# Patient Record
Sex: MALE | Race: WHITE | NOT HISPANIC OR LATINO | URBAN - METROPOLITAN AREA
[De-identification: names, ages, dates, MRNs, and addresses within clinical notes are randomized per-mention and may not be internally consistent; named-entity substitution may affect disease eponyms.]

---

## 2017-01-09 ENCOUNTER — INPATIENT (INPATIENT)
Facility: HOSPITAL | Age: 77
LOS: 2 days | Discharge: NURSING, REHAB OR LONG-TERM CARE | End: 2017-01-12
Attending: HOSPITALIST | Admitting: HOSPITALIST
Payer: MEDICARE

## 2017-01-09 VITALS
TEMPERATURE: 100 F | RESPIRATION RATE: 20 BRPM | DIASTOLIC BLOOD PRESSURE: 70 MMHG | HEART RATE: 76 BPM | SYSTOLIC BLOOD PRESSURE: 143 MMHG | OXYGEN SATURATION: 94 %

## 2017-01-09 DIAGNOSIS — Z87.19 PERSONAL HISTORY OF OTHER DISEASES OF THE DIGESTIVE SYSTEM: Chronic | ICD-10-CM

## 2017-01-09 DIAGNOSIS — J84.10 PULMONARY FIBROSIS, UNSPECIFIED: ICD-10-CM

## 2017-01-09 DIAGNOSIS — A41.9 SEPSIS, UNSPECIFIED ORGANISM: ICD-10-CM

## 2017-01-09 DIAGNOSIS — Z29.9 ENCOUNTER FOR PROPHYLACTIC MEASURES, UNSPECIFIED: ICD-10-CM

## 2017-01-09 DIAGNOSIS — Z90.49 ACQUIRED ABSENCE OF OTHER SPECIFIED PARTS OF DIGESTIVE TRACT: Chronic | ICD-10-CM

## 2017-01-09 DIAGNOSIS — G47.33 OBSTRUCTIVE SLEEP APNEA (ADULT) (PEDIATRIC): ICD-10-CM

## 2017-01-09 DIAGNOSIS — N30.01 ACUTE CYSTITIS WITH HEMATURIA: ICD-10-CM

## 2017-01-09 DIAGNOSIS — N30.00 ACUTE CYSTITIS WITHOUT HEMATURIA: ICD-10-CM

## 2017-01-09 LAB
ALBUMIN SERPL ELPH-MCNC: 3.5 G/DL — SIGNIFICANT CHANGE UP (ref 3.3–5)
ALP SERPL-CCNC: 80 U/L — SIGNIFICANT CHANGE UP (ref 40–120)
ALT FLD-CCNC: 18 U/L — SIGNIFICANT CHANGE UP (ref 4–41)
APPEARANCE UR: SIGNIFICANT CHANGE UP
AST SERPL-CCNC: 28 U/L — SIGNIFICANT CHANGE UP (ref 4–40)
BASE EXCESS BLDV CALC-SCNC: 6 MMOL/L — SIGNIFICANT CHANGE UP
BASOPHILS # BLD AUTO: 0.04 K/UL — SIGNIFICANT CHANGE UP (ref 0–0.2)
BASOPHILS NFR BLD AUTO: 0.2 % — SIGNIFICANT CHANGE UP (ref 0–2)
BILIRUB SERPL-MCNC: 1.1 MG/DL — SIGNIFICANT CHANGE UP (ref 0.2–1.2)
BILIRUB UR-MCNC: NEGATIVE — SIGNIFICANT CHANGE UP
BLOOD GAS VENOUS - CREATININE: 0.63 MG/DL — SIGNIFICANT CHANGE UP (ref 0.5–1.3)
BLOOD UR QL VISUAL: HIGH
BUN SERPL-MCNC: 9 MG/DL — SIGNIFICANT CHANGE UP (ref 7–23)
CALCIUM SERPL-MCNC: 8.8 MG/DL — SIGNIFICANT CHANGE UP (ref 8.4–10.5)
CHLORIDE BLDV-SCNC: 99 MMOL/L — SIGNIFICANT CHANGE UP (ref 96–108)
CHLORIDE SERPL-SCNC: 97 MMOL/L — LOW (ref 98–107)
CK MB BLD-MCNC: 1 NG/ML — SIGNIFICANT CHANGE UP (ref 1–6.6)
CK MB BLD-MCNC: SIGNIFICANT CHANGE UP (ref 0–2.5)
CK SERPL-CCNC: 38 U/L — SIGNIFICANT CHANGE UP (ref 30–200)
CO2 SERPL-SCNC: 24 MMOL/L — SIGNIFICANT CHANGE UP (ref 22–31)
COLOR SPEC: YELLOW — SIGNIFICANT CHANGE UP
CREAT SERPL-MCNC: 0.71 MG/DL — SIGNIFICANT CHANGE UP (ref 0.5–1.3)
EOSINOPHIL # BLD AUTO: 0.02 K/UL — SIGNIFICANT CHANGE UP (ref 0–0.5)
EOSINOPHIL NFR BLD AUTO: 0.1 % — SIGNIFICANT CHANGE UP (ref 0–6)
GAS PNL BLDV: 135 MMOL/L — LOW (ref 136–146)
GLUCOSE BLDV-MCNC: 109 — HIGH (ref 70–99)
GLUCOSE SERPL-MCNC: 109 MG/DL — HIGH (ref 70–99)
GLUCOSE UR-MCNC: NEGATIVE — SIGNIFICANT CHANGE UP
HCO3 BLDV-SCNC: 28 MMOL/L — HIGH (ref 20–27)
HCT VFR BLD CALC: 44.4 % — SIGNIFICANT CHANGE UP (ref 39–50)
HCT VFR BLDV CALC: 47 % — SIGNIFICANT CHANGE UP (ref 39–51)
HGB BLD-MCNC: 14.8 G/DL — SIGNIFICANT CHANGE UP (ref 13–17)
HGB BLDV-MCNC: 15.3 G/DL — SIGNIFICANT CHANGE UP (ref 13–17)
IMM GRANULOCYTES NFR BLD AUTO: 0.4 % — SIGNIFICANT CHANGE UP (ref 0–1.5)
KETONES UR-MCNC: NEGATIVE — SIGNIFICANT CHANGE UP
LACTATE BLDV-MCNC: 2 MMOL/L — SIGNIFICANT CHANGE UP (ref 0.5–2)
LEUKOCYTE ESTERASE UR-ACNC: HIGH
LYMPHOCYTES # BLD AUTO: 1.94 K/UL — SIGNIFICANT CHANGE UP (ref 1–3.3)
LYMPHOCYTES # BLD AUTO: 10.6 % — LOW (ref 13–44)
MCHC RBC-ENTMCNC: 31 PG — SIGNIFICANT CHANGE UP (ref 27–34)
MCHC RBC-ENTMCNC: 33.3 % — SIGNIFICANT CHANGE UP (ref 32–36)
MCV RBC AUTO: 93.1 FL — SIGNIFICANT CHANGE UP (ref 80–100)
MONOCYTES # BLD AUTO: 1.28 K/UL — HIGH (ref 0–0.9)
MONOCYTES NFR BLD AUTO: 7 % — SIGNIFICANT CHANGE UP (ref 2–14)
MUCOUS THREADS # UR AUTO: SIGNIFICANT CHANGE UP
NEUTROPHILS # BLD AUTO: 14.97 K/UL — HIGH (ref 1.8–7.4)
NEUTROPHILS NFR BLD AUTO: 81.7 % — HIGH (ref 43–77)
NITRITE UR-MCNC: POSITIVE — HIGH
PCO2 BLDV: 47 MMHG — SIGNIFICANT CHANGE UP (ref 41–51)
PH BLDV: 7.43 PH — SIGNIFICANT CHANGE UP (ref 7.32–7.43)
PH UR: 6.5 — SIGNIFICANT CHANGE UP (ref 4.6–8)
PLATELET # BLD AUTO: 145 K/UL — LOW (ref 150–400)
PMV BLD: 13.5 FL — HIGH (ref 7–13)
PO2 BLDV: 32 MMHG — LOW (ref 35–40)
POTASSIUM BLDV-SCNC: 3.8 MMOL/L — SIGNIFICANT CHANGE UP (ref 3.4–4.5)
POTASSIUM SERPL-MCNC: 4.3 MMOL/L — SIGNIFICANT CHANGE UP (ref 3.5–5.3)
POTASSIUM SERPL-SCNC: 4.3 MMOL/L — SIGNIFICANT CHANGE UP (ref 3.5–5.3)
PROT SERPL-MCNC: 7.5 G/DL — SIGNIFICANT CHANGE UP (ref 6–8.3)
PROT UR-MCNC: 100 — SIGNIFICANT CHANGE UP
RBC # BLD: 4.77 M/UL — SIGNIFICANT CHANGE UP (ref 4.2–5.8)
RBC # FLD: 13.3 % — SIGNIFICANT CHANGE UP (ref 10.3–14.5)
RBC CASTS # UR COMP ASSIST: >50 — HIGH (ref 0–?)
SAO2 % BLDV: 58 % — LOW (ref 60–85)
SODIUM SERPL-SCNC: 137 MMOL/L — SIGNIFICANT CHANGE UP (ref 135–145)
SP GR SPEC: 1.02 — SIGNIFICANT CHANGE UP (ref 1–1.03)
SQUAMOUS # UR AUTO: SIGNIFICANT CHANGE UP
TROPONIN T SERPL-MCNC: < 0.06 NG/ML — SIGNIFICANT CHANGE UP (ref 0–0.06)
UROBILINOGEN FLD QL: NORMAL E.U. — SIGNIFICANT CHANGE UP (ref 0.1–0.2)
WBC # BLD: 18.32 K/UL — HIGH (ref 3.8–10.5)
WBC # FLD AUTO: 18.32 K/UL — HIGH (ref 3.8–10.5)
WBC CLUMPS #/AREA URNS HPF: PRESENT — HIGH (ref 0–?)
WBC UR QL: >50 — HIGH (ref 0–?)

## 2017-01-09 PROCEDURE — 71010: CPT | Mod: 26

## 2017-01-09 PROCEDURE — 99223 1ST HOSP IP/OBS HIGH 75: CPT | Mod: AI,GC

## 2017-01-09 RX ORDER — ACETYLCYSTEINE 200 MG/ML
1200 VIAL (ML) MISCELLANEOUS DAILY
Qty: 0 | Refills: 0 | Status: DISCONTINUED | OUTPATIENT
Start: 2017-01-09 | End: 2017-01-12

## 2017-01-09 RX ORDER — CEFTRIAXONE 500 MG/1
1 INJECTION, POWDER, FOR SOLUTION INTRAMUSCULAR; INTRAVENOUS EVERY 24 HOURS
Qty: 0 | Refills: 0 | Status: DISCONTINUED | OUTPATIENT
Start: 2017-01-10 | End: 2017-01-12

## 2017-01-09 RX ORDER — ENOXAPARIN SODIUM 100 MG/ML
40 INJECTION SUBCUTANEOUS DAILY
Qty: 0 | Refills: 0 | Status: DISCONTINUED | OUTPATIENT
Start: 2017-01-09 | End: 2017-01-12

## 2017-01-09 RX ORDER — PANTOPRAZOLE SODIUM 20 MG/1
40 TABLET, DELAYED RELEASE ORAL
Qty: 0 | Refills: 0 | Status: DISCONTINUED | OUTPATIENT
Start: 2017-01-09 | End: 2017-01-12

## 2017-01-09 RX ORDER — CEFTRIAXONE 500 MG/1
1 INJECTION, POWDER, FOR SOLUTION INTRAMUSCULAR; INTRAVENOUS EVERY 24 HOURS
Qty: 0 | Refills: 0 | Status: DISCONTINUED | OUTPATIENT
Start: 2017-01-10 | End: 2017-01-09

## 2017-01-09 RX ORDER — SODIUM CHLORIDE 9 MG/ML
1000 INJECTION INTRAMUSCULAR; INTRAVENOUS; SUBCUTANEOUS ONCE
Qty: 0 | Refills: 0 | Status: COMPLETED | OUTPATIENT
Start: 2017-01-09 | End: 2017-01-09

## 2017-01-09 RX ORDER — CEFTRIAXONE 500 MG/1
1 INJECTION, POWDER, FOR SOLUTION INTRAMUSCULAR; INTRAVENOUS ONCE
Qty: 0 | Refills: 0 | Status: COMPLETED | OUTPATIENT
Start: 2017-01-09 | End: 2017-01-09

## 2017-01-09 RX ORDER — DOCUSATE SODIUM 100 MG
100 CAPSULE ORAL
Qty: 0 | Refills: 0 | Status: DISCONTINUED | OUTPATIENT
Start: 2017-01-09 | End: 2017-01-12

## 2017-01-09 RX ORDER — ATORVASTATIN CALCIUM 80 MG/1
20 TABLET, FILM COATED ORAL AT BEDTIME
Qty: 0 | Refills: 0 | Status: DISCONTINUED | OUTPATIENT
Start: 2017-01-09 | End: 2017-01-12

## 2017-01-09 RX ORDER — ASPIRIN/CALCIUM CARB/MAGNESIUM 324 MG
81 TABLET ORAL DAILY
Qty: 0 | Refills: 0 | Status: DISCONTINUED | OUTPATIENT
Start: 2017-01-09 | End: 2017-01-12

## 2017-01-09 RX ORDER — ACETAMINOPHEN 500 MG
650 TABLET ORAL ONCE
Qty: 0 | Refills: 0 | Status: COMPLETED | OUTPATIENT
Start: 2017-01-09 | End: 2017-01-09

## 2017-01-09 RX ORDER — TAMSULOSIN HYDROCHLORIDE 0.4 MG/1
0.4 CAPSULE ORAL AT BEDTIME
Qty: 0 | Refills: 0 | Status: DISCONTINUED | OUTPATIENT
Start: 2017-01-09 | End: 2017-01-12

## 2017-01-09 RX ORDER — ACETAMINOPHEN 500 MG
650 TABLET ORAL EVERY 6 HOURS
Qty: 0 | Refills: 0 | Status: DISCONTINUED | OUTPATIENT
Start: 2017-01-09 | End: 2017-01-12

## 2017-01-09 RX ADMIN — CEFTRIAXONE 100 GRAM(S): 500 INJECTION, POWDER, FOR SOLUTION INTRAMUSCULAR; INTRAVENOUS at 16:32

## 2017-01-09 RX ADMIN — Medication 650 MILLIGRAM(S): at 22:27

## 2017-01-09 RX ADMIN — Medication 650 MILLIGRAM(S): at 15:02

## 2017-01-09 RX ADMIN — TAMSULOSIN HYDROCHLORIDE 0.4 MILLIGRAM(S): 0.4 CAPSULE ORAL at 21:00

## 2017-01-09 RX ADMIN — SODIUM CHLORIDE 1000 MILLILITER(S): 9 INJECTION INTRAMUSCULAR; INTRAVENOUS; SUBCUTANEOUS at 13:43

## 2017-01-09 RX ADMIN — Medication 100 MILLIGRAM(S): at 21:00

## 2017-01-09 RX ADMIN — Medication 650 MILLIGRAM(S): at 16:32

## 2017-01-09 RX ADMIN — ENOXAPARIN SODIUM 40 MILLIGRAM(S): 100 INJECTION SUBCUTANEOUS at 18:33

## 2017-01-09 RX ADMIN — ATORVASTATIN CALCIUM 20 MILLIGRAM(S): 80 TABLET, FILM COATED ORAL at 21:00

## 2017-01-09 RX ADMIN — Medication 1200 MILLIGRAM(S): at 22:27

## 2017-01-09 NOTE — H&P ADULT. - FAMILY HISTORY
Sibling  Still living? Unknown  Family history of myocardial infarction, Age at diagnosis: Age Unknown  Family history of colon cancer, Age at diagnosis: Age Unknown

## 2017-01-09 NOTE — ED ADULT NURSE NOTE - OBJECTIVE STATEMENT
75 y/o male pt brought in to rm 3, accompanied by family members, aox3. Pt reports generalized weakness that started this morning, unable to position self on a chair, pt skin warm and dry in triage, rectal temp taken rectally 102. Pt denies any other complaints, SL placed, labs sent, fluids infusing, NAD, will monitor

## 2017-01-09 NOTE — ED PROVIDER NOTE - OBJECTIVE STATEMENT
Pt is a 71 y/o male with PMH of HLD, ?Cardiac hx, BRIEN (on nasal CPAP) p/w generalized weakness today. patient lives w/ wife and had difficulty bearing beign, patient at baseline. Not eating / drinking well as usual. NO other GI or  complaints. No recent sickness. Pt is a 71 y/o male with PMH of HLD, ?Cardiac hx, BRIEN (on nasal CPAP) p/w generalized weakness today. Noted to have difficulty bearing weight and decreased PO intake. Denies chest pain or shortness of breath. Wife reporting that he is at baseline.  NO other GI or  complaints. No recent sickness.    patient lives w/ wife who provided most of the history. Pt is a 73 y/o male with PMH of HLD, ?Cardiac hx, BRIEN (on nasal CPAP) p/w generalized weakness today. Noted to have difficulty bearing weight and decreased PO intake. Denies chest pain or shortness of breath. Wife reporting that he is at baseline.  NO other GI or  complaints. No recent sickness.  patient lives w/ wife who provided most of the history.

## 2017-01-09 NOTE — ED ADULT TRIAGE NOTE - CHIEF COMPLAINT QUOTE
Pt with c/o feeling unsteady this morning. Pt with fever o2 sat 94% in triage placed on 02. Pt c/o feeling tired.

## 2017-01-09 NOTE — H&P ADULT. - ASSESSMENT
Pt. is a 75y/o M w/ PMHx of mild dementia at baseline, HLD, BRIEN (on nasal CPAP), Pulmonary     Fibrosis, RBBB, and multiple UTIs in the past (most recently in late December 2016) who presents for     generalized weakness and fever since this morning found to have a UTI.

## 2017-01-09 NOTE — ED PROVIDER NOTE - ATTENDING CONTRIBUTION TO CARE
Case of a  71 y/o male with PMH of HLD, BRIEN (on nasal CPAP) presenting with generalized weakness and fever, physical exam within normal although patient coughing. R/O Pneumonia vs UTI, will do labs, X-ray and monitor patient closely. Agree with resident's physical exam, assessment and documentation

## 2017-01-09 NOTE — H&P ADULT. - ATTENDING COMMENTS
Pt was seen and examined by me on date of admission, 1/9/17.  Case d/w housestaff and I agree with findings and plan as detailed above.

## 2017-01-09 NOTE — H&P ADULT. - HISTORY OF PRESENT ILLNESS
HPI: Pt. is a 75y/o M w/ PMHx of mild dementia at baseline, HLD, BRIEN (on nasal CPAP), Pulmonary Fibrosis, RBBB, and multiple UTIs in the past (most recently in late December 2016) who presents for generalized weakness and fever since this morning. As per wife, Pt. had difficulty ambulating to the dining table this morning and was unable to stand up from the chair. Pt. has had decreased appetite for past few days and nausea since last night. Denies any vomiting.   Pt. also reports having burning sensation on urination and foul smelling urine this morning. Pt. was recently treated for a UTI in late December, completing 10 day Ciprofloxacin course on Dec. 29th. Cultures collected by Urologist were negative on January 3rd, 2017.   Pt. Denies any CP, HA, cough, Pt. is a 77y/o M w/ PMHx of mild dementia at baseline, HLD, BRIEN (on nasal CPAP), Pulmonary     Fibrosis, RBBB, and multiple UTIs in the past (most recently in late December 2016) who presents for     generalized weakness and fever since this morning. As per wife, Pt. had difficulty ambulating to the     dining table this morning and was unable to stand up from the chair. Pt. has had decreased appetite     for past few days and nausea since last night. Denies any vomiting.     Pt. also reports having burning sensation on urination and foul smelling urine this morning. Pt. was     recently treated for a UTI in late December, completing 10 day Ciprofloxacin course on Dec. 29th.     Cultures collected by Urologist were negative on January 3rd, 2017. Pt. was last hospitalized in 2012     for UTI and has had multiple UTI and outpatient Tx since than.     Pt. had endorses some SOB but Denies any CP, HA, cough, chills, diarrhea, constipation, rashes, back     pain, abdominal pain, no LOC, no falls, no facial drooping, no shaking.     As per wife Pt. is independent in all ADL, ambulates well on cane.     ED Course:  -Vitals: T=99.7-102, HR=76, QV=774/70, RR=20 on room air, 16 on O2, SpO2=94%  -UA showed moderate blood, Nitrite+, High Leukocyte Esterase.   -CXR showed signs of pulm fibrosis/atelectasis in the bases but no focal consolidation.   -cardiac enzymes WNL    -Tx: 1g Ceftriaxone IV, 650mg Tylenol PO

## 2017-01-09 NOTE — H&P ADULT. - PROBLEM SELECTOR PLAN 1
Ua positive, frequent UTI, symptomatic UTI with sepsis.   - C/W CTX, f/u urine and blood cultures.   - Complicated UTI will treat for 7 days.

## 2017-01-09 NOTE — ED PROVIDER NOTE - MEDICAL DECISION MAKING DETAILS
77 y/o male w/ generalized weakness. Given hx and physical, likely UTI, will evaluate for other sources of infection. xray chest, basic labs, abx, plan for admission

## 2017-01-10 LAB
HCT VFR BLD CALC: 41.5 % — SIGNIFICANT CHANGE UP (ref 39–50)
HGB BLD-MCNC: 13.6 G/DL — SIGNIFICANT CHANGE UP (ref 13–17)
MCHC RBC-ENTMCNC: 30.8 PG — SIGNIFICANT CHANGE UP (ref 27–34)
MCHC RBC-ENTMCNC: 32.8 % — SIGNIFICANT CHANGE UP (ref 32–36)
MCV RBC AUTO: 94.1 FL — SIGNIFICANT CHANGE UP (ref 80–100)
PLATELET # BLD AUTO: 126 K/UL — LOW (ref 150–400)
PMV BLD: 14.4 FL — HIGH (ref 7–13)
RBC # BLD: 4.41 M/UL — SIGNIFICANT CHANGE UP (ref 4.2–5.8)
RBC # FLD: 13.7 % — SIGNIFICANT CHANGE UP (ref 10.3–14.5)
SPECIMEN SOURCE: SIGNIFICANT CHANGE UP
WBC # BLD: 21.22 K/UL — HIGH (ref 3.8–10.5)
WBC # FLD AUTO: 21.22 K/UL — HIGH (ref 3.8–10.5)

## 2017-01-10 PROCEDURE — 99233 SBSQ HOSP IP/OBS HIGH 50: CPT | Mod: GC

## 2017-01-10 RX ORDER — SODIUM CHLORIDE 9 MG/ML
1000 INJECTION INTRAMUSCULAR; INTRAVENOUS; SUBCUTANEOUS
Qty: 0 | Refills: 0 | Status: DISCONTINUED | OUTPATIENT
Start: 2017-01-10 | End: 2017-01-11

## 2017-01-10 RX ADMIN — Medication 100 MILLIGRAM(S): at 17:03

## 2017-01-10 RX ADMIN — TAMSULOSIN HYDROCHLORIDE 0.4 MILLIGRAM(S): 0.4 CAPSULE ORAL at 21:21

## 2017-01-10 RX ADMIN — ENOXAPARIN SODIUM 40 MILLIGRAM(S): 100 INJECTION SUBCUTANEOUS at 11:58

## 2017-01-10 RX ADMIN — Medication 100 MILLIGRAM(S): at 05:11

## 2017-01-10 RX ADMIN — CEFTRIAXONE 100 GRAM(S): 500 INJECTION, POWDER, FOR SOLUTION INTRAMUSCULAR; INTRAVENOUS at 17:04

## 2017-01-10 RX ADMIN — ATORVASTATIN CALCIUM 20 MILLIGRAM(S): 80 TABLET, FILM COATED ORAL at 21:21

## 2017-01-10 RX ADMIN — PANTOPRAZOLE SODIUM 40 MILLIGRAM(S): 20 TABLET, DELAYED RELEASE ORAL at 05:11

## 2017-01-10 RX ADMIN — Medication 1200 MILLIGRAM(S): at 11:58

## 2017-01-10 RX ADMIN — SODIUM CHLORIDE 75 MILLILITER(S): 9 INJECTION INTRAMUSCULAR; INTRAVENOUS; SUBCUTANEOUS at 10:12

## 2017-01-10 RX ADMIN — Medication 650 MILLIGRAM(S): at 17:50

## 2017-01-10 RX ADMIN — Medication 81 MILLIGRAM(S): at 11:58

## 2017-01-11 LAB
-  AMIKACIN: SIGNIFICANT CHANGE UP
-  AMPICILLIN/SULBACTAM: SIGNIFICANT CHANGE UP
-  AMPICILLIN: SIGNIFICANT CHANGE UP
-  AZTREONAM: SIGNIFICANT CHANGE UP
-  CEFAZOLIN: SIGNIFICANT CHANGE UP
-  CEFEPIME: SIGNIFICANT CHANGE UP
-  CEFOXITIN: SIGNIFICANT CHANGE UP
-  CEFTAZIDIME: SIGNIFICANT CHANGE UP
-  CEFTRIAXONE: SIGNIFICANT CHANGE UP
-  CIPROFLOXACIN: SIGNIFICANT CHANGE UP
-  ERTAPENEM: SIGNIFICANT CHANGE UP
-  GENTAMICIN: SIGNIFICANT CHANGE UP
-  IMIPENEM: SIGNIFICANT CHANGE UP
-  LEVOFLOXACIN: SIGNIFICANT CHANGE UP
-  MEROPENEM: SIGNIFICANT CHANGE UP
-  NITROFURANTOIN: SIGNIFICANT CHANGE UP
-  PIPERACILLIN/TAZOBACTAM: SIGNIFICANT CHANGE UP
-  TIGECYCLINE: SIGNIFICANT CHANGE UP
-  TOBRAMYCIN: SIGNIFICANT CHANGE UP
-  TRIMETHOPRIM/SULFAMETHOXAZOLE: SIGNIFICANT CHANGE UP
APTT BLD: 32.6 SEC — SIGNIFICANT CHANGE UP (ref 27.5–37.4)
BACTERIA UR CULT: SIGNIFICANT CHANGE UP
BASE EXCESS BLDA CALC-SCNC: 0.6 MMOL/L — SIGNIFICANT CHANGE UP
BASOPHILS # BLD AUTO: 0.02 K/UL — SIGNIFICANT CHANGE UP (ref 0–0.2)
BASOPHILS NFR BLD AUTO: 0.2 % — SIGNIFICANT CHANGE UP (ref 0–2)
EOSINOPHIL # BLD AUTO: 0.01 K/UL — SIGNIFICANT CHANGE UP (ref 0–0.5)
EOSINOPHIL NFR BLD AUTO: 0.1 % — SIGNIFICANT CHANGE UP (ref 0–6)
HCO3 BLDA-SCNC: 26 MMOL/L — SIGNIFICANT CHANGE UP (ref 22–26)
HCT VFR BLD CALC: 39.2 % — SIGNIFICANT CHANGE UP (ref 39–50)
HGB BLD-MCNC: 13.1 G/DL — SIGNIFICANT CHANGE UP (ref 13–17)
IMM GRANULOCYTES NFR BLD AUTO: 0.5 % — SIGNIFICANT CHANGE UP (ref 0–1.5)
INR BLD: 1.27 — HIGH (ref 0.87–1.18)
LYMPHOCYTES # BLD AUTO: 1.51 K/UL — SIGNIFICANT CHANGE UP (ref 1–3.3)
LYMPHOCYTES # BLD AUTO: 12.5 % — LOW (ref 13–44)
MCHC RBC-ENTMCNC: 31.2 PG — SIGNIFICANT CHANGE UP (ref 27–34)
MCHC RBC-ENTMCNC: 33.4 % — SIGNIFICANT CHANGE UP (ref 32–36)
MCV RBC AUTO: 93.3 FL — SIGNIFICANT CHANGE UP (ref 80–100)
METHOD TYPE: SIGNIFICANT CHANGE UP
MONOCYTES # BLD AUTO: 0.62 K/UL — SIGNIFICANT CHANGE UP (ref 0–0.9)
MONOCYTES NFR BLD AUTO: 5.1 % — SIGNIFICANT CHANGE UP (ref 2–14)
NEUTROPHILS # BLD AUTO: 9.86 K/UL — HIGH (ref 1.8–7.4)
NEUTROPHILS NFR BLD AUTO: 81.6 % — HIGH (ref 43–77)
ORGANISM # SPEC MICROSCOPIC CNT: SIGNIFICANT CHANGE UP
ORGANISM # SPEC MICROSCOPIC CNT: SIGNIFICANT CHANGE UP
PCO2 BLDA: 32 MMHG — LOW (ref 35–48)
PH BLDA: 7.48 PH — HIGH (ref 7.35–7.45)
PLATELET # BLD AUTO: 102 K/UL — LOW (ref 150–400)
PMV BLD: 14 FL — HIGH (ref 7–13)
PO2 BLDA: 113 MMHG — HIGH (ref 83–108)
PROTHROM AB SERPL-ACNC: 14.5 SEC — HIGH (ref 10–13.1)
RBC # BLD: 4.2 M/UL — SIGNIFICANT CHANGE UP (ref 4.2–5.8)
RBC # FLD: 13.7 % — SIGNIFICANT CHANGE UP (ref 10.3–14.5)
SAO2 % BLDA: 98.9 % — SIGNIFICANT CHANGE UP (ref 95–99)
WBC # BLD: 12.08 K/UL — HIGH (ref 3.8–10.5)
WBC # FLD AUTO: 12.08 K/UL — HIGH (ref 3.8–10.5)

## 2017-01-11 PROCEDURE — 76770 US EXAM ABDO BACK WALL COMP: CPT | Mod: 26

## 2017-01-11 PROCEDURE — 99233 SBSQ HOSP IP/OBS HIGH 50: CPT

## 2017-01-11 RX ORDER — SODIUM CHLORIDE 9 MG/ML
1000 INJECTION INTRAMUSCULAR; INTRAVENOUS; SUBCUTANEOUS
Qty: 0 | Refills: 0 | Status: COMPLETED | OUTPATIENT
Start: 2017-01-11 | End: 2017-01-11

## 2017-01-11 RX ADMIN — TAMSULOSIN HYDROCHLORIDE 0.4 MILLIGRAM(S): 0.4 CAPSULE ORAL at 21:47

## 2017-01-11 RX ADMIN — Medication 81 MILLIGRAM(S): at 12:05

## 2017-01-11 RX ADMIN — Medication 1200 MILLIGRAM(S): at 12:05

## 2017-01-11 RX ADMIN — Medication 100 MILLIGRAM(S): at 17:48

## 2017-01-11 RX ADMIN — ATORVASTATIN CALCIUM 20 MILLIGRAM(S): 80 TABLET, FILM COATED ORAL at 21:47

## 2017-01-11 RX ADMIN — CEFTRIAXONE 100 GRAM(S): 500 INJECTION, POWDER, FOR SOLUTION INTRAMUSCULAR; INTRAVENOUS at 17:47

## 2017-01-11 RX ADMIN — PANTOPRAZOLE SODIUM 40 MILLIGRAM(S): 20 TABLET, DELAYED RELEASE ORAL at 05:31

## 2017-01-11 RX ADMIN — Medication 650 MILLIGRAM(S): at 02:30

## 2017-01-11 RX ADMIN — SODIUM CHLORIDE 75 MILLILITER(S): 9 INJECTION INTRAMUSCULAR; INTRAVENOUS; SUBCUTANEOUS at 21:47

## 2017-01-11 RX ADMIN — ENOXAPARIN SODIUM 40 MILLIGRAM(S): 100 INJECTION SUBCUTANEOUS at 12:05

## 2017-01-11 RX ADMIN — Medication 100 MILLIGRAM(S): at 05:31

## 2017-01-12 ENCOUNTER — TRANSCRIPTION ENCOUNTER (OUTPATIENT)
Age: 77
End: 2017-01-12

## 2017-01-12 VITALS
RESPIRATION RATE: 17 BRPM | SYSTOLIC BLOOD PRESSURE: 97 MMHG | TEMPERATURE: 99 F | DIASTOLIC BLOOD PRESSURE: 62 MMHG | HEART RATE: 88 BPM | OXYGEN SATURATION: 97 %

## 2017-01-12 LAB
HCT VFR BLD CALC: 39.3 % — SIGNIFICANT CHANGE UP (ref 39–50)
HGB BLD-MCNC: 12.8 G/DL — LOW (ref 13–17)
MCHC RBC-ENTMCNC: 30.8 PG — SIGNIFICANT CHANGE UP (ref 27–34)
MCHC RBC-ENTMCNC: 32.6 % — SIGNIFICANT CHANGE UP (ref 32–36)
MCV RBC AUTO: 94.5 FL — SIGNIFICANT CHANGE UP (ref 80–100)
PLATELET # BLD AUTO: 111 K/UL — LOW (ref 150–400)
PMV BLD: 13.6 FL — HIGH (ref 7–13)
RBC # BLD: 4.16 M/UL — LOW (ref 4.2–5.8)
RBC # FLD: 13.8 % — SIGNIFICANT CHANGE UP (ref 10.3–14.5)
WBC # BLD: 8.04 K/UL — SIGNIFICANT CHANGE UP (ref 3.8–10.5)
WBC # FLD AUTO: 8.04 K/UL — SIGNIFICANT CHANGE UP (ref 3.8–10.5)

## 2017-01-12 PROCEDURE — 99239 HOSP IP/OBS DSCHRG MGMT >30: CPT

## 2017-01-12 RX ADMIN — ENOXAPARIN SODIUM 40 MILLIGRAM(S): 100 INJECTION SUBCUTANEOUS at 12:56

## 2017-01-12 RX ADMIN — Medication 81 MILLIGRAM(S): at 12:56

## 2017-01-12 RX ADMIN — PANTOPRAZOLE SODIUM 40 MILLIGRAM(S): 20 TABLET, DELAYED RELEASE ORAL at 05:21

## 2017-01-12 RX ADMIN — Medication 100 MILLIGRAM(S): at 05:21

## 2017-01-12 RX ADMIN — Medication 1200 MILLIGRAM(S): at 15:14

## 2017-01-12 NOTE — DISCHARGE NOTE ADULT - MEDICATION SUMMARY - MEDICATIONS TO TAKE
I will START or STAY ON the medications listed below when I get home from the hospital:    Aspir 81 oral delayed release tablet  -- 1 tab(s) by mouth once a day  -- Indication: For Cardiac PPX    tamsulosin 0.4 mg oral capsule  -- 2 cap(s) by mouth once a day  -- Indication: For BPH     mg oral capsule  -- 2 cap(s) by mouth once a day  -- Indication: For Pulmonary fibrosis    Livalo 2 mg oral tablet  -- 1 tab(s) by mouth once a day  -- Indication: For Hyperlipidemia    Foradil Aerolizer 12 mcg inhalation capsule  -- 1 cap(s) inhaled every 12 hours  -- Indication: For Obstructive sleep apnea on CPAP    cefpodoxime 100 mg oral tablet  -- 1 tab(s) by mouth every 12 hours until 1/15/17  -- Indication: For UTI    armodafinil 50 mg oral tablet  -- 1 tab(s) by mouth once a day  -- Indication: For Obstructive sleep apnea on CPAP    raNITIdine 300 mg oral capsule  -- 1 cap(s) by mouth once a day (at bedtime)  -- Indication: For GERD    cranberry oral capsule  -- take 4200mg twice daily   -- Indication: For UTI    docusate sodium 100 mg oral capsule  -- 1 cap(s) by mouth 2 times a day  -- Indication: For Constipation    omeprazole 40 mg oral delayed release capsule  -- 1 cap(s) by mouth once a day  -- Indication: For GERD

## 2017-01-12 NOTE — DISCHARGE NOTE ADULT - HOSPITAL COURSE
HPI  Pt. is a 75y/o M w/ PMHx of mild dementia at baseline, HLD, BRIEN (on nasal CPAP), Pulmonary     Fibrosis, RBBB, and multiple UTIs in the past (most recently in late December 2016) who presents for     generalized weakness and fever since this morning. As per wife, Pt. had difficulty ambulating to the     dining table this morning and was unable to stand up from the chair. Pt. has had decreased appetite     for past few days and nausea since last night. Denies any vomiting.     Pt. also reports having burning sensation on urination and foul smelling urine this morning. Pt. was     recently treated for a UTI in late December, completing 10 day Ciprofloxacin course on Dec. 29th.     Cultures collected by Urologist were negative on January 3rd, 2017. Pt. was last hospitalized in 2012     for UTI and has had multiple UTI and outpatient Tx since than.     Hospital course.   The patient was admitted to medicine for further care of his UTI. The patient was started on CTX for UTI. Urine cultures grew Ecoli only resistant to cipro and levaquin. Blood cultures were negative. US of the kidney and bladder was normal. The patient had an episode of delirum in the hospital and was monitored. Likely 2/2 to infection, hospital setting, mild dementia and medication (nuvagil) withdrawal. By discharge the patient's mental status improved. The patient was discharged in a stable condition to rehab as rec by PT. He needs to continue the use of his bipap QHS for BRIEN, follow up with his pmd post d/c from [place] and finish his course of antibiotics.

## 2017-01-12 NOTE — DISCHARGE NOTE ADULT - PATIENT PORTAL LINK FT
“You can access the Follow[facility] Patient Portal, offered by [facility], by registering with the following website: [email or web address]”

## 2017-01-12 NOTE — DISCHARGE NOTE ADULT - PLAN OF CARE
Resolution and completion of your antibiotics. Please continue to take your antbiotics til 1/15/17. Please follow up with your urologist for your recurrent UTI's. If recurrent fevers, chills, dysuria or flank pain please call your doctor. Please continue to use your BIPAP at night time. Your CPAP settings should be @ 6 at FIO2 of 40. Please follow up with your pulmonologist.

## 2017-01-12 NOTE — DISCHARGE NOTE ADULT - CARE PROVIDER_API CALL
Te Prieto), Internal Medicine; Pulmonary Disease  [address] [place] [id number]  Phone: [phone]  Fax: [phone]

## 2017-01-12 NOTE — DISCHARGE NOTE ADULT - CARE PLAN
Principal Discharge DX:	Acute cystitis with hematuria  Goal:	Resolution and completion of your antibiotics.  Instructions for follow-up, activity and diet:	Please continue to take your antbiotics til 1/15/17. Please follow up with your urologist for your recurrent UTI's. If recurrent fevers, chills, dysuria or flank pain please call your doctor.  Secondary Diagnosis:	Obstructive sleep apnea on CPAP  Instructions for follow-up, activity and diet:	Please continue to use your BIPAP at night time. Your CPAP settings should be @ 6 at FIO2 of 40.  Secondary Diagnosis:	Pulmonary fibrosis  Instructions for follow-up, activity and diet:	Please follow up with your pulmonologist.

## 2017-01-14 LAB
BACTERIA BLD CULT: SIGNIFICANT CHANGE UP
BACTERIA BLD CULT: SIGNIFICANT CHANGE UP

## 2017-03-08 ENCOUNTER — APPOINTMENT (OUTPATIENT)
Dept: PULMONOLOGY | Facility: CLINIC | Age: 77
End: 2017-03-08

## 2017-03-08 VITALS
WEIGHT: 204 LBS | HEART RATE: 83 BPM | BODY MASS INDEX: 27.63 KG/M2 | RESPIRATION RATE: 17 BRPM | HEIGHT: 72 IN | OXYGEN SATURATION: 96 % | DIASTOLIC BLOOD PRESSURE: 66 MMHG | SYSTOLIC BLOOD PRESSURE: 108 MMHG

## 2017-03-22 ENCOUNTER — APPOINTMENT (OUTPATIENT)
Dept: PULMONOLOGY | Facility: CLINIC | Age: 77
End: 2017-03-22

## 2017-04-14 ENCOUNTER — RX RENEWAL (OUTPATIENT)
Age: 77
End: 2017-04-14

## 2017-06-08 ENCOUNTER — APPOINTMENT (OUTPATIENT)
Dept: PULMONOLOGY | Facility: CLINIC | Age: 77
End: 2017-06-08

## 2017-06-08 VITALS
RESPIRATION RATE: 17 BRPM | BODY MASS INDEX: 28.31 KG/M2 | WEIGHT: 209 LBS | HEART RATE: 78 BPM | DIASTOLIC BLOOD PRESSURE: 70 MMHG | OXYGEN SATURATION: 93 % | SYSTOLIC BLOOD PRESSURE: 110 MMHG | HEIGHT: 72 IN

## 2017-06-08 RX ORDER — MONTELUKAST SODIUM 10 MG/1
10 TABLET, FILM COATED ORAL
Qty: 1 | Refills: 1 | Status: ACTIVE | COMMUNITY
Start: 2017-06-08 | End: 1900-01-01

## 2017-07-26 ENCOUNTER — FORM ENCOUNTER (OUTPATIENT)
Age: 77
End: 2017-07-26

## 2017-07-27 ENCOUNTER — APPOINTMENT (OUTPATIENT)
Dept: CT IMAGING | Facility: IMAGING CENTER | Age: 77
End: 2017-07-27
Payer: MEDICARE

## 2017-07-27 ENCOUNTER — OUTPATIENT (OUTPATIENT)
Dept: OUTPATIENT SERVICES | Facility: HOSPITAL | Age: 77
LOS: 1 days | End: 2017-07-27
Payer: MEDICARE

## 2017-07-27 DIAGNOSIS — J84.9 INTERSTITIAL PULMONARY DISEASE, UNSPECIFIED: ICD-10-CM

## 2017-07-27 DIAGNOSIS — R06.02 SHORTNESS OF BREATH: ICD-10-CM

## 2017-07-27 DIAGNOSIS — Z87.19 PERSONAL HISTORY OF OTHER DISEASES OF THE DIGESTIVE SYSTEM: Chronic | ICD-10-CM

## 2017-07-27 DIAGNOSIS — Z90.49 ACQUIRED ABSENCE OF OTHER SPECIFIED PARTS OF DIGESTIVE TRACT: Chronic | ICD-10-CM

## 2017-07-27 PROCEDURE — 71250 CT THORAX DX C-: CPT | Mod: 26

## 2017-07-27 PROCEDURE — 71250 CT THORAX DX C-: CPT

## 2017-08-06 ENCOUNTER — INPATIENT (INPATIENT)
Facility: HOSPITAL | Age: 77
LOS: 2 days | Discharge: HOME | End: 2017-08-09
Attending: HOSPITALIST | Admitting: HOSPITALIST
Payer: MEDICARE

## 2017-08-06 ENCOUNTER — TRANSCRIPTION ENCOUNTER (OUTPATIENT)
Age: 77
End: 2017-08-06

## 2017-08-06 VITALS
RESPIRATION RATE: 20 BRPM | SYSTOLIC BLOOD PRESSURE: 115 MMHG | DIASTOLIC BLOOD PRESSURE: 65 MMHG | OXYGEN SATURATION: 98 % | HEART RATE: 83 BPM | TEMPERATURE: 100 F

## 2017-08-06 DIAGNOSIS — I48.91 UNSPECIFIED ATRIAL FIBRILLATION: ICD-10-CM

## 2017-08-06 DIAGNOSIS — J44.9 CHRONIC OBSTRUCTIVE PULMONARY DISEASE, UNSPECIFIED: ICD-10-CM

## 2017-08-06 DIAGNOSIS — G45.9 TRANSIENT CEREBRAL ISCHEMIC ATTACK, UNSPECIFIED: ICD-10-CM

## 2017-08-06 DIAGNOSIS — A41.9 SEPSIS, UNSPECIFIED ORGANISM: ICD-10-CM

## 2017-08-06 DIAGNOSIS — Z87.19 PERSONAL HISTORY OF OTHER DISEASES OF THE DIGESTIVE SYSTEM: Chronic | ICD-10-CM

## 2017-08-06 DIAGNOSIS — F03.90 UNSPECIFIED DEMENTIA, UNSPECIFIED SEVERITY, WITHOUT BEHAVIORAL DISTURBANCE, PSYCHOTIC DISTURBANCE, MOOD DISTURBANCE, AND ANXIETY: ICD-10-CM

## 2017-08-06 DIAGNOSIS — E78.5 HYPERLIPIDEMIA, UNSPECIFIED: ICD-10-CM

## 2017-08-06 DIAGNOSIS — G47.33 OBSTRUCTIVE SLEEP APNEA (ADULT) (PEDIATRIC): ICD-10-CM

## 2017-08-06 DIAGNOSIS — N39.0 URINARY TRACT INFECTION, SITE NOT SPECIFIED: ICD-10-CM

## 2017-08-06 DIAGNOSIS — Z29.9 ENCOUNTER FOR PROPHYLACTIC MEASURES, UNSPECIFIED: ICD-10-CM

## 2017-08-06 DIAGNOSIS — Z90.49 ACQUIRED ABSENCE OF OTHER SPECIFIED PARTS OF DIGESTIVE TRACT: Chronic | ICD-10-CM

## 2017-08-06 LAB
ALBUMIN SERPL ELPH-MCNC: 3.4 G/DL — SIGNIFICANT CHANGE UP (ref 3.3–5)
ALP SERPL-CCNC: 93 U/L — SIGNIFICANT CHANGE UP (ref 40–120)
ALT FLD-CCNC: 17 U/L — SIGNIFICANT CHANGE UP (ref 4–41)
APPEARANCE UR: SIGNIFICANT CHANGE UP
APTT BLD: 29.5 SEC — SIGNIFICANT CHANGE UP (ref 27.5–37.4)
AST SERPL-CCNC: 29 U/L — SIGNIFICANT CHANGE UP (ref 4–40)
BACTERIA # UR AUTO: SIGNIFICANT CHANGE UP
BASE EXCESS BLDV CALC-SCNC: 1.8 MMOL/L — SIGNIFICANT CHANGE UP
BASOPHILS # BLD AUTO: 0.07 K/UL — SIGNIFICANT CHANGE UP (ref 0–0.2)
BASOPHILS # BLD AUTO: 0.09 K/UL — SIGNIFICANT CHANGE UP (ref 0–0.2)
BASOPHILS NFR BLD AUTO: 0.4 % — SIGNIFICANT CHANGE UP (ref 0–2)
BASOPHILS NFR BLD AUTO: 0.5 % — SIGNIFICANT CHANGE UP (ref 0–2)
BILIRUB SERPL-MCNC: 0.7 MG/DL — SIGNIFICANT CHANGE UP (ref 0.2–1.2)
BILIRUB UR-MCNC: NEGATIVE — SIGNIFICANT CHANGE UP
BLD GP AB SCN SERPL QL: NEGATIVE — SIGNIFICANT CHANGE UP
BLOOD GAS VENOUS - CREATININE: 0.71 MG/DL — SIGNIFICANT CHANGE UP (ref 0.5–1.3)
BLOOD UR QL VISUAL: HIGH
BUN SERPL-MCNC: 10 MG/DL — SIGNIFICANT CHANGE UP (ref 7–23)
BUN SERPL-MCNC: 11 MG/DL — SIGNIFICANT CHANGE UP (ref 7–23)
BUN SERPL-MCNC: 14 MG/DL — SIGNIFICANT CHANGE UP (ref 7–23)
CA-I BLD-SCNC: 1.03 MMOL/L — SIGNIFICANT CHANGE UP (ref 1.03–1.23)
CALCIUM SERPL-MCNC: 6.4 MG/DL — CRITICAL LOW (ref 8.4–10.5)
CALCIUM SERPL-MCNC: 7.9 MG/DL — LOW (ref 8.4–10.5)
CALCIUM SERPL-MCNC: 8.4 MG/DL — SIGNIFICANT CHANGE UP (ref 8.4–10.5)
CHLORIDE BLDV-SCNC: 101 MMOL/L — SIGNIFICANT CHANGE UP (ref 96–108)
CHLORIDE SERPL-SCNC: 102 MMOL/L — SIGNIFICANT CHANGE UP (ref 98–107)
CHLORIDE SERPL-SCNC: 111 MMOL/L — HIGH (ref 98–107)
CHLORIDE SERPL-SCNC: 99 MMOL/L — SIGNIFICANT CHANGE UP (ref 98–107)
CHOLEST SERPL-MCNC: 88 MG/DL — LOW (ref 120–199)
CK MB BLD-MCNC: 1 NG/ML — SIGNIFICANT CHANGE UP (ref 1–6.6)
CK MB BLD-MCNC: 1 NG/ML — SIGNIFICANT CHANGE UP (ref 1–6.6)
CK SERPL-CCNC: 48 U/L — SIGNIFICANT CHANGE UP (ref 30–200)
CK SERPL-CCNC: 49 U/L — SIGNIFICANT CHANGE UP (ref 30–200)
CO2 SERPL-SCNC: 21 MMOL/L — LOW (ref 22–31)
CO2 SERPL-SCNC: 21 MMOL/L — LOW (ref 22–31)
CO2 SERPL-SCNC: 22 MMOL/L — SIGNIFICANT CHANGE UP (ref 22–31)
COLOR SPEC: YELLOW — SIGNIFICANT CHANGE UP
CREAT SERPL-MCNC: 0.49 MG/DL — LOW (ref 0.5–1.3)
CREAT SERPL-MCNC: 0.72 MG/DL — SIGNIFICANT CHANGE UP (ref 0.5–1.3)
CREAT SERPL-MCNC: 0.76 MG/DL — SIGNIFICANT CHANGE UP (ref 0.5–1.3)
EOSINOPHIL # BLD AUTO: 0.01 K/UL — SIGNIFICANT CHANGE UP (ref 0–0.5)
EOSINOPHIL # BLD AUTO: 0.02 K/UL — SIGNIFICANT CHANGE UP (ref 0–0.5)
EOSINOPHIL NFR BLD AUTO: 0.1 % — SIGNIFICANT CHANGE UP (ref 0–6)
EOSINOPHIL NFR BLD AUTO: 0.1 % — SIGNIFICANT CHANGE UP (ref 0–6)
GAS PNL BLDV: 135 MMOL/L — LOW (ref 136–146)
GLUCOSE BLDV-MCNC: 125 — HIGH (ref 70–99)
GLUCOSE SERPL-MCNC: 113 MG/DL — HIGH (ref 70–99)
GLUCOSE SERPL-MCNC: 142 MG/DL — HIGH (ref 70–99)
GLUCOSE SERPL-MCNC: 88 MG/DL — SIGNIFICANT CHANGE UP (ref 70–99)
GLUCOSE UR-MCNC: NEGATIVE — SIGNIFICANT CHANGE UP
HCO3 BLDV-SCNC: 26 MMOL/L — SIGNIFICANT CHANGE UP (ref 20–27)
HCT VFR BLD CALC: 37.3 % — LOW (ref 39–50)
HCT VFR BLD CALC: 41.7 % — SIGNIFICANT CHANGE UP (ref 39–50)
HCT VFR BLDV CALC: 44.1 % — SIGNIFICANT CHANGE UP (ref 39–51)
HDLC SERPL-MCNC: 31 MG/DL — LOW (ref 35–55)
HGB BLD-MCNC: 12.5 G/DL — LOW (ref 13–17)
HGB BLD-MCNC: 14.2 G/DL — SIGNIFICANT CHANGE UP (ref 13–17)
HGB BLDV-MCNC: 14.4 G/DL — SIGNIFICANT CHANGE UP (ref 13–17)
IMM GRANULOCYTES # BLD AUTO: 0.08 # — SIGNIFICANT CHANGE UP
IMM GRANULOCYTES # BLD AUTO: 0.09 # — SIGNIFICANT CHANGE UP
IMM GRANULOCYTES NFR BLD AUTO: 0.5 % — SIGNIFICANT CHANGE UP (ref 0–1.5)
IMM GRANULOCYTES NFR BLD AUTO: 0.5 % — SIGNIFICANT CHANGE UP (ref 0–1.5)
KETONES UR-MCNC: NEGATIVE — SIGNIFICANT CHANGE UP
LACTATE BLDV-MCNC: 1.8 MMOL/L — SIGNIFICANT CHANGE UP (ref 0.5–2)
LEUKOCYTE ESTERASE UR-ACNC: HIGH
LIPID PNL WITH DIRECT LDL SERPL: 52 MG/DL — SIGNIFICANT CHANGE UP
LYMPHOCYTES # BLD AUTO: 20.1 % — SIGNIFICANT CHANGE UP (ref 13–44)
LYMPHOCYTES # BLD AUTO: 24.8 % — SIGNIFICANT CHANGE UP (ref 13–44)
LYMPHOCYTES # BLD AUTO: 3.47 K/UL — HIGH (ref 1–3.3)
LYMPHOCYTES # BLD AUTO: 4.11 K/UL — HIGH (ref 1–3.3)
MAGNESIUM SERPL-MCNC: 1.3 MG/DL — LOW (ref 1.6–2.6)
MAGNESIUM SERPL-MCNC: 1.9 MG/DL — SIGNIFICANT CHANGE UP (ref 1.6–2.6)
MCHC RBC-ENTMCNC: 30.9 PG — SIGNIFICANT CHANGE UP (ref 27–34)
MCHC RBC-ENTMCNC: 31.1 PG — SIGNIFICANT CHANGE UP (ref 27–34)
MCHC RBC-ENTMCNC: 33.5 % — SIGNIFICANT CHANGE UP (ref 32–36)
MCHC RBC-ENTMCNC: 34.1 % — SIGNIFICANT CHANGE UP (ref 32–36)
MCV RBC AUTO: 91.4 FL — SIGNIFICANT CHANGE UP (ref 80–100)
MCV RBC AUTO: 92.1 FL — SIGNIFICANT CHANGE UP (ref 80–100)
MONOCYTES # BLD AUTO: 1.24 K/UL — HIGH (ref 0–0.9)
MONOCYTES # BLD AUTO: 1.33 K/UL — HIGH (ref 0–0.9)
MONOCYTES NFR BLD AUTO: 7.5 % — SIGNIFICANT CHANGE UP (ref 2–14)
MONOCYTES NFR BLD AUTO: 7.7 % — SIGNIFICANT CHANGE UP (ref 2–14)
MUCOUS THREADS # UR AUTO: SIGNIFICANT CHANGE UP
NEUTROPHILS # BLD AUTO: 11.07 K/UL — HIGH (ref 1.8–7.4)
NEUTROPHILS # BLD AUTO: 12.32 K/UL — HIGH (ref 1.8–7.4)
NEUTROPHILS NFR BLD AUTO: 66.6 % — SIGNIFICANT CHANGE UP (ref 43–77)
NEUTROPHILS NFR BLD AUTO: 71.2 % — SIGNIFICANT CHANGE UP (ref 43–77)
NITRITE UR-MCNC: NEGATIVE — SIGNIFICANT CHANGE UP
NRBC # FLD: 0 — SIGNIFICANT CHANGE UP
NRBC # FLD: 0 — SIGNIFICANT CHANGE UP
PCO2 BLDV: 38 MMHG — LOW (ref 41–51)
PH BLDV: 7.44 PH — HIGH (ref 7.32–7.43)
PH UR: 5.5 — SIGNIFICANT CHANGE UP (ref 4.6–8)
PLATELET # BLD AUTO: 128 K/UL — LOW (ref 150–400)
PLATELET # BLD AUTO: 139 K/UL — LOW (ref 150–400)
PMV BLD: 13.3 FL — HIGH (ref 7–13)
PMV BLD: 13.8 FL — HIGH (ref 7–13)
PO2 BLDV: 67 MMHG — HIGH (ref 35–40)
POTASSIUM BLDV-SCNC: 3.7 MMOL/L — SIGNIFICANT CHANGE UP (ref 3.4–4.5)
POTASSIUM SERPL-MCNC: 3.1 MMOL/L — LOW (ref 3.5–5.3)
POTASSIUM SERPL-MCNC: 4.4 MMOL/L — SIGNIFICANT CHANGE UP (ref 3.5–5.3)
POTASSIUM SERPL-MCNC: 4.6 MMOL/L — SIGNIFICANT CHANGE UP (ref 3.5–5.3)
POTASSIUM SERPL-SCNC: 3.1 MMOL/L — LOW (ref 3.5–5.3)
POTASSIUM SERPL-SCNC: 4.4 MMOL/L — SIGNIFICANT CHANGE UP (ref 3.5–5.3)
POTASSIUM SERPL-SCNC: 4.6 MMOL/L — SIGNIFICANT CHANGE UP (ref 3.5–5.3)
PROT SERPL-MCNC: 7.5 G/DL — SIGNIFICANT CHANGE UP (ref 6–8.3)
PROT UR-MCNC: 10 — SIGNIFICANT CHANGE UP
RBC # BLD: 4.05 M/UL — LOW (ref 4.2–5.8)
RBC # BLD: 4.56 M/UL — SIGNIFICANT CHANGE UP (ref 4.2–5.8)
RBC # FLD: 13.7 % — SIGNIFICANT CHANGE UP (ref 10.3–14.5)
RBC # FLD: 13.8 % — SIGNIFICANT CHANGE UP (ref 10.3–14.5)
RBC CASTS # UR COMP ASSIST: SIGNIFICANT CHANGE UP (ref 0–?)
RH IG SCN BLD-IMP: POSITIVE — SIGNIFICANT CHANGE UP
SAO2 % BLDV: 93.9 % — HIGH (ref 60–85)
SODIUM SERPL-SCNC: 136 MMOL/L — SIGNIFICANT CHANGE UP (ref 135–145)
SODIUM SERPL-SCNC: 137 MMOL/L — SIGNIFICANT CHANGE UP (ref 135–145)
SODIUM SERPL-SCNC: 142 MMOL/L — SIGNIFICANT CHANGE UP (ref 135–145)
SP GR SPEC: 1.02 — SIGNIFICANT CHANGE UP (ref 1–1.03)
SQUAMOUS # UR AUTO: SIGNIFICANT CHANGE UP
TRIGL SERPL-MCNC: 76 MG/DL — SIGNIFICANT CHANGE UP (ref 10–149)
TROPONIN T SERPL-MCNC: < 0.06 NG/ML — SIGNIFICANT CHANGE UP (ref 0–0.06)
TROPONIN T SERPL-MCNC: < 0.06 NG/ML — SIGNIFICANT CHANGE UP (ref 0–0.06)
UROBILINOGEN FLD QL: NORMAL E.U. — SIGNIFICANT CHANGE UP (ref 0.1–0.2)
WBC # BLD: 16.6 K/UL — HIGH (ref 3.8–10.5)
WBC # BLD: 17.3 K/UL — HIGH (ref 3.8–10.5)
WBC # FLD AUTO: 16.6 K/UL — HIGH (ref 3.8–10.5)
WBC # FLD AUTO: 17.3 K/UL — HIGH (ref 3.8–10.5)
WBC CLUMPS #/AREA URNS HPF: PRESENT — HIGH (ref 0–?)
WBC UR QL: SIGNIFICANT CHANGE UP (ref 0–?)

## 2017-08-06 PROCEDURE — 70450 CT HEAD/BRAIN W/O DYE: CPT | Mod: 26

## 2017-08-06 PROCEDURE — 99223 1ST HOSP IP/OBS HIGH 75: CPT

## 2017-08-06 PROCEDURE — 71010: CPT | Mod: 26

## 2017-08-06 RX ORDER — SODIUM CHLORIDE 9 MG/ML
1000 INJECTION INTRAMUSCULAR; INTRAVENOUS; SUBCUTANEOUS
Qty: 0 | Refills: 0 | Status: DISCONTINUED | OUTPATIENT
Start: 2017-08-06 | End: 2017-08-08

## 2017-08-06 RX ORDER — ASPIRIN/CALCIUM CARB/MAGNESIUM 324 MG
81 TABLET ORAL DAILY
Qty: 0 | Refills: 0 | Status: DISCONTINUED | OUTPATIENT
Start: 2017-08-06 | End: 2017-08-08

## 2017-08-06 RX ORDER — PANTOPRAZOLE SODIUM 20 MG/1
40 TABLET, DELAYED RELEASE ORAL
Qty: 0 | Refills: 0 | Status: DISCONTINUED | OUTPATIENT
Start: 2017-08-06 | End: 2017-08-08

## 2017-08-06 RX ORDER — DABIGATRAN ETEXILATE MESYLATE 150 MG/1
150 CAPSULE ORAL EVERY 12 HOURS
Qty: 0 | Refills: 0 | Status: DISCONTINUED | OUTPATIENT
Start: 2017-08-06 | End: 2017-08-08

## 2017-08-06 RX ORDER — CEFPODOXIME PROXETIL 100 MG
1 TABLET ORAL
Qty: 0 | Refills: 0 | COMMUNITY

## 2017-08-06 RX ORDER — CEFTRIAXONE 500 MG/1
1 INJECTION, POWDER, FOR SOLUTION INTRAMUSCULAR; INTRAVENOUS EVERY 24 HOURS
Qty: 0 | Refills: 0 | Status: DISCONTINUED | OUTPATIENT
Start: 2017-08-07 | End: 2017-08-08

## 2017-08-06 RX ORDER — CEFTRIAXONE 500 MG/1
1 INJECTION, POWDER, FOR SOLUTION INTRAMUSCULAR; INTRAVENOUS ONCE
Qty: 0 | Refills: 0 | Status: COMPLETED | OUTPATIENT
Start: 2017-08-06 | End: 2017-08-06

## 2017-08-06 RX ORDER — CHOLECALCIFEROL (VITAMIN D3) 125 MCG
500 CAPSULE ORAL DAILY
Qty: 0 | Refills: 0 | Status: DISCONTINUED | OUTPATIENT
Start: 2017-08-06 | End: 2017-08-07

## 2017-08-06 RX ORDER — ACETAMINOPHEN 500 MG
1000 TABLET ORAL ONCE
Qty: 0 | Refills: 0 | Status: COMPLETED | OUTPATIENT
Start: 2017-08-06 | End: 2017-08-06

## 2017-08-06 RX ORDER — TAMSULOSIN HYDROCHLORIDE 0.4 MG/1
0.4 CAPSULE ORAL AT BEDTIME
Qty: 0 | Refills: 0 | Status: DISCONTINUED | OUTPATIENT
Start: 2017-08-06 | End: 2017-08-08

## 2017-08-06 RX ORDER — DOCUSATE SODIUM 100 MG
100 CAPSULE ORAL
Qty: 0 | Refills: 0 | Status: DISCONTINUED | OUTPATIENT
Start: 2017-08-06 | End: 2017-08-08

## 2017-08-06 RX ORDER — POTASSIUM CHLORIDE 20 MEQ
40 PACKET (EA) ORAL EVERY 4 HOURS
Qty: 0 | Refills: 0 | Status: COMPLETED | OUTPATIENT
Start: 2017-08-06 | End: 2017-08-06

## 2017-08-06 RX ORDER — ACETYLCYSTEINE 200 MG/ML
1200 VIAL (ML) MISCELLANEOUS DAILY
Qty: 0 | Refills: 0 | Status: DISCONTINUED | OUTPATIENT
Start: 2017-08-06 | End: 2017-08-06

## 2017-08-06 RX ORDER — CEFTRIAXONE 500 MG/1
INJECTION, POWDER, FOR SOLUTION INTRAMUSCULAR; INTRAVENOUS
Qty: 0 | Refills: 0 | Status: DISCONTINUED | OUTPATIENT
Start: 2017-08-06 | End: 2017-08-08

## 2017-08-06 RX ORDER — LEVALBUTEROL 1.25 MG/.5ML
0.63 SOLUTION, CONCENTRATE RESPIRATORY (INHALATION) EVERY 4 HOURS
Qty: 0 | Refills: 0 | Status: DISCONTINUED | OUTPATIENT
Start: 2017-08-06 | End: 2017-08-08

## 2017-08-06 RX ORDER — FORMOTEROL FUMARATE 12 MCG
1 CAPSULE, WITH INHALATION DEVICE INHALATION
Qty: 0 | Refills: 0 | COMMUNITY

## 2017-08-06 RX ORDER — ACETYLCYSTEINE 200 MG/ML
2 VIAL (ML) MISCELLANEOUS
Qty: 0 | Refills: 0 | COMMUNITY

## 2017-08-06 RX ORDER — MAGNESIUM SULFATE 500 MG/ML
1 VIAL (ML) INJECTION ONCE
Qty: 0 | Refills: 0 | Status: COMPLETED | OUTPATIENT
Start: 2017-08-06 | End: 2017-08-06

## 2017-08-06 RX ORDER — FAMOTIDINE 10 MG/ML
20 INJECTION INTRAVENOUS DAILY
Qty: 0 | Refills: 0 | Status: DISCONTINUED | OUTPATIENT
Start: 2017-08-06 | End: 2017-08-08

## 2017-08-06 RX ADMIN — Medication 400 MILLIGRAM(S): at 02:32

## 2017-08-06 RX ADMIN — Medication 100 GRAM(S): at 11:33

## 2017-08-06 RX ADMIN — LEVALBUTEROL 0.63 MILLIGRAM(S): 1.25 SOLUTION, CONCENTRATE RESPIRATORY (INHALATION) at 15:32

## 2017-08-06 RX ADMIN — SODIUM CHLORIDE 100 MILLILITER(S): 9 INJECTION INTRAMUSCULAR; INTRAVENOUS; SUBCUTANEOUS at 06:00

## 2017-08-06 RX ADMIN — Medication 40 MILLIEQUIVALENT(S): at 15:32

## 2017-08-06 RX ADMIN — PANTOPRAZOLE SODIUM 40 MILLIGRAM(S): 20 TABLET, DELAYED RELEASE ORAL at 06:18

## 2017-08-06 RX ADMIN — Medication 81 MILLIGRAM(S): at 11:33

## 2017-08-06 RX ADMIN — FAMOTIDINE 20 MILLIGRAM(S): 10 INJECTION INTRAVENOUS at 11:33

## 2017-08-06 RX ADMIN — CEFTRIAXONE 100 GRAM(S): 500 INJECTION, POWDER, FOR SOLUTION INTRAMUSCULAR; INTRAVENOUS at 06:00

## 2017-08-06 RX ADMIN — TAMSULOSIN HYDROCHLORIDE 0.4 MILLIGRAM(S): 0.4 CAPSULE ORAL at 21:26

## 2017-08-06 RX ADMIN — Medication 40 MILLIEQUIVALENT(S): at 20:02

## 2017-08-06 RX ADMIN — Medication 100 MILLIGRAM(S): at 06:18

## 2017-08-06 RX ADMIN — DABIGATRAN ETEXILATE MESYLATE 150 MILLIGRAM(S): 150 CAPSULE ORAL at 20:02

## 2017-08-06 RX ADMIN — Medication 40 MILLIEQUIVALENT(S): at 10:00

## 2017-08-06 RX ADMIN — Medication 1000 MILLIGRAM(S): at 03:35

## 2017-08-06 RX ADMIN — DABIGATRAN ETEXILATE MESYLATE 150 MILLIGRAM(S): 150 CAPSULE ORAL at 03:41

## 2017-08-06 RX ADMIN — Medication 500 UNIT(S): at 11:33

## 2017-08-06 NOTE — H&P ADULT - PROBLEM SELECTOR PLAN 6
Patient unable to take atorvastatin due to muscle pain. Patient's wife to bring in medication tomorrow .   Check lipid  Low salt, low cholesterol diet Continue with CPAP at night

## 2017-08-06 NOTE — H&P ADULT - ASSESSMENT
78 y/o M with h/o A. fib on pradaxa, COPD, GERD, HLD, BRIEN on CPAP, dementia, recurrent UTI presents to the ED for slurred speech and left sided weakness. Admit to telemetry. 78 y/o Male, ambulatory with a cane and a walker, with h/o A. fib on Pradaxa, COPD (only on Xopenex prn per the wife as unable to take), GERD, HLD, BRIEN on CPAP at night, chronic b/l LE lymphedema, mild dementia, urinary incontinence, recurrent UTIs a/w slurred speech and left sided weakness (in resolution), TIA vs CVA; Found to have UTI, satisfied criteria for sepsis;

## 2017-08-06 NOTE — ED PROVIDER NOTE - CHIEF COMPLAINT
The patient is a 77y Male complaining of The patient is a 77y Male complaining of altered mental status

## 2017-08-06 NOTE — OCCUPATIONAL THERAPY INITIAL EVALUATION ADULT - MD ORDER
Occupational Therapy to evaluate and treat. Occupational Therapy to evaluate and treat. Out of Bed with Assistance. As per RNKANE, patient is okay to participate in OT evaluation and perform out of bed activity as tolerated.

## 2017-08-06 NOTE — H&P ADULT - PROBLEM SELECTOR PLAN 1
Admit to telemetry.   Neurology consult appreciated-Permissive HTN x 24hrs goal 220/120, MRI brain ,MRA brain MRA neck, HgA1c, Lipid profile, Atorvastatin 80 mg, Continue Pradaxa home dose , TTE, Telemetry monitoring,dysphagia screen, NPO , PT/OT.  check cbc,tsh,bmp with mag and phos.   f/u MD note Admit to telemetry. Sepsis most likely due to urinary tract infection.   Start Ceftriaxone 1 gram STAT and every 24 hours.   Blood cultures and urine culture pending.   Continue to monitor. Leukocytosis, fever, Zvna=099.4;  Sepsis most likely due to urinary tract infection;  Start Ceftriaxone 1 gram STAT and every 24 hours.   Blood cultures and urine culture (was sent to the lab - not reflected by EMR yet) pending.   IV fluids

## 2017-08-06 NOTE — DISCHARGE NOTE ADULT - CARE PLAN
Principal Discharge DX:	Encephalopathy due to infection  Goal:	continue Antibiotics  Instructions for follow-up, activity and diet:	follow up with Neurology in one week - call for appointment  Secondary Diagnosis:	Atrial fibrillation  Secondary Diagnosis:	COPD (chronic obstructive pulmonary disease)  Secondary Diagnosis:	Dementia Principal Discharge DX:	Encephalopathy due to infection  Goal:	continue Antibiotics  Instructions for follow-up, activity and diet:	follow up with Neurology in one week - call for appointment  Secondary Diagnosis:	Atrial fibrillation  Secondary Diagnosis:	COPD (chronic obstructive pulmonary disease)  Instructions for follow-up, activity and diet:	f/u with Dr. Prieto upon discharge  Secondary Diagnosis:	Dementia

## 2017-08-06 NOTE — OCCUPATIONAL THERAPY INITIAL EVALUATION ADULT - PLANNED THERAPY INTERVENTIONS, OT EVAL
balance training/bed mobility training/ADL retraining/neuromuscular re-education/transfer training/strengthening/ROM

## 2017-08-06 NOTE — DISCHARGE NOTE ADULT - MEDICATION SUMMARY - MEDICATIONS TO STOP TAKING
I will STOP taking the medications listed below when I get home from the hospital:    cefpodoxime 100 mg oral tablet  -- 1 tab(s) by mouth every 12 hours until 1/15/17

## 2017-08-06 NOTE — PHYSICAL THERAPY INITIAL EVALUATION ADULT - GENERAL OBSERVATIONS, REHAB EVAL
Received supine and left sitting up at the edge of the stretcher with IV in place and wife and son present.

## 2017-08-06 NOTE — DISCHARGE NOTE ADULT - ADDITIONAL INSTRUCTIONS
follow up with your PMD in one week - call for appointment  follow up with Neurology in one week - call for appointment

## 2017-08-06 NOTE — H&P ADULT - MUSCULOSKELETAL
detailed exam no joint warmth/no calf tenderness/ROM intact/normal strength/no joint erythema/no joint swelling

## 2017-08-06 NOTE — ED ADULT NURSE NOTE - OBJECTIVE STATEMENT
Pt arrives to trauma C,  A+OX2 as code stroke. Pt c/o generalized weakness with increased weakness to left side.  Unable to walk, at baseline ambulates with cane.  PMH dementia, at baseline mental status.  Wife states at 2300h she found pt slumped over to left side and had slurred speech and was unable to walk.  Currently without any slurred speech, arm drift, facial droop.  Equal strength aaron, all symptoms have since resolved, +4 pitting edema to BLE, patient denies any SOB, chest pain, states "I just feel tired." nad noted. nsr per monitor. respirations are even and unlabored. spo2 97% on room air, bilateral 20g iv access obtained all labs drawn and sent. patient ntoed to be febrile rectally, md rahman aware, blood cultures drawn and sent. will ctm closely. neuro at bedside.

## 2017-08-06 NOTE — OCCUPATIONAL THERAPY INITIAL EVALUATION ADULT - PERTINENT HX OF CURRENT PROBLEM, REHAB EVAL
Pt is a 77 year old Male with PMHx of AFib, COPD, GERD, HLD, BRIEN on CPAP at night, chronic b/l LE lymphedema, mild dementia, urinary incontinence, & recurrent UTIs, who presented to [facility] on 8/6/17 for slurred speech and left sided weakness. CT Brain Scan on 8/6/17 displays no acute intracranial hemorrhage, mass effect, or CT evidence of an acute vascular territorial infarct. Moderate chronic ischemic changes in the frontoparietal white matter.

## 2017-08-06 NOTE — H&P ADULT - PMH
Atrial fibrillation    Dementia    GERD (gastroesophageal reflux disease)    Hyperlipidemia    Obstructive sleep apnea on CPAP    Recurrent UTI

## 2017-08-06 NOTE — DISCHARGE NOTE ADULT - CARE PROVIDERS DIRECT ADDRESSES
[email or web address]

## 2017-08-06 NOTE — OCCUPATIONAL THERAPY INITIAL EVALUATION ADULT - ADDITIONAL COMMENTS
Patient's wife reports pt. was using the RW outside in the community and uses the cane inside the apartment.

## 2017-08-06 NOTE — H&P ADULT - NSHPOUTPATIENTPROVIDERS_GEN_ALL_CORE
Dr. Markus Warner (PCP) Dr. Markus Warner (PCP) Dr. Rey (cardiologist) Dr. Lux (Neurologist) Dr. Pfeiffer (Pulmonologist)

## 2017-08-06 NOTE — H&P ADULT - NSHPSOCIALHISTORY_GEN_ALL_CORE
Pt denies smoking, alcohol or drug use. Retired sanitation dept worker (administrative position)  , lives with wife   Denies smoking, alcohol or drug use

## 2017-08-06 NOTE — CONSULT NOTE ADULT - PROBLEM SELECTOR RECOMMENDATION 9
Permissive HTN x 24hrs goal 220/120  MRI brain   MRA brain   MRA neck  HgA1c  Lipid profile  Atorvastatin 80 mg  Continue Pradaxa home dose   TTE  Telemetry monitoring  dysphagia screen  NPO  PT/OT

## 2017-08-06 NOTE — CONSULT NOTE ADULT - SUBJECTIVE AND OBJECTIVE BOX
HPI:    77 year old male with PMH of Asad (on Pradaxa BID), COPD, GERD, ILD, BRIEN presented to ED for evaluation of slurred speech and weakness on left side of his body.     As per wife, he was in his usual state of health until 10:30 pm last night. She again saw him around 11:00 pm and found that he had facial droop, slurred speech and leaned towards his left side.     Patient currently takes Pradaxa BID for MONIE.changb. Last dose was in the morning. His NIHSS is 2 ( facial droop and drift in left lower limb) and MRS is 1.     He denied any sensory loss or incontinence. As per wife, he is returning to his baseline as his slurred speech and weakness improved. initially receptive aphasia was on impression but wife reported that he forgot his hearing aid and he responded well to loud sound.       MEDICATIONS  (STANDING):    MEDICATIONS  (PRN):    PAST MEDICAL & SURGICAL HISTORY:  Hyperlipidemia  Recurrent UTI (urinary tract infection)  Obstructive sleep apnea on CPAP  H/O small bowel obstruction  History of appendectomy    FAMILY HISTORY:  Family history of colon cancer (Sibling)  Family history of myocardial infarction (Sibling)    Allergies    No Known Drug Allergies  zosyn (Drowsiness)    Intolerances        SHx - No smoking, No ETOH, No drug abuse      Review of Systems:  CONSTITUTIONAL:  No weight loss, fever, chills, weakness or fatigue.  HEENT:  Eyes:  No visual loss, blurred vision, double vision or yellow sclerae. Ears, Nose, Throat:  No hearing loss, sneezing, congestion, runny nose or sore throat.  SKIN:  No rash or itching.  CARDIOVASCULAR:  No chest pain, chest pressure or chest discomfort. No palpitations or edema.  RESPIRATORY:  No shortness of breath, cough or sputum.  GASTROINTESTINAL:  No anorexia, nausea, vomiting or diarrhea. No abdominal pain or blood.  GENITOURINARY:  NO Burning on urination.   NEUROLOGICAL: See HPI  MUSCULOSKELETAL:  No muscle, back pain, joint pain or stiffness.  HEMATOLOGIC:  No anemia, bleeding or bruising.  LYMPHATICS:  No enlarged nodes. No history of splenectomy.  PSYCHIATRIC:  No history of depression or anxiety.  ENDOCRINOLOGIC:  No reports of sweating, cold or heat intolerance. No polyuria or polydipsia.  ALLERGIES:  No history of asthma, hives, eczema or rhinitis.        Vital Signs Last 24 Hrs  T(C): 38.6 (06 Aug 2017 01:02), Max: 38.6 (06 Aug 2017 01:02)  T(F): 101.4 (06 Aug 2017 01:02), Max: 101.4 (06 Aug 2017 01:02)  HR: 87 (06 Aug 2017 01:02) (83 - 87)  BP: 108/61 (06 Aug 2017 01:02) (108/61 - 115/65)  BP(mean): --  RR: 19 (06 Aug 2017 01:02) (19 - 20)  SpO2: 100% (06 Aug 2017 01:02) (98% - 100%)    General Exam:   General appearance: No acute distress                   Neurological Exam:    Mental Status: Orientated to self, date and place.  Attention intact.  No dysarthria, aphasia or neglect.  Knowledge intact.      Cranial Nerves:   PERRL, EOMI, VFF, no nystagmus or diplopia.   CN V1-3 intact to light touch and pinprick.  No facial asymmetry.  Hearing intact to finger rub bilaterally.  Tongue, uvula and palate midline.  Sternocleidomastoid and Trapezius intact bilaterally.    Motor:   Tone: normal.                  Strength:   bilateral upper extremity 5/5 , strength in bilateral lower extremity are symmetrical as he was not able to hold against gravity, Knee extensors and knee flexros 4/5 on left side    Pronator drift: none                 Dysmetria: None to finger-nose-finger or heel-shin-heel    Tremor: No resting, postural or action tremor.  No myoclonus.    Sensation: intact to light touch, pinprick, vibration and proprioception    Deep Tendon Reflexes: 1+ bilateral biceps, triceps, brachioradialis, knee and ankle  Toes flexor bilaterally    Gait: deferred     Other:    08-06    136  |  99  |  x   ----------------------------<  x   4.4   |  x   |  x         08-06    136  |  99  |  x   ----------------------------<  x   4.4   |  x   |  x                               14.2   17.30 )-----------( 139      ( 06 Aug 2017 00:55 )             41.7     PTT - ( 06 Aug 2017 00:55 )  PTT:29.5 SEC    Radiology    CT head :    < from: CT Brain Stroke Protocol (08.06.17 @ 00:37) >  No acute intracranial hemorrhage, mass effect, or CT evidence of anacute   vascular territorial infarct.    Moderate chronic ischemic changes in the frontoparietal white matter.    < end of copied text >

## 2017-08-06 NOTE — ED ADULT NURSE NOTE - CHIEF COMPLAINT QUOTE
Pt A+OX2.  Pt c/o generalized weakness with increased weakness to left side.  Unable to walk.  PMH dementia.  Wife states at 2300h she found pt slumped over to left side and had slurred speech and was unable to walk.  Currently without any slurred speech, arm drift, facial droop.  Equal strength aaron.  Pt observed to be very weak.  4+ pedal edema noted.  Dr. Blevins notified and code stroke called.  Pt to CT

## 2017-08-06 NOTE — H&P ADULT - NSHPREVIEWOFSYSTEMS_GEN_ALL_CORE
Constitutional: No fever, fatigue or weight loss.  Skin: No rash.  Eyes: No recent vision problems or eye pain.  ENT: No congestion, ear pain, or sore throat.  Endocrine: No thyroid problems.  Cardiovascular: No chest pain or palpation.  Respiratory: No cough, shortness of breath, congestion, or wheezing.  Gastrointestinal: No abdominal pain, nausea, vomiting, or diarrhea.  Genitourinary: No dysuria.  Musculoskeletal: No joint swelling.  Neurologic: No headache.

## 2017-08-06 NOTE — DISCHARGE NOTE ADULT - NS AS DC STROKE ED MATERIALS
Need for Followup After Discharge/Call 911 for Stroke/Prescribed Medications/Stroke Warning Signs and Symptoms/Stroke Education Booklet/Risk Factors for Stroke

## 2017-08-06 NOTE — H&P ADULT - PROBLEM SELECTOR PLAN 3
Continue with xopenex. [id number] score of 4. Continue with pradaxa. Patient is currently rate controlled. [id number] score of 4. Continue with Pradaxa. Patient is currently rate controlled.  Not on rate controlling agents at home;

## 2017-08-06 NOTE — CONSULT NOTE ADULT - ASSESSMENT
77 year old male with PMH of Asad (on Pradaxa BID), COPD, GERD, ILD, BRIEN presented to ED for evaluation of slurred speech and weakness on left side of his body. PE showed mild facial droop on left side and drifting in left lower extremity. Patient had rapidly improving neurological symptoms and his NIHSS is 2. Patient was not given tPA due to rapidly improving neurological symptoms and low NIHSS.

## 2017-08-06 NOTE — DISCHARGE NOTE ADULT - CARE PROVIDER_API CALL
Te Prieto), Internal Medicine; Pulmonary Disease  [address]  [address]  [place] [address]  Phone: [phone]  Fax: [phone]    Manuel Kate (DILLON), Neurology; Vascular Neurology  [address]  [address]  [place] [address]  Phone: [phone]  Fax: [phone]

## 2017-08-06 NOTE — OCCUPATIONAL THERAPY INITIAL EVALUATION ADULT - LIVES WITH, PROFILE
Patient’s wife reports pt. lives with her in an apartment within a house with 5 steps to enter. Patient’s wife explains once inside pt. has 13 steps to negotiate to reach his apartment on the 2nd floor. As per patient’s daughter, he has a bathtub with a shower chair and a grab bar in his bathroom available. Patient’s wife reports pt. lives with her in an apartment within a house with 5 steps to enter. Patient’s wife explains once inside there are 13 steps to negotiate to reach their apartment on the 2nd floor. As per patient’s wife, there is a bathtub with a shower chair and a grab bar in bathroom available.

## 2017-08-06 NOTE — DISCHARGE NOTE ADULT - PLAN OF CARE
continue Antibiotics follow up with Neurology in one week - call for appointment f/u with Dr. Prieto upon discharge

## 2017-08-06 NOTE — ED ADULT TRIAGE NOTE - CHIEF COMPLAINT QUOTE
Pt A+OX2.  Pt c/o generalized weakness with increased weakness to left side.  Unable to walk.  PMH dementia.  Wife states at 2300h she found pt slumped over to left side and had slurred speech and was unable to walk.  Currently without any slurred speech, arm drift, facial droop.  Equal strength aaron.  Pt observed to be very weak.  4+ pedal edema noted.  Dr. Blevins notified and code stroke called Pt A+OX2.  Pt c/o generalized weakness with increased weakness to left side.  Unable to walk.  PMH dementia.  Wife states at 2300h she found pt slumped over to left side and had slurred speech and was unable to walk.  Currently without any slurred speech, arm drift, facial droop.  Equal strength aaron.  Pt observed to be very weak.  4+ pedal edema noted.  Dr. Blevins notified and code stroke called.  Pt to CT

## 2017-08-06 NOTE — H&P ADULT - PROBLEM SELECTOR PLAN 2
[id number] score of 4. Continue with pradaxa. Patient is currently rate controlled. Admit to telemetry.   Neurology consult appreciated-Permissive HTN x 24hrs goal 220/120, MRI brain ,MRA brain MRA neck, HgA1c, Lipid profile, Atorvastatin 80 mg, Continue Pradaxa home dose , TTE, Telemetry monitoring,dysphagia screen, NPO , PT/OT.  check cbc,tsh,bmp with mag and phos.   f/u MD note Admit to telemetry.   Neurology consult appreciated-Permissive HTN x 24hrs goal 220/120, MRI brain ,MRA brain MRA neck, HgA1c, Lipid profile, Atorvastatin 80 mg, Continue Pradaxa, ASA , TTE, Telemetry monitoring, dysphagia screen, NPO , PT/OT.  Cineesophagogram ordered    check cbc,tsh,bmp with mag and phos. Admit to telemetry.   Neurology consult appreciated-Permissive HTN x 24hrs goal 220/120, MRI brain ,MRA brain MRA neck, HgA1c, Lipid profile, Continue Pradaxa, ASA , TTE, Telemetry monitoring, dysphagia screen, NPO , PT/OT.  Cineesophagogram ordered    Atorvastatin 80 mg ---> Pt already on Livalo 2mg at home and 76yo, will c/w Livalo at home (not available via [facility] pharmacy)

## 2017-08-06 NOTE — DISCHARGE NOTE ADULT - MEDICATION SUMMARY - MEDICATIONS TO TAKE
I will START or STAY ON the medications listed below when I get home from the hospital:    aspirin 81 mg oral delayed release tablet  -- 1 tab(s) by mouth once a day  -- Indication: For CVA prevention    tamsulosin 0.4 mg oral capsule  -- 1 cap(s) by mouth once a day (at bedtime)  -- Indication: For BPH    dabigatran 150 mg oral capsule  -- 1 cap(s) by mouth every 12 hours  -- Indication: For Atrial fibrillation    Livalo 2 mg oral tablet  -- 1 tab(s) by mouth once a day  -- Indication: For Hyperlipidemia    QUEtiapine 25 mg oral tablet  -- 1 tab(s) by mouth once a day (at bedtime)  -- Indication: For Dementia    levalbuterol 0.63 mg/3 mL inhalation solution  -- 3 milliliter(s) inhaled 4 times a day, As Needed  -- Indication: For COPD (chronic obstructive pulmonary disease)    cefuroxime 250 mg oral tablet  -- 1 tab(s) by mouth every 12 hours  -- Indication: For UTI    armodafinil 50 mg oral tablet  -- 1 tab(s) by mouth once a day  -- Indication: For Dementia    raNITIdine 300 mg oral capsule  -- 1 cap(s) by mouth once a day (at bedtime)  -- Indication: For GERD (gastroesophageal reflux disease)    cranberry oral capsule  -- take 4200mg twice daily   -- Indication: For Supplement    docusate sodium 100 mg oral capsule  -- 1 cap(s) by mouth 2 times a day  -- Indication: For Constipation    omeprazole 40 mg oral delayed release capsule  -- 1 cap(s) by mouth once a day  -- Indication: For GERD (gastroesophageal reflux disease)    Vitamin D3 1000 intl units oral capsule  -- 0.5 cap(s) by mouth once a day  -- Indication: For Supplement    Vitamin B12 Methylcobalamin 5000 mcg sublingual tablet  -- 0.5 tab(s) under tongue once a day  -- Indication: For Supplement

## 2017-08-06 NOTE — H&P ADULT - HISTORY OF PRESENT ILLNESS
76 y/o M with h/o A. fib on pradaxa, COPD, GERD, HLD, BRIEN on CPAP, dementia, recurrent UTI presents to the ED for slurred speech and left sided weakness. As per wife, patient was found to have left sided facial droop, slurred speech and leaning towards his left sided while sitting in the chair. As per wife, patient is returning back to baseline. Pt denies LOC, syncope,fever, chills ,recent infection/travel, shortness of breath,palpitations, numbness, tingling, dysuria, urinary/bowel incontinence or any other complaints at this time. 76 y/o M with h/o A. fib on pradaxa, COPD, GERD, HLD, BRIEN on CPAP, dementia, recurrent UTI presents to the ED for slurred speech and left sided weakness. As per wife, patient was found to have left sided facial droop, slurred speech and leaning towards his left sided while sitting in the chair. As per wife, patient is returning back to baseline. Pt denies LOC, syncope,fever, chills ,recent infection/travel, shortness of breath,palpitations, numbness, tingling, dysuria, urinary/bowel incontinence or any other complaints at this time.     In the ED, patient was seen and evaluated by neurology. Vital Signs Last 24 Hrs T(C): 37.2 (06 Aug 2017 04:05), Max: 38.6 (06 Aug 2017 01:02)T(F): 98.9 (06 Aug 2017 04:05), Max: 101.4 (06 Aug 2017 01:02) HR: 67 (06 Aug 2017 04:05) (67 - 87) BP: 114/55 (06 Aug 2017 04:05) (108/61 - 124/72) RR: 22 (06 Aug 2017 04:05) (19 - 25) SpO2: 100% (06 Aug 2017 04:05) (98% - 100%). Pt is admitted to telemetry. 78 y/o M with h/o A. fib on pradaxa, COPD, GERD, HLD, BRIEN on CPAP, dementia, recurrent UTI presents to the ED for slurred speech and left sided weakness. As per wife, patient was found to have left sided facial droop, slurred speech and leaning towards his left sided while sitting in the chair around 11 pm. As per wife, patient is returning back to baseline. Pt denies LOC, syncope,fever, chills ,recent infection/travel, shortness of breath, palpitations numbness, tingling, dysuria, urinary/bowel incontinence or any other complaints at this time.     In the ED, patient was seen and evaluated by neurology. Vital Signs Last 24 Hrs T(C): 37.2 (06 Aug 2017 04:05), Max: 38.6 (06 Aug 2017 01:02)T(F): 98.9 (06 Aug 2017 04:05), Max: 101.4 (06 Aug 2017 01:02) HR: 67 (06 Aug 2017 04:05) (67 - 87) BP: 114/55 (06 Aug 2017 04:05) (108/61 - 124/72) RR: 22 (06 Aug 2017 04:05) (19 - 25) SpO2: 100% (06 Aug 2017 04:05) (98% - 100%). Pt is admitted to telemetry. 78 y/o Male, ambulatory with a cane and a walker, with h/o A. fib on Pradaxa COPD, GERD, HLD, BRIEN on CPAP, chronic b/l LE lymphedema, mild dementia, urinary incontinence, recurrent UTI presents to the ED for slurred speech and left sided weakness. As per wife, patient was found to have left sided facial droop, slurred speech and leaning towards his left sided while sitting in the chair around 11 pm. As per wife, patient is returning back to baseline. Pt denies LOC, fever, chills , recent infection/travel, shortness of breath, palpitations numbness, tingling, dysuria, urinary/bowel incontinence or any other complaints at this time.     In the ED, patient was seen and evaluated by neurology. Vital Signs Last 24 Hrs T(C): 37.2 (06 Aug 2017 04:05), Max: 38.6 (06 Aug 2017 01:02)T(F): 98.9 (06 Aug 2017 04:05), Tmax: 101.4 (06 Aug 2017 01:02) HR: 67 (06 Aug 2017 04:05) (67 - 87) BP: 114/55 (06 Aug 2017 04:05) (108/61 - 124/72) RR: 22 (06 Aug 2017 04:05) (19 - 25) SpO2: 100% (06 Aug 2017 04:05) (98% - 100%). Pt is admitted to telemetry. 78 y/o Male, ambulatory with a cane and a walker, with h/o A. fib on Pradaxa, COPD (only on Xopenex prn per the wife as unable to take), GERD, HLD, BRIEN on CPAP at night, chronic b/l LE lymphedema, mild dementia, urinary incontinence, recurrent UTIs presents to the ED for slurred speech and left sided weakness. As per wife, patient was found to have left sided facial droop, slurred speech and leaning towards his left sided while sitting in the chair around 11 pm. As per wife, patient is returning back to baseline. Pt denies LOC, fever, chills , recent infection/travel, shortness of breath, palpitations numbness, tingling, dysuria, urinary/bowel incontinence or any other complaints at this time.     In the ED, patient was seen and evaluated by neurology. Vital Signs Last 24 Hrs T(C): 37.2 (06 Aug 2017 04:05), Max: 38.6 (06 Aug 2017 01:02)T(F): 98.9 (06 Aug 2017 04:05), Tmax: 101.4 (06 Aug 2017 01:02) HR: 67 (06 Aug 2017 04:05) (67 - 87) BP: 114/55 (06 Aug 2017 04:05) (108/61 - 124/72) RR: 22 (06 Aug 2017 04:05) (19 - 25) SpO2: 100% (06 Aug 2017 04:05) (98% - 100%). Pt is admitted to telemetry.

## 2017-08-06 NOTE — H&P ADULT - PROBLEM SELECTOR PLAN 5
Continue with CPAP at night Continue with Nuvigil. Continue with CPAP at night  Continue with home Nuvigil - the pt has his own (to be sent to the pharmacy for identification once the pt is on the floor)

## 2017-08-06 NOTE — ED PROVIDER NOTE - OBJECTIVE STATEMENT
Case of a 78 y/o male patient with past medical history of A-fib (on Pradaxa), HLD and BRIEN brought into the ED after an episode of altered mental status. Patient's wife states that the patient was sitting on a chair and suddenly he lean on to his left side, after that he was unable to talk and had some weakness of his left sided extremities. Patient started to got better after a couple of minutes but remains with difficulty talking and mild weakness to the side. Patient talking, reports wekness denies headache, chest pain, dizziness, nausea or double vision.

## 2017-08-06 NOTE — DISCHARGE NOTE ADULT - HOSPITAL COURSE
76 y/o M with h/o A. fib on pradaxa, COPD, GERD, HLD, BRIEN on CPAP, dementia, recurrent UTI presents to the ED for slurred speech and left sided weakness. Admit to telemetry. NIHSS 2 dysphagia pending   CT head: No acute intracranial hemorrhage, mass effect, or CT evidence of an acute vascular territorial infarct. Moderate chronic ischemic changes in the frontoparietal white matter.  Cardiac enzyme neg x 2  EKG shows a. fib @ 85 bpm,    Neurology:Permissive HTN x 24hrs goal 220/120, MRI brain ,MRA brain   MRA neck, HgA1c, Lipid profile, Atorvastatin 80 mg, Continue Pradaxa home dose , TTE, Telemetry monitoring,dysphagia screen, NPO , PT/OT.  K+ 3.1-->supplementing  8/6 CXR: Redemonstrated bibasilar predominant coarse reticular interstitial opacities corresponding to pulmonary fibrosis as demonstrated on chest CT from 7/27/2017. Clear upper lungs. No pleural effusions or pneumothorax.  8/7 cards: stable CV, pending TTE. f/u neuro  8/7 speech: pureed and honey  8/7 pulm: stable pulmonarys stautus, would suggest checking O2 sat on RA after ambulation, continue noctural CPAP, bronchodilator prn and incentive spirometry. suggest spacer upon discahrge outpt f/u with Dr Prieto   8/7 med: pending MRI/MRA and TTE. continue pradaxa and asa. f/u consult service  8/7 neuro: gradual normotensive, awaiting MRI/MRA and TTE study. continue abx for UTI  8/8 cards: awaiting TTE, f/u neuro  8/8 med: awaiting TTE, MRI. seroquel 12.5 prior to MRI. seroquel 25mg HS for sleep,avoid benzo so as not to worsen delirium in dementia patient. haldol prn for severe agitation. ceftriaxone convert to ceftin for 5 days total course  8/8 per neuro(Dr Kate): if cannot toelrate MRI then do repeat CT head and carotid doppler. pt was only able to concetta MRA done. CT and carotid doppler ordered  8/8 carotid doppler:Mild-moderate calcific plaque noted within the proximal right and left internal carotid arteries, consistent with 16-49% stenoses in both proximal vessels.  No  hemodynamically significant stenoses noted  8/8 TTE bubble  1. Calcified trileaflet aortic valve with normal opening.  2. Endocardium not well visualized; particularly the basal  segments. Unable to evaluate left ventricular function.  Consider repeat limited study with echocontrast (Definity)  for further LV function assessment.  3. The right ventricle is not well visualized.  4. Agitated saline injection is inconclusive regarding the  pressence of an intracardiac shunt.  8/8 MRA: Intracranial MR angiography is within normal limits.  8/8 CTH: icrovascular ischemic changes involving the periventricular   and subcortical white matter. No change compared with the prior  8/9 Neuro - Likely Encephalopathy 2/2 UTI  C/W UTI Treatment  - Continue blood pressure control  - MRA brain WNL  - MRA neck not done, Neck Duplex showed 16-49% Stenosis of the b/l ICAs  - No need for further imaging  - C/W Atorvastatin 80 mg- (pt on Piavistatin at home due to muscle aches with atorvastatin)  - C/W ASA  - C/W Pradaxa home dose   - TTE w/ bubble study did not show any thrombus, PFO (however study was inadequate) No definity study ordered  - F/U with Neuro (Dr. Kate) as outpatient  - Will sign off, recall as needed  77 year old male with PMH of A.fibb (on Pradaxa BID), COPD, GERD, ILD, BRIEN presented to ED for evaluation of slurred speech and weakness on left side of his body. PE showed mild facial droop on left side and drifting in left lower extremity. Patient had rapidly improving neurological symptoms and his NIHSS is 2. Patient was not given tPA due to rapidly improving neurological symptoms and low NIHSS.      Problem:  - R/O Stroke    Recommendations:  - C/W UTI Treatment  - Continue blood pressure control  - MRI brain  - MRA brain WNL  - MRA neck not done, Neck Duplex showed 16-49% Stenosis of the b/l ICAs  - C/W Atorvastatin 80 mg  - C/W ASA  - C/W Pradaxa home dose   - TTE w/ bubble study did not show any thrombus, PFO (however study was inadequate)  - C/W Telemetry monitoring    cleared by Neuro  Discussed with MD - pt stable for discharge home, pt with no complaints.  discharge meds discussed with MD 76 y/o M with h/o A. fib on pradaxa, COPD, GERD, HLD, BRIEN on CPAP, dementia, recurrent UTI presents to the ED for slurred speech and left sided weakness. Initially admitted to r/o CVA but found to have sepsis 2/2 UTI initially treated with Ceftriaxone then switched to Cefuroxime to complete a total 5 day course. Patient was not given tPA due to rapidly improving neurological symptoms and low NIHSS.  Repeat CT head negative, was unable to do MRI brain due to patient inability to lie still. MRA brain WNL, Neck Duplex showed 16-49% Stenosis of the b/l ICAs. Cardiac enzymes neg x 2. TTE w/ bubble study did not show any thrombus, PFO (however study was inadequate) Had some episodes of sundowning and improved on Seroquel qhs.   Of note 8/6 CXR:bibasilar predominant coarse reticular interstitial opacities corresponding to pulmonary fibrosis as demonstrated on chest CT, had stable pulmonary status during admission. Advised outpatient followup with Dr. Prieto on DC    TTE results:  1. Calcified trileaflet aortic valve with normal opening.  2. Endocardium not well visualized; particularly the basal  segments. Unable to evaluate left ventricular function.  Consider repeat limited study with echocontrast (Definity)  for further LV function assessment.  3. The right ventricle is not well visualized.  4. Agitated saline injection is inconclusive regarding the  pressence of an intracardiac shunt.  8/8 MRA: Intracranial MR angiography is within normal limits.  8/8 CTH: icrovascular ischemic changes involving the periventricular   and subcortical white matter. No change compared with the prior 78 y/o M with h/o A. fib on pradaxa, COPD, GERD, HLD, BRIEN on CPAP, dementia, recurrent UTI presents to the ED for slurred speech and left sided weakness. Initially admitted to r/o CVA but found to have sepsis 2/2 UTI initially treated with Ceftriaxone then switched to Cefuroxime to complete a total 5 day course. Patient was not given tPA due to rapidly improving neurological symptoms and low NIHSS.  Repeat CT head negative, was unable to do MRI brain due to patient inability to lie still. MRA brain WNL, Neck Duplex showed 16-49% Stenosis of the b/l ICAs. Cardiac enzymes neg x 2. TTE w/ bubble study did not show any thrombus, PFO (however study was inadequate) Had some episodes of sundowning and improved on Seroquel qhs. PT recs: home with home PT    Of note 8/6 CXR:bibasilar predominant coarse reticular interstitial opacities corresponding to pulmonary fibrosis as demonstrated on chest CT, had stable pulmonary status during admission. Advised outpatient followup with Dr. Prieto on DC     TTE results:  1. Calcified trileaflet aortic valve with normal opening.  2. Endocardium not well visualized; particularly the basal  segments. Unable to evaluate left ventricular function.  Consider repeat limited study with echocontrast (Definity)  for further LV function assessment.  3. The right ventricle is not well visualized.  4. Agitated saline injection is inconclusive regarding the  pressence of an intracardiac shunt.  8/8 MRA: Intracranial MR angiography is within normal limits.  8/8 CTH: icrovascular ischemic changes involving the periventricular   and subcortical white matter. No change compared with the prior

## 2017-08-06 NOTE — H&P ADULT - NSHPLABSRESULTS_GEN_ALL_CORE
LABS:                        14.2   17.30 )-----------( 139      ( 06 Aug 2017 00:55 )             41.7     08-06    136  |  99  |  14  ----------------------------<  113<H>  4.4   |  22  |  0.76    Ca    8.4      06 Aug 2017 00:55    TPro  7.5  /  Alb  3.4  /  TBili  0.7  /  DBili  x   /  AST  29  /  ALT  17  /  AlkPhos  93  08-06    PTT - ( 06 Aug 2017 00:55 )  PTT:29.5 SEC    CAPILLARY BLOOD GLUCOSE  110 (06 Aug 2017 01:10)  110 (06 Aug 2017 00:09)          CT head: No acute intracranial hemorrhage, mass effect, or CT evidence of an acute vascular territorial infarct. Moderate chronic ischemic changes in the frontoparietal white matter.  EKG shows a. trial fib @ 85 bpm  LABS:                        14.2   17.30 )-----------( 139      ( 06 Aug 2017 00:55 )             41.7     08-06    136  |  99  |  14  ----------------------------<  113<H>  4.4   |  22  |  0.76    Ca    8.4      06 Aug 2017 00:55    TPro  7.5  /  Alb  3.4  /  TBili  0.7  /  DBili  x   /  AST  29  /  ALT  17  /  AlkPhos  93  08-06    PTT - ( 06 Aug 2017 00:55 )  PTT:29.5 SEC    CAPILLARY BLOOD GLUCOSE  110 (06 Aug 2017 01:10)  110 (06 Aug 2017 00:09)          CT head: No acute intracranial hemorrhage, mass effect, or CT evidence of an acute vascular territorial infarct. Moderate chronic ischemic changes in the frontoparietal white matter.  EKG shows atrial fib @ 85 bpm  CT head: No acute intracranial hemorrhage, mass effect, or CT evidence of an acute vascular territorial infarct. Moderate chronic ischemic changes in the frontoparietal white matter.  EKG shows atrial fib @ 85 bpm   CXR prelim show Reticular opacities and honeycombing with a basilar predominance likely representing pulmonary fibrosis. Bibasilar subsegmental atelectasis- f/u offical report CT head: No acute intracranial hemorrhage, mass effect, or CT evidence of an acute vascular territorial infarct. Moderate chronic ischemic changes in the frontoparietal white matter.    EKG shows atrial fib @ 85 bpm , RBBB, no acute Tw or ST changes - unchanged compared to EKG from 1/9/2017    CXR prelim: reticular opacities and honeycombing with a basilar predominance likely representing pulmonary fibrosis, bibasilar subsegmental atelectasis

## 2017-08-06 NOTE — H&P ADULT - PROBLEM SELECTOR PLAN 7
Check urinalysis and urine culture. Patient unable to take atorvastatin due to muscle pain. Patient's wife to bring in medication tomorrow .   Check lipid  Low salt, low cholesterol diet On Pradaxa

## 2017-08-06 NOTE — ED ADULT NURSE REASSESSMENT NOTE - NS ED NURSE REASSESS COMMENT FT1
report given to rads avril vega. ankit noted. vs as stated. resps even and unlabored. afib per monitor. medicated per orders. ivf infusing. patient to be taken to rads.

## 2017-08-06 NOTE — OCCUPATIONAL THERAPY INITIAL EVALUATION ADULT - ANTICIPATED DISCHARGE DISPOSITION, OT EVAL
[facility]. However, please continue to follow progress notes closely.

## 2017-08-06 NOTE — ED PROVIDER NOTE - PROGRESS NOTE DETAILS
Patient symptoms have been improving, Neuro recommends no TPA, probable TIA. will admit for further workup

## 2017-08-07 DIAGNOSIS — A41.9 SEPSIS, UNSPECIFIED ORGANISM: ICD-10-CM

## 2017-08-07 LAB
BUN SERPL-MCNC: 11 MG/DL — SIGNIFICANT CHANGE UP (ref 7–23)
CALCIUM SERPL-MCNC: 8.8 MG/DL — SIGNIFICANT CHANGE UP (ref 8.4–10.5)
CHLORIDE SERPL-SCNC: 101 MMOL/L — SIGNIFICANT CHANGE UP (ref 98–107)
CO2 SERPL-SCNC: 23 MMOL/L — SIGNIFICANT CHANGE UP (ref 22–31)
CREAT SERPL-MCNC: 0.71 MG/DL — SIGNIFICANT CHANGE UP (ref 0.5–1.3)
GLUCOSE SERPL-MCNC: 107 MG/DL — HIGH (ref 70–99)
HCT VFR BLD CALC: 41.8 % — SIGNIFICANT CHANGE UP (ref 39–50)
HGB BLD-MCNC: 13.8 G/DL — SIGNIFICANT CHANGE UP (ref 13–17)
MCHC RBC-ENTMCNC: 30.7 PG — SIGNIFICANT CHANGE UP (ref 27–34)
MCHC RBC-ENTMCNC: 33 % — SIGNIFICANT CHANGE UP (ref 32–36)
MCV RBC AUTO: 92.9 FL — SIGNIFICANT CHANGE UP (ref 80–100)
NRBC # FLD: 0 — SIGNIFICANT CHANGE UP
PLATELET # BLD AUTO: 131 K/UL — LOW (ref 150–400)
PMV BLD: 13.6 FL — HIGH (ref 7–13)
POTASSIUM SERPL-MCNC: 4.2 MMOL/L — SIGNIFICANT CHANGE UP (ref 3.5–5.3)
POTASSIUM SERPL-SCNC: 4.2 MMOL/L — SIGNIFICANT CHANGE UP (ref 3.5–5.3)
RBC # BLD: 4.5 M/UL — SIGNIFICANT CHANGE UP (ref 4.2–5.8)
RBC # FLD: 13.9 % — SIGNIFICANT CHANGE UP (ref 10.3–14.5)
SODIUM SERPL-SCNC: 139 MMOL/L — SIGNIFICANT CHANGE UP (ref 135–145)
SPECIMEN SOURCE: SIGNIFICANT CHANGE UP
WBC # BLD: 15.65 K/UL — HIGH (ref 3.8–10.5)
WBC # FLD AUTO: 15.65 K/UL — HIGH (ref 3.8–10.5)

## 2017-08-07 PROCEDURE — 99223 1ST HOSP IP/OBS HIGH 75: CPT | Mod: GC

## 2017-08-07 PROCEDURE — 74230 X-RAY XM SWLNG FUNCJ C+: CPT | Mod: 26

## 2017-08-07 PROCEDURE — 99233 SBSQ HOSP IP/OBS HIGH 50: CPT

## 2017-08-07 RX ORDER — LANOLIN ALCOHOL/MO/W.PET/CERES
3 CREAM (GRAM) TOPICAL AT BEDTIME
Qty: 0 | Refills: 0 | Status: DISCONTINUED | OUTPATIENT
Start: 2017-08-07 | End: 2017-08-08

## 2017-08-07 RX ORDER — CHOLECALCIFEROL (VITAMIN D3) 125 MCG
5000 CAPSULE ORAL DAILY
Qty: 0 | Refills: 0 | Status: DISCONTINUED | OUTPATIENT
Start: 2017-08-07 | End: 2017-08-08

## 2017-08-07 RX ORDER — HALOPERIDOL DECANOATE 100 MG/ML
1 INJECTION INTRAMUSCULAR ONCE
Qty: 0 | Refills: 0 | Status: COMPLETED | OUTPATIENT
Start: 2017-08-07 | End: 2017-08-07

## 2017-08-07 RX ADMIN — CEFTRIAXONE 100 GRAM(S): 500 INJECTION, POWDER, FOR SOLUTION INTRAMUSCULAR; INTRAVENOUS at 05:39

## 2017-08-07 RX ADMIN — Medication 100 MILLIGRAM(S): at 05:47

## 2017-08-07 RX ADMIN — PANTOPRAZOLE SODIUM 40 MILLIGRAM(S): 20 TABLET, DELAYED RELEASE ORAL at 05:47

## 2017-08-07 RX ADMIN — FAMOTIDINE 20 MILLIGRAM(S): 10 INJECTION INTRAVENOUS at 12:44

## 2017-08-07 RX ADMIN — TAMSULOSIN HYDROCHLORIDE 0.4 MILLIGRAM(S): 0.4 CAPSULE ORAL at 21:03

## 2017-08-07 RX ADMIN — DABIGATRAN ETEXILATE MESYLATE 150 MILLIGRAM(S): 150 CAPSULE ORAL at 05:42

## 2017-08-07 RX ADMIN — Medication 81 MILLIGRAM(S): at 12:44

## 2017-08-07 RX ADMIN — DABIGATRAN ETEXILATE MESYLATE 150 MILLIGRAM(S): 150 CAPSULE ORAL at 17:24

## 2017-08-07 RX ADMIN — HALOPERIDOL DECANOATE 1 MILLIGRAM(S): 100 INJECTION INTRAMUSCULAR at 23:22

## 2017-08-07 RX ADMIN — Medication 5000 UNIT(S): at 12:55

## 2017-08-07 NOTE — CONSULT NOTE ADULT - SUBJECTIVE AND OBJECTIVE BOX
CHIEF COMPLAINT: Chest Pain    HPI:  78 y/o Male well known to our group, best to Dr Ro, history AF on pradaxa, GERD, HL, sleep apnea on CPAP, copd, presented with with slurred speech and weakness. No chest pain or dyspnea. Symptoms reportedly resolving. Poor historian.    In the ED, patient was seen and evaluated by neurology. Vital Signs Last 24 Hrs T(C): 37.2 (06 Aug 2017 04:05), Max: 38.6 (06 Aug 2017 01:02)T(F): 98.9 (06 Aug 2017 04:05), Tmax: 101.4 (06 Aug 2017 01:02) HR: 67 (06 Aug 2017 04:05) (67 - 87) BP: 114/55 (06 Aug 2017 04:05) (108/61 - 124/72) RR: 22 (06 Aug 2017 04:05) (19 - 25) SpO2: 100% (06 Aug 2017 04:05) (98% - 100%). Pt is admitted to telemetry. (06 Aug 2017 03:51)      PAST MEDICAL & SURGICAL HISTORY:  Dementia  Atrial fibrillation  GERD (gastroesophageal reflux disease)  Recurrent UTI  Hyperlipidemia  Obstructive sleep apnea on CPAP  H/O small bowel obstruction  History of appendectomy      Allergies    No Known Drug Allergies  zosyn (Drowsiness)      SOCIAL HISTORY    Smoking Hx: denies    FAMILY HISTORY:  Family history of colon cancer (Sibling)  Family history of myocardial infarction (Sibling)      MEDICATIONS:  dabigatran 150 milliGRAM(s) Oral every 12 hours  aspirin enteric coated 81 milliGRAM(s) Oral daily  tamsulosin 0.4 milliGRAM(s) Oral at bedtime  famotidine    Tablet 20 milliGRAM(s) Oral daily  docusate sodium 100 milliGRAM(s) Oral two times a day  pantoprazole    Tablet 40 milliGRAM(s) Oral before breakfast  cholecalciferol 500 Unit(s) Oral daily  levalbuterol Inhalation 0.63 milliGRAM(s) Inhalation every 4 hours PRN  cefTRIAXone   IVPB   IV Intermittent   sodium chloride 0.9%. 1000 milliLiter(s) IV Continuous <Continuous>  cefTRIAXone   IVPB 1 Gram(s) IV Intermittent every 24 hours      REVIEW OF SYSTEMS:    CONSTITUTIONAL: No weakness, fevers or chills  EYES/ENT: No visual changes;  No vertigo or throat pain   NECK: No pain or stiffness  RESPIRATORY: No cough, wheezing, hemoptysis; No shortness of breath  CARDIOVASCULAR: No chest pain or palpitations  GASTROINTESTINAL: No abdominal or epigastric pain. No nausea, vomiting, or hematemesis; No diarrhea or constipation. No melena or hematochezia.  GENITOURINARY: No dysuria, frequency or hematuria  NEUROLOGICAL: No numbness or weakness  SKIN: No itching, burning, rashes, or lesions       Vital Signs Last 24 Hrs  T(C): 37.1 (07 Aug 2017 05:42), Max: 37.2 (06 Aug 2017 23:08)  T(F): 98.7 (07 Aug 2017 05:42), Max: 98.9 (06 Aug 2017 23:08)  HR: 88 (07 Aug 2017 07:29) (73 - 88)  BP: 125/68 (07 Aug 2017 05:42) (111/46 - 134/79)  BP(mean): --  RR: 18 (07 Aug 2017 05:42) (18 - 19)  SpO2: 95% (07 Aug 2017 07:29) (95% - 99%)    I&O's Summary    06 Aug 2017 07:01  -  07 Aug 2017 07:00  --------------------------------------------------------  IN: 0 mL / OUT: 500 mL / NET: -500 mL        PHYSICAL EXAM:  Telemtry: AF 70    Constitutional: NAD, awake and alert, well-developed  HEENT: PERR, EOMI  Neck: soft and supple, No LAD, No JVD  Respiratory: Breath sounds are clear bilaterally, No wheezing, rales or rhonchi  Cardiovascular: irregular normal s1s2  Gastrointestinal: Bowel Sounds present, soft, nontender.   Extremities: moderate bilateral edema    .  EKG: cannot locate, not in computer, but reportedly AF rbbb; qtc 499 ms, reportedly unchanged versus 1/17    LABS: All Labs Reviewed:                        13.8   15.65 )-----------( 131      ( 07 Aug 2017 05:49 )             41.8                         12.5   16.60 )-----------( 128      ( 06 Aug 2017 06:00 )             37.3                         14.2   17.30 )-----------( 139      ( 06 Aug 2017 00:55 )             41.7     07 Aug 2017 05:49    139    |  101    |  11     ----------------------------<  107    4.2     |  23     |  0.71   06 Aug 2017 20:07    137    |  102    |  10     ----------------------------<  142    4.6     |  21     |  0.72   06 Aug 2017 06:00    142    |  111    |  11     ----------------------------<  88     3.1     |  21     |  0.49     Ca    8.8        07 Aug 2017 05:49  Ca    7.9        06 Aug 2017 20:07  Ca    6.4        06 Aug 2017 06:00  Mg     1.9       06 Aug 2017 20:07  Mg     1.3       06 Aug 2017 06:00    TPro  7.5    /  Alb  3.4    /  TBili  0.7    /  DBili  x      /  AST  29     /  ALT  17     /  AlkPhos  93     06 Aug 2017 00:55    PTT - ( 06 Aug 2017 00:55 )  PTT:29.5 SEC  Blood Culture:     CARDIAC MARKERS:

## 2017-08-07 NOTE — CONSULT NOTE ADULT - ASSESSMENT
#AF on pradaxa  #R/o cva versus tia   #sleep apnea  #COPD   #HL  Stable at present cv perspective. Echo please. Neurology workup in progress.

## 2017-08-07 NOTE — SWALLOW VFSS/MBS ASSESSMENT ADULT - ROSENBEK'S PENETRATION ASPIRATION SCALE
(3) contrast remains above the vocal cords, visible residue remains (penetration) (1) no aspiration, contrast does not enter airway

## 2017-08-07 NOTE — SWALLOW VFSS/MBS ASSESSMENT ADULT - DIAGNOSTIC IMPRESSIONS
Patient presents with Mild Oral Stage and Moderate Pharyngeal Stage Dysphagia. The Oral Stage is characterized by adequate oral containment, slow/prolonged chewing for solid, slow bolus manipulation, slow tongue motion with slow anterior to posterior transfer of the bolus for puree/solids trials; intermittent premature loss/spillage to the hypopharynx for thin liquids. There is adequate oral clearance post swallow.   The Pharyngeal Stage is characterized by delayed initiation of the pharyngeal swallow (bolus head is at pyriforms for Thin Liquids/Nectar Thick Liquids; laryngeal surface of epiglottis for Honey Thick liquids), reduced laryngeal elevation, reduced tongue base retraction resulting in mild to moderate vallecular residue post primary swallow, reduced pharyngeal constriction resulting in mild pyriform/pharyngeal wall coating residue post primary swallow. There was Laryngeal Penetration during and after the swallow for Thin Liquids/Nectar Thick Liquids without retrieval leaving residue in the laryngeal vestibule. Patient is with reduced sensation given no reflexive cough. Patient presents with Mild Oral Stage and Moderate Pharyngeal Stage Dysphagia. The Oral Stage is characterized by adequate oral containment, slow/prolonged chewing for solid, slow bolus manipulation, slow tongue motion with slow anterior to posterior transfer of the bolus for puree/solids trials; intermittent premature loss/spillage to the hypopharynx for thin liquids. There is adequate oral clearance post swallow.   The Pharyngeal Stage is characterized by delayed initiation of the pharyngeal swallow (bolus head is at pyriforms for Thin Liquids/Nectar Thick Liquids; laryngeal surface of epiglottis for Honey Thick liquids), reduced laryngeal elevation, reduced tongue base retraction resulting in mild to moderate vallecular residue post primary swallow, reduced pharyngeal constriction resulting in mild pyriform/pharyngeal wall coating residue post primary swallow. There was Laryngeal Penetration during and after the swallow for Thin Liquids/Nectar Thick Liquids without retrieval leaving residue in the laryngeal vestibule. Patient is with reduced sensation given no reflexive cough.    Of Note: Patient's shoulder girdle limited viewing of the lower laryngeal vestibule/trachea. Patient presents with Mild Oral Stage and Moderate Pharyngeal Stage Dysphagia. The Oral Stage is characterized by adequate oral containment, slow/prolonged chewing for solid, slow bolus manipulation, slow tongue motion with slow anterior to posterior transfer of the bolus for puree/solids trials; intermittent premature loss/spillage to the hypopharynx for thin liquids. There is adequate oral clearance post swallow.   The Pharyngeal Stage is characterized by delayed initiation of the pharyngeal swallow (bolus head is at pyriforms for Thin Liquids/Nectar Thick Liquids; laryngeal surface of epiglottis for Honey Thick liquids), reduced laryngeal elevation, reduced tongue base retraction resulting in mild to moderate vallecular residue post primary swallow, reduced pharyngeal constriction resulting in mild pyriform/pharyngeal wall coating residue post primary swallow. There is moderate pharyngeal clearance deficits post primary swallow located in the vallecula/pyriforms/pharyngeal wall.  Spontaneous reswallow intermittently benefits to assist with partial pharyngeal clearance. There was Laryngeal Penetration during and after the swallow for Thin Liquids/Nectar Thick Liquids without retrieval leaving residue in the laryngeal vestibule. Patient is with reduced sensation given no reflexive cough. There was No Aspiration observed before, during or after the swallow for puree/honey thick liquids.     Of Note: Patient's shoulder girdle limited viewing of the lower laryngeal vestibule/trachea. Patient presents with Mild Oral Stage and Moderate Pharyngeal Stage Dysphagia. The Oral Stage is characterized by adequate oral containment, slow/prolonged chewing for solid, slow bolus manipulation, slow tongue motion with slow anterior to posterior transfer of the bolus for puree/solids trials; intermittent premature loss/spillage to the hypopharynx for thin liquids. There is adequate oral clearance post swallow.   The Pharyngeal Stage is characterized by delayed initiation of the pharyngeal swallow (bolus head is at pyriforms for Thin Liquids/Nectar Thick Liquids; laryngeal surface of epiglottis for Honey Thick liquids), reduced laryngeal elevation, reduced tongue base retraction resulting in mild to moderate vallecular residue post primary swallow, reduced pharyngeal constriction resulting in mild pyriform/pharyngeal wall coating residue post primary swallow. There is moderate pharyngeal clearance deficits post primary swallow located in the vallecula/pyriforms/pharyngeal wall.  Spontaneous reswallow intermittently benefits to assist with partial pharyngeal clearance. There was Laryngeal Penetration during and after the swallow for Thin Liquids/Nectar Thick Liquids without retrieval leaving residue in the laryngeal vestibule. Patient is with reduced sensation given no reflexive cough. There was No Aspiration observed before, during or after the swallow for puree/honey thick liquids.     Of Note: Patient's shoulder girdle limited viewing of the trachea.

## 2017-08-07 NOTE — CONSULT NOTE ADULT - ASSESSMENT
*** 77M hx COPD, GERD, ILD, Afib on Pradaxa, and BRIEN on CPAP at home, who initially presented with slurred speech and left sided weakness. Pt of Dr. Prieto's    #Hx of ILD, COPD, BRIEN  At this time respiratory status is stable. No SOB/SEO. No hypoxia noted on VS, though has mild cyanosis of lips.  - pt may use his home CPAP (not tolerating full face mask)  - cont home meds  - ambulatory sats, pt may require O2  - please provider pt with Rx for spacer upon d/c  - please have f/u with Dr. Prieto upon discharge    case d/w Dr. Kumar

## 2017-08-07 NOTE — SWALLOW VFSS/MBS ASSESSMENT ADULT - ADDITIONAL RECOMMENDATIONS
Patient may benefit swallowing therapy pending discharge plans (rehabilitation center vs home care vs OutPatient at [facility] [phone])

## 2017-08-07 NOTE — PROGRESS NOTE ADULT - ASSESSMENT
77 year old male with PMH of Asad (on Pradaxa BID), COPD, GERD, ILD, BRIEN presented to ED for evaluation of slurred speech and weakness on left side of his body. PE showed mild facial droop on left side and drifting in left lower extremity. Patient had rapidly improving neurological symptoms and his NIHSS is 2. Patient was not given tPA due to rapidly improving neurological symptoms and low NIHSS.      Problem:  - R/O Stroke    Recommendations:  - C/W UTI Treatment  - Gradual Normotension  - MRI brain   - MRA brain   - MRA neck  - Start Atorvastatin 80 mg  - C/W ASA  - C/W Pradaxa home dose   - TTE w/ bubble study  - Telemetry monitoring

## 2017-08-07 NOTE — SWALLOW VFSS/MBS ASSESSMENT ADULT - RECOMMENDED CONSISTENCY
1.) Puree with Honey Thick Liquids  2.) Aspiration Precautions  3.) Feeding/Swallowing Guidelines: Sit upright, teaspoon amount at a time, allow patient to swallow prior to next presentation, honey thick liquids via teaspoon or small single cup sips; alternate with a liquid wash of honey thick liquid after every 2 teaspoon of puree.   4.) Maintain Good Oral Hygiene Care

## 2017-08-07 NOTE — CONSULT NOTE ADULT - SUBJECTIVE AND OBJECTIVE BOX
CHIEF COMPLAINT:    HPI:    PAST MEDICAL & SURGICAL HISTORY:  Dementia  Atrial fibrillation  GERD (gastroesophageal reflux disease)  Recurrent UTI  Hyperlipidemia  Obstructive sleep apnea on CPAP  H/O small bowel obstruction  History of appendectomy      FAMILY HISTORY:  Family history of colon cancer (Sibling)  Family history of myocardial infarction (Sibling)      SOCIAL HISTORY:  Smoking: [ ] Never Smoked [ ] Former Smoker (__ packs x ___ years) [ ] Current Smoker  (__ packs x ___ years)  Substance Use: [ ] Never Used [ ] Used ____  EtOH Use:  Marital Status: [ ] Single [ ]  [ ]  [ ]   Sexual History:   Occupation:  Recent Travel:  Country of Birth:  Advance Directives:    Allergies    No Known Drug Allergies  zosyn (Drowsiness)    Intolerances        HOME MEDICATIONS:    REVIEW OF SYSTEMS:  Constitutional: [ ] negative [ ] fevers [ ] chills [ ] weight loss [ ] weight gain  HEENT: [ ] negative [ ] dry eyes [ ] eye irritation [ ] postnasal drip [ ] nasal congestion  CV: [ ] negative  [ ] chest pain [ ] orthopnea [ ] palpitations [ ] murmur  Resp: [ ] negative [ ] cough [ ] shortness of breath [ ] dyspnea [ ] wheezing [ ] sputum [ ] hemoptysis  GI: [ ] negative [ ] nausea [ ] vomiting [ ] diarrhea [ ] constipation [ ] abd pain [ ] dysphagia   : [ ] negative [ ] dysuria [ ] nocturia [ ] hematuria [ ] increased urinary frequency  Musculoskeletal: [ ] negative [ ] back pain [ ] myalgias [ ] arthralgias [ ] fracture  Skin: [ ] negative [ ] rash [ ] itch  Neurological: [ ] negative [ ] headache [ ] dizziness [ ] syncope [ ] weakness [ ] numbness  Psychiatric: [ ] negative [ ] anxiety [ ] depression  Endocrine: [ ] negative [ ] diabetes [ ] thyroid problem  Hematologic/Lymphatic: [ ] negative [ ] anemia [ ] bleeding problem  Allergic/Immunologic: [ ] negative [ ] itchy eyes [ ] nasal discharge [ ] hives [ ] angioedema  [ ] All other systems negative  [ ] Unable to assess ROS because ________    OBJECTIVE:  ICU Vital Signs Last 24 Hrs  T(C): 37.1 (07 Aug 2017 05:42), Max: 37.2 (06 Aug 2017 23:08)  T(F): 98.7 (07 Aug 2017 05:42), Max: 98.9 (06 Aug 2017 23:08)  HR: 85 (07 Aug 2017 12:25) (73 - 88)  BP: 135/74 (07 Aug 2017 12:25) (111/46 - 135/74)  BP(mean): --  ABP: --  ABP(mean): --  RR: 18 (07 Aug 2017 12:25) (18 - 18)  SpO2: 96% (07 Aug 2017 12:25) (95% - 98%)        08 @ 07:01  -   @ 07:00  --------------------------------------------------------  IN: 0 mL / OUT: 500 mL / NET: -500 mL      CAPILLARY BLOOD GLUCOSE  110 (06 Aug 2017 01:10)          PHYSICAL EXAM:  General:   HEENT:   Lymph Nodes:  Neck:   Respiratory:   Cardiovascular:   Abdomen:   Extremities:   Skin:   Neurological:  Psychiatry:    HOSPITAL MEDICATIONS:  dabigatran 150 milliGRAM(s) Oral every 12 hours  aspirin enteric coated 81 milliGRAM(s) Oral daily    cefTRIAXone   IVPB   IV Intermittent   cefTRIAXone   IVPB 1 Gram(s) IV Intermittent every 24 hours    tamsulosin 0.4 milliGRAM(s) Oral at bedtime      levalbuterol Inhalation 0.63 milliGRAM(s) Inhalation every 4 hours PRN      famotidine    Tablet 20 milliGRAM(s) Oral daily  docusate sodium 100 milliGRAM(s) Oral two times a day  pantoprazole    Tablet 40 milliGRAM(s) Oral before breakfast        sodium chloride 0.9%. 1000 milliLiter(s) IV Continuous <Continuous>  cholecalciferol 5000 Unit(s) Oral daily        livalo 2 milliGRAM(s) 2 milliGRAM(s) Oral daily  armodafinil 50 milliGRAM(s) 50 milliGRAM(s) Oral daily          LABS:                        13.8   15.65 )-----------( 131      ( 07 Aug 2017 05:49 )             41.8     Hgb Trend: 13.8<--, 12.5<--, 14.2<--      139  |  101  |  11  ----------------------------<  107<H>  4.2   |  23  |  0.71    Ca    8.8      07 Aug 2017 05:49  Mg     1.9         TPro  7.5  /  Alb  3.4  /  TBili  0.7  /  DBili  x   /  AST  29  /  ALT  17  /  AlkPhos  93      Creatinine Trend: 0.71<--, 0.72<--, 0.49<--, 0.76<--  PTT - ( 06 Aug 2017 00:55 )  PTT:29.5 SEC  Urinalysis Basic - ( 06 Aug 2017 02:40 )    Color: YELLOW / Appearance: HAZY / S.023 / pH: 5.5  Gluc: NEGATIVE / Ketone: NEGATIVE  / Bili: NEGATIVE / Urobili: NORMAL E.U.   Blood: TRACE / Protein: 10 / Nitrite: NEGATIVE   Leuk Esterase: LARGE / RBC: 2-5 / WBC 25-50   Sq Epi: OCC / Non Sq Epi: x / Bacteria: FEW        Venous Blood Gas:   @ 00:55  7.44/38/67/26/93.9  VBG Lactate: 1.8      MICROBIOLOGY:     RADIOLOGY:  [ ] Reviewed and interpreted by me    PULMONARY FUNCTION TESTS:    EKG: CHIEF COMPLAINT: facial droop    HPI:  77M hx COPD, GERD, ILD, Afib on Pradaxa, and BRIEN on CPAP at home, who initially presented with slurred speech and left sided weakness. Pt arrived to the ED, stroke code called. CTH negative. MRI/A pending. TTE pending. Neurology following the patient on the floors. Currently being treated for sepsis due to UTI - on ceftriaxone.     Asked to see the patient by Dr. Prieto. Currently patient has no SOB and feels at his respiratory baseline. Outpatient spirometry show restrictive disease. As per his wife, pt does not take any of his inhalers at home.     PAST MEDICAL & SURGICAL HISTORY:  Dementia  Atrial fibrillation  GERD (gastroesophageal reflux disease)  Recurrent UTI  Hyperlipidemia  Obstructive sleep apnea on CPAP  H/O small bowel obstruction  History of appendectomy      FAMILY HISTORY:  Family history of colon cancer (Sibling)  Family history of myocardial infarction (Sibling)      SOCIAL HISTORY:  Smoking: [x] Never Smoked   Substance Use: [x] Never Used [ ] Used ____  EtOH Use: none  Marital Status: [ ] Single [x]  [ ]  [ ]     Allergies    No Known Drug Allergies  zosyn (Drowsiness)      HOME MEDICATIONS:  Aspirin 81 MG TABS  Esbriet CAPS  Levalbuterol HCl - 0.63 MG/3ML Inhalation Nebulization Solution; PRN 4 x day  Livalo 1 MG Oral Tablet   MG Oral Capsule  Nuvigil 250 MG Oral Tablet  Omeprazole 40 MG Oral Capsule Delayed Release; TAKE ONE CAPSULE BY MOUTH  EVERY MORNING  Omeprazole 40 MG Oral Capsule Delayed Release  RaNITidine HCl - 300 MG Oral Tablet; TAKE 1 TABLET AT BEDTIME  Stool Softener 100 MG Oral Capsule  Terazosin HCl TABS  Levalbuterol    REVIEW OF SYSTEMS:  [x] Unable to assess ROS because dementia    OBJECTIVE:  ICU Vital Signs Last 24 Hrs  T(C): 37.1 (07 Aug 2017 05:42), Max: 37.2 (06 Aug 2017 23:08)  T(F): 98.7 (07 Aug 2017 05:42), Max: 98.9 (06 Aug 2017 23:08)  HR: 85 (07 Aug 2017 12:25) (73 - 88)  BP: 135/74 (07 Aug 2017 12:25) (111/46 - 135/74)  BP(mean): --  ABP: --  ABP(mean): --  RR: 18 (07 Aug 2017 12:25) (18 - 18)  SpO2: 96% (07 Aug 2017 12:25) (95% - 98%)         @ 07:01  -   @ 07:00  --------------------------------------------------------  IN: 0 mL / OUT: 500 mL / NET: -500 mL      CAPILLARY BLOOD GLUCOSE  110 (06 Aug 2017 01:10)    PHYSICAL EXAM:  General: NAD, pleasant  HEENT: NCAT, moist mucosal membranes  Respiratory: CTAB, no wheezing  Cardiovascular: S1/S2 normal, irregular rhythm  Abdomen: soft, non-tender, non-distended with normal bowel sounds  Extremities: no clubbing, no b/l LE edema  Skin: mildly cyanotic lips  Neurological: no facial droop, speaking in full sentences    HOSPITAL MEDICATIONS:  dabigatran 150 milliGRAM(s) Oral every 12 hours  aspirin enteric coated 81 milliGRAM(s) Oral daily    cefTRIAXone   IVPB   IV Intermittent   cefTRIAXone   IVPB 1 Gram(s) IV Intermittent every 24 hours    tamsulosin 0.4 milliGRAM(s) Oral at bedtime      levalbuterol Inhalation 0.63 milliGRAM(s) Inhalation every 4 hours PRN      famotidine    Tablet 20 milliGRAM(s) Oral daily  docusate sodium 100 milliGRAM(s) Oral two times a day  pantoprazole    Tablet 40 milliGRAM(s) Oral before breakfast        sodium chloride 0.9%. 1000 milliLiter(s) IV Continuous <Continuous>  cholecalciferol 5000 Unit(s) Oral daily        livalo 2 milliGRAM(s) 2 milliGRAM(s) Oral daily  armodafinil 50 milliGRAM(s) 50 milliGRAM(s) Oral daily          LABS:                        13.8   15.65 )-----------( 131      ( 07 Aug 2017 05:49 )             41.8     Hgb Trend: 13.8<--, 12.5<--, 14.2<--      139  |  101  |  11  ----------------------------<  107<H>  4.2   |  23  |  0.71    Ca    8.8      07 Aug 2017 05:49  Mg     1.9     -    TPro  7.5  /  Alb  3.4  /  TBili  0.7  /  DBili  x   /  AST  29  /  ALT  17  /  AlkPhos  93  -    Creatinine Trend: 0.71<--, 0.72<--, 0.49<--, 0.76<--  PTT - ( 06 Aug 2017 00:55 )  PTT:29.5 SEC  Urinalysis Basic - ( 06 Aug 2017 02:40 )    Color: YELLOW / Appearance: HAZY / S.023 / pH: 5.5  Gluc: NEGATIVE / Ketone: NEGATIVE  / Bili: NEGATIVE / Urobili: NORMAL E.U.   Blood: TRACE / Protein: 10 / Nitrite: NEGATIVE   Leuk Esterase: LARGE / RBC: 2-5 / WBC 25-50   Sq Epi: OCC / Non Sq Epi: x / Bacteria: FEW        Venous Blood Gas:   @ 00:55  7.44/38/67/26/93.9  VBG Lactate: 1.8      MICROBIOLOGY:   UrCx E. coli  BCx NGTD    RADIOLOGY:  CXR :  IMPRESSION:  Redemonstrated bibasilar predominant coarse reticular interstitial   opacities corresponding to pulmonary fibrosis as demonstrated on chest CT   from 2017. Clear upper lungs. No pleural effusions or pneumothorax.    Indistinct heart borders due to epicardial fat limits accurate assessment   of heart size.     Trachea midline.    Generalized osteopenia again noted.    PULMONARY FUNCTION TESTS:    FVC 58%  FEV1 62%  FEV1/FVC 84%  DLCO 65%

## 2017-08-07 NOTE — SWALLOW VFSS/MBS ASSESSMENT ADULT - PHARYNGEAL PHASE COMMENTS
delayed initiation of the pharyngeal swallow, reduced laryngeal elevation, reduced tongue base retraction, reduced pharyngeal constriction

## 2017-08-07 NOTE — CONSULT NOTE ADULT - ATTENDING COMMENTS
77 M with obesity, COPD/asthma, BRIEN on nocturnal CPAP, ILD  A fib on pradaxa now admitted with a possible TIA. Neurological symptoms completely resolved, undergoing work up. Also found to have a UTI now on Ceftriaxone. Stable pulmonary status, oxygenating well on RA at rest. would suggest checking O2 sat on RA after ambulation. Cont nocturnal CPAP, bronchodilators prn and incentive spirometry. He is supposed to be on Symbicort BID but unable to use his inhalers correctly at home. Would suggest a spacer upon discharge. Outpt pulm FUP with Dr. Prieto.
Patient was seen and examined.  At this time he is asymptomatic given presenting symptomology.  Agree with plan.

## 2017-08-07 NOTE — PROGRESS NOTE ADULT - ASSESSMENT
78 y/o Male, ambulatory with a cane and a walker, with h/o A. fib on Pradaxa, COPD (only on Xopenex prn per the wife as unable to take), GERD, HLD, BRIEN on CPAP at night, chronic b/l LE lymphedema, mild dementia, urinary incontinence, recurrent UTIs a/w slurred speech and left sided weakness (in resolution), TIA vs CVA; Found to have UTI, satisfied criteria for sepsis;

## 2017-08-07 NOTE — SWALLOW VFSS/MBS ASSESSMENT ADULT - ORAL PHASE
Reduced anterior - posterior transport/Delayed oral transit time Delayed oral transit time/Reduced anterior - posterior transport within functional limits Within functional limits

## 2017-08-07 NOTE — SWALLOW VFSS/MBS ASSESSMENT ADULT - COMMENTS
Patient is a 78 y/o M with h/o A. fib on pradaxa, COPD, GERD, HLD, BRIEN on CPAP, dementia, recurrent UTI presents to the ED for slurred speech and left sided weakness; r/o CVA

## 2017-08-08 DIAGNOSIS — F03.91 UNSPECIFIED DEMENTIA WITH BEHAVIORAL DISTURBANCE: ICD-10-CM

## 2017-08-08 LAB
-  AMIKACIN: SIGNIFICANT CHANGE UP
-  AMPICILLIN/SULBACTAM: SIGNIFICANT CHANGE UP
-  AMPICILLIN: SIGNIFICANT CHANGE UP
-  AZTREONAM: SIGNIFICANT CHANGE UP
-  CEFAZOLIN: SIGNIFICANT CHANGE UP
-  CEFEPIME: SIGNIFICANT CHANGE UP
-  CEFOXITIN: SIGNIFICANT CHANGE UP
-  CEFTAZIDIME: SIGNIFICANT CHANGE UP
-  CEFTRIAXONE: SIGNIFICANT CHANGE UP
-  CIPROFLOXACIN: SIGNIFICANT CHANGE UP
-  ERTAPENEM: SIGNIFICANT CHANGE UP
-  GENTAMICIN: SIGNIFICANT CHANGE UP
-  IMIPENEM: SIGNIFICANT CHANGE UP
-  LEVOFLOXACIN: SIGNIFICANT CHANGE UP
-  MEROPENEM: SIGNIFICANT CHANGE UP
-  NITROFURANTOIN: SIGNIFICANT CHANGE UP
-  PIPERACILLIN/TAZOBACTAM: SIGNIFICANT CHANGE UP
-  TIGECYCLINE: SIGNIFICANT CHANGE UP
-  TOBRAMYCIN: SIGNIFICANT CHANGE UP
-  TRIMETHOPRIM/SULFAMETHOXAZOLE: SIGNIFICANT CHANGE UP
BACTERIA UR CULT: SIGNIFICANT CHANGE UP
BASE EXCESS BLDV CALC-SCNC: 2.8 MMOL/L — SIGNIFICANT CHANGE UP
BUN SERPL-MCNC: 10 MG/DL — SIGNIFICANT CHANGE UP (ref 7–23)
CALCIUM SERPL-MCNC: 8.7 MG/DL — SIGNIFICANT CHANGE UP (ref 8.4–10.5)
CHLORIDE SERPL-SCNC: 100 MMOL/L — SIGNIFICANT CHANGE UP (ref 98–107)
CO2 SERPL-SCNC: 24 MMOL/L — SIGNIFICANT CHANGE UP (ref 22–31)
CREAT SERPL-MCNC: 0.58 MG/DL — SIGNIFICANT CHANGE UP (ref 0.5–1.3)
GAS PNL BLDV: 135 MMOL/L — LOW (ref 136–146)
GLUCOSE BLDV-MCNC: 137 — HIGH (ref 70–99)
GLUCOSE SERPL-MCNC: 130 MG/DL — HIGH (ref 70–99)
HCO3 BLDV-SCNC: 27 MMOL/L — SIGNIFICANT CHANGE UP (ref 20–27)
HCT VFR BLD CALC: 41.1 % — SIGNIFICANT CHANGE UP (ref 39–50)
HCT VFR BLDV CALC: 42.8 % — SIGNIFICANT CHANGE UP (ref 39–51)
HGB BLD-MCNC: 13.7 G/DL — SIGNIFICANT CHANGE UP (ref 13–17)
HGB BLDV-MCNC: 13.9 G/DL — SIGNIFICANT CHANGE UP (ref 13–17)
MAGNESIUM SERPL-MCNC: 1.8 MG/DL — SIGNIFICANT CHANGE UP (ref 1.6–2.6)
MCHC RBC-ENTMCNC: 30.6 PG — SIGNIFICANT CHANGE UP (ref 27–34)
MCHC RBC-ENTMCNC: 33.3 % — SIGNIFICANT CHANGE UP (ref 32–36)
MCV RBC AUTO: 91.7 FL — SIGNIFICANT CHANGE UP (ref 80–100)
METHOD TYPE: SIGNIFICANT CHANGE UP
NRBC # FLD: 0 — SIGNIFICANT CHANGE UP
ORGANISM # SPEC MICROSCOPIC CNT: SIGNIFICANT CHANGE UP
ORGANISM # SPEC MICROSCOPIC CNT: SIGNIFICANT CHANGE UP
PCO2 BLDV: 37 MMHG — LOW (ref 41–51)
PH BLDV: 7.47 PH — HIGH (ref 7.32–7.43)
PLATELET # BLD AUTO: 140 K/UL — LOW (ref 150–400)
PMV BLD: 13.2 FL — HIGH (ref 7–13)
PO2 BLDV: 71 MMHG — HIGH (ref 35–40)
POTASSIUM BLDV-SCNC: 3.5 MMOL/L — SIGNIFICANT CHANGE UP (ref 3.4–4.5)
POTASSIUM SERPL-MCNC: 3.8 MMOL/L — SIGNIFICANT CHANGE UP (ref 3.5–5.3)
POTASSIUM SERPL-SCNC: 3.8 MMOL/L — SIGNIFICANT CHANGE UP (ref 3.5–5.3)
RBC # BLD: 4.48 M/UL — SIGNIFICANT CHANGE UP (ref 4.2–5.8)
RBC # FLD: 13.6 % — SIGNIFICANT CHANGE UP (ref 10.3–14.5)
SAO2 % BLDV: 95.1 % — HIGH (ref 60–85)
SODIUM SERPL-SCNC: 138 MMOL/L — SIGNIFICANT CHANGE UP (ref 135–145)
WBC # BLD: 9.35 K/UL — SIGNIFICANT CHANGE UP (ref 3.8–10.5)
WBC # FLD AUTO: 9.35 K/UL — SIGNIFICANT CHANGE UP (ref 3.8–10.5)

## 2017-08-08 PROCEDURE — 93306 TTE W/DOPPLER COMPLETE: CPT | Mod: 26

## 2017-08-08 PROCEDURE — 93880 EXTRACRANIAL BILAT STUDY: CPT | Mod: 26

## 2017-08-08 PROCEDURE — 99233 SBSQ HOSP IP/OBS HIGH 50: CPT

## 2017-08-08 PROCEDURE — 70450 CT HEAD/BRAIN W/O DYE: CPT | Mod: 26

## 2017-08-08 PROCEDURE — 93010 ELECTROCARDIOGRAM REPORT: CPT

## 2017-08-08 PROCEDURE — 70544 MR ANGIOGRAPHY HEAD W/O DYE: CPT | Mod: 26

## 2017-08-08 RX ORDER — QUETIAPINE FUMARATE 200 MG/1
12.5 TABLET, FILM COATED ORAL ONCE
Qty: 0 | Refills: 0 | Status: COMPLETED | OUTPATIENT
Start: 2017-08-08 | End: 2018-07-07

## 2017-08-08 RX ORDER — QUETIAPINE FUMARATE 200 MG/1
25 TABLET, FILM COATED ORAL AT BEDTIME
Qty: 0 | Refills: 0 | Status: DISCONTINUED | OUTPATIENT
Start: 2017-08-08 | End: 2017-08-08

## 2017-08-08 RX ORDER — QUETIAPINE FUMARATE 200 MG/1
12.5 TABLET, FILM COATED ORAL ONCE
Qty: 0 | Refills: 0 | Status: COMPLETED | OUTPATIENT
Start: 2017-08-08 | End: 2017-08-08

## 2017-08-08 RX ORDER — CEFUROXIME AXETIL 250 MG
250 TABLET ORAL EVERY 12 HOURS
Qty: 0 | Refills: 0 | Status: DISCONTINUED | OUTPATIENT
Start: 2017-08-09 | End: 2017-08-08

## 2017-08-08 RX ADMIN — Medication 100 MILLIGRAM(S): at 06:48

## 2017-08-08 RX ADMIN — CEFTRIAXONE 100 GRAM(S): 500 INJECTION, POWDER, FOR SOLUTION INTRAMUSCULAR; INTRAVENOUS at 06:48

## 2017-08-08 RX ADMIN — DABIGATRAN ETEXILATE MESYLATE 150 MILLIGRAM(S): 150 CAPSULE ORAL at 06:48

## 2017-08-08 RX ADMIN — Medication 5000 UNIT(S): at 11:29

## 2017-08-08 RX ADMIN — PANTOPRAZOLE SODIUM 40 MILLIGRAM(S): 20 TABLET, DELAYED RELEASE ORAL at 06:48

## 2017-08-08 RX ADMIN — FAMOTIDINE 20 MILLIGRAM(S): 10 INJECTION INTRAVENOUS at 11:30

## 2017-08-08 RX ADMIN — DABIGATRAN ETEXILATE MESYLATE 150 MILLIGRAM(S): 150 CAPSULE ORAL at 21:34

## 2017-08-08 RX ADMIN — QUETIAPINE FUMARATE 25 MILLIGRAM(S): 200 TABLET, FILM COATED ORAL at 21:34

## 2017-08-08 RX ADMIN — QUETIAPINE FUMARATE 12.5 MILLIGRAM(S): 200 TABLET, FILM COATED ORAL at 11:30

## 2017-08-08 RX ADMIN — Medication 81 MILLIGRAM(S): at 11:29

## 2017-08-08 RX ADMIN — TAMSULOSIN HYDROCHLORIDE 0.4 MILLIGRAM(S): 0.4 CAPSULE ORAL at 21:34

## 2017-08-08 NOTE — PROGRESS NOTE ADULT - ASSESSMENT
76 y/o Male, ambulatory with a cane and a walker, with h/o A. fib on Pradaxa, COPD (only on Xopenex prn per the wife as unable to take), GERD, HLD, BRIEN on CPAP at night, chronic b/l LE lymphedema, mild dementia, urinary incontinence, recurrent UTIs a/w slurred speech and left sided weakness (in resolution), TIA vs CVA; Found to have UTI, satisfied criteria for sepsis;

## 2017-08-08 NOTE — PROGRESS NOTE ADULT - ASSESSMENT
#AF on pradaxa  #R/o cva versus tia   #sleep apnea  #COPD   #HL  Await echo  The patient is stable from a cardiovascular perspective.  Neuro workup in progress

## 2017-08-08 NOTE — PROGRESS NOTE ADULT - PROBLEM SELECTOR PLAN 2
Likely with sundowning overnight  Can give seroquel 12.5 prior to MRI testing  Start seroquel 25mg qhs  Haldol PRN for severe agitation, QTC within acceptable range Likely with sundowning overnight  Can give seroquel 12.5 prior to MRI testing  Start seroquel 25mg qhs  Avoid benzos so as not to worsen delirium in dementia patients  Haldol PRN for severe agitation, QTC within acceptable range

## 2017-08-08 NOTE — CHART NOTE - NSCHARTNOTEFT_GEN_A_CORE
notified by ELMIRA Samson, patient confused, often getting up out of bed. On exam patient is angry and confused as to why his wife left the hospital tonight. Patient able to be redirected that he is in the hospital. She was called by patient various times, wife spoken to RN and requested something to help him sleep. Neuro exam stable no focal weakness, sensation in tact. Haldol 1mg given with improvement. Will continue on Ceftriaxone for UTI, and MRI/MRA pending.
Pt's MS improved this morning A&OX 1.5-2. Plan for MRI/MRA with premedication to calm him. Pt unable to complete the study secondary to confusion and agitation, per neuro if MRI cannot be done then order head CT without contrast and carotid doppler. Test ordered. Family seen at bedside to explain progress, family requested repeat speech eval. Pt seen by speech therapist given recent cinesophagram, will recommend to repeat cinesophagram on Thursday. Will continue to monitor

## 2017-08-09 VITALS
HEART RATE: 84 BPM | RESPIRATION RATE: 18 BRPM | OXYGEN SATURATION: 100 % | DIASTOLIC BLOOD PRESSURE: 74 MMHG | SYSTOLIC BLOOD PRESSURE: 111 MMHG | TEMPERATURE: 99 F

## 2017-08-09 DIAGNOSIS — G93.49 OTHER ENCEPHALOPATHY: ICD-10-CM

## 2017-08-09 LAB
BUN SERPL-MCNC: 11 MG/DL — SIGNIFICANT CHANGE UP (ref 7–23)
CALCIUM SERPL-MCNC: 8.9 MG/DL — SIGNIFICANT CHANGE UP (ref 8.4–10.5)
CHLORIDE SERPL-SCNC: 101 MMOL/L — SIGNIFICANT CHANGE UP (ref 98–107)
CO2 SERPL-SCNC: 25 MMOL/L — SIGNIFICANT CHANGE UP (ref 22–31)
CREAT SERPL-MCNC: 0.64 MG/DL — SIGNIFICANT CHANGE UP (ref 0.5–1.3)
GLUCOSE SERPL-MCNC: 96 MG/DL — SIGNIFICANT CHANGE UP (ref 70–99)
HBA1C BLD-MCNC: 5.7 % — HIGH (ref 4–5.6)
HCT VFR BLD CALC: 43.9 % — SIGNIFICANT CHANGE UP (ref 39–50)
HGB BLD-MCNC: 14.5 G/DL — SIGNIFICANT CHANGE UP (ref 13–17)
MAGNESIUM SERPL-MCNC: 1.9 MG/DL — SIGNIFICANT CHANGE UP (ref 1.6–2.6)
MCHC RBC-ENTMCNC: 30.4 PG — SIGNIFICANT CHANGE UP (ref 27–34)
MCHC RBC-ENTMCNC: 33 % — SIGNIFICANT CHANGE UP (ref 32–36)
MCV RBC AUTO: 92 FL — SIGNIFICANT CHANGE UP (ref 80–100)
NRBC # FLD: 0 — SIGNIFICANT CHANGE UP
PLATELET # BLD AUTO: 163 K/UL — SIGNIFICANT CHANGE UP (ref 150–400)
PMV BLD: 13.2 FL — HIGH (ref 7–13)
POTASSIUM SERPL-MCNC: 4.2 MMOL/L — SIGNIFICANT CHANGE UP (ref 3.5–5.3)
POTASSIUM SERPL-SCNC: 4.2 MMOL/L — SIGNIFICANT CHANGE UP (ref 3.5–5.3)
RBC # BLD: 4.77 M/UL — SIGNIFICANT CHANGE UP (ref 4.2–5.8)
RBC # FLD: 13.7 % — SIGNIFICANT CHANGE UP (ref 10.3–14.5)
SODIUM SERPL-SCNC: 141 MMOL/L — SIGNIFICANT CHANGE UP (ref 135–145)
WBC # BLD: 8.79 K/UL — SIGNIFICANT CHANGE UP (ref 3.8–10.5)
WBC # FLD AUTO: 8.79 K/UL — SIGNIFICANT CHANGE UP (ref 3.8–10.5)

## 2017-08-09 PROCEDURE — 99233 SBSQ HOSP IP/OBS HIGH 50: CPT

## 2017-08-09 PROCEDURE — 99239 HOSP IP/OBS DSCHRG MGMT >30: CPT

## 2017-08-09 RX ORDER — CEFUROXIME AXETIL 250 MG
1 TABLET ORAL
Qty: 3 | Refills: 0 | OUTPATIENT
Start: 2017-08-09 | End: 2017-08-11

## 2017-08-09 RX ORDER — DABIGATRAN ETEXILATE MESYLATE 150 MG/1
1 CAPSULE ORAL
Qty: 0 | Refills: 0 | COMMUNITY

## 2017-08-09 RX ORDER — DOCUSATE SODIUM 100 MG
1 CAPSULE ORAL
Qty: 0 | Refills: 0 | COMMUNITY

## 2017-08-09 RX ORDER — DOCUSATE SODIUM 100 MG
1 CAPSULE ORAL
Qty: 0 | Refills: 0 | COMMUNITY
Start: 2017-08-09

## 2017-08-09 RX ORDER — DABIGATRAN ETEXILATE MESYLATE 150 MG/1
1 CAPSULE ORAL
Qty: 0 | Refills: 0 | COMMUNITY
Start: 2017-08-09

## 2017-08-09 RX ORDER — QUETIAPINE FUMARATE 200 MG/1
1 TABLET, FILM COATED ORAL
Qty: 30 | Refills: 0 | OUTPATIENT
Start: 2017-08-09 | End: 2017-09-08

## 2017-08-09 RX ORDER — ASPIRIN/CALCIUM CARB/MAGNESIUM 324 MG
1 TABLET ORAL
Qty: 0 | Refills: 0 | COMMUNITY

## 2017-08-09 RX ORDER — QUETIAPINE FUMARATE 200 MG/1
1 TABLET, FILM COATED ORAL
Qty: 0 | Refills: 0 | COMMUNITY
Start: 2017-08-09

## 2017-08-09 RX ORDER — ASPIRIN/CALCIUM CARB/MAGNESIUM 324 MG
1 TABLET ORAL
Qty: 0 | Refills: 0 | COMMUNITY
Start: 2017-08-09

## 2017-08-09 RX ORDER — TAMSULOSIN HYDROCHLORIDE 0.4 MG/1
1 CAPSULE ORAL
Qty: 0 | Refills: 0 | COMMUNITY
Start: 2017-08-09

## 2017-08-09 RX ORDER — TAMSULOSIN HYDROCHLORIDE 0.4 MG/1
2 CAPSULE ORAL
Qty: 0 | Refills: 0 | COMMUNITY

## 2017-08-09 RX ADMIN — PANTOPRAZOLE SODIUM 40 MILLIGRAM(S): 20 TABLET, DELAYED RELEASE ORAL at 06:22

## 2017-08-09 RX ADMIN — Medication 81 MILLIGRAM(S): at 13:13

## 2017-08-09 RX ADMIN — Medication 5000 UNIT(S): at 13:13

## 2017-08-09 RX ADMIN — Medication 100 MILLIGRAM(S): at 06:22

## 2017-08-09 RX ADMIN — DABIGATRAN ETEXILATE MESYLATE 150 MILLIGRAM(S): 150 CAPSULE ORAL at 17:15

## 2017-08-09 RX ADMIN — DABIGATRAN ETEXILATE MESYLATE 150 MILLIGRAM(S): 150 CAPSULE ORAL at 06:22

## 2017-08-09 RX ADMIN — Medication 250 MILLIGRAM(S): at 06:22

## 2017-08-09 RX ADMIN — FAMOTIDINE 20 MILLIGRAM(S): 10 INJECTION INTRAVENOUS at 13:13

## 2017-08-09 NOTE — PROGRESS NOTE ADULT - PROBLEM SELECTOR PLAN 7
On Pradaxa
Patient unable to take atorvastatin due to muscle pain. ordered home med as nonformulary
Patient unable to take atorvastatin due to muscle pain. ordered home med as nonformulary

## 2017-08-09 NOTE — PROGRESS NOTE ADULT - ASSESSMENT
76 y/o Male, ambulatory with a cane and a walker, with h/o A. fib on Pradaxa, COPD (only on Xopenex prn per the wife as unable to take), GERD, HLD, BRIEN on CPAP at night, chronic b/l LE lymphedema, mild dementia, urinary incontinence, recurrent UTIs a/w slurred speech and left sided weakness (in resolution), TIA vs CVA; Found to have UTI, satisfied criteria for sepsis. Admitted due to encephalopathy 2/2 UTI

## 2017-08-09 NOTE — PROGRESS NOTE ADULT - PROBLEM SELECTOR PLAN 3
[id number] score of 4. Continue with Pradaxa. Patient is currently rate controlled.  Not on rate controlling agents at home
leukocytosis resolved on ceftriaxone, on day 3, to complete 5 day course  Ucx with Ecoli, sensitivities pending, BCx: NGTD
resolved, Continue Ceftin BID till 8/10  Ucx with Ecoli, sensitivities pending, BCx: NGTD

## 2017-08-09 NOTE — PROGRESS NOTE ADULT - PROBLEM SELECTOR PLAN 6
Patient unable to take atorvastatin due to muscle pain. ordered home med as nonformulary
Continue with CPAP at night  Continue with home Nuvigil (ordered as non formulary)
Continue with CPAP at night  Continue with home Nuvigil (ordered as non formulary)

## 2017-08-09 NOTE — PROGRESS NOTE ADULT - ASSESSMENT
77 year old male with PMH of Asad (on Pradaxa BID), COPD, GERD, ILD, BRIEN presented to ED for evaluation of slurred speech and weakness on left side of his body. PE showed mild facial droop on left side and drifting in left lower extremity. Patient had rapidly improving neurological symptoms and his NIHSS is 2. Patient was not given tPA due to rapidly improving neurological symptoms and low NIHSS.      Problem:  - R/O Stroke    Recommendations:  - C/W UTI Treatment  - Continue blood pressure control  - MRI brain  - MRA brain WNL  - MRA neck not done, Neck Duplex showed 16-49% Stenosis of the b/l ICAs  - C/W Atorvastatin 80 mg  - C/W ASA  - C/W Pradaxa home dose   - TTE w/ bubble study did not show any thrombus, PFO (however study was inadequate)  - C/W Telemetry monitoring 77 year old male with PMH of A.fibb (on Pradaxa BID), COPD, GERD, ILD, BRIEN presented to ED for evaluation of slurred speech and weakness on left side of his body. PE showed mild facial droop on left side and drifting in left lower extremity. Patient had rapidly improving neurological symptoms and his NIHSS is 2. Patient was not given tPA due to rapidly improving neurological symptoms and low NIHSS.      Problem:  - Likely Encephalopathy 2/2 UTI    Recommendations:  - C/W UTI Treatment  - Continue blood pressure control  - MRA brain WNL  - MRA neck not done, Neck Duplex showed 16-49% Stenosis of the b/l ICAs  - No need for further imaging  - C/W Atorvastatin 80 mg  - C/W ASA  - C/W Pradaxa home dose   - TTE w/ bubble study did not show any thrombus, PFO (however study was inadequate)  - F/U with Neuro (Dr. Kate) as outpatient  - Will sign off, recall as needed

## 2017-08-09 NOTE — PROGRESS NOTE ADULT - PROBLEM SELECTOR PROBLEM 2
TIA (transient ischemic attack)
Dementia with behavioral disturbance, unspecified dementia type
Dementia with behavioral disturbance, unspecified dementia type

## 2017-08-09 NOTE — PROGRESS NOTE ADULT - PROBLEM SELECTOR PROBLEM 5
Obstructive sleep apnea on CPAP
COPD (chronic obstructive pulmonary disease)
COPD (chronic obstructive pulmonary disease)

## 2017-08-09 NOTE — PROGRESS NOTE ADULT - SUBJECTIVE AND OBJECTIVE BOX
Neurology Follow up note    Patient is a 77y old  Male who presents with a chief complaint of Slurred speech, facial droop (06 Aug 2017 13:27)      Subjective:Interval History - No events overnight    Objective:   Vital Signs Last 24 Hrs  T(C): 36.2 (07 Aug 2017 14:35), Max: 37.2 (06 Aug 2017 23:08)  T(F): 97.2 (07 Aug 2017 14:35), Max: 98.9 (06 Aug 2017 23:08)  HR: 56 (07 Aug 2017 14:35) (56 - 88)  BP: 150/86 (07 Aug 2017 14:35) (111/46 - 150/86)  BP(mean): --  RR: 17 (07 Aug 2017 14:35) (17 - 18)  SpO2: 100% (07 Aug 2017 14:35) (95% - 100%)    General Exam:   General appearance: No acute distress                   Neurological Exam:    Mental Status: Orientated to self, date and place.  Attention intact.  Mild dysarthria, No aphasia.     Cranial Nerves:   PERRL, EOMI, VFF, no nystagmus or diplopia. No facial asymmetry.  Motor:   Tone: normal.                  Strength:   bilateral upper extremity 5/5 , strength in bilateral lower extremity are symmetrical as he was not able to hold against gravity  Pronator drift: none                 Dysmetria: None to finger-nose-finger b/l    Tremor: No resting, postural or action tremor.        08-07    139  |  101  |  11  ----------------------------<  107<H>  4.2   |  23  |  0.71    Ca    8.8      07 Aug 2017 05:49  Mg     1.9     08-06    TPro  7.5  /  Alb  3.4  /  TBili  0.7  /  DBili  x   /  AST  29  /  ALT  17  /  AlkPhos  93  08-06 08-07    139  |  101  |  11  ----------------------------<  107<H>  4.2   |  23  |  0.71    Ca    8.8      07 Aug 2017 05:49  Mg     1.9     08-06    TPro  7.5  /  Alb  3.4  /  TBili  0.7  /  DBili  x   /  AST  29  /  ALT  17  /  AlkPhos  93  08-06    LIVER FUNCTIONS - ( 06 Aug 2017 00:55 )  Alb: 3.4 g/dL / Pro: 7.5 g/dL / ALK PHOS: 93 u/L / ALT: 17 u/L / AST: 29 u/L / GGT: x                                 13.8   15.65 )-----------( 131      ( 07 Aug 2017 05:49 )             41.8     Radiology Reviewed.      MEDICATIONS  (STANDING):  dabigatran 150 milliGRAM(s) Oral every 12 hours  aspirin enteric coated 81 milliGRAM(s) Oral daily  tamsulosin 0.4 milliGRAM(s) Oral at bedtime  famotidine    Tablet 20 milliGRAM(s) Oral daily  docusate sodium 100 milliGRAM(s) Oral two times a day  pantoprazole    Tablet 40 milliGRAM(s) Oral before breakfast  cefTRIAXone   IVPB   IV Intermittent   sodium chloride 0.9%. 1000 milliLiter(s) (100 mL/Hr) IV Continuous <Continuous>  cefTRIAXone   IVPB 1 Gram(s) IV Intermittent every 24 hours  livalo 2 milliGRAM(s) 2 milliGRAM(s) Oral daily  armodafinil 50 milliGRAM(s) 50 milliGRAM(s) Oral daily  cholecalciferol 5000 Unit(s) Oral daily  LORazepam     Tablet 1 milliGRAM(s) Oral once    MEDICATIONS  (PRN):  levalbuterol Inhalation 0.63 milliGRAM(s) Inhalation every 4 hours PRN Shortness of breath and /or Wheezing
Neurology Follow up note    Patient is a 77y old  Male who presents with a chief complaint of Slurred speech, facial droop (06 Aug 2017 13:27)      Subjective:Interval History - No events overnight    Objective:   Vital Signs Last 24 Hrs  T(C): 36.7 (09 Aug 2017 05:06), Max: 36.9 (08 Aug 2017 20:46)  T(F): 98.1 (09 Aug 2017 05:06), Max: 98.4 (08 Aug 2017 20:46)  HR: 82 (09 Aug 2017 06:40) (75 - 92)  BP: 117/68 (09 Aug 2017 05:06) (117/68 - 127/74)  BP(mean): --  RR: 18 (09 Aug 2017 05:06) (18 - 18)  SpO2: 95% (09 Aug 2017 06:40) (95% - 100%)    General Exam:   General appearance: No acute distress                   Neurological Exam:    Mental Status: Orientated to self, date and place.  Attention intact.  Mild dysarthria, No aphasia.     Cranial Nerves:   PERRL, EOMI, VFF, no nystagmus or diplopia. No facial asymmetry.  Motor:   Tone: normal.                  Strength:   bilateral upper extremity 5/5 , strength in bilateral lower extremity are symmetrical as he was not able to hold against gravity  Pronator drift: none                 Dysmetria: None to finger-nose-finger b/l    Tremor: No resting, postural or action tremor.    08-08    138  |  100  |  10  ----------------------------<  130<H>  3.8   |  24  |  0.58    Ca    8.7      08 Aug 2017 07:20  Mg     1.8     08-08 08-08    138  |  100  |  10  ----------------------------<  130<H>  3.8   |  24  |  0.58    Ca    8.7      08 Aug 2017 07:20  Mg     1.8     08-08                              14.5   8.79  )-----------( 163      ( 09 Aug 2017 06:00 )             43.9     Radiology Reviewed.      MEDICATIONS  (STANDING):  dabigatran 150 milliGRAM(s) Oral every 12 hours  aspirin enteric coated 81 milliGRAM(s) Oral daily  tamsulosin 0.4 milliGRAM(s) Oral at bedtime  famotidine    Tablet 20 milliGRAM(s) Oral daily  docusate sodium 100 milliGRAM(s) Oral two times a day  pantoprazole    Tablet 40 milliGRAM(s) Oral before breakfast  livalo 2 milliGRAM(s) 2 milliGRAM(s) Oral daily  armodafinil 50 milliGRAM(s) 50 milliGRAM(s) Oral daily  cholecalciferol 5000 Unit(s) Oral daily  QUEtiapine 25 milliGRAM(s) Oral at bedtime  cefuroxime   Tablet 250 milliGRAM(s) Oral every 12 hours    MEDICATIONS  (PRN):  levalbuterol Inhalation 0.63 milliGRAM(s) Inhalation every 4 hours PRN Shortness of breath and /or Wheezing
Patient is a 77y old  Male who presents with a chief complaint of Slurred speech, facial droop (06 Aug 2017 13:27)      SUBJECTIVE / OVERNIGHT EVENTS: No acute events overnight. Mental status back to baseline per wife at bedside. No chest pain, SOB, N/V/D. Slept well last night with seroquel    MEDICATIONS  (STANDING):  dabigatran 150 milliGRAM(s) Oral every 12 hours  aspirin enteric coated 81 milliGRAM(s) Oral daily  tamsulosin 0.4 milliGRAM(s) Oral at bedtime  famotidine    Tablet 20 milliGRAM(s) Oral daily  docusate sodium 100 milliGRAM(s) Oral two times a day  pantoprazole    Tablet 40 milliGRAM(s) Oral before breakfast  livalo 2 milliGRAM(s) 2 milliGRAM(s) Oral daily  armodafinil 50 milliGRAM(s) 50 milliGRAM(s) Oral daily  cholecalciferol 5000 Unit(s) Oral daily  QUEtiapine 25 milliGRAM(s) Oral at bedtime  cefuroxime   Tablet 250 milliGRAM(s) Oral every 12 hours    MEDICATIONS  (PRN):  levalbuterol Inhalation 0.63 milliGRAM(s) Inhalation every 4 hours PRN Shortness of breath and /or Wheezing      T(C): 37 (08-09-17 @ 14:36), Max: 37 (08-09-17 @ 14:36)  HR: 84 (08-09-17 @ 14:36) (75 - 92)  BP: 111/74 (08-09-17 @ 14:36) (111/74 - 127/74)  RR: 18 (08-09-17 @ 14:36) (18 - 18)  SpO2: 100% (08-09-17 @ 14:36) (95% - 100%)  CAPILLARY BLOOD GLUCOSE        I&O's Summary      PHYSICAL EXAM:  GENERAL: no apparent distress, on room air, CPAP at bedside  EYES: conjunctiva and sclera clear b/l  CHEST/LUNG: + gynecomastia, Clear to auscultation bilaterally; No wheezing or crackles  HEART: irregular, no murmurs appreciated  ABDOMEN: Soft, Nontender, Nondistended; Bowel sounds present  EXTREMITIES:  2+ Peripheral Pulses, No clubbing, cyanosis, or edema  NEUROLOGY: no facial droop or dysarthria, no sensory deficits, muscle strength equal b/l, able to lift all extremities against resistance  PSYCH: calm, pleasant, elderly male, awake, alert, oriented to person and place, does not know month, year or president. Recognizes wife at bedside    LABS:                        14.5   8.79  )-----------( 163      ( 09 Aug 2017 06:00 )             43.9     08-09    141  |  101  |  11  ----------------------------<  96  4.2   |  25  |  0.64    Ca    8.9      09 Aug 2017 06:00  Mg     1.9     08-09            RADIOLOGY & ADDITIONAL TESTS:
Patient is a 77y old  Male who presents with a chief complaint of Slurred speech, facial droop (06 Aug 2017 13:27)      SUBJECTIVE / OVERNIGHT EVENTS: Required Haldol overnight due to patient being agitated and trying to leave. No complaints this morning and is more calm. ROS unreliable. Wife and son at bedside stating at baseline, patient recognizes family and knows where he is. Wife concerned about the honey thick liquids and states patient not drinking the thickened liquids. States patient was drinking water completely fine.    MEDICATIONS  (STANDING):  dabigatran 150 milliGRAM(s) Oral every 12 hours  aspirin enteric coated 81 milliGRAM(s) Oral daily  tamsulosin 0.4 milliGRAM(s) Oral at bedtime  famotidine    Tablet 20 milliGRAM(s) Oral daily  docusate sodium 100 milliGRAM(s) Oral two times a day  pantoprazole    Tablet 40 milliGRAM(s) Oral before breakfast  cefTRIAXone   IVPB   IV Intermittent   sodium chloride 0.9%. 1000 milliLiter(s) (100 mL/Hr) IV Continuous <Continuous>  cefTRIAXone   IVPB 1 Gram(s) IV Intermittent every 24 hours  livalo 2 milliGRAM(s) 2 milliGRAM(s) Oral daily  armodafinil 50 milliGRAM(s) 50 milliGRAM(s) Oral daily  cholecalciferol 5000 Unit(s) Oral daily  QUEtiapine 25 milliGRAM(s) Oral at bedtime  QUEtiapine 12.5 milliGRAM(s) Oral once    MEDICATIONS  (PRN):  levalbuterol Inhalation 0.63 milliGRAM(s) Inhalation every 4 hours PRN Shortness of breath and /or Wheezing  melatonin 3 milliGRAM(s) Oral at bedtime PRN Insomnia      T(C): 36.8 (08-08-17 @ 05:37), Max: 37.1 (08-07-17 @ 20:23)  HR: 90 (08-08-17 @ 07:35) (56 - 102)  BP: 147/82 (08-08-17 @ 05:37) (126/68 - 150/86)  RR: 18 (08-08-17 @ 05:37) (17 - 18)  SpO2: 100% (08-08-17 @ 07:35) (95% - 100%)  CAPILLARY BLOOD GLUCOSE        I&O's Summary    07 Aug 2017 07:01  -  08 Aug 2017 07:00  --------------------------------------------------------  IN: 0 mL / OUT: 450 mL / NET: -450 mL        PHYSICAL EXAM:  GENERAL: no apparent distress, on room air, CPAP at bedside  EYES: conjunctiva and sclera clear b/l  CHEST/LUNG: + gynecomastia, Clear to auscultation bilaterally; No wheezing or crackles  HEART: irregular, no murmurs appreciated  ABDOMEN: Soft, Nontender, Nondistended; Bowel sounds present  EXTREMITIES:  2+ Peripheral Pulses, No clubbing, cyanosis, or edema  NEUROLOGY: no facial droop or dysarthria, left side slightly weaker than right, 4/5 muscle strength on left and 5/5 on right. No sensory deficits  PSYCH: calm, pleasant, elderly male, awake, alert, oriented to person and place, does not know month, year or president. Recognizes wife and son at bedside.    LABS:                        13.7   9.35  )-----------( 140      ( 08 Aug 2017 07:20 )             41.1     08-08    138  |  100  |  10  ----------------------------<  130<H>  3.8   |  24  |  0.58    Ca    8.7      08 Aug 2017 07:20  Mg     1.8     08-08        RADIOLOGY & ADDITIONAL TESTS: < from: Xray Cinesophagram (08.07.17 @ 11:35) >  IMPRESSION:       Laryngeal penetration during and after the swallow for thin   liquids/nectar thick liquids without retrieval leaving residue in the   laryngeal vestibule. No aspiration observed before, during or after the   swallow for puree/honey thick liquids.    < end of copied text >
SUBJECTIVE: The patient denies chest pain, shortness of breath, arm pain or jaw pain, dizziness or palpitations.    	  MEDICATIONS:  tamsulosin 0.4 milliGRAM(s) Oral at bedtime    cefTRIAXone   IVPB   IV Intermittent   cefTRIAXone   IVPB 1 Gram(s) IV Intermittent every 24 hours    levalbuterol Inhalation 0.63 milliGRAM(s) Inhalation every 4 hours PRN    LORazepam     Tablet 1 milliGRAM(s) Oral once  melatonin 3 milliGRAM(s) Oral at bedtime PRN    famotidine    Tablet 20 milliGRAM(s) Oral daily  docusate sodium 100 milliGRAM(s) Oral two times a day  pantoprazole    Tablet 40 milliGRAM(s) Oral before breakfast      dabigatran 150 milliGRAM(s) Oral every 12 hours  aspirin enteric coated 81 milliGRAM(s) Oral daily  sodium chloride 0.9%. 1000 milliLiter(s) IV Continuous <Continuous>  cholecalciferol 5000 Unit(s) Oral daily      REVIEW OF SYSTEMS:    CONSTITUTIONAL: No fever, weight loss, or fatigue  EYES: No eye pain, visual disturbances, or discharge  NECK: No pain or stiffness  RESPIRATORY: No cough, wheezing, chills or hemoptysis; No Shortness of Breath  CARDIOVASCULAR: No chest pain, palpitations, dizziness, or leg swelling  GASTROINTESTINAL: No abdominal or epigastric pain. No nausea, vomiting, or hematemesis; No diarrhea or constipation. No melena or hematochezia.  GENITOURINARY: No dysuria, frequency, hematuria, or incontinence  NEUROLOGICAL: No headaches, memory loss, loss of strength, numbness, or tremors  SKIN: No itching, burning, rashes, or lesions   LYMPH Nodes: No enlarged glands  MUSCULOSKELETAL: No joint pain or swelling; No muscle, back, or extremity pain  All other review of systems are negative.  	  [ ] Unable to obtain    PHYSICAL EXAM:  T(C): 36.8 (08-08-17 @ 05:37), Max: 37.1 (08-07-17 @ 20:23)  HR: 90 (08-08-17 @ 07:35) (56 - 102)  BP: 147/82 (08-08-17 @ 05:37) (126/68 - 150/86)  RR: 18 (08-08-17 @ 05:37) (17 - 18)  SpO2: 100% (08-08-17 @ 07:35) (95% - 100%)  Wt(kg): --  I&O's Summary    07 Aug 2017 07:01  -  08 Aug 2017 07:00  --------------------------------------------------------  IN: 0 mL / OUT: 450 mL / NET: -450 mL          PHYSICAL EXAM    Appearance: Normal	  HEENT:   Normal oral mucosa, PERRL, EOMI	  NECK: Soft and supple, No LAD, No JVD  Cardiovascular: Regular Rate and Rhythm, Normal S1 S2, No murmurs, No clicks, gallops or rubs  Respiratory: Lungs clear to auscultation	  Gastrointestinal:  Soft, Non-tender, + BS	  Skin: No rashes, No ecchymoses, No cyanosis  Neurologic: Non-focal  Extremities: No clubbing, cyanosis or edema  Vascular: Peripheral pulses palpable 2+ bilaterally    TELEMETRY: 	Atrial flutter in the 80's    ECG:  	  RADIOLOGY  DIAGNOSTIC TESTING:  [ ] Echocardiogram:  [ ] Catheterization:  [ ] Stress Test:    OTHER: 	    LABS:	 	                    13.7   9.35  )-----------( 140      ( 08 Aug 2017 07:20 )             41.1     08-08    138  |  100  |  10  ----------------------------<  130<H>  3.8   |  24  |  0.58    Ca    8.7      08 Aug 2017 07:20  Mg     1.8     08-08
Patient is a 77y old  Male who presents with a chief complaint of Slurred speech, facial droop (06 Aug 2017 13:27)      SUBJECTIVE / OVERNIGHT EVENTS: No acute events overnight. No complaints from patient. States his "mind is going". No chest pain, SOB, N/V/D.    MEDICATIONS  (STANDING):  dabigatran 150 milliGRAM(s) Oral every 12 hours  aspirin enteric coated 81 milliGRAM(s) Oral daily  tamsulosin 0.4 milliGRAM(s) Oral at bedtime  famotidine    Tablet 20 milliGRAM(s) Oral daily  docusate sodium 100 milliGRAM(s) Oral two times a day  pantoprazole    Tablet 40 milliGRAM(s) Oral before breakfast  cefTRIAXone   IVPB   IV Intermittent   sodium chloride 0.9%. 1000 milliLiter(s) (100 mL/Hr) IV Continuous <Continuous>  cefTRIAXone   IVPB 1 Gram(s) IV Intermittent every 24 hours  livalo 2 milliGRAM(s) 2 milliGRAM(s) Oral daily  armodafinil 50 milliGRAM(s) 50 milliGRAM(s) Oral daily  cholecalciferol 5000 Unit(s) Oral daily    MEDICATIONS  (PRN):  levalbuterol Inhalation 0.63 milliGRAM(s) Inhalation every 4 hours PRN Shortness of breath and /or Wheezing      T(C): 36.2 (17 @ 14:35), Max: 37.2 (17 @ 23:08)  HR: 56 (17 @ 14:35) (56 - 88)  BP: 150/86 (17 @ 14:35) (111/46 - 150/86)  RR: 17 (17 @ 14:35) (17 - 18)  SpO2: 100% (17 @ 14:35) (95% - 100%)  CAPILLARY BLOOD GLUCOSE        I&O's Summary    06 Aug 2017 07:01  -  07 Aug 2017 07:00  --------------------------------------------------------  IN: 0 mL / OUT: 500 mL / NET: -500 mL        PHYSICAL EXAM:  GENERAL: no apparent distress, on room air  HEAD:  Atraumatic, Normocephalic  EYES: EOMI, PERRL, conjunctiva and sclera clear b/l  CHEST/LUNG: + gynecomastia, Clear to auscultation bilaterally; No wheezing or crackles  HEART: irregular, no murmurs appreciated  ABDOMEN: Soft, Nontender, Nondistended; Bowel sounds present  EXTREMITIES:  2+ Peripheral Pulses, No clubbing, cyanosis, or edema  NEUROLOGY: no facial droop or dysarthria, left hand  slightly weaker than right, 5/5 muscle strength b./l upper and lower extremities, no sensory deficits  PSYCH: calm, pleasant, elderly male, awake, alert, oriented to person and place, does not know month, year or president.    LABS:                        13.8   15.65 )-----------( 131      ( 07 Aug 2017 05:49 )             41.8     08-07    139  |  101  |  11  ----------------------------<  107<H>  4.2   |  23  |  0.71    Ca    8.8      07 Aug 2017 05:49  Mg     1.9     08-06    TPro  7.5  /  Alb  3.4  /  TBili  0.7  /  DBili  x   /  AST  29  /  ALT  17  /  AlkPhos  93  08-06    PTT - ( 06 Aug 2017 00:55 )  PTT:29.5 SEC  CARDIAC MARKERS ( 06 Aug 2017 06:00 )  x     / < 0.06 ng/mL / 48 u/L / 1.00 ng/mL / x      CARDIAC MARKERS ( 06 Aug 2017 00:55 )  x     / < 0.06 ng/mL / 49 u/L / 1.00 ng/mL / x          Urinalysis Basic - ( 06 Aug 2017 02:40 )    Color: YELLOW / Appearance: HAZY / S.023 / pH: 5.5  Gluc: NEGATIVE / Ketone: NEGATIVE  / Bili: NEGATIVE / Urobili: NORMAL E.U.   Blood: TRACE / Protein: 10 / Nitrite: NEGATIVE   Leuk Esterase: LARGE / RBC: 2-5 / WBC 25-50   Sq Epi: OCC / Non Sq Epi: x / Bacteria: FEW        RADIOLOGY & ADDITIONAL TESTS:

## 2017-08-09 NOTE — PROGRESS NOTE ADULT - PROBLEM SELECTOR PLAN 5
Continue with CPAP at night  Continue with home Nuvigil (ordered as non formulary)
Continue with Xopenex  CXR with predominant coarse reticular interstitial opacities corresponding to pulmonary fibrosis as demonstrated on chest CT from 7/27/2017. Pulmonary recs pending
Continue with Xopenex  CXR with predominant coarse reticular interstitial opacities corresponding to pulmonary fibrosis as demonstrated on chest CT from 7/27/2017. Pulmonary recs pending

## 2017-08-09 NOTE — PROGRESS NOTE ADULT - PROBLEM SELECTOR PLAN 2
Improved  c/w seroquel 25mg qhs  Avoid benzos so as not to worsen delirium in dementia patients  Haldol PRN for severe agitation, QTC within acceptable range

## 2017-08-09 NOTE — PROGRESS NOTE ADULT - PROBLEM SELECTOR PROBLEM 1
Sepsis due to urinary tract infection
Encephalopathy due to infection
TIA (transient ischemic attack)

## 2017-08-09 NOTE — PROGRESS NOTE ADULT - PROBLEM SELECTOR PLAN 4
Continue with Xopenex  CXR with predominant coarse reticular interstitial opacities corresponding to pulmonary fibrosis as demonstrated on chest CT from 7/27/2017. Pulmonary recs pending
[id number] score of 4. Continue with Pradaxa. Patient is currently rate controlled.  Not on rate controlling agents at home  patient is stable from a cardiovascular perspective
[id number] score of 4. Continue with Pradaxa. Patient is currently rate controlled.  Not on rate controlling agents at home  patient is stable from a cardiovascular perspective

## 2017-08-09 NOTE — PROGRESS NOTE ADULT - NSHPATTENDINGPLANDISCUSS_GEN_ALL_CORE
family and primary team
tele PA, wife and son at bedside
tele PA, wife at bedside
tele PA, attempted to call wife: Monique and left message for callback

## 2017-08-10 ENCOUNTER — RX RENEWAL (OUTPATIENT)
Age: 77
End: 2017-08-10

## 2017-08-11 LAB
BACTERIA BLD CULT: SIGNIFICANT CHANGE UP
BACTERIA BLD CULT: SIGNIFICANT CHANGE UP

## 2017-08-18 ENCOUNTER — RX RENEWAL (OUTPATIENT)
Age: 77
End: 2017-08-18

## 2017-09-25 ENCOUNTER — APPOINTMENT (OUTPATIENT)
Dept: NEUROLOGY | Facility: CLINIC | Age: 77
End: 2017-09-25
Payer: MEDICARE

## 2017-09-25 VITALS
DIASTOLIC BLOOD PRESSURE: 71 MMHG | HEART RATE: 60 BPM | WEIGHT: 209 LBS | SYSTOLIC BLOOD PRESSURE: 129 MMHG | BODY MASS INDEX: 28.31 KG/M2 | HEIGHT: 72 IN

## 2017-09-25 DIAGNOSIS — G45.9 TRANSIENT CEREBRAL ISCHEMIC ATTACK, UNSPECIFIED: ICD-10-CM

## 2017-09-25 PROCEDURE — ZZZZZ: CPT

## 2017-09-25 PROCEDURE — 99215 OFFICE O/P EST HI 40 MIN: CPT

## 2017-10-09 ENCOUNTER — APPOINTMENT (OUTPATIENT)
Dept: PULMONOLOGY | Facility: CLINIC | Age: 77
End: 2017-10-09
Payer: MEDICARE

## 2017-10-09 VITALS
OXYGEN SATURATION: 96 % | HEIGHT: 72 IN | SYSTOLIC BLOOD PRESSURE: 110 MMHG | WEIGHT: 209 LBS | HEART RATE: 70 BPM | DIASTOLIC BLOOD PRESSURE: 70 MMHG | RESPIRATION RATE: 14 BRPM | BODY MASS INDEX: 28.31 KG/M2

## 2017-10-09 DIAGNOSIS — Z23 ENCOUNTER FOR IMMUNIZATION: ICD-10-CM

## 2017-10-09 PROCEDURE — 90662 IIV NO PRSV INCREASED AG IM: CPT

## 2017-10-09 PROCEDURE — G0008: CPT

## 2017-10-09 PROCEDURE — 94010 BREATHING CAPACITY TEST: CPT

## 2017-10-09 PROCEDURE — 99214 OFFICE O/P EST MOD 30 MIN: CPT | Mod: 25

## 2017-10-09 RX ORDER — INHALER, ASSIST DEVICES
SPACER (EA) MISCELLANEOUS
Qty: 1 | Refills: 2 | Status: ACTIVE | COMMUNITY
Start: 2017-10-09 | End: 1900-01-01

## 2017-10-09 RX ORDER — NITROFURANTOIN (MONOHYDRATE/MACROCRYSTALS) 25; 75 MG/1; MG/1
100 CAPSULE ORAL
Qty: 20 | Refills: 0 | Status: ACTIVE | COMMUNITY
Start: 2017-04-11

## 2017-10-09 RX ORDER — DOCUSATE SODIUM 100 MG/1
100 CAPSULE, LIQUID FILLED ORAL TWICE DAILY
Refills: 0 | Status: ACTIVE | COMMUNITY

## 2017-10-09 RX ORDER — MONTELUKAST 10 MG/1
10 TABLET, FILM COATED ORAL DAILY
Refills: 0 | Status: ACTIVE | COMMUNITY

## 2017-10-09 RX ORDER — QUETIAPINE FUMARATE 25 MG/1
25 TABLET ORAL
Qty: 30 | Refills: 0 | Status: ACTIVE | COMMUNITY
Start: 2017-08-09

## 2017-10-09 RX ORDER — LEVOFLOXACIN 250 MG/1
250 TABLET, FILM COATED ORAL
Qty: 7 | Refills: 0 | Status: DISCONTINUED | COMMUNITY
Start: 2017-05-05 | End: 2017-10-09

## 2017-10-09 RX ORDER — CEFUROXIME AXETIL 250 MG/1
250 TABLET ORAL
Qty: 3 | Refills: 0 | Status: DISCONTINUED | COMMUNITY
Start: 2017-08-09 | End: 2017-10-09

## 2017-10-09 RX ORDER — OXYCODONE AND ACETAMINOPHEN 5; 325 MG/1; MG/1
5-325 TABLET ORAL
Qty: 30 | Refills: 0 | Status: ACTIVE | COMMUNITY
Start: 2017-05-19

## 2017-10-09 RX ORDER — DONEPEZIL HYDROCHLORIDE 5 MG/1
5 TABLET ORAL
Qty: 30 | Refills: 0 | Status: ACTIVE | COMMUNITY
Start: 2017-09-27

## 2017-10-09 RX ORDER — DABIGATRAN ETEXILATE MESYLATE 150 MG/1
150 CAPSULE ORAL TWICE DAILY
Refills: 0 | Status: ACTIVE | COMMUNITY

## 2017-10-09 RX ORDER — ROSUVASTATIN CALCIUM 5 MG/1
5 TABLET, FILM COATED ORAL
Qty: 30 | Refills: 0 | Status: ACTIVE | COMMUNITY
Start: 2017-08-11

## 2017-10-09 RX ORDER — PITAVASTATIN CALCIUM 2.09 MG/1
2 TABLET, FILM COATED ORAL
Qty: 30 | Refills: 0 | Status: ACTIVE | COMMUNITY
Start: 2017-05-23

## 2017-10-09 RX ORDER — COLD-HOT PACK
600 EACH MISCELLANEOUS
Refills: 0 | Status: ACTIVE | COMMUNITY

## 2017-10-09 RX ORDER — CIPROFLOXACIN AND DEXAMETHASONE 3; 1 MG/ML; MG/ML
0.3-0.1 SUSPENSION/ DROPS AURICULAR (OTIC)
Qty: 8 | Refills: 0 | Status: DISCONTINUED | COMMUNITY
Start: 2017-04-03 | End: 2017-10-09

## 2017-10-09 RX ORDER — CEPHALEXIN 500 MG/1
500 CAPSULE ORAL
Qty: 15 | Refills: 0 | Status: DISCONTINUED | COMMUNITY
Start: 2017-05-19 | End: 2017-10-09

## 2017-10-09 RX ORDER — TAMSULOSIN HYDROCHLORIDE 0.4 MG/1
0.4 CAPSULE ORAL
Refills: 0 | Status: ACTIVE | COMMUNITY

## 2017-10-09 RX ORDER — RANITIDINE 300 MG/1
300 TABLET ORAL
Refills: 0 | Status: ACTIVE | COMMUNITY

## 2017-10-24 ENCOUNTER — RX RENEWAL (OUTPATIENT)
Age: 77
End: 2017-10-24

## 2017-11-08 NOTE — H&P ADULT - NSHPPHYSICALEXAM_GEN_ALL_CORE
Surgical Office Location :   [facility]  [address] [place] [address]     GENERAL APPEARANCE: Well developed, well nourished, alert and cooperative, and appears to be in no acute distress.  HEAD: normocephalic.  EYES: PERRL, EOMI.   EARS: External auditory canals clear, hearing grossly intact.  NECK: Neck supple, non-tender without lymphadenopathy, masses or thyromegaly.  CARDIAC: Normal S1 and S2. No S3, S4 or murmurs. Rhythm is irregular.   LUNGS: Clear to auscultation and percussion without rales, rhonchi, wheezing or diminished breath sounds.  ABDOMEN: Positive bowel sounds. Soft, nondistended, nontender. No guarding or rebound. No masses.  EXTREMITIES: No significant deformity or joint abnormality. 3+ lower extremity edema bilaterally.  Peripheral pulses intact.   NEUROLOGICAL: Orientated to self, date and place. No dysarthria, aphasia or neglect. Cranial Nerves:   PERRL, EOMI, VFF, no nystagmus or diplopia.   CN V1-3 intact to light touch and pinprick.  No facial asymmetry.  Hearing intact to finger rub bilaterally.  Tongue, uvula and palate midline.  Sternocleidomastoid and Trapezius intact bilaterally.             Strength:   RUE 5/5 LUE 5/5, LLE 4/5 RLE 5/5   Pronator drift: none                SKIN: Skin normal color, texture. GENERAL APPEARANCE: Well developed, well nourished, alert and cooperative, and appears to be in no acute distress.  HEAD: normocephalic.  EYES: PERRL, EOMI.   EARS: External auditory canals clear, hearing grossly intact.  NECK: Neck supple, non-tender without lymphadenopathy, masses or thyromegaly.  CARDIAC: Normal S1 and S2. No S3, S4 or murmurs. Rhythm is irregular.   LUNGS: Clear to auscultation and percussion without rales, rhonchi, wheezing  ABDOMEN: Positive bowel sounds. Soft, nondistended, nontender. No guarding or rebound. No masses.  EXTREMITIES: No significant deformity or joint abnormality. 3+ lower extremity edema bilaterally.  Peripheral pulses intact.   NEUROLOGICAL: Orientated to self, date and place. No dysarthria, aphasia or neglect. Cranial Nerves:   PERRL, EOMI, VFF, no nystagmus or diplopia.   CN V1-3 intact to light touch and pinprick.  No facial asymmetry.  Hearing intact to finger rub bilaterally.  Tongue, uvula and palate midline.  Sternocleidomastoid and Trapezius intact bilaterally.             Strength:   RUE 5/5 LUE 5/5, LLE 4/5 RLE 5/5   Pronator drift: none                SKIN: Skin normal color, texture.

## 2018-01-01 ENCOUNTER — TRANSCRIPTION ENCOUNTER (OUTPATIENT)
Age: 78
End: 2018-01-01

## 2018-01-01 ENCOUNTER — INPATIENT (INPATIENT)
Facility: HOSPITAL | Age: 78
LOS: 6 days | Discharge: NURSING, REHAB OR LONG-TERM CARE | DRG: 192 | End: 2018-01-08
Attending: GENERAL PRACTICE | Admitting: GENERAL PRACTICE
Payer: MEDICARE

## 2018-01-01 VITALS
TEMPERATURE: 98 F | HEIGHT: 73 IN | OXYGEN SATURATION: 97 % | WEIGHT: 216.05 LBS | RESPIRATION RATE: 14 BRPM | DIASTOLIC BLOOD PRESSURE: 68 MMHG | HEART RATE: 71 BPM | SYSTOLIC BLOOD PRESSURE: 126 MMHG

## 2018-01-01 DIAGNOSIS — Z87.19 PERSONAL HISTORY OF OTHER DISEASES OF THE DIGESTIVE SYSTEM: Chronic | ICD-10-CM

## 2018-01-01 DIAGNOSIS — Z29.9 ENCOUNTER FOR PROPHYLACTIC MEASURES, UNSPECIFIED: ICD-10-CM

## 2018-01-01 DIAGNOSIS — J90 PLEURAL EFFUSION, NOT ELSEWHERE CLASSIFIED: ICD-10-CM
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
## 2018-01-01 DIAGNOSIS — G47.33 OBSTRUCTIVE SLEEP APNEA (ADULT) (PEDIATRIC): ICD-10-CM

## 2018-01-01 DIAGNOSIS — K21.9 GASTRO-ESOPHAGEAL REFLUX DISEASE WITHOUT ESOPHAGITIS: ICD-10-CM

## 2018-01-01 DIAGNOSIS — Z90.49 ACQUIRED ABSENCE OF OTHER SPECIFIED PARTS OF DIGESTIVE TRACT: Chronic | ICD-10-CM

## 2018-01-01 DIAGNOSIS — J44.1 CHRONIC OBSTRUCTIVE PULMONARY DISEASE WITH (ACUTE) EXACERBATION: ICD-10-CM

## 2018-01-01 DIAGNOSIS — I50.9 HEART FAILURE, UNSPECIFIED: ICD-10-CM

## 2018-01-01 DIAGNOSIS — I48.91 UNSPECIFIED ATRIAL FIBRILLATION: ICD-10-CM

## 2018-01-01 DIAGNOSIS — J84.10 PULMONARY FIBROSIS, UNSPECIFIED: ICD-10-CM

## 2018-01-01 LAB
ALBUMIN SERPL ELPH-MCNC: 3 G/DL — LOW (ref 3.5–5)
ALP SERPL-CCNC: 97 U/L — SIGNIFICANT CHANGE UP (ref 40–120)
ALT FLD-CCNC: 26 U/L DA — SIGNIFICANT CHANGE UP (ref 10–60)
ANION GAP SERPL CALC-SCNC: 9 MMOL/L — SIGNIFICANT CHANGE UP (ref 5–17)
APPEARANCE UR: CLEAR — SIGNIFICANT CHANGE UP
APTT BLD: 36.8 SEC — SIGNIFICANT CHANGE UP (ref 27.5–37.4)
AST SERPL-CCNC: 21 U/L — SIGNIFICANT CHANGE UP (ref 10–40)
BASE EXCESS BLDV CALC-SCNC: 2.7 MMOL/L — HIGH (ref -2–2)
BASOPHILS # BLD AUTO: 0.1 K/UL — SIGNIFICANT CHANGE UP (ref 0–0.2)
BASOPHILS NFR BLD AUTO: 0.8 % — SIGNIFICANT CHANGE UP (ref 0–2)
BILIRUB SERPL-MCNC: 0.7 MG/DL — SIGNIFICANT CHANGE UP (ref 0.2–1.2)
BILIRUB UR-MCNC: NEGATIVE — SIGNIFICANT CHANGE UP
BLOOD GAS COMMENTS, VENOUS: SIGNIFICANT CHANGE UP
BUN SERPL-MCNC: 14 MG/DL — SIGNIFICANT CHANGE UP (ref 7–18)
CALCIUM SERPL-MCNC: 8 MG/DL — LOW (ref 8.4–10.5)
CHLORIDE SERPL-SCNC: 100 MMOL/L — SIGNIFICANT CHANGE UP (ref 96–108)
CO2 SERPL-SCNC: 26 MMOL/L — SIGNIFICANT CHANGE UP (ref 22–31)
COLOR SPEC: YELLOW — SIGNIFICANT CHANGE UP
CREAT SERPL-MCNC: 0.86 MG/DL — SIGNIFICANT CHANGE UP (ref 0.5–1.3)
DIFF PNL FLD: NEGATIVE — SIGNIFICANT CHANGE UP
EOSINOPHIL # BLD AUTO: 0 K/UL — SIGNIFICANT CHANGE UP (ref 0–0.5)
EOSINOPHIL NFR BLD AUTO: 0.2 % — SIGNIFICANT CHANGE UP (ref 0–6)
GLUCOSE SERPL-MCNC: 119 MG/DL — HIGH (ref 70–99)
GLUCOSE UR QL: NEGATIVE — SIGNIFICANT CHANGE UP
HCO3 BLDV-SCNC: 27 MMOL/L — SIGNIFICANT CHANGE UP (ref 21–29)
HCOV 229E RNA SPEC QL NAA+PROBE: DETECTED
HCT VFR BLD CALC: 45.3 % — SIGNIFICANT CHANGE UP (ref 39–50)
HGB BLD-MCNC: 14.3 G/DL — SIGNIFICANT CHANGE UP (ref 13–17)
HOROWITZ INDEX BLDV+IHG-RTO: 21 — SIGNIFICANT CHANGE UP
INR BLD: 1.2 RATIO — HIGH (ref 0.88–1.16)
KETONES UR-MCNC: NEGATIVE — SIGNIFICANT CHANGE UP
LEUKOCYTE ESTERASE UR-ACNC: NEGATIVE — SIGNIFICANT CHANGE UP
LYMPHOCYTES # BLD AUTO: 17.4 % — SIGNIFICANT CHANGE UP (ref 13–44)
LYMPHOCYTES # BLD AUTO: 2.5 K/UL — SIGNIFICANT CHANGE UP (ref 1–3.3)
MCHC RBC-ENTMCNC: 30.2 PG — SIGNIFICANT CHANGE UP (ref 27–34)
MCHC RBC-ENTMCNC: 31.6 GM/DL — LOW (ref 32–36)
MCV RBC AUTO: 95.6 FL — SIGNIFICANT CHANGE UP (ref 80–100)
MONOCYTES # BLD AUTO: 1.2 K/UL — HIGH (ref 0–0.9)
MONOCYTES NFR BLD AUTO: 8.1 % — SIGNIFICANT CHANGE UP (ref 2–14)
NEUTROPHILS # BLD AUTO: 10.7 K/UL — HIGH (ref 1.8–7.4)
NEUTROPHILS NFR BLD AUTO: 73.5 % — SIGNIFICANT CHANGE UP (ref 43–77)
NITRITE UR-MCNC: NEGATIVE — SIGNIFICANT CHANGE UP
NT-PROBNP SERPL-SCNC: 1249 PG/ML — HIGH (ref 0–450)
PCO2 BLDV: 43 MMHG — SIGNIFICANT CHANGE UP (ref 35–50)
PH BLDV: 7.42 — SIGNIFICANT CHANGE UP (ref 7.35–7.45)
PH UR: 6.5 — SIGNIFICANT CHANGE UP (ref 5–8)
PLATELET # BLD AUTO: 152 K/UL — SIGNIFICANT CHANGE UP (ref 150–400)
PO2 BLDV: SIGNIFICANT CHANGE UP MMHG (ref 25–45)
POTASSIUM SERPL-MCNC: 3.8 MMOL/L — SIGNIFICANT CHANGE UP (ref 3.5–5.3)
POTASSIUM SERPL-SCNC: 3.8 MMOL/L — SIGNIFICANT CHANGE UP (ref 3.5–5.3)
PROT SERPL-MCNC: 7.7 G/DL — SIGNIFICANT CHANGE UP (ref 6–8.3)
PROT UR-MCNC: NEGATIVE — SIGNIFICANT CHANGE UP
PROTHROM AB SERPL-ACNC: 13.1 SEC — HIGH (ref 9.8–12.7)
RAPID RVP RESULT: DETECTED
RBC # BLD: 4.74 M/UL — SIGNIFICANT CHANGE UP (ref 4.2–5.8)
RBC # FLD: 13.1 % — SIGNIFICANT CHANGE UP (ref 10.3–14.5)
SAO2 % BLDV: 59 % — LOW (ref 67–88)
SODIUM SERPL-SCNC: 135 MMOL/L — SIGNIFICANT CHANGE UP (ref 135–145)
SP GR SPEC: 1.01 — SIGNIFICANT CHANGE UP (ref 1.01–1.02)
TROPONIN I SERPL-MCNC: 0.02 NG/ML — SIGNIFICANT CHANGE UP (ref 0–0.04)
UROBILINOGEN FLD QL: NEGATIVE — SIGNIFICANT CHANGE UP
WBC # BLD: 14.6 K/UL — HIGH (ref 3.8–10.5)
WBC # FLD AUTO: 14.6 K/UL — HIGH (ref 3.8–10.5)

## 2018-01-01 PROCEDURE — 99285 EMERGENCY DEPT VISIT HI MDM: CPT | Mod: 25

## 2018-01-01 PROCEDURE — 72074 X-RAY EXAM THORAC SPINE4/>VW: CPT | Mod: 26

## 2018-01-01 PROCEDURE — 71250 CT THORAX DX C-: CPT | Mod: 26

## 2018-01-01 PROCEDURE — 71045 X-RAY EXAM CHEST 1 VIEW: CPT | Mod: 26

## 2018-01-01 RX ORDER — CHOLECALCIFEROL (VITAMIN D3) 125 MCG
1000 CAPSULE ORAL DAILY
Qty: 0 | Refills: 0 | Status: DISCONTINUED | OUTPATIENT
Start: 2018-01-01 | End: 2018-01-08

## 2018-01-01 RX ORDER — FAMOTIDINE 10 MG/ML
20 INJECTION INTRAVENOUS DAILY
Qty: 0 | Refills: 0 | Status: DISCONTINUED | OUTPATIENT
Start: 2018-01-01 | End: 2018-01-02

## 2018-01-01 RX ORDER — ACETAMINOPHEN 500 MG
650 TABLET ORAL EVERY 6 HOURS
Qty: 0 | Refills: 0 | Status: DISCONTINUED | OUTPATIENT
Start: 2018-01-01 | End: 2018-01-08

## 2018-01-01 RX ORDER — DABIGATRAN ETEXILATE MESYLATE 150 MG/1
150 CAPSULE ORAL EVERY 12 HOURS
Qty: 0 | Refills: 0 | Status: DISCONTINUED | OUTPATIENT
Start: 2018-01-01 | End: 2018-01-08

## 2018-01-01 RX ORDER — PANTOPRAZOLE SODIUM 20 MG/1
40 TABLET, DELAYED RELEASE ORAL
Qty: 0 | Refills: 0 | Status: DISCONTINUED | OUTPATIENT
Start: 2018-01-01 | End: 2018-01-08

## 2018-01-01 RX ORDER — FUROSEMIDE 40 MG
40 TABLET ORAL ONCE
Qty: 0 | Refills: 0 | Status: COMPLETED | OUTPATIENT
Start: 2018-01-01 | End: 2018-01-01

## 2018-01-01 RX ORDER — DOCUSATE SODIUM 100 MG
100 CAPSULE ORAL
Qty: 0 | Refills: 0 | Status: DISCONTINUED | OUTPATIENT
Start: 2018-01-01 | End: 2018-01-08

## 2018-01-01 RX ORDER — ASPIRIN/CALCIUM CARB/MAGNESIUM 324 MG
81 TABLET ORAL DAILY
Qty: 0 | Refills: 0 | Status: DISCONTINUED | OUTPATIENT
Start: 2018-01-01 | End: 2018-01-08

## 2018-01-01 RX ORDER — SODIUM CHLORIDE 9 MG/ML
500 INJECTION INTRAMUSCULAR; INTRAVENOUS; SUBCUTANEOUS ONCE
Qty: 0 | Refills: 0 | Status: COMPLETED | OUTPATIENT
Start: 2018-01-01 | End: 2018-01-01

## 2018-01-01 RX ORDER — ATORVASTATIN CALCIUM 80 MG/1
10 TABLET, FILM COATED ORAL AT BEDTIME
Qty: 0 | Refills: 0 | Status: DISCONTINUED | OUTPATIENT
Start: 2018-01-01 | End: 2018-01-03

## 2018-01-01 RX ORDER — IPRATROPIUM/ALBUTEROL SULFATE 18-103MCG
3 AEROSOL WITH ADAPTER (GRAM) INHALATION ONCE
Qty: 0 | Refills: 0 | Status: COMPLETED | OUTPATIENT
Start: 2018-01-01 | End: 2018-01-01

## 2018-01-01 RX ORDER — ACETYLCYSTEINE 200 MG/ML
4 VIAL (ML) MISCELLANEOUS
Qty: 0 | Refills: 0 | Status: DISCONTINUED | OUTPATIENT
Start: 2018-01-01 | End: 2018-01-03

## 2018-01-01 RX ORDER — TAMSULOSIN HYDROCHLORIDE 0.4 MG/1
0.8 CAPSULE ORAL AT BEDTIME
Qty: 0 | Refills: 0 | Status: DISCONTINUED | OUTPATIENT
Start: 2018-01-01 | End: 2018-01-08

## 2018-01-01 RX ORDER — PREGABALIN 225 MG/1
100 CAPSULE ORAL DAILY
Qty: 0 | Refills: 0 | Status: DISCONTINUED | OUTPATIENT
Start: 2018-01-01 | End: 2018-01-08

## 2018-01-01 RX ORDER — IPRATROPIUM/ALBUTEROL SULFATE 18-103MCG
3 AEROSOL WITH ADAPTER (GRAM) INHALATION EVERY 6 HOURS
Qty: 0 | Refills: 0 | Status: DISCONTINUED | OUTPATIENT
Start: 2018-01-01 | End: 2018-01-08

## 2018-01-01 RX ADMIN — Medication 1000 UNIT(S): at 14:56

## 2018-01-01 RX ADMIN — Medication 3 MILLILITER(S): at 04:41

## 2018-01-01 RX ADMIN — Medication 4 MILLILITER(S): at 18:15

## 2018-01-01 RX ADMIN — Medication 4 MILLILITER(S): at 20:25

## 2018-01-01 RX ADMIN — Medication 3 MILLILITER(S): at 20:24

## 2018-01-01 RX ADMIN — TAMSULOSIN HYDROCHLORIDE 0.8 MILLIGRAM(S): 0.4 CAPSULE ORAL at 22:38

## 2018-01-01 RX ADMIN — PREGABALIN 100 MICROGRAM(S): 225 CAPSULE ORAL at 14:56

## 2018-01-01 RX ADMIN — Medication 81 MILLIGRAM(S): at 14:56

## 2018-01-01 RX ADMIN — DABIGATRAN ETEXILATE MESYLATE 150 MILLIGRAM(S): 150 CAPSULE ORAL at 18:09

## 2018-01-01 RX ADMIN — Medication 3 MILLILITER(S): at 17:11

## 2018-01-01 RX ADMIN — FAMOTIDINE 20 MILLIGRAM(S): 10 INJECTION INTRAVENOUS at 14:56

## 2018-01-01 RX ADMIN — SODIUM CHLORIDE 1000 MILLILITER(S): 9 INJECTION INTRAMUSCULAR; INTRAVENOUS; SUBCUTANEOUS at 04:09

## 2018-01-01 RX ADMIN — Medication 100 MILLIGRAM(S): at 18:09

## 2018-01-01 RX ADMIN — ATORVASTATIN CALCIUM 10 MILLIGRAM(S): 80 TABLET, FILM COATED ORAL at 22:39

## 2018-01-01 RX ADMIN — Medication 40 MILLIGRAM(S): at 05:47

## 2018-01-01 NOTE — DISCHARGE NOTE ADULT - CARE PROVIDER_API CALL
Willis Montes), Internal Medicine; Pulmonary Disease  [address] [place] [address]  Phone: [phone]  Fax: [phone]

## 2018-01-01 NOTE — DISCHARGE NOTE ADULT - CARE PLAN
Principal Discharge DX:	COPD (chronic obstructive pulmonary disease)  Goal:	Continue with your current bronchodilator medications as prescribed  Instructions for follow-up, activity and diet:	You were admitted for an exacerbation of your COPD, likely secondary to viral bronchitis given your positive respiratory viral panel for coronavirus. We started you on a regimen of bronchodilating medications and oxygen supplementation, which improved you clinical status.  Continue with your bronchodilator medications as prescribed and follow up with outpatient primary care provider within 2 weeks of discharge for additional recommendations and continued monitoring.  Secondary Diagnosis:	Atrial fibrillation  Instructions for follow-up, activity and diet:	Continue with your home dose of dabigatran as prescribed  Secondary Diagnosis:	Fall  Instructions for follow-up, activity and diet:	You were admitted for a mechanical fall. Your initial xray of your lumbar spine revealed no evidence of fracture, and we had you evaluated by our physical therapist. PT recommends for you to be discharged to subacute rehab for further therapy in order to optimize your ambulatory status.  Secondary Diagnosis:	GERD (gastroesophageal reflux disease)  Instructions for follow-up, activity and diet:	Continue with your home dose of ranitidine and omeprazole as prescribed  Secondary Diagnosis:	Pulmonary fibrosis  Instructions for follow-up, activity and diet:	Continue with your bronchodilator medications as prescribed Principal Discharge DX:	COPD (chronic obstructive pulmonary disease)  Goal:	Continue with your current bronchodilator medications as prescribed  Instructions for follow-up, activity and diet:	You were admitted for an exacerbation of your COPD, likely secondary to viral bronchitis given your positive respiratory viral panel for coronavirus. We started you on a regimen of bronchodilating medications and oxygen supplementation, which improved you clinical status.  Continue with your bronchodilator medications as prescribed and follow up with outpatient primary care provider within 2 weeks of discharge for additional recommendations and continued monitoring.  Secondary Diagnosis:	Atrial fibrillation  Instructions for follow-up, activity and diet:	Continue with your home dose of dabigatran as prescribed  Secondary Diagnosis:	Fall  Instructions for follow-up, activity and diet:	You were admitted for a mechanical fall. Your initial xray of your lumbar spine revealed no evidence of fracture, and we had you evaluated by our physical therapist. PT recommends for you to be discharged to subacute rehab for further therapy in order to optimize your ambulatory status.  In addition, your orthostatic VS were positive, and ABIEL stockings were ordered.  Please continue with them.  Secondary Diagnosis:	GERD (gastroesophageal reflux disease)  Instructions for follow-up, activity and diet:	Continue with your home dose of ranitidine and omeprazole as prescribed  Secondary Diagnosis:	Pulmonary fibrosis  Instructions for follow-up, activity and diet:	Continue with your bronchodilator medications as prescribed as well as steroids

## 2018-01-01 NOTE — PATIENT PROFILE ADULT. - ABILITY TO HEAR (WITH HEARING AID OR HEARING APPLIANCE IF NORMALLY USED):
Mildly to Moderately Impaired: difficulty hearing in some environments or speaker may need to increase volume or speak distinctly hearing aids at home/Mildly to Moderately Impaired: difficulty hearing in some environments or speaker may need to increase volume or speak distinctly

## 2018-01-01 NOTE — ED ADULT TRIAGE NOTE - CHIEF COMPLAINT QUOTE
BIBA with c/o fall with a back pain. As per EMS , no head trauma, no loc. Patient on paradoxa blood thinner .

## 2018-01-01 NOTE — H&P ADULT - ASSESSMENT
76 y/o Male, ambulatory with a cane and a walker, with h/o A. fib on Pradaxa, COPD, GERD, HLD, abdominal hernia, pulmonary fibrosis, RBBB, BRIEN on CPAP at night, chronic b/l LE lymphedema, mild dementia, urinary incontinence, and recurrent UTIs came in after a fall. Patient is confused, most of the history is by wife. Per wife, she has been noticing that the patient was feeling weak in the past couple of days. Last night around 1am, when he was getting out of the bathroom, he feel. His back hit the tub. Denies any head trauma, or LOC. Denies near syncope or dizziness. After the fall patient started complaining of lower back pain so he was bought to the ER. Wife also complaints that she heard him wheezing overnight. Denies any cough. No other complaints at this time.

## 2018-01-01 NOTE — DISCHARGE NOTE ADULT - HOSPITAL COURSE
77M admitted for COPD exacerbation and fall     PMHx A Fib (Pradaxa), COPD, GERD, HLD, Pulmonary Fibrosis, BRIEN (CPAP QHS), Chronic B/L Lymphedema, Dementia, Urinary incontinence with current UTIs    Background - came to ED s/p fall. Patient's wife reported that patient had been weak prior 2 days and 1AM prior to admission he fell and hit his back in the tub. Patient's wife denied any head truma, syncope, and patient denied any preceding dizziness. His only complain was lower back back and wheezing, so he was brought to ED for further evaluation.     ED - vitals stable  Labs significant for WBC 14.6,   Plain film of lumbar spine negative for acute pathology  CXR revealed new basilar effusions; BNP 1249; RVP positive for coronavirus   CT chest revealed mild pulmonary fibrosis and stable L pleural effusion   EKG without ischemic changes; Troponin negative  S/p 40mg IV lasix, BiPAP with transition to Nasal cannula    On the floors, patient was continued on regimen of bronchodilating medications and evaluated by pulmonology.     Given patient's improved clinical status and current hemodynamic stability, decision was made to discharge.  Please refer to patient's complete medical chart with documents for a full hospital course, for this is only a brief summary. 77M admitted for COPD exacerbation and fall     PMHx A Fib (Pradaxa), COPD, GERD, HLD, Pulmonary Fibrosis, BRIEN (CPAP QHS), Chronic B/L Lymphedema, Dementia, Urinary incontinence with current UTIs    Background - came to ED s/p fall. Patient's wife reported that patient had been weak prior 2 days and 1AM prior to admission he fell and hit his back in the tub. Patient's wife denied any head truma, syncope, and patient denied any preceding dizziness. His only complain was lower back back and wheezing, so he was brought to ED for further evaluation.     ED - vitals stable  Labs significant for WBC 14.6,   Plain film of lumbar spine negative for acute pathology  CXR revealed new basilar effusions; BNP 1249; RVP positive for coronavirus   CT chest revealed mild pulmonary fibrosis and stable L pleural effusion   EKG without ischemic changes; Troponin negative  S/p 40mg IV lasix, BiPAP with transition to Nasal cannula    On the floors, patient was continued on regimen of bronchodilating medications and evaluated by pulmonology. Because patient's CT scan findings were chronic and unchanged from prior testing, patient did not require any surgical intervention for his pleural effusion.  Patient did, however, undergo repeat echocardiographic testing to evaluate for potential worsening in cardiac function - TTE revealed (ECHO).    Given patient's improved clinical status and current hemodynamic stability, decision was made to discharge.  Please refer to patient's complete medical chart with documents for a full hospital course, for this is only a brief summary. 78 y/o Male, ambulatory with a cane and a walker, with h/o A. fib on Pradaxa, COPD, GERD, HLD, abdominal hernia, pulmonary fibrosis, RBBB, BRIEN on CPAP at night, chronic b/l LE lymphedema, mild dementia, urinary incontinence, and recurrent UTIs came in after a fall. Patient is confused, most of the history is by wife. Per wife, she has been noticing that the patient was feeling weak in the past couple of days. Last night around 1am, when he was getting out of the bathroom, he fell. His back hit the tub. Denied any head trauma, or LOC. Denied near syncope or dizziness. After the fall patient started complaining of lower back pain so he was bought to the ER. Wife also complained that she heard him wheezing overnight.  Pt admitted for workup to the medical floor.      Fall,  likely mechanical fall  - XR lumbar spine- negative for acute changes or fracture  - PT eval noted: for home pt  - Tylenol PRN pain, added topical lidocaine patch.     COPD (chronic obstructive pulmonary disease), likely 2/2 to viral bronchitis  RVP- positive for corona virus  - c/w duonebs/steroids  - CT chest showed no acute infilitrate.  No need for antibiotics  - Dr. Montes, pulmonologist, consulted given pulm fibrosis and effusion noted  - BRIEN, c/w bipap hs.     R/O CHF, left ventricular.    -Echocardiogram showed an EF of >55%, and essentially no  signs of HF.      Pulmonary fibrosis.   -Dr. Montes, pulmonologist, consulted  -c/w Bronchodilators prn and oxygen supp.     Atrial fibrillation.    rate controlled  c/w pradaxa.     GERD   Protonix    Need for prophylactic measure.   Pradaxa and Protonix    PT changed to [facility].  CM provided choices     Attending cleared patient for discharge to [facility]. 78 y/o Male, ambulatory with a cane and a walker, with h/o A. fib on Pradaxa, COPD, GERD, HLD, abdominal hernia, pulmonary fibrosis, RBBB, BRIEN on CPAP at night, chronic b/l LE lymphedema, mild dementia, urinary incontinence, and recurrent UTIs came in after a fall. Patient is confused, most of the history is by wife. Per wife, she has been noticing that the patient was feeling weak in the past couple of days. Last night around 1am, when he was getting out of the bathroom, he fell. His back hit the tub. Denied any head trauma, or LOC. Denied near syncope or dizziness. After the fall patient started complaining of lower back pain so he was bought to the ER. Wife also complained that she heard him wheezing overnight.  Pt admitted for workup to the medical floor.      Fall,  likely mechanical fall  - Orthostatic VS positive.  ABIEL stockings prescribed.  - XR lumbar spine- negative for acute changes or fracture  - PT eval recommended [facility].    - Tylenol PRN pain, added topical lidocaine patch.     COPD (chronic obstructive pulmonary disease), likely 2/2 to viral bronchitis  RVP- positive for corona virus  - c/w duonebs/steroids  - CT chest showed no acute infilitrate.  No need for antibiotics  - Dr. Montes, pulmonologist, consulted given pulm fibrosis and effusion noted  - BRIEN, c/w bipap hs.     R/O CHF, left ventricular.    -Echocardiogram showed an EF of >55%, and essentially no  signs of HF.      Pulmonary fibrosis.   -Dr. Montes, pulmonologist, consulted  -c/w Bronchodilators prn and oxygen supp.     Atrial fibrillation.    rate controlled  c/w pradaxa.     GERD   Protonix given     Need for prophylactic measure.   Pradaxa and Protonix    PT changed to [facility].  CM provided choices     Attending cleared patient for discharge to [facility]. 78 y/o Male, ambulatory with a cane and a walker, with h/o A. fib on Pradaxa, COPD, GERD, HLD, abdominal hernia, pulmonary fibrosis, RBBB, BRIEN on CPAP at night, chronic b/l LE lymphedema, mild dementia, urinary incontinence, and recurrent UTIs came in after a fall. Patient is confused, most of the history is by wife. Per wife, she has been noticing that the patient was feeling weak in the past couple of days. Last night around 1am, when he was getting out of the bathroom, he fell. His back hit the tub. Denied any head trauma, or LOC. Denied near syncope or dizziness. After the fall patient started complaining of lower back pain so he was bought to the ER. Wife also complained that she heard him wheezing overnight.  Pt admitted for workup to the medical floor.      Fall,  likely mechanical fall  - Orthostatic VS positive with O2 at 99%.  ABIEL stockings prescribed.  - XR lumbar spine- negative for acute changes or fracture  - PT eval recommended [facility].    - Tylenol PRN pain, added topical lidocaine patch.     COPD (chronic obstructive pulmonary disease), likely 2/2 to viral bronchitis  RVP- positive for corona virus  - c/w duonebs/steroids  - CT chest showed no acute infilitrate.  No need for antibiotics  - Dr. Montes, pulmonologist, consulted given pulm fibrosis and effusion noted  - BRIEN, c/w bipap hs.     R/O CHF, left ventricular.    -Echocardiogram showed an EF of >55%, and essentially no  signs of HF.      Pulmonary fibrosis.   -Dr. Montes, pulmonologist, consulted  -c/w Bronchodilators prn and oxygen supp.     Atrial fibrillation.    rate controlled  c/w pradaxa.     GERD   Protonix given     Need for prophylactic measure.   Pradaxa and Protonix    PT changed to [facility].  CM provided choices     Attending cleared patient for discharge to [facility].

## 2018-01-01 NOTE — DISCHARGE NOTE ADULT - PLAN OF CARE
Continue with your current bronchodilator medications as prescribed You were admitted for an exacerbation of your COPD, likely secondary to viral bronchitis given your positive respiratory viral panel for coronavirus. We started you on a regimen of bronchodilating medications and oxygen supplementation, which improved you clinical status.  Continue with your bronchodilator medications as prescribed and follow up with outpatient primary care provider within 2 weeks of discharge for additional recommendations and continued monitoring. Continue with your home dose of dabigatran as prescribed You were admitted for a mechanical fall. Your initial xray of your lumbar spine revealed no evidence of fracture, and we had you evaluated by our physical therapist. PT recommends for you to be discharged to subacute rehab for further therapy in order to optimize your ambulatory status. Continue with your home dose of ranitidine and omeprazole as prescribed Continue with your bronchodilator medications as prescribed You were admitted for a mechanical fall. Your initial xray of your lumbar spine revealed no evidence of fracture, and we had you evaluated by our physical therapist. PT recommends for you to be discharged to subacute rehab for further therapy in order to optimize your ambulatory status.  In addition, your orthostatic VS were positive, and ABIEL stockings were ordered.  Please continue with them. Continue with your bronchodilator medications as prescribed as well as steroids

## 2018-01-01 NOTE — H&P ADULT - PROBLEM SELECTOR PLAN 1
Per wife, likely mechanical fall, no focal deficits on PE  - XR lumbar spine- negative  - will check orthostatics  - PT eval  - Tylenol PRN pain

## 2018-01-01 NOTE — ED ADULT NURSE REASSESSMENT NOTE - NS ED NURSE REASSESS COMMENT FT1
Pt. placed on bipap as per Dr. Greco order. Family at bedside. Pt. appears comfortable at this time. No complaints voiced. Vs is stable at this time. Will continue to monitor closely.

## 2018-01-01 NOTE — ED ADULT NURSE REASSESSMENT NOTE - NS ED NURSE REASSESS COMMENT FT1
Patient received alert, responsive, resting in stretcher, on 2L oxygen via nasal cannula, pulse oximetry 100%. Patient on wall cardiac monitor, sinus rhythm. Wife at bedside. Awaiting inpatient bed.

## 2018-01-01 NOTE — ED PROVIDER NOTE - PMH
Atrial fibrillation    Bundle branch block, right    COPD (chronic obstructive pulmonary disease)    Dementia    GERD (gastroesophageal reflux disease)    Hyperlipidemia    Obstructive sleep apnea on CPAP    Pulmonary fibrosis    Recurrent UTI

## 2018-01-01 NOTE — ED PROVIDER NOTE - CARE PLAN
Principal Discharge DX:	CHF (congestive heart failure)  Secondary Diagnosis:	Pleural effusion  Secondary Diagnosis:	Fall at home, initial encounter

## 2018-01-01 NOTE — DISCHARGE NOTE ADULT - MEDICATION SUMMARY - MEDICATIONS TO STOP TAKING
I will STOP taking the medications listed below when I get home from the hospital:    omeprazole 40 mg oral delayed release capsule  -- 1 cap(s) by mouth once a day    Livalo 2 mg oral tablet  -- 1 tab(s) by mouth once a day    armodafinil 50 mg oral tablet  -- 1 tab(s) by mouth once a day    cranberry oral capsule  -- take 4200mg twice daily    raNITIdine 300 mg oral capsule  -- 1 cap(s) by mouth once a day (at bedtime)    levalbuterol 0.63 mg/3 mL inhalation solution  -- 3 milliliter(s) inhaled 4 times a day, As Needed    dabigatran 150 mg oral capsule  -- 1 cap(s) by mouth every 12 hours    docusate sodium 100 mg oral capsule  -- 1 cap(s) by mouth 2 times a day    QUEtiapine 25 mg oral tablet  -- 1 tab(s) by mouth once a day (at bedtime)    cefuroxime 250 mg oral tablet  -- 1 tab(s) by mouth every 12 hours

## 2018-01-01 NOTE — DISCHARGE NOTE ADULT - PATIENT PORTAL LINK FT
“You can access the [facility] Patient Portal, offered by , by registering with the following website: [email or web address]”

## 2018-01-01 NOTE — H&P ADULT - NSHPLABSRESULTS_GEN_ALL_CORE
14.3   14.6  )-----------( 152      ( 01 Jan 2018 03:42 )             45.3     01-01    135  |  100  |  14  ----------------------------<  119<H>  3.8   |  26  |  0.86    Ca    8.0<L>      01 Jan 2018 03:42    TPro  7.7  /  Alb  3.0<L>  /  TBili  0.7  /  DBili  x   /  AST  21  /  ALT  26  /  AlkPhos  97  01-01    < from: Xray Chest 1 View AP-PORTABLE IMMEDIATE (01.01.18 @ 05:58) >      EXAM:  XR CHEST PORTABLE IMMED 1V                            PROCEDURE DATE:  01/01/2018          INTERPRETATION:  AP chest on January 1, 2018 at 3:35 AM. Patient is short   of breath.    Heart is magnified by technique.    On August 6, 2017 there were increased interstitial markings at the lung   bases left greater than right suggesting chronic etiology. CAT scan of   July 27, 2017 showed reticular findings, honeycombing, and bronchiectasis   in the region.    Presently there appear to be mildpleural effusion superimposed on the   findings suggesting CHF on top of chronic lung disease.    IMPRESSION: New basilar effusions on top of chronic lung densities at the   lung bases.    < end of copied text >    < from: CT Chest No Cont (01.01.18 @ 11:31) >    EXAM:  CT CHEST                            PROCEDURE DATE:  01/01/2018          INTERPRETATION:  CLINICAL INFORMATION: Pneumonia    COMPARISON: CT chest 7/27/2017    PROCEDURE:   CT of the Chest was performed without intravenous contrast.  [place] coronal reformats were performed.      FINDINGS:    CHEST:     LUNGS AND LARGE AIRWAYS: Patent central airways. Subpleural reticulation   and honeycombing compatible with fibrosis, unchanged.  PLEURA: Stable small left pleural effusion.  VESSELS:Within normal limits.  HEART: Heart size is normal.No pericardial effusion. Small amount of   fluid in the superior pericardial recess, stable.  MEDIASTINUM AND JAM: No lymphadenopathy.  CHEST WALL AND LOWER NECK: Within normal limits.  VISUALIZED UPPER ABDOMEN: Cholelithiasis. Right upper pole renal cyst.  BONES: Degenerative changes of the spine.    IMPRESSION: No acute infiltrate. Stable mild pulmonary fibrosis and   stable small left pleural effusion.    < end of copied text >

## 2018-01-01 NOTE — ED PROVIDER NOTE - MEDICAL DECISION MAKING DETAILS
77yoM with pulm fibrosis/COPD, afib, RBBB p/w SOB with exertion and fall. B/l LE edema, BNP elev, and CXR consistent with possibly new onset CHF. May also be underlying PNA, as well as pleural effusion noted. No other s/s's of PE. No ECG or trop changes or CP to suggest ACS. Initially given 500mL due to concern for intravascular dehydration. Later after CXR became mildly SOB, likely 2/2 the exertion of going for CXR. Given 40mg IV furosemide and levoflox for possible PNA. VBG at this time unremarkable. Started on CPAP as his wife wanted him to rest and he is on nightly CPAP and sats dropped to 93% on RA. Admitted for further monitoring, w/u, and care.    Other diagnoses: afib, thoracic back pain, shortness of breath at rest, concern for pneumonia

## 2018-01-01 NOTE — H&P ADULT - PROBLEM SELECTOR PLAN 3
B/l pleural effusions seen in July 2017  CT chest- 1/1/2018- No acute infiltrate. Stable mild pulmonary fibrosis and   stable small left pleural effusion.  - Patient received 40 IV lasix in ED for suspicion of CHF  - Per wife patient has no prior history of CHF  - ECHO done in Aug 2017 was inconclusive of LV function  - BNP wnl for age  - Patient has chronic venous insufficiency hence b/l LE edema  - No rales heard on Lung exam  - doubt CHF, however will repeat ECHO to confirm B/l pleural effusions seen in July 2017  CT chest- 1/1/2018- No acute infiltrate. Stable mild pulmonary fibrosis and   stable small left pleural effusion.  - Patient received 40 IV lasix in ED for suspicion of CHF  - Per wife patient has no prior history of CHF  - ECHO done in Aug 2017 was inconclusive of LV function  - BNP wnl for age  - Patient has chronic venous insufficiency hence b/l LE edema  - No rales heard on Lung exam  - doubt CHF, however will repeat ECHO to confirm  -pulm eval

## 2018-01-01 NOTE — H&P ADULT - PROBLEM SELECTOR PLAN 2
Likely 2/2 to viral bronchitis  RVP- positive for corona virus  - start duonebs  -  Will hold off to abx, CT chest shows no acute infilitrate Likely 2/2 to viral bronchitis  RVP- positive for corona virus  - start duonebs  -  Will hold off to abx, CT chest shows no acute infilitrate  -pulm eval

## 2018-01-01 NOTE — ED ADULT NURSE NOTE - OBJECTIVE STATEMENT
Pt. stated he fell about 2 hours ago when he was leaving the bathroom. Pt. stated he fell backwards hitting his buttocks and back. Pt. denies any LOC just has back pain.

## 2018-01-01 NOTE — ED PROVIDER NOTE - OBJECTIVE STATEMENT
76 y/o M pt with a significant PMHx of a-fib (on Pradaxa), dementia, GERD, HLD, obstructive sleep apnea (on CPAP), pulmonary fibrosis, R bundle branch block, COPD and recurrent UTI and a significant PSHx of SBO and appendectomy presents to the ED c/o back pain associated with certain movements s/p losing balance and falling PTA to the ED. Pt also sustained a skin tear to his L hand from his fall. Pt states his back pain is in the lower thoracic area. Wife states that pt was initially unable to walk after his fall 2/2 weakness. Wife states that over the past week pt has been getting weak and has some SOB while walking. Pt states he has pain in his shoulders but notes that it is not new pain. Pt denies head injury, chest pain, dizziness, LOC, numbness, EtOH use or any other complaints. Pt also takes Omeprazole, Armodafinil, Aspirin, Livalo, n-acetyl cysteine, Tamsulosin, Docusate Sodium, Ranitidine, Donepezil, Albuterol inhalation. Allergies: Zosyn (fever). Non-smoker. (PMD: Dr. Warner). Cardiologst: Dr. Ro. 78 y/o M pt with a significant PMHx of a-fib (on Pradaxa), dementia, GERD, HLD, BRIEN (on nightly CPAP), pulmonary fibrosis, R bundle branch block, COPD and a significant PSHx of SBO and appendectomy presents to the ED c/o back pain associated with certain movements s/p losing balance and falling PTA to the ED. Pt also sustained a skin tear to his L hand from his fall. Pt states his back pain is in the lower thoracic area. Wife states that pt was initially unable to walk after his fall 2/2 weakness, though patient denies focal weakness or numbness in his legs. Wife states that over the past week pt has been getting weak and having SOB with walking. Pt denies head injury, chest pain, dizziness, LOC, numbness, EtOH use or any other complaints. Pt also takes Omeprazole, Armodafinil, Aspirin, Livalo, n-acetyl cysteine, Tamsulosin, Docusate Sodium, Ranitidine, Donepezil, Albuterol inhalation. Allergies: Zosyn (fever). Non-smoker. (PMD: Dr. Warner). Cardiologst: Dr. Ro.

## 2018-01-01 NOTE — H&P ADULT - NSHPREVIEWOFSYSTEMS_GEN_ALL_CORE
REVIEW OF SYSTEMS:  CONSTITUTIONAL: No fever, weight loss, or fatigue  EYES: No eye pain, visual disturbances, or discharge  ENMT:  No difficulty hearing, tinnitus, vertigo; No sinus or throat pain  NECK: No pain or stiffness  RESPIRATORY: No cough, wheezing, chills or hemoptysis; No shortness of breath  CARDIOVASCULAR: b/l LE swelling. No chest pain, palpitations, or dizziness  GASTROINTESTINAL: Constipation. No abdominal or epigastric pain. No nausea, vomiting, or hematemesis;  No melena or hematochezia.  GENITOURINARY: No dysuria, frequency, ot hematuria  NEUROLOGICAL: No headaches, memory loss, loss of strength, numbness, or tremors  SKIN: No itching, burning, rashes, or lesions   MUSCULOSKELETAL: lower back pain

## 2018-01-01 NOTE — H&P ADULT - NSHPPHYSICALEXAM_GEN_ALL_CORE
Vital Signs Last 24 Hrs  T(C): 37 (01 Jan 2018 07:32), Max: 37 (01 Jan 2018 07:32)  T(F): 98.6 (01 Jan 2018 07:32), Max: 98.6 (01 Jan 2018 07:32)  HR: 97 (01 Jan 2018 07:32) (71 - 97)  BP: 152/80 (01 Jan 2018 07:32) (126/68 - 152/80)  BP(mean): --  RR: 14 (01 Jan 2018 07:32) (14 - 14)  SpO2: 98% (01 Jan 2018 07:32) (95% - 98%)    GENERAL: NAD  HEAD:  Atraumatic, Normocephalic  EYES: EOMI, PERRLA, conjunctiva and sclera clear  ENMT: Moist mucous membranes  NECK: Supple  NERVOUS SYSTEM:  Alert & Oriented X3  CHEST/LUNG: Clear to auscultation bilaterally; No rales, rhonchi, wheezing, or rubs  HEART: Regular rate and rhythm; No murmurs, rubs, or gallops  ABDOMEN: Soft, Nontender, Nondistended; Bowel sounds present  EXTREMITIES:  2+ Peripheral Pulses, b/l LE edema

## 2018-01-02 DIAGNOSIS — J44.9 CHRONIC OBSTRUCTIVE PULMONARY DISEASE, UNSPECIFIED: ICD-10-CM

## 2018-01-02 LAB
ANION GAP SERPL CALC-SCNC: 9 MMOL/L — SIGNIFICANT CHANGE UP (ref 5–17)
BASOPHILS # BLD AUTO: 0.1 K/UL — SIGNIFICANT CHANGE UP (ref 0–0.2)
BASOPHILS NFR BLD AUTO: 0.9 % — SIGNIFICANT CHANGE UP (ref 0–2)
BUN SERPL-MCNC: 20 MG/DL — HIGH (ref 7–18)
CALCIUM SERPL-MCNC: 8.7 MG/DL — SIGNIFICANT CHANGE UP (ref 8.4–10.5)
CHLORIDE SERPL-SCNC: 100 MMOL/L — SIGNIFICANT CHANGE UP (ref 96–108)
CHOLEST SERPL-MCNC: 145 MG/DL — SIGNIFICANT CHANGE UP (ref 10–199)
CO2 SERPL-SCNC: 27 MMOL/L — SIGNIFICANT CHANGE UP (ref 22–31)
CREAT SERPL-MCNC: 0.89 MG/DL — SIGNIFICANT CHANGE UP (ref 0.5–1.3)
CULTURE RESULTS: SIGNIFICANT CHANGE UP
EOSINOPHIL # BLD AUTO: 0.1 K/UL — SIGNIFICANT CHANGE UP (ref 0–0.5)
EOSINOPHIL NFR BLD AUTO: 0.9 % — SIGNIFICANT CHANGE UP (ref 0–6)
GLUCOSE SERPL-MCNC: 127 MG/DL — HIGH (ref 70–99)
HBA1C BLD-MCNC: 5.7 % — HIGH (ref 4–5.6)
HCT VFR BLD CALC: 47.3 % — SIGNIFICANT CHANGE UP (ref 39–50)
HDLC SERPL-MCNC: 58 MG/DL — SIGNIFICANT CHANGE UP (ref 40–125)
HGB BLD-MCNC: 15.5 G/DL — SIGNIFICANT CHANGE UP (ref 13–17)
LIPID PNL WITH DIRECT LDL SERPL: 66 MG/DL — SIGNIFICANT CHANGE UP
LYMPHOCYTES # BLD AUTO: 27 % — SIGNIFICANT CHANGE UP (ref 13–44)
LYMPHOCYTES # BLD AUTO: 3 K/UL — SIGNIFICANT CHANGE UP (ref 1–3.3)
MCHC RBC-ENTMCNC: 31 PG — SIGNIFICANT CHANGE UP (ref 27–34)
MCHC RBC-ENTMCNC: 32.7 GM/DL — SIGNIFICANT CHANGE UP (ref 32–36)
MCV RBC AUTO: 94.8 FL — SIGNIFICANT CHANGE UP (ref 80–100)
MONOCYTES # BLD AUTO: 1.1 K/UL — HIGH (ref 0–0.9)
MONOCYTES NFR BLD AUTO: 9.9 % — SIGNIFICANT CHANGE UP (ref 2–14)
NEUTROPHILS # BLD AUTO: 6.7 K/UL — SIGNIFICANT CHANGE UP (ref 1.8–7.4)
NEUTROPHILS NFR BLD AUTO: 61.3 % — SIGNIFICANT CHANGE UP (ref 43–77)
PLATELET # BLD AUTO: 147 K/UL — LOW (ref 150–400)
POTASSIUM SERPL-MCNC: 3.6 MMOL/L — SIGNIFICANT CHANGE UP (ref 3.5–5.3)
POTASSIUM SERPL-SCNC: 3.6 MMOL/L — SIGNIFICANT CHANGE UP (ref 3.5–5.3)
RBC # BLD: 4.99 M/UL — SIGNIFICANT CHANGE UP (ref 4.2–5.8)
RBC # FLD: 13 % — SIGNIFICANT CHANGE UP (ref 10.3–14.5)
SODIUM SERPL-SCNC: 136 MMOL/L — SIGNIFICANT CHANGE UP (ref 135–145)
SPECIMEN SOURCE: SIGNIFICANT CHANGE UP
TOTAL CHOLESTEROL/HDL RATIO MEASUREMENT: 2.5 RATIO — LOW (ref 3.4–9.6)
TRIGL SERPL-MCNC: 107 MG/DL — SIGNIFICANT CHANGE UP (ref 10–149)
TSH SERPL-MCNC: 2.77 UU/ML — SIGNIFICANT CHANGE UP (ref 0.34–4.82)
WBC # BLD: 11 K/UL — HIGH (ref 3.8–10.5)
WBC # FLD AUTO: 11 K/UL — HIGH (ref 3.8–10.5)

## 2018-01-02 RX ORDER — DONEPEZIL HYDROCHLORIDE 10 MG/1
10 TABLET, FILM COATED ORAL AT BEDTIME
Qty: 0 | Refills: 0 | Status: DISCONTINUED | OUTPATIENT
Start: 2018-01-02 | End: 2018-01-08

## 2018-01-02 RX ORDER — FAMOTIDINE 10 MG/ML
20 INJECTION INTRAVENOUS
Qty: 0 | Refills: 0 | Status: DISCONTINUED | OUTPATIENT
Start: 2018-01-02 | End: 2018-01-08

## 2018-01-02 RX ADMIN — Medication 4 MILLILITER(S): at 02:44

## 2018-01-02 RX ADMIN — TAMSULOSIN HYDROCHLORIDE 0.8 MILLIGRAM(S): 0.4 CAPSULE ORAL at 21:46

## 2018-01-02 RX ADMIN — DONEPEZIL HYDROCHLORIDE 10 MILLIGRAM(S): 10 TABLET, FILM COATED ORAL at 21:46

## 2018-01-02 RX ADMIN — Medication 650 MILLIGRAM(S): at 03:28

## 2018-01-02 RX ADMIN — Medication 1000 UNIT(S): at 13:08

## 2018-01-02 RX ADMIN — ATORVASTATIN CALCIUM 10 MILLIGRAM(S): 80 TABLET, FILM COATED ORAL at 21:46

## 2018-01-02 RX ADMIN — Medication 4 MILLILITER(S): at 20:49

## 2018-01-02 RX ADMIN — Medication 100 MILLIGRAM(S): at 17:36

## 2018-01-02 RX ADMIN — DABIGATRAN ETEXILATE MESYLATE 150 MILLIGRAM(S): 150 CAPSULE ORAL at 17:36

## 2018-01-02 RX ADMIN — PANTOPRAZOLE SODIUM 40 MILLIGRAM(S): 20 TABLET, DELAYED RELEASE ORAL at 06:11

## 2018-01-02 RX ADMIN — Medication 100 MILLIGRAM(S): at 06:11

## 2018-01-02 RX ADMIN — Medication 4 MILLILITER(S): at 14:32

## 2018-01-02 RX ADMIN — Medication 3 MILLILITER(S): at 20:47

## 2018-01-02 RX ADMIN — DABIGATRAN ETEXILATE MESYLATE 150 MILLIGRAM(S): 150 CAPSULE ORAL at 06:11

## 2018-01-02 RX ADMIN — PREGABALIN 100 MICROGRAM(S): 225 CAPSULE ORAL at 13:08

## 2018-01-02 RX ADMIN — Medication 3 MILLILITER(S): at 02:42

## 2018-01-02 RX ADMIN — Medication 3 MILLILITER(S): at 08:28

## 2018-01-02 RX ADMIN — Medication 3 MILLILITER(S): at 14:30

## 2018-01-02 RX ADMIN — Medication 4 MILLILITER(S): at 08:27

## 2018-01-02 RX ADMIN — FAMOTIDINE 20 MILLIGRAM(S): 10 INJECTION INTRAVENOUS at 17:36

## 2018-01-02 RX ADMIN — Medication 81 MILLIGRAM(S): at 12:32

## 2018-01-02 NOTE — PHYSICAL THERAPY INITIAL EVALUATION ADULT - CRITERIA FOR SKILLED THERAPEUTIC INTERVENTIONS
rehab potential/impairments found/predicted duration of therapy intervention/anticipated discharge recommendation/therapy frequency

## 2018-01-02 NOTE — PROGRESS NOTE ADULT - PROBLEM SELECTOR PLAN 1
S/p mechanical fall with trauma to lumbar spine  Plain film of L-spine negative for acute pathology  Continue with tylenol PRN for pain  PT evaluation S/p mechanical fall with trauma to lumbar spine  Plain film of L-spine negative for acute pathology  Continue with tylenol PRN for pain  PT recommends home PT

## 2018-01-02 NOTE — PROGRESS NOTE ADULT - ASSESSMENT
77M PMHx A Fib (Pradaxa), COPD, GERD, HLD, Pulmonary Fibrosis, BRIEN (CPAP QHS), Chronic B/L Lymphedema, Dementia, Urinary incontinence with current UTIs admitted for COPD exacerbation and fall

## 2018-01-02 NOTE — PROGRESS NOTE ADULT - PROBLEM SELECTOR PLAN 8
IMPROVE VTE score = 2 for age and immobilization  Pradaxa for DVT chemoprophylaxis  Protonix and famotidine for GI prophylaxis

## 2018-01-02 NOTE — PROGRESS NOTE ADULT - SUBJECTIVE AND OBJECTIVE BOX
_________________________________________________________________________________________  ========>>  M E D I C A L   A T T E N D I N G    F O L L O W  U P  N O T E  <<=========  -----------------------------------------------------------------------------------------------------    - Patient seen and examined by me approximately thirty minutes ago.  - In summary, patient is a 77y year old man who originally presented with fall, cough, SOB  - Patient today overall doing ok, comfortable, eating OK.     ==================>> REVIEW OF SYSTEM <<=================    GEN: no fever, no chills, no pain  RESP: some occasional SOB and cough with occasional sputum  CVS: no chest pain, no palpitations, + chronic edema  GI: no abdominal pain, no nausea, no constipation, no diarrhea  : no dysuria, no frequency, no hematuria  Neuro: no headache, no dizziness  Derm : no itching, no rash    ==================>> PHYSICAL EXAM <<=================    GEN: A&O X 2 , NAD , comfortable, a bit forgetful at times  HEENT: NCAT, PERRL, MMM, hearing intact  Neck: supple , no JVD  CVS: S1S2 , regular , No M/R/G appreciated  PULM: CTA B/L,  no W/R/R appreciated  ABD.: soft. non tender, non distended,  bowel sounds present  Extrem: intact pulses , + b/l mild nonpitting edema   PSYCH : normal mood,  not anxious      ==================>> MEDICATIONS <<====================    MEDICATIONS  (STANDING):  acetylcysteine 10% Inhalation 4 milliLiter(s) Inhalation four times a day  ALBUTerol/ipratropium for Nebulization 3 milliLiter(s) Nebulizer every 6 hours  aspirin enteric coated 81 milliGRAM(s) Oral daily  atorvastatin 10 milliGRAM(s) Oral at bedtime  cholecalciferol 1000 Unit(s) Oral daily  cyanocobalamin 100 MICROGram(s) Oral daily  dabigatran 150 milliGRAM(s) Oral every 12 hours  docusate sodium 100 milliGRAM(s) Oral two times a day  donepezil 10 milliGRAM(s) Oral at bedtime  famotidine    Tablet 20 milliGRAM(s) Oral two times a day  pantoprazole    Tablet 40 milliGRAM(s) Oral before breakfast  tamsulosin 0.8 milliGRAM(s) Oral at bedtime    MEDICATIONS  (PRN):  acetaminophen   Tablet 650 milliGRAM(s) Oral every 6 hours PRN pain    ==================>> VITAL SIGNS <<==================    T(C): 36.7 (18 @ 15:46), Max: 36.8 (18 @ 23:30)  HR: 90 (18 @ 15:46) (62 - 90)  BP: 101/53 (18 @ 15:46) (95/73 - 133/68)  RR: 17 (18 @ 15:46) (17 - 18)  SpO2: 95% (18 @ 15:46) (93% - 98%)     ==================>> LAB AND IMAGING <<==================                        15.5   11.0  )-----------( 147      ( 2018 08:13 )             47.3            136  |  100  |  20<H>  ----------------------------<  127<H>  3.6   |  27  |  0.89    Ca    8.7      2018 08:13    TPro  7.7  /  Alb  3.0<L>  /  TBili  0.7  /  DBili  x   /  AST  21  /  ALT  26  /  AlkPhos  97      PT/INR - ( 2018 03:42 )   PT: 13.1 sec;   INR: 1.20 ratio    PTT - ( 2018 03:42 )  PTT:36.8 sec              CARDIAC MARKERS ( 2018 03:42 )  0.019 ng/mL / x     / x     / x     / x        Urinalysis Basic - ( 2018 07:07 )  Color: Yellow / Appearance: Clear / S.010 / pH: x  Gluc: x / Ketone: Negative  / Bili: Negative / Urobili: Negative   Blood: x / Protein: Negative / Nitrite: Negative   Leuk Esterase: Negative / RBC: x / WBC x   Sq Epi: x / Non Sq Epi: x / Bacteria: x    TSH:      2.77   (18)           Lipid profile:  (18)     Total: 145     LDL  : 66     HDL  :58     TG   :107     HgA1C: 5.7  (18)            < from: CT Chest No Cont (18 @ 11:31) >  IMPRESSION: No acute infiltrate. Stable mild pulmonary fibrosis and   stable small left pleural effusion.  < end of copied text >    ___________________________________________________________________________________  ===============>>  A S S E S S M E N T   A N D   P L A N <<===============  ------------------------------------------------------------------------------------------    · Assessment		  76 y/o Male, ambulatory with a cane and a walker, with h/o A. fib on Pradaxa, COPD, GERD, HLD, abdominal hernia, pulmonary fibrosis, RBBB, BRIEN on CPAP at night, chronic b/l LE lymphedema, mild dementia, urinary incontinence, and recurrent UTIs came in after a fall. Patient is confused, most of the history is by wife. Per wife, she has been noticing that the patient was feeling weak in the past couple of days. Last night around 1am, when he was getting out of the bathroom, he feel. His back hit the tub. Denies any head trauma, or LOC. Denies near syncope or dizziness. After the fall patient started complaining of lower back pain so he was bought to the ER. Wife also complaints that she heard him wheezing overnight.    Problem/Plan - 1:  ·  Problem: Fall.  Plan: Per wife, likely mechanical fall  - XR lumbar spine- negative for acute changes or fracture  - will check orthostatics  - PT eval  - Tylenol PRN pain.     Problem/Plan - 2:  ·  Problem: COPD exacerbation.  : Likely 2/2 to viral bronchitis  RVP- positive for corona virus  - start duonebs  -  Will hold off to abx, CT chest shows no acute infilitrate  -pulm eval for follow up given pulm fibrosis and effusion   - ECHO done in Aug 2017 was inconclusive of LV function  - BNP wnl for age  - Patient has chronic venous insufficiency hence b/l LE edema  - No rales heard on Lung exam  - doubt CHF, however will repeat ECHO to confirm    Problem/Plan - 3:  ·  Problem: Atrial fibrillation.  Plan: Rate controlled  C/w Pradaxa 150mg BID.     Problem/Plan - 4:  Problem: GERD    c/w omeprazole and ranitidine.    Problem/Plan - 5:  ·  Problem: BRIEN   c/w nocturnal BiPAP.   pulm f/u    -GI/DVT Prophylaxis.    --------------------------------------------  Case discussed with pt and family, HS  Education given on plan of care, supportive care  ___________________________  H. JAELYN Hunter.  Pager: [phone]

## 2018-01-02 NOTE — PROGRESS NOTE ADULT - PROBLEM SELECTOR PLAN 6
Continue with protonix for therapuetic interchange of home dose of omeprazole, and famotidine for ranitidine Continue with protonix for therapeutic interchange of home dose of omeprazole, and famotidine for ranitidine

## 2018-01-02 NOTE — PROGRESS NOTE ADULT - SUBJECTIVE AND OBJECTIVE BOX
PGY 1 Note discussed with supervising resident and primary attending    Patient is a 77y old  Male who presents with a chief complaint of Fall (2018 18:43)      INTERVAL HPI/OVERNIGHT EVENTS: patient examined at bedside. He/She has no complaints and current symptoms are resolving    Overnight events on telemetry monitoring []    MEDICATIONS  (STANDING):  acetylcysteine 10% Inhalation 4 milliLiter(s) Inhalation four times a day  ALBUTerol/ipratropium for Nebulization 3 milliLiter(s) Nebulizer every 6 hours  aspirin enteric coated 81 milliGRAM(s) Oral daily  atorvastatin 10 milliGRAM(s) Oral at bedtime  cholecalciferol 1000 Unit(s) Oral daily  cyanocobalamin 100 MICROGram(s) Oral daily  dabigatran 150 milliGRAM(s) Oral every 12 hours  docusate sodium 100 milliGRAM(s) Oral two times a day  famotidine    Tablet 20 milliGRAM(s) Oral daily  pantoprazole    Tablet 40 milliGRAM(s) Oral before breakfast  tamsulosin 0.8 milliGRAM(s) Oral at bedtime    MEDICATIONS  (PRN):  acetaminophen   Tablet 650 milliGRAM(s) Oral every 6 hours PRN pain    _______________________________________________  REVIEW OF SYSTEMS:  CONSTITUTIONAL: No fever  NECK: No pain or stiffness  RESPIRATORY: No cough; No shortness of breath  CARDIOVASCULAR: No chest pain, no palpitations  GASTROINTESTINAL: No pain. No constipation, nausea or vomiting; No diarrhea   NEUROLOGICAL: No headache or numbness, no tremors  MUSCULOSKELETAL: No joint pain, no muscle pain  GENITOURINARY: no dysuria, no frequency, no hesitancy  PSYCHIATRY: no depression , no anxiety    Vital Signs Last 24 Hrs  T(C): 36.4 (2018 05:17), Max: 37 (2018 07:32)  T(F): 97.6 (2018 05:17), Max: 98.6 (2018 07:32)  HR: 85 (2018 05:17) (77 - 97)  BP: 95/73 (2018 05:17) (95/73 - 152/80)  BP(mean): --  RR: 18 (2018 05:17) (14 - 18)  SpO2: 97% (2018 05:17) (94% - 98%)  ________________________________________________  PHYSICAL EXAM:  GENERAL: NAD, lying in bed  HEENT: Normocephalic;  conjunctivae and sclerae clear; moist mucous membranes  NECK : supple, trachea midline, no JVD  CHEST/LUNG: Clear to auscultation bilaterally with good air entry   HEART: S1 S2,  regular rate and rhythm,; no murmurs  ABDOMEN: Soft, Nontender, Nondistended; Bowel sounds present  EXTREMITIES: no cyanosis; no edema; no calf tenderness  SKIN: no rash  NERVOUS SYSTEM:  AAOx3; no new focal deficits  _________________________________________________  LABS:                        14.3   14.6  )-----------( 152      ( 2018 03:42 )             45.3     01-01    135  |  100  |  14  ----------------------------<  119<H>  3.8   |  26  |  0.86    Ca    8.0<L>      2018 03:42    TPro  7.7  /  Alb  3.0<L>  /  TBili  0.7  /  DBili  x   /  AST  21  /  ALT  26  /  AlkPhos  97  -    PT/INR - ( 2018 03:42 )   PT: 13.1 sec;   INR: 1.20 ratio         PTT - ( 2018 03:42 )  PTT:36.8 sec  Urinalysis Basic - ( 2018 07:07 )    Color: Yellow / Appearance: Clear / S.010 / pH: x  Gluc: x / Ketone: Negative  / Bili: Negative / Urobili: Negative   Blood: x / Protein: Negative / Nitrite: Negative   Leuk Esterase: Negative / RBC: x / WBC x   Sq Epi: x / Non Sq Epi: x / Bacteria: x      CAPILLARY BLOOD GLUCOSE        RADIOLOGY & ADDITIONAL TESTS:    Consultant(s) Notes Reviewed:   YES    Care Discussed with Consultants:   Infectious Disease [] Endocrinology [] Neurology [] ENT [] Cardiology [x] Electrophysiology [] Pulmonology [x] Gastroenterology [] Nephrology [] Urology [] Orthopaedics [] Vascular Surgery [] Thoracic Surgery [] Plastic Surgery [] General Surgery [] Podiatry [] Psychiatry [] Hematology/Oncology [] Pain [] Palliative Care []    Plan of care was discussed with patient and/or primary care giver; all questions and concerns were addressed and care was aligned with patient's wishes. PGY 1 Note discussed with supervising resident and primary attending    Patient is a 77y old  Male who presents with a chief complaint of Fall (2018 18:43)      INTERVAL HPI/OVERNIGHT EVENTS: patient examined at bedside. Patient reports mild cough, however, no symptoms of dyspnea. He has no other complaints.     Overnight events on telemetry monitoring [x] A Fib, PVCs    MEDICATIONS  (STANDING):  acetylcysteine 10% Inhalation 4 milliLiter(s) Inhalation four times a day  ALBUTerol/ipratropium for Nebulization 3 milliLiter(s) Nebulizer every 6 hours  aspirin enteric coated 81 milliGRAM(s) Oral daily  atorvastatin 10 milliGRAM(s) Oral at bedtime  cholecalciferol 1000 Unit(s) Oral daily  cyanocobalamin 100 MICROGram(s) Oral daily  dabigatran 150 milliGRAM(s) Oral every 12 hours  docusate sodium 100 milliGRAM(s) Oral two times a day  famotidine    Tablet 20 milliGRAM(s) Oral daily  pantoprazole    Tablet 40 milliGRAM(s) Oral before breakfast  tamsulosin 0.8 milliGRAM(s) Oral at bedtime    MEDICATIONS  (PRN):  acetaminophen   Tablet 650 milliGRAM(s) Oral every 6 hours PRN pain    _______________________________________________  REVIEW OF SYSTEMS:  CONSTITUTIONAL: No fever  RESPIRATORY: Reports cough; No shortness of breath  CARDIOVASCULAR: No chest pain, no palpitations  GASTROINTESTINAL: No pain. No constipation, nausea or vomiting; No diarrhea   NEUROLOGICAL: No headache or numbness, no tremors  MUSCULOSKELETAL: Reports back pain    Vital Signs Last 24 Hrs  T(C): 36.4 (2018 05:17), Max: 37 (2018 07:32)  T(F): 97.6 (2018 05:17), Max: 98.6 (2018 07:32)  HR: 85 (2018 05:17) (77 - 97)  BP: 95/73 (2018 05:17) (95/73 - 152/80)  BP(mean): --  RR: 18 (2018 05:17) (14 - 18)  SpO2: 97% (2018 05:17) (94% - 98%)  ________________________________________________  PHYSICAL EXAM:  GENERAL: NAD, lying in bed  CHEST/LUNG: Clear to auscultation bilaterally with good air entry   HEART: S1 S2,  regular rate and rhythm,; no murmurs  ABDOMEN: Soft, Nontender, Nondistended; Bowel sounds decreased  EXTREMITIES: no cyanosis,; 1+ bilateral non-pitting edema; no calf tenderness.   NERVOUS SYSTEM:  AAOx3; no new focal deficits  _________________________________________________  LABS:                        14.3   14.6  )-----------( 152      ( 2018 03:42 )             45.3         135  |  100  |  14  ----------------------------<  119<H>  3.8   |  26  |  0.86    Ca    8.0<L>      2018 03:42    TPro  7.7  /  Alb  3.0<L>  /  TBili  0.7  /  DBili  x   /  AST  21  /  ALT  26  /  AlkPhos  97      PT/INR - ( 2018 03:42 )   PT: 13.1 sec;   INR: 1.20 ratio         PTT - ( 2018 03:42 )  PTT:36.8 sec  Urinalysis Basic - ( 2018 07:07 )    Color: Yellow / Appearance: Clear / S.010 / pH: x  Gluc: x / Ketone: Negative  / Bili: Negative / Urobili: Negative   Blood: x / Protein: Negative / Nitrite: Negative   Leuk Esterase: Negative / RBC: x / WBC x   Sq Epi: x / Non Sq Epi: x / Bacteria: x      CAPILLARY BLOOD GLUCOSE        RADIOLOGY & ADDITIONAL TESTS:    Consultant(s) Notes Reviewed:   YES    Care Discussed with Consultants:   Infectious Disease [] Endocrinology [] Neurology [] ENT [] Cardiology [x] Electrophysiology [] Pulmonology [x] Gastroenterology [] Nephrology [] Urology [] Orthopaedics [] Vascular Surgery [] Thoracic Surgery [] Plastic Surgery [] General Surgery [] Podiatry [] Psychiatry [] Hematology/Oncology [] Pain [] Palliative Care []    Plan of care was discussed with patient and/or primary care giver; all questions and concerns were addressed and care was aligned with patient's wishes.

## 2018-01-03 RX ADMIN — Medication 1000 UNIT(S): at 12:11

## 2018-01-03 RX ADMIN — TAMSULOSIN HYDROCHLORIDE 0.8 MILLIGRAM(S): 0.4 CAPSULE ORAL at 23:05

## 2018-01-03 RX ADMIN — DABIGATRAN ETEXILATE MESYLATE 150 MILLIGRAM(S): 150 CAPSULE ORAL at 17:20

## 2018-01-03 RX ADMIN — Medication 100 MILLIGRAM(S): at 06:38

## 2018-01-03 RX ADMIN — Medication 3 MILLILITER(S): at 22:18

## 2018-01-03 RX ADMIN — DONEPEZIL HYDROCHLORIDE 10 MILLIGRAM(S): 10 TABLET, FILM COATED ORAL at 23:06

## 2018-01-03 RX ADMIN — DABIGATRAN ETEXILATE MESYLATE 150 MILLIGRAM(S): 150 CAPSULE ORAL at 06:38

## 2018-01-03 RX ADMIN — Medication 100 MILLIGRAM(S): at 17:21

## 2018-01-03 RX ADMIN — FAMOTIDINE 20 MILLIGRAM(S): 10 INJECTION INTRAVENOUS at 06:38

## 2018-01-03 RX ADMIN — Medication 3 MILLILITER(S): at 02:32

## 2018-01-03 RX ADMIN — Medication 650 MILLIGRAM(S): at 20:36

## 2018-01-03 RX ADMIN — Medication 4 MILLILITER(S): at 02:32

## 2018-01-03 RX ADMIN — Medication 81 MILLIGRAM(S): at 12:11

## 2018-01-03 RX ADMIN — Medication 4 MILLILITER(S): at 08:59

## 2018-01-03 RX ADMIN — Medication 100 MILLIGRAM(S): at 23:05

## 2018-01-03 RX ADMIN — PREGABALIN 100 MICROGRAM(S): 225 CAPSULE ORAL at 12:58

## 2018-01-03 RX ADMIN — PANTOPRAZOLE SODIUM 40 MILLIGRAM(S): 20 TABLET, DELAYED RELEASE ORAL at 06:38

## 2018-01-03 RX ADMIN — FAMOTIDINE 20 MILLIGRAM(S): 10 INJECTION INTRAVENOUS at 17:20

## 2018-01-03 RX ADMIN — Medication 3 MILLILITER(S): at 08:58

## 2018-01-03 NOTE — PROGRESS NOTE ADULT - SUBJECTIVE AND OBJECTIVE BOX
_________________________________________________________________________________________  ========>>  M E D I C A L   A T T E N D I N G    F O L L O W  U P  N O T E  <<=========  -----------------------------------------------------------------------------------------------------    - Patient seen and examined by me approximately thirty minutes ago.  - In summary, patient is a 77y year old man who originally presented with fall, cough, SOB  - Patient today overall doing fairly, comfortable, eating poorly today, per wife, not sleeping well and more disoriented today..    ==================>> REVIEW OF SYSTEM <<=================    GEN: no fever, no chills, + occasional pain in back  RESP: some occasional SOB and cough with occasional sputum  CVS: no chest pain, no palpitations, + chronic edema  GI: no abdominal pain, no nausea, no constipation, no diarrhea  : no dysuria, no frequency, no hematuria  Neuro: no headache, no dizziness  Derm : no itching, no rash    ==================>> PHYSICAL EXAM <<=================    GEN: A&O X 2 , NAD , comfortable, a bit forgetful at times  HEENT: NCAT, PERRL, MMM, hearing intact  Neck: supple , no JVD  CVS: S1S2 , regular , No M/R/G appreciated  PULM: CTA B/L,  no W/R/R appreciated  ABD.: soft. non tender, non distended,  bowel sounds present, obese  Extrem: intact pulses , + b/l mild nonpitting edema   PSYCH : normal mood,  not anxious       ==================>> MEDICATIONS <<====================    ALBUTerol/ipratropium for Nebulization 3 milliLiter(s) Nebulizer every 6 hours  aspirin enteric coated 81 milliGRAM(s) Oral daily  benzonatate 100 milliGRAM(s) Oral every 8 hours  cholecalciferol 1000 Unit(s) Oral daily  cyanocobalamin 100 MICROGram(s) Oral daily  dabigatran 150 milliGRAM(s) Oral every 12 hours  docusate sodium 100 milliGRAM(s) Oral two times a day  donepezil 10 milliGRAM(s) Oral at bedtime  famotidine    Tablet 20 milliGRAM(s) Oral two times a day  pantoprazole    Tablet 40 milliGRAM(s) Oral before breakfast  tamsulosin 0.8 milliGRAM(s) Oral at bedtime    MEDICATIONS  (PRN):  acetaminophen   Tablet 650 milliGRAM(s) Oral every 6 hours PRN pain    ==================>> VITAL SIGNS <<==================    Vital Signs Last 24 Hrs  T(C): 36.8 (01-03-18 @ 11:30)  T(F): 98.3 (01-03-18 @ 11:30), Max: 98.5 (01-03-18 @ 07:29)  HR: 93 (01-03-18 @ 11:30) (79 - 93)  BP: 124/75 (01-03-18 @ 11:30)  BP(mean): --  RR: 18 (01-03-18 @ 11:30) (17 - 19)  SpO2: 96% (01-03-18 @ 11:30) (94% - 100%)       ==================>> LAB AND IMAGING <<==================                        15.5   11.0  )-----------( 147      ( 02 Jan 2018 08:13 )             47.3        01-02    136  |  100  |  20<H>  ----------------------------<  127<H>  3.6   |  27  |  0.89    Ca    8.7      02 Jan 2018 08:13      < from: CT Chest No Cont (01.01.18 @ 11:31) >  IMPRESSION: No acute infiltrate. Stable mild pulmonary fibrosis and   stable small left pleural effusion.  < end of copied text >    ___________________________________________________________________________________  ===============>>  A S S E S S M E N T   A N D   P L A N <<===============  ------------------------------------------------------------------------------------------    · Assessment		  78 y/o Male, ambulatory with a cane and a walker, with h/o A. fib on Pradaxa, COPD, GERD, HLD, abdominal hernia, pulmonary fibrosis, RBBB, BRIEN on CPAP at night, chronic b/l LE lymphedema, mild dementia, urinary incontinence, and recurrent UTIs came in after a fall. Patient is confused, most of the history is by wife. Per wife, she has been noticing that the patient was feeling weak in the past couple of days. Last night around 1am, when he was getting out of the bathroom, he feel. His back hit the tub. Denies any head trauma, or LOC. Denies near syncope or dizziness. After the fall patient started complaining of lower back pain so he was bought to the ER. Wife also complaints that she heard him wheezing overnight.    Problem/Plan - 1:  ·  Problem: Fall.  Plan: Per wife, likely mechanical fall  - XR lumbar spine- negative for acute changes or fracture  - will check orthostatics, pending  - PT eval noted: for home pt  - Tylenol PRN pain.     Problem/Plan - 2:  ·  Problem: COPD exacerbation.  : Likely 2/2 to viral bronchitis  RVP- positive for corona virus  - start duonebs  -  Will hold off to abx, CT chest shows no acute infilitrate  -pulm eval for follow up given pulm fibrosis and effusion noted  - ECHO done in Aug 2017 was inconclusive of LV function  - BNP wnl for age  - Patient has chronic venous insufficiency hence b/l LE edema  - No rales heard on Lung exam  - doubt CHF, however will repeat ECHO to confirm    Problem/Plan - 3:  ·  Problem: Atrial fibrillation.  Plan: Rate controlled  C/w Pradaxa 150mg BID.     Problem/Plan - 4:  Problem: GERD    c/w omeprazole and ranitidine.    Problem/Plan - 5:  ·  Problem: BRIEN   c/w nocturnal BiPAP.   pulm f/u    -GI/DVT Prophylaxis.  - hopefully DC planing soon to home?  --------------------------------------------  Case discussed with pt and family, HS  Education given on plan of care, supportive care  ___________________________  H. JAELYN Hunter.  Pager: [phone]

## 2018-01-03 NOTE — CONSULT NOTE ADULT - SUBJECTIVE AND OBJECTIVE BOX
PULMONARY CONSULT NOTE      SATINDER HARPER  MRN-[id number]    Patient is a 77y old  Male who presents with a chief complaint of Fall (01 Jan 2018 18:43)  History of Present Illness:  Chief Complaint/Reason for Admission: Fall	  History of Present Illness: 	  76 y/o Male, ambulatory with a cane and a walker, with h/o A. fib on Pradaxa, COPD, GERD, HLD, abdominal hernia, pulmonary fibrosis, RBBB, BRIEN on CPAP at night, chronic b/l LE lymphedema, mild dementia, urinary incontinence, and recurrent UTIs came in after a fall.   Patient is confused, most of the history is by wife. Per wife, she has been noticing that the patient was feeling weak in the past couple of days. Last night around 1am, when he was getting out of the bathroom, he feel. His back hit the tub. Denies any head trauma, or LOC. Denies near syncope or dizziness. After the fall patient started complaining of lower back pain so he was bought to the ER.   Wife also complaints that she heard him wheezing overnight. Denies any cough. No other complaints at this time.  in Ed patient was placed on Bipap, removed this morning onto nasal canula      HISTORY OF PRESENT ILLNESS:As above. Denies smoking but was exposed to [employer] [place] fumes after 9/11.    MEDICATIONS  (STANDING):  acetylcysteine 10% Inhalation 4 milliLiter(s) Inhalation four times a day  ALBUTerol/ipratropium for Nebulization 3 milliLiter(s) Nebulizer every 6 hours  aspirin enteric coated 81 milliGRAM(s) Oral daily  cholecalciferol 1000 Unit(s) Oral daily  cyanocobalamin 100 MICROGram(s) Oral daily  dabigatran 150 milliGRAM(s) Oral every 12 hours  docusate sodium 100 milliGRAM(s) Oral two times a day  donepezil 10 milliGRAM(s) Oral at bedtime  famotidine    Tablet 20 milliGRAM(s) Oral two times a day  pantoprazole    Tablet 40 milliGRAM(s) Oral before breakfast  tamsulosin 0.8 milliGRAM(s) Oral at bedtime      MEDICATIONS  (PRN):  acetaminophen   Tablet 650 milliGRAM(s) Oral every 6 hours PRN pain      Allergies    No Known Drug Allergies  zosyn (Drowsiness)    Intolerances        PAST MEDICAL & SURGICAL HISTORY:  COPD (chronic obstructive pulmonary disease)  Bundle branch block, right  Pulmonary fibrosis  Dementia  Atrial fibrillation  GERD (gastroesophageal reflux disease)  Recurrent UTI  Hyperlipidemia  Obstructive sleep apnea on CPAP  H/O small bowel obstruction  History of appendectomy      FAMILY HISTORY:  Family history of colon cancer (Sibling)  Family history of myocardial infarction (Sibling)      SOCIAL HISTORY  Smoking History:     REVIEW OF SYSTEMS:    CONSTITUTIONAL:  No fevers, chills, sweats    HEENT:  Eyes:  No diplopia or blurred vision. ENT:  No earache, sore throat or runny nose.    CARDIOVASCULAR:  No pressure, squeezing, tightness, or heaviness about the chest; no palpitations.    RESPIRATORY:  Per HPI    GASTROINTESTINAL:  No abdominal pain, nausea, vomiting or diarrhea.    GENITOURINARY:  No dysuria, frequency or urgency.    NEUROLOGIC:  No paresthesias, fasciculations, seizures or weakness.    PSYCHIATRIC:  No disorder of thought or mood.    Vital Signs Last 24 Hrs  T(C): 36.8 (03 Jan 2018 11:30), Max: 36.9 (03 Jan 2018 07:29)  T(F): 98.3 (03 Jan 2018 11:30), Max: 98.5 (03 Jan 2018 07:29)  HR: 93 (03 Jan 2018 11:30) (79 - 93)  BP: 124/75 (03 Jan 2018 11:30) (101/53 - 152/73)  BP(mean): --  RR: 18 (03 Jan 2018 11:30) (17 - 19)  SpO2: 96% (03 Jan 2018 11:30) (94% - 100%)  I&O's Detail    02 Jan 2018 07:01  -  03 Jan 2018 07:00  --------------------------------------------------------  IN:    Oral Fluid: 100 mL  Total IN: 100 mL    OUT:  Total OUT: 0 mL    Total NET: 100 mL      03 Jan 2018 07:01  -  03 Jan 2018 13:55  --------------------------------------------------------  IN:    Oral Fluid: 236 mL  Total IN: 236 mL    OUT:  Total OUT: 0 mL    Total NET: 236 mL          PHYSICAL EXAMINATION:    GENERAL: The patient is a well-developed, well-nourished _____in no apparent distress.     HEENT: Head is normocephalic and atraumatic. Extraocular muscles are intact. Mucous membranes are moist.     NECK: Supple.     LUNGS: Crakles on bases post    HEART: Regular rate and rhythm without murmur.    ABDOMEN: Soft, nontender, and nondistended.  No hepatosplenomegaly is noted.    EXTREMITIES: Bilateral leg edema    NEUROLOGIC: Grossly intact.      LABS:                        15.5   11.0  )-----------( 147      ( 02 Jan 2018 08:13 )             47.3     01-02    136  |  100  |  20<H>  ----------------------------<  127<H>  3.6   |  27  |  0.89    Ca    8.7      02 Jan 2018 08:13                  Serum Pro-Brain Natriuretic Peptide: 1249 pg/mL (01-01-18 @ 03:42)          MICROBIOLOGY:    RADIOLOGY & ADDITIONAL STUDIES:    CXR:  < from: Xray Chest 1 View AP-PORTABLE IMMEDIATE (01.01.18 @ 05:58) >  IMPRESSION: New basilar effusions on top of chronic lung densities at the   lung bases.    < end of copied text >    Ct scan chest:  < from: CT Chest No Cont (01.01.18 @ 11:31) >    IMPRESSION: No acute infiltrate. Stable mild pulmonary fibrosis and   stable small left pleural effusion.      < end of copied text >    ekg;    echo:< from: TTE with Doppler (w/Cont) (08.08.17 @ 15:11) >  CONCLUSIONS:  1. Calcified trileaflet aortic valve with normal opening.  2. Endocardium not well visualized; particularly the basal  segments. Unable to evaluate left ventricular function.  Consider repeat limited study with echocontrast (Definity)  for further LV function assessment.  3. The right ventricle is not well visualized.  4. Agitated saline injection is inconclusive regarding the  pressence of an intracardiac shunt.    < end of copied text >

## 2018-01-03 NOTE — PROGRESS NOTE ADULT - PROBLEM SELECTOR PLAN 6
Continue with protonix for therapeutic interchange of home dose of omeprazole, and famotidine for ranitidine

## 2018-01-03 NOTE — PROGRESS NOTE ADULT - PROBLEM SELECTOR PLAN 1
S/p mechanical fall with trauma to lumbar spine  Plain film of L-spine negative for acute pathology  Continue with tylenol PRN for pain  PT recommends home PT

## 2018-01-03 NOTE — PROGRESS NOTE ADULT - SUBJECTIVE AND OBJECTIVE BOX
PGY 1 Note discussed with supervising resident and primary attending    Patient is a 77y old  Male who presents with a chief complaint of Fall (2018 18:43)      INTERVAL HPI/OVERNIGHT EVENTS: patient examined at bedside. He/She has no complaints and current symptoms are resolving    Overnight events on telemetry monitoring []    MEDICATIONS  (STANDING):  acetylcysteine 10% Inhalation 4 milliLiter(s) Inhalation four times a day  ALBUTerol/ipratropium for Nebulization 3 milliLiter(s) Nebulizer every 6 hours  aspirin enteric coated 81 milliGRAM(s) Oral daily  atorvastatin 10 milliGRAM(s) Oral at bedtime  cholecalciferol 1000 Unit(s) Oral daily  cyanocobalamin 100 MICROGram(s) Oral daily  dabigatran 150 milliGRAM(s) Oral every 12 hours  docusate sodium 100 milliGRAM(s) Oral two times a day  donepezil 10 milliGRAM(s) Oral at bedtime  famotidine    Tablet 20 milliGRAM(s) Oral two times a day  pantoprazole    Tablet 40 milliGRAM(s) Oral before breakfast  tamsulosin 0.8 milliGRAM(s) Oral at bedtime    MEDICATIONS  (PRN):  acetaminophen   Tablet 650 milliGRAM(s) Oral every 6 hours PRN pain    _______________________________________________  REVIEW OF SYSTEMS:  CONSTITUTIONAL: No fever  NECK: No pain or stiffness  RESPIRATORY: No cough; No shortness of breath  CARDIOVASCULAR: No chest pain, no palpitations  GASTROINTESTINAL: No pain. No constipation, nausea or vomiting; No diarrhea   NEUROLOGICAL: No headache or numbness, no tremors  MUSCULOSKELETAL: No joint pain, no muscle pain  GENITOURINARY: no dysuria, no frequency, no hesitancy  PSYCHIATRY: no depression , no anxiety    Vital Signs Last 24 Hrs  T(C): 36.8 (2018 05:15), Max: 36.8 (2018 19:35)  T(F): 98.3 (2018 05:15), Max: 98.3 (2018 23:30)  HR: 79 (2018 05:15) (62 - 90)  BP: 106/65 (2018 05:15) (101/53 - 152/73)  BP(mean): --  RR: 18 (2018 05:15) (17 - 19)  SpO2: 98% (2018 05:15) (93% - 100%)  ________________________________________________  PHYSICAL EXAM:  GENERAL: NAD, lying in bed  HEENT: Normocephalic;  conjunctivae and sclerae clear; moist mucous membranes  NECK : supple, trachea midline, no JVD  CHEST/LUNG: Clear to auscultation bilaterally with good air entry   HEART: S1 S2,  regular rate and rhythm,; no murmurs  ABDOMEN: Soft, Nontender, Nondistended; Bowel sounds present  EXTREMITIES: no cyanosis; no edema; no calf tenderness  SKIN: no rash  NERVOUS SYSTEM:  AAOx3; no new focal deficits  _________________________________________________  LABS:                        15.5   11.0  )-----------( 147      ( 2018 08:13 )             47.3     01-02    136  |  100  |  20<H>  ----------------------------<  127<H>  3.6   |  27  |  0.89    Ca    8.7      2018 08:13        Urinalysis Basic - ( 2018 07:07 )    Color: Yellow / Appearance: Clear / S.010 / pH: x  Gluc: x / Ketone: Negative  / Bili: Negative / Urobili: Negative   Blood: x / Protein: Negative / Nitrite: Negative   Leuk Esterase: Negative / RBC: x / WBC x   Sq Epi: x / Non Sq Epi: x / Bacteria: x      CAPILLARY BLOOD GLUCOSE        RADIOLOGY & ADDITIONAL TESTS:    Consultant(s) Notes Reviewed:   YES    Care Discussed with Consultants:   Infectious Disease [] Endocrinology [] Neurology [] ENT [] Cardiology [] Electrophysiology [] Pulmonology [x] Gastroenterology [] Nephrology [] Urology [] Orthopaedics [] Vascular Surgery [] Thoracic Surgery [] Plastic Surgery [] General Surgery [] Podiatry [] Psychiatry [] Hematology/Oncology [] Pain [] Palliative Care []    Plan of care was discussed with patient and/or primary care giver; all questions and concerns were addressed and care was aligned with patient's wishes. PGY 1 Note discussed with supervising resident and primary attending    Patient is a 77y old  Male who presents with a chief complaint of Fall (2018 18:43)      INTERVAL HPI/OVERNIGHT EVENTS: patient examined at bedside. Patient reports having difficulty sleeping overnight due to symptoms of lower back pain, cough, dyspnea, and chest pain. He also experienced mild nausea overnight with nausea.     Overnight events on telemetry monitoring [x] PVCs    MEDICATIONS  (STANDING):  acetylcysteine 10% Inhalation 4 milliLiter(s) Inhalation four times a day  ALBUTerol/ipratropium for Nebulization 3 milliLiter(s) Nebulizer every 6 hours  aspirin enteric coated 81 milliGRAM(s) Oral daily  atorvastatin 10 milliGRAM(s) Oral at bedtime  cholecalciferol 1000 Unit(s) Oral daily  cyanocobalamin 100 MICROGram(s) Oral daily  dabigatran 150 milliGRAM(s) Oral every 12 hours  docusate sodium 100 milliGRAM(s) Oral two times a day  donepezil 10 milliGRAM(s) Oral at bedtime  famotidine    Tablet 20 milliGRAM(s) Oral two times a day  pantoprazole    Tablet 40 milliGRAM(s) Oral before breakfast  tamsulosin 0.8 milliGRAM(s) Oral at bedtime    MEDICATIONS  (PRN):  acetaminophen   Tablet 650 milliGRAM(s) Oral every 6 hours PRN pain    _______________________________________________  REVIEW OF SYSTEMS:  CONSTITUTIONAL: No fever  RESPIRATORY: Reports cough; No shortness of breath  CARDIOVASCULAR: Reports chest pain, no palpitations  GASTROINTESTINAL: No pain. No constipation, but reports nausea without vomiting; No diarrhea   NEUROLOGICAL: No headache or numbness, no tremors  MUSCULOSKELETAL: complaining of lower back pain    Vital Signs Last 24 Hrs  T(C): 36.8 (2018 05:15), Max: 36.8 (2018 19:35)  T(F): 98.3 (2018 05:15), Max: 98.3 (2018 23:30)  HR: 79 (2018 05:15) (62 - 90)  BP: 106/65 (2018 05:15) (101/53 - 152/73)  BP(mean): --  RR: 18 (2018 05:15) (17 - 19)  SpO2: 98% (2018 05:15) (93% - 100%)  ________________________________________________  PHYSICAL EXAM:  GENERAL: NAD, lying in bed  CHEST/LUNG: Clear to auscultation bilaterally with good air entry   HEART: S1 S2,  regular rate and rhythm,; no murmurs  ABDOMEN: Soft, Nontender, Nondistended; Bowel sounds present  EXTREMITIES: no cyanosis; non-pitting edema in LEs; no calf tenderness  NERVOUS SYSTEM:  AAOx3; no new focal deficits  _________________________________________________  LABS:                        15.5   11.0  )-----------( 147      ( 2018 08:13 )             47.3     01-02    136  |  100  |  20<H>  ----------------------------<  127<H>  3.6   |  27  |  0.89    Ca    8.7      2018 08:13        Urinalysis Basic - ( 2018 07:07 )    Color: Yellow / Appearance: Clear / S.010 / pH: x  Gluc: x / Ketone: Negative  / Bili: Negative / Urobili: Negative   Blood: x / Protein: Negative / Nitrite: Negative   Leuk Esterase: Negative / RBC: x / WBC x   Sq Epi: x / Non Sq Epi: x / Bacteria: x      CAPILLARY BLOOD GLUCOSE        RADIOLOGY & ADDITIONAL TESTS:    Consultant(s) Notes Reviewed:   YES    Care Discussed with Consultants:   Infectious Disease [] Endocrinology [] Neurology [] ENT [] Cardiology [] Electrophysiology [] Pulmonology [x] Gastroenterology [] Nephrology [] Urology [] Orthopaedics [] Vascular Surgery [] Thoracic Surgery [] Plastic Surgery [] General Surgery [] Podiatry [] Psychiatry [] Hematology/Oncology [] Pain [] Palliative Care []    Plan of care was discussed with patient and/or primary care giver; all questions and concerns were addressed and care was aligned with patient's wishes.

## 2018-01-04 LAB
ANION GAP SERPL CALC-SCNC: 8 MMOL/L — SIGNIFICANT CHANGE UP (ref 5–17)
BUN SERPL-MCNC: 14 MG/DL — SIGNIFICANT CHANGE UP (ref 7–18)
CALCIUM SERPL-MCNC: 8.5 MG/DL — SIGNIFICANT CHANGE UP (ref 8.4–10.5)
CHLORIDE SERPL-SCNC: 102 MMOL/L — SIGNIFICANT CHANGE UP (ref 96–108)
CO2 SERPL-SCNC: 27 MMOL/L — SIGNIFICANT CHANGE UP (ref 22–31)
CREAT SERPL-MCNC: 0.7 MG/DL — SIGNIFICANT CHANGE UP (ref 0.5–1.3)
GLUCOSE BLDC GLUCOMTR-MCNC: 108 MG/DL — HIGH (ref 70–99)
GLUCOSE BLDC GLUCOMTR-MCNC: 116 MG/DL — HIGH (ref 70–99)
GLUCOSE SERPL-MCNC: 115 MG/DL — HIGH (ref 70–99)
HCT VFR BLD CALC: 44.4 % — SIGNIFICANT CHANGE UP (ref 39–50)
HGB BLD-MCNC: 14.1 G/DL — SIGNIFICANT CHANGE UP (ref 13–17)
MCHC RBC-ENTMCNC: 30.9 PG — SIGNIFICANT CHANGE UP (ref 27–34)
MCHC RBC-ENTMCNC: 31.6 GM/DL — LOW (ref 32–36)
MCV RBC AUTO: 97.6 FL — SIGNIFICANT CHANGE UP (ref 80–100)
PLATELET # BLD AUTO: 144 K/UL — LOW (ref 150–400)
POTASSIUM SERPL-MCNC: 3.9 MMOL/L — SIGNIFICANT CHANGE UP (ref 3.5–5.3)
POTASSIUM SERPL-SCNC: 3.9 MMOL/L — SIGNIFICANT CHANGE UP (ref 3.5–5.3)
RBC # BLD: 4.55 M/UL — SIGNIFICANT CHANGE UP (ref 4.2–5.8)
RBC # FLD: 13.3 % — SIGNIFICANT CHANGE UP (ref 10.3–14.5)
SODIUM SERPL-SCNC: 137 MMOL/L — SIGNIFICANT CHANGE UP (ref 135–145)
WBC # BLD: 6.7 K/UL — SIGNIFICANT CHANGE UP (ref 3.8–10.5)
WBC # FLD AUTO: 6.7 K/UL — SIGNIFICANT CHANGE UP (ref 3.8–10.5)

## 2018-01-04 RX ORDER — LIDOCAINE 4 G/100G
1 CREAM TOPICAL DAILY
Qty: 0 | Refills: 0 | Status: DISCONTINUED | OUTPATIENT
Start: 2018-01-04 | End: 2018-01-08

## 2018-01-04 RX ADMIN — Medication 1000 UNIT(S): at 11:29

## 2018-01-04 RX ADMIN — Medication 3 MILLILITER(S): at 08:10

## 2018-01-04 RX ADMIN — DABIGATRAN ETEXILATE MESYLATE 150 MILLIGRAM(S): 150 CAPSULE ORAL at 05:15

## 2018-01-04 RX ADMIN — DABIGATRAN ETEXILATE MESYLATE 150 MILLIGRAM(S): 150 CAPSULE ORAL at 17:09

## 2018-01-04 RX ADMIN — FAMOTIDINE 20 MILLIGRAM(S): 10 INJECTION INTRAVENOUS at 17:10

## 2018-01-04 RX ADMIN — Medication 3 MILLILITER(S): at 03:53

## 2018-01-04 RX ADMIN — Medication 650 MILLIGRAM(S): at 14:07

## 2018-01-04 RX ADMIN — Medication 100 MILLIGRAM(S): at 05:16

## 2018-01-04 RX ADMIN — Medication 3 MILLILITER(S): at 14:26

## 2018-01-04 RX ADMIN — Medication 650 MILLIGRAM(S): at 06:29

## 2018-01-04 RX ADMIN — PREGABALIN 100 MICROGRAM(S): 225 CAPSULE ORAL at 11:29

## 2018-01-04 RX ADMIN — LIDOCAINE 1 PATCH: 4 CREAM TOPICAL at 13:13

## 2018-01-04 RX ADMIN — FAMOTIDINE 20 MILLIGRAM(S): 10 INJECTION INTRAVENOUS at 06:24

## 2018-01-04 RX ADMIN — PANTOPRAZOLE SODIUM 40 MILLIGRAM(S): 20 TABLET, DELAYED RELEASE ORAL at 06:24

## 2018-01-04 RX ADMIN — Medication 100 MILLIGRAM(S): at 17:10

## 2018-01-04 RX ADMIN — Medication 81 MILLIGRAM(S): at 11:29

## 2018-01-04 RX ADMIN — Medication 100 MILLIGRAM(S): at 13:12

## 2018-01-04 RX ADMIN — Medication 3 MILLILITER(S): at 20:46

## 2018-01-04 RX ADMIN — DONEPEZIL HYDROCHLORIDE 10 MILLIGRAM(S): 10 TABLET, FILM COATED ORAL at 21:31

## 2018-01-04 RX ADMIN — TAMSULOSIN HYDROCHLORIDE 0.8 MILLIGRAM(S): 0.4 CAPSULE ORAL at 21:31

## 2018-01-04 NOTE — PROGRESS NOTE ADULT - ASSESSMENT
78 y/o Male, ambulatory with a cane and a walker, with h/o A. fib on Pradaxa, COPD, GERD, HLD, abdominal hernia, pulmonary fibrosis, RBBB, BRIEN on CPAP at night, chronic b/l LE lymphedema, mild dementia, urinary incontinence, and recurrent UTIs came in after a fall. Patient is confused, most of the history is by wife. Per wife, she has been noticing that the patient was feeling weak in the past couple of days. Last night around 1am, when he was getting out of the bathroom, he feel. His back hit the tub. Denies any head trauma, or LOC. Denies near syncope or dizziness. After the fall patient started complaining of lower back pain so he was bought to the ER. Wife also complaints that she heard him wheezing overnight.

## 2018-01-04 NOTE — PROGRESS NOTE ADULT - SUBJECTIVE AND OBJECTIVE BOX
_________________________________________________________________________________________  ========>>  M E D I C A L   A T T E N D I N G    F O L L O W  U P  N O T E  <<=========  -----------------------------------------------------------------------------------------------------    - Patient seen and examined by me earlier today.  - In summary, patient is a 77y year old man who originally presented with fall, cough, SOB  - Patient today overall doing fairly, comfortable, eating better, c/o some back pain (not asking for pain meds from RN)    ==================>> REVIEW OF SYSTEM <<=================    GEN: no fever, no chills, + occasional pain in back as above  RESP: some occasional SOB and cough with occasional sputum  CVS: no chest pain, no palpitations, + chronic edema  GI: no abdominal pain, no nausea, no constipation, no diarrhea  : no dysuria, no frequency, no hematuria  Neuro: no headache, no dizziness  Derm : no itching, no rash    ==================>> PHYSICAL EXAM <<=================    GEN: A&O X 2 , NAD , comfortable, a bit forgetful   HEENT: NCAT, PERRL, MMM, hearing intact  Neck: supple , no JVD  CVS: S1S2 , regular , No M/R/G appreciated  PULM: CTA B/L,  no W/R/R appreciated  ABD.: soft. non tender, non distended,  bowel sounds present, obese  Extrem: intact pulses , + b/l mild nonpitting edema   PSYCH : normal mood,  not anxious       ==================>> MEDICATIONS <<====================    ALBUTerol/ipratropium for Nebulization 3 milliLiter(s) Nebulizer every 6 hours  aspirin enteric coated 81 milliGRAM(s) Oral daily  cholecalciferol 1000 Unit(s) Oral daily  cyanocobalamin 100 MICROGram(s) Oral daily  dabigatran 150 milliGRAM(s) Oral every 12 hours  docusate sodium 100 milliGRAM(s) Oral two times a day  donepezil 10 milliGRAM(s) Oral at bedtime  famotidine    Tablet 20 milliGRAM(s) Oral two times a day  lidocaine   Patch 1 Patch Transdermal daily  pantoprazole    Tablet 40 milliGRAM(s) Oral before breakfast  tamsulosin 0.8 milliGRAM(s) Oral at bedtime    MEDICATIONS  (PRN):  acetaminophen   Tablet 650 milliGRAM(s) Oral every 6 hours PRN pain    ==================>> VITAL SIGNS <<==================    Vital Signs Last 24 Hrs  T(C): 36.7 (01-04-18 @ 05:26)  T(F): 98.1 (01-04-18 @ 05:26), Max: 98.1 (01-03-18 @ 19:42)  HR: 75 (01-04-18 @ 10:00) (62 - 82)  BP: 116/78 (01-04-18 @ 05:26)  RR: 14 (01-04-18 @ 05:26) (14 - 19)  SpO2: 98% (01-04-18 @ 10:00) (98% - 99%)      POCT Blood Glucose.: 108 mg/dL (04 Jan 2018 12:09)  POCT Blood Glucose.: 116 mg/dL (04 Jan 2018 08:29)     ==================>> LAB AND IMAGING <<==================                        14.1   6.7   )-----------( 144      ( 04 Jan 2018 07:12 )             44.4        01-04    137  |  102  |  14  ----------------------------<  115<H>  3.9   |  27  |  0.70    Ca    8.5      04 Jan 2018 07:12    < from: CT Chest No Cont (01.01.18 @ 11:31) >  IMPRESSION: No acute infiltrate. Stable mild pulmonary fibrosis and   stable small left pleural effusion.  < end of copied text >      < from: Transthoracic Echocardiogram (01.03.18 @ 13:03) >  CONCLUSIONS:  1. Mild mitral regurgitation.  2.  Mild aortic regurgitation.  3. Mild left atrial enlargement.  4. Normal left ventricular internal dimensions and wall  thicknesses.  5. Endocardium not well visualized; grossly normal left  ventricular systolic function. Segmental wall motion could  not be assessed.  6. Normal right ventricular size and function.    < end of copied text >    ___________________________________________________________________________________  ===============>>  A S S E S S M E N T   A N D   P L A N <<===============  ------------------------------------------------------------------------------------------    · Assessment		  76 y/o Male, ambulatory with a cane and a walker, with h/o A. fib on Pradaxa, COPD, GERD, HLD, abdominal hernia, pulmonary fibrosis, RBBB, BRIEN on CPAP at night, chronic b/l LE lymphedema, mild dementia, urinary incontinence, and recurrent UTIs came in after a fall.       Problem/Plan - 1:  ·  Problem: Fall.  Plan: Per wife, likely mechanical fall  - XR lumbar spine- negative for acute changes or fracture  - will check orthostatics, pending!!  - PT kimmie noted: for home pt  - OOB, fall precautions   - Tylenol PRN pain.     Problem/Plan - 2:  ·  Problem: COPD exacerbation.  : Likely 2/2 to viral bronchitis  RVP- positive for corona virus  - start duonebs  -  Will hold off to abx, CT chest shows no acute infilitrate  -pulm follow up  appreciated  - ECHO done in Aug 2017 was inconclusive of LV function  - Patient has chronic venous insufficiency hence b/l LE edema    Problem/Plan - 3:  ·  Problem: Atrial fibrillation.  Plan: Rate controlled  C/w Pradaxa 150mg BID.     Problem/Plan - 4:  Problem: GERD    c/w omeprazole and ranitidine.    Problem/Plan - 5:  ·  Problem: BRIEN   c/w nocturnal BiPAP.   pulm f/u    -GI/DVT Prophylaxis.  - hopefully DC planing soon to home  --------------------------------------------  Case discussed with pt RN, Np  Education given on plan of care, supportive care  ___________________________  MIGUEL ANGEL. JAELYN Hunter.  Pager: [phone]

## 2018-01-04 NOTE — PROGRESS NOTE ADULT - SUBJECTIVE AND OBJECTIVE BOX
NP Note discussed with  Primary Attending    Patient is a 77y old  Male who presents with a chief complaint of Fall (01 Jan 2018 18:43)      INTERVAL HPI/OVERNIGHT EVENTS: no new complaints    MEDICATIONS  (STANDING):  ALBUTerol/ipratropium for Nebulization 3 milliLiter(s) Nebulizer every 6 hours  aspirin enteric coated 81 milliGRAM(s) Oral daily  benzonatate 100 milliGRAM(s) Oral every 8 hours  cholecalciferol 1000 Unit(s) Oral daily  cyanocobalamin 100 MICROGram(s) Oral daily  dabigatran 150 milliGRAM(s) Oral every 12 hours  docusate sodium 100 milliGRAM(s) Oral two times a day  donepezil 10 milliGRAM(s) Oral at bedtime  famotidine    Tablet 20 milliGRAM(s) Oral two times a day  lidocaine   Patch 1 Patch Transdermal daily  pantoprazole    Tablet 40 milliGRAM(s) Oral before breakfast  tamsulosin 0.8 milliGRAM(s) Oral at bedtime    MEDICATIONS  (PRN):  acetaminophen   Tablet 650 milliGRAM(s) Oral every 6 hours PRN pain      __________________________________________________  REVIEW OF SYSTEMS:    CONSTITUTIONAL: No fever,   EYES: no acute visual disturbances  NECK: No pain or stiffness  RESPIRATORY: No cough; No shortness of breath  CARDIOVASCULAR: No chest pain, no palpitations  GASTROINTESTINAL: No pain. No nausea or vomiting; No diarrhea   NEUROLOGICAL: No headache or numbness, no tremors  MUSCULOSKELETAL: No joint pain, no muscle pain  GENITOURINARY: no dysuria, no frequency, no hesitancy  PSYCHIATRY: no depression , no anxiety  ALL OTHER  ROS negative        Vital Signs Last 24 Hrs  T(C): 36.7 (04 Jan 2018 05:26), Max: 36.7 (03 Jan 2018 15:48)  T(F): 98.1 (04 Jan 2018 05:26), Max: 98.1 (03 Jan 2018 19:42)  HR: 70 (04 Jan 2018 05:26) (62 - 82)  BP: 116/78 (04 Jan 2018 05:26) (110/56 - 120/65)  BP(mean): --  RR: 14 (04 Jan 2018 05:26) (14 - 19)  SpO2: 98% (04 Jan 2018 05:26) (98% - 99%)    ________________________________________________  PHYSICAL EXAM:  GENERAL: NAD  HEENT: Normocephalic;  conjunctivae and sclerae clear; moist mucous membranes;   NECK : supple  CHEST/LUNG: Clear to auscultation bilaterally with good air entry   HEART: S1 S2  regular; no murmurs, gallops or rubs  ABDOMEN: Soft, Nontender, Nondistended; Bowel sounds present  EXTREMITIES: no cyanosis; no edema; no calf tenderness  SKIN: warm and dry; no rash  NERVOUS SYSTEM:  Awake and alert; Oriented  to place, person and time ; no new deficits    _________________________________________________  LABS:                        14.1   6.7   )-----------( 144      ( 04 Jan 2018 07:12 )             44.4     01-04    137  |  102  |  14  ----------------------------<  115<H>  3.9   |  27  |  0.70    Ca    8.5      04 Jan 2018 07:12          CAPILLARY BLOOD GLUCOSE      POCT Blood Glucose.: 116 mg/dL (04 Jan 2018 08:29)        RADIOLOGY & ADDITIONAL TESTS:    Imaging  Reviewed:  YES    Consultant(s) Notes Reviewed:   YES      Plan of care was discussed with patient ; all questions and concerns were addressed

## 2018-01-04 NOTE — PROGRESS NOTE ADULT - PROBLEM SELECTOR PLAN 1
er wife, likely mechanical fall  - XR lumbar spine- negative for acute changes or fracture  - will check orthostatics, pending  - PT eval noted: for home pt  - Tylenol PRN pain, added topical lidocaine patch

## 2018-01-04 NOTE — PROGRESS NOTE ADULT - SUBJECTIVE AND OBJECTIVE BOX
Patient is a 77y old  Male who presents with a chief complaint of Fall (01 Jan 2018 18:43)  Awake, alert, comfortable in NAD. Complaining of lower back pain    INTERVAL HPI/OVERNIGHT EVENTS:      VITAL SIGNS:  T(F): 98.1 (01-04-18 @ 05:26)  HR: 75 (01-04-18 @ 10:00)  BP: 116/78 (01-04-18 @ 05:26)  RR: 14 (01-04-18 @ 05:26)  SpO2: 98% (01-04-18 @ 10:00)  Wt(kg): --  I&O's Detail    03 Jan 2018 07:01  -  04 Jan 2018 07:00  --------------------------------------------------------  IN:    Oral Fluid: 236 mL  Total IN: 236 mL    OUT:  Total OUT: 0 mL    Total NET: 236 mL              REVIEW OF SYSTEMS:    CONSTITUTIONAL:  No fevers, chills, sweats    HEENT:  Eyes:  No diplopia or blurred vision. ENT:  No earache, sore throat or runny nose.    CARDIOVASCULAR:  No pressure, squeezing, tightness, or heaviness about the chest; no palpitations.    RESPIRATORY:  Per HPI    GASTROINTESTINAL:  No abdominal pain, nausea, vomiting or diarrhea.    GENITOURINARY:  No dysuria, frequency or urgency.    NEUROLOGIC:  No paresthesias, fasciculations, seizures or weakness.    PSYCHIATRIC:  No disorder of thought or mood.      PHYSICAL EXAM:    Constitutional: Well developed and nourished  Eyes:Perrla  ENMT: normal  Neck:supple  Respiratory: Rales posteriorly  Cardiovascular: S1 S2 regular  Gastrointestinal: Soft, Non tender  Extremities: + edema  Vascular:normal  Neurological:Awake, alert,Ox3  Musculoskeletal:Normal      MEDICATIONS  (STANDING):  ALBUTerol/ipratropium for Nebulization 3 milliLiter(s) Nebulizer every 6 hours  aspirin enteric coated 81 milliGRAM(s) Oral daily  cholecalciferol 1000 Unit(s) Oral daily  cyanocobalamin 100 MICROGram(s) Oral daily  dabigatran 150 milliGRAM(s) Oral every 12 hours  docusate sodium 100 milliGRAM(s) Oral two times a day  donepezil 10 milliGRAM(s) Oral at bedtime  famotidine    Tablet 20 milliGRAM(s) Oral two times a day  lidocaine   Patch 1 Patch Transdermal daily  pantoprazole    Tablet 40 milliGRAM(s) Oral before breakfast  tamsulosin 0.8 milliGRAM(s) Oral at bedtime    MEDICATIONS  (PRN):  acetaminophen   Tablet 650 milliGRAM(s) Oral every 6 hours PRN pain      Allergies    No Known Drug Allergies  zosyn (Drowsiness)    Intolerances        LABS:                        14.1   6.7   )-----------( 144      ( 04 Jan 2018 07:12 )             44.4     01-04    137  |  102  |  14  ----------------------------<  115<H>  3.9   |  27  |  0.70    Ca    8.5      04 Jan 2018 07:12                CAPILLARY BLOOD GLUCOSE      POCT Blood Glucose.: 108 mg/dL (04 Jan 2018 12:09)  POCT Blood Glucose.: 116 mg/dL (04 Jan 2018 08:29)    pro-bnp 1249 01-01 @ 03:42     d-dimer --  01-01 @ 03:42      RADIOLOGY & ADDITIONAL TESTS:    CXR:    Ct scan chest:    ekg;    echo:

## 2018-01-05 RX ORDER — TIOTROPIUM BROMIDE 18 UG/1
1 CAPSULE ORAL; RESPIRATORY (INHALATION) DAILY
Qty: 0 | Refills: 0 | Status: DISCONTINUED | OUTPATIENT
Start: 2018-01-05 | End: 2018-01-08

## 2018-01-05 RX ADMIN — Medication 3 MILLILITER(S): at 20:37

## 2018-01-05 RX ADMIN — Medication 100 MILLIGRAM(S): at 17:34

## 2018-01-05 RX ADMIN — Medication 1000 UNIT(S): at 11:54

## 2018-01-05 RX ADMIN — LIDOCAINE 1 PATCH: 4 CREAM TOPICAL at 23:00

## 2018-01-05 RX ADMIN — Medication 650 MILLIGRAM(S): at 06:24

## 2018-01-05 RX ADMIN — FAMOTIDINE 20 MILLIGRAM(S): 10 INJECTION INTRAVENOUS at 17:34

## 2018-01-05 RX ADMIN — Medication 81 MILLIGRAM(S): at 11:54

## 2018-01-05 RX ADMIN — Medication 3 MILLILITER(S): at 09:18

## 2018-01-05 RX ADMIN — LIDOCAINE 1 PATCH: 4 CREAM TOPICAL at 05:24

## 2018-01-05 RX ADMIN — FAMOTIDINE 20 MILLIGRAM(S): 10 INJECTION INTRAVENOUS at 05:22

## 2018-01-05 RX ADMIN — Medication 3 MILLILITER(S): at 03:46

## 2018-01-05 RX ADMIN — DABIGATRAN ETEXILATE MESYLATE 150 MILLIGRAM(S): 150 CAPSULE ORAL at 17:34

## 2018-01-05 RX ADMIN — PANTOPRAZOLE SODIUM 40 MILLIGRAM(S): 20 TABLET, DELAYED RELEASE ORAL at 06:24

## 2018-01-05 RX ADMIN — Medication 100 MILLIGRAM(S): at 05:21

## 2018-01-05 RX ADMIN — PREGABALIN 100 MICROGRAM(S): 225 CAPSULE ORAL at 11:54

## 2018-01-05 RX ADMIN — DABIGATRAN ETEXILATE MESYLATE 150 MILLIGRAM(S): 150 CAPSULE ORAL at 05:21

## 2018-01-05 RX ADMIN — TAMSULOSIN HYDROCHLORIDE 0.8 MILLIGRAM(S): 0.4 CAPSULE ORAL at 21:39

## 2018-01-05 RX ADMIN — LIDOCAINE 1 PATCH: 4 CREAM TOPICAL at 11:54

## 2018-01-05 RX ADMIN — DONEPEZIL HYDROCHLORIDE 10 MILLIGRAM(S): 10 TABLET, FILM COATED ORAL at 21:39

## 2018-01-05 RX ADMIN — Medication 650 MILLIGRAM(S): at 21:39

## 2018-01-05 NOTE — PROGRESS NOTE ADULT - SUBJECTIVE AND OBJECTIVE BOX
_________________________________________________________________________________________  ========>>  M E D I C A L   A T T E N D I N G    F O L L O W  U P  N O T E  <<=========  -----------------------------------------------------------------------------------------------------    - Patient seen and examined by me earlier today.  - In summary, patient is a 77y year old man who originally presented with fall, cough, SOB  - Patient today overall doing fairly, comfortable, eating better, back pain seems better.      as noted, a PT re-eval done again today and recommending GINNY, so discharge was cancelled ..    ==================>> REVIEW OF SYSTEM <<=================    GEN: no fever, no chills, + occasional pain in back as above  RESP: some occasional SOB and cough with occasional sputum  CVS: no chest pain, no palpitations, + chronic edema  GI: no abdominal pain, no nausea, no constipation, no diarrhea  : no dysuria, no frequency, no hematuria  Neuro: no headache, no dizziness  Derm : no itching, no rash    ==================>> PHYSICAL EXAM <<=================    GEN: A&O X 2 , NAD , comfortable, a bit forgetful   HEENT: NCAT, PERRL, MMM, hearing intact  Neck: supple , no JVD  CVS: S1S2 , regular , No M/R/G appreciated  PULM: CTA B/L,  no W/R/R appreciated  ABD.: soft. non tender, non distended,  bowel sounds present, obese  Extrem: intact pulses , + b/l mild nonpitting edema  stable  PSYCH : normal mood,  not anxious       ==================>> MEDICATIONS <<====================    ALBUTerol/ipratropium for Nebulization 3 milliLiter(s) Nebulizer every 6 hours  aspirin enteric coated 81 milliGRAM(s) Oral daily  cholecalciferol 1000 Unit(s) Oral daily  cyanocobalamin 100 MICROGram(s) Oral daily  dabigatran 150 milliGRAM(s) Oral every 12 hours  docusate sodium 100 milliGRAM(s) Oral two times a day  donepezil 10 milliGRAM(s) Oral at bedtime  famotidine    Tablet 20 milliGRAM(s) Oral two times a day  lidocaine   Patch 1 Patch Transdermal daily  pantoprazole    Tablet 40 milliGRAM(s) Oral before breakfast  tamsulosin 0.8 milliGRAM(s) Oral at bedtime  tiotropium 18 MICROgram(s) Capsule 1 Capsule(s) Inhalation daily    MEDICATIONS  (PRN):  acetaminophen   Tablet 650 milliGRAM(s) Oral every 6 hours PRN pain    ==================>> VITAL SIGNS <<==================    Vital Signs Last 24 Hrs  T(C): 36.5 (01-05-18 @ 14:22)  T(F): 97.7 (01-05-18 @ 14:22), Max: 97.9 (01-05-18 @ 05:30)  HR: 71 (01-05-18 @ 14:22) (67 - 79)  BP: 117/68 (01-05-18 @ 14:22)  BP(mean): --  RR: 15 (01-05-18 @ 14:22) (15 - 18)  SpO2: 100% (01-05-18 @ 14:22) (97% - 100%)    CAPILLARY BLOOD GLUCOSE         ==================>> LAB AND IMAGING <<==================                        14.1   6.7   )-----------( 144      ( 04 Jan 2018 07:12 )             44.4        01-04    137  |  102  |  14  ----------------------------<  115<H>  3.9   |  27  |  0.70    Ca    8.5      04 Jan 2018 07:12    ___________________________________________________________________________________  ===============>>  A S S E S S M E N T   A N D   P L A N <<===============  ------------------------------------------------------------------------------------------    · Assessment		  76 y/o Male, ambulatory with a cane and a walker, with h/o A. fib on Pradaxa, COPD, GERD, HLD, abdominal hernia, pulmonary fibrosis, RBBB, BRIEN on CPAP at night, chronic b/l LE lymphedema, mild dementia, urinary incontinence, and recurrent UTIs came in after a fall.       Problem/Plan - 1:  ·  Problem: Fall.  Plan: Per wife, likely mechanical fall  - XR lumbar spine- negative for acute changes or fracture  - orthostatics not done!!  - OOB, fall precautions   - Tylenol PRN pain.   - rehab    Problem/Plan - 2:  ·  Problem: COPD exacerbation.  : Likely 2/2 to viral bronchitis, overall improved  RVP- positive for corona virus  - duonebs  -  observe off abx,   -pulm follow up  appreciated    Problem/Plan - 3:  ·  Problem: Atrial fibrillation.  Plan: Rate controlled  C/w Pradaxa 150mg BID.     Problem/Plan - 4:  Problem: GERD    c/w omeprazole and ranitidine.    Problem/Plan - 5:  ·  Problem: BRIEN   c/w nocturnal BiPAP.   pulm f/u    -GI/DVT Prophylaxis.  - hopefully DC planing now to rehab  --------------------------------------------  Case discussed with pt wife, NINFA, RN, Np  Education given on plan of care, supportive care  ___________________________  H. JAELYN Hunter.  Pager: [phone]

## 2018-01-05 NOTE — PROGRESS NOTE ADULT - SUBJECTIVE AND OBJECTIVE BOX
Patient is a 77y old  Male who presents with a chief complaint of Fall (01 Jan 2018 18:43)  Awake, alert, comfortable OOB to chair in NAD.    INTERVAL HPI/OVERNIGHT EVENTS:      VITAL SIGNS:  T(F): 97.9 (01-05-18 @ 05:30)  HR: 78 (01-05-18 @ 05:30)  BP: 108/65 (01-05-18 @ 05:30)  RR: 17 (01-05-18 @ 05:30)  SpO2: 100% (01-05-18 @ 05:30)  Wt(kg): --  I&O's Detail          REVIEW OF SYSTEMS:    CONSTITUTIONAL:  No fevers, chills, sweats    HEENT:  Eyes:  No diplopia or blurred vision. ENT:  No earache, sore throat or runny nose.    CARDIOVASCULAR:  No pressure, squeezing, tightness, or heaviness about the chest; no palpitations.    RESPIRATORY:  Per HPI    GASTROINTESTINAL:  No abdominal pain, nausea, vomiting or diarrhea.    GENITOURINARY:  No dysuria, frequency or urgency.    NEUROLOGIC:  No paresthesias, fasciculations, seizures or weakness.    PSYCHIATRIC:  No disorder of thought or mood.      PHYSICAL EXAM:    Constitutional: Well developed and nourished  Eyes:Perrla  ENMT: normal  Neck:supple  Respiratory: rales post  Cardiovascular: S1 S2 regular  Gastrointestinal: Soft, Non tender  Extremities: + edema  Vascular:normal  Neurological:Awake, alert,Ox3  Musculoskeletal:leg edema      MEDICATIONS  (STANDING):  ALBUTerol/ipratropium for Nebulization 3 milliLiter(s) Nebulizer every 6 hours  aspirin enteric coated 81 milliGRAM(s) Oral daily  cholecalciferol 1000 Unit(s) Oral daily  cyanocobalamin 100 MICROGram(s) Oral daily  dabigatran 150 milliGRAM(s) Oral every 12 hours  docusate sodium 100 milliGRAM(s) Oral two times a day  donepezil 10 milliGRAM(s) Oral at bedtime  famotidine    Tablet 20 milliGRAM(s) Oral two times a day  lidocaine   Patch 1 Patch Transdermal daily  pantoprazole    Tablet 40 milliGRAM(s) Oral before breakfast  tamsulosin 0.8 milliGRAM(s) Oral at bedtime    MEDICATIONS  (PRN):  acetaminophen   Tablet 650 milliGRAM(s) Oral every 6 hours PRN pain      Allergies    No Known Drug Allergies  zosyn (Drowsiness)    Intolerances        LABS:                        14.1   6.7   )-----------( 144      ( 04 Jan 2018 07:12 )             44.4     01-04    137  |  102  |  14  ----------------------------<  115<H>  3.9   |  27  |  0.70    Ca    8.5      04 Jan 2018 07:12                CAPILLARY BLOOD GLUCOSE        pro-bnp 1249 01-01 @ 03:42     d-dimer --  01-01 @ 03:42      RADIOLOGY & ADDITIONAL TESTS:    CXR:    Ct scan chest:    ekg;    echo:

## 2018-01-06 LAB
CULTURE RESULTS: SIGNIFICANT CHANGE UP
CULTURE RESULTS: SIGNIFICANT CHANGE UP
SPECIMEN SOURCE: SIGNIFICANT CHANGE UP
SPECIMEN SOURCE: SIGNIFICANT CHANGE UP

## 2018-01-06 RX ADMIN — LIDOCAINE 1 PATCH: 4 CREAM TOPICAL at 11:57

## 2018-01-06 RX ADMIN — Medication 100 MILLIGRAM(S): at 17:41

## 2018-01-06 RX ADMIN — DABIGATRAN ETEXILATE MESYLATE 150 MILLIGRAM(S): 150 CAPSULE ORAL at 05:57

## 2018-01-06 RX ADMIN — TAMSULOSIN HYDROCHLORIDE 0.8 MILLIGRAM(S): 0.4 CAPSULE ORAL at 22:20

## 2018-01-06 RX ADMIN — Medication 1000 UNIT(S): at 11:56

## 2018-01-06 RX ADMIN — Medication 3 MILLILITER(S): at 20:22

## 2018-01-06 RX ADMIN — Medication 100 MILLIGRAM(S): at 05:57

## 2018-01-06 RX ADMIN — FAMOTIDINE 20 MILLIGRAM(S): 10 INJECTION INTRAVENOUS at 17:41

## 2018-01-06 RX ADMIN — Medication 81 MILLIGRAM(S): at 11:57

## 2018-01-06 RX ADMIN — LIDOCAINE 1 PATCH: 4 CREAM TOPICAL at 23:16

## 2018-01-06 RX ADMIN — FAMOTIDINE 20 MILLIGRAM(S): 10 INJECTION INTRAVENOUS at 05:57

## 2018-01-06 RX ADMIN — DABIGATRAN ETEXILATE MESYLATE 150 MILLIGRAM(S): 150 CAPSULE ORAL at 17:41

## 2018-01-06 RX ADMIN — Medication 3 MILLILITER(S): at 14:17

## 2018-01-06 RX ADMIN — PREGABALIN 100 MICROGRAM(S): 225 CAPSULE ORAL at 11:56

## 2018-01-06 RX ADMIN — PANTOPRAZOLE SODIUM 40 MILLIGRAM(S): 20 TABLET, DELAYED RELEASE ORAL at 06:09

## 2018-01-06 RX ADMIN — DONEPEZIL HYDROCHLORIDE 10 MILLIGRAM(S): 10 TABLET, FILM COATED ORAL at 22:20

## 2018-01-06 RX ADMIN — TIOTROPIUM BROMIDE 1 CAPSULE(S): 18 CAPSULE ORAL; RESPIRATORY (INHALATION) at 17:41

## 2018-01-06 RX ADMIN — Medication 3 MILLILITER(S): at 02:12

## 2018-01-06 RX ADMIN — Medication 3 MILLILITER(S): at 09:04

## 2018-01-06 NOTE — DIETITIAN INITIAL EVALUATION ADULT. - PROBLEM SELECTOR PLAN 2
Likely 2/2 to viral bronchitis  RVP- positive for corona virus  - start duonebs  -  Will hold off to abx, CT chest shows no acute infilitrate  -pulm eval

## 2018-01-06 NOTE — PROGRESS NOTE ADULT - SUBJECTIVE AND OBJECTIVE BOX
Patient is a 77y old  Male who presents with a chief complaint of Fall (01 Jan 2018 18:43)  Awake, alert, comfortable OOB to chair in NAD    INTERVAL HPI/OVERNIGHT EVENTS:      VITAL SIGNS:  T(F): 97.4 (01-06-18 @ 05:36)  HR: 83 (01-06-18 @ 05:36)  BP: 136/78 (01-06-18 @ 05:36)  RR: 16 (01-06-18 @ 05:36)  SpO2: 100% (01-06-18 @ 05:36)  Wt(kg): --  I&O's Detail          REVIEW OF SYSTEMS:    CONSTITUTIONAL:  No fevers, chills, sweats    HEENT:  Eyes:  No diplopia or blurred vision. ENT:  No earache, sore throat or runny nose.    CARDIOVASCULAR:  No pressure, squeezing, tightness, or heaviness about the chest; no palpitations.    RESPIRATORY:  Per HPI    GASTROINTESTINAL:  No abdominal pain, nausea, vomiting or diarrhea.    GENITOURINARY:  No dysuria, frequency or urgency.    NEUROLOGIC:  No paresthesias, fasciculations, seizures or weakness.    PSYCHIATRIC:  No disorder of thought or mood.      PHYSICAL EXAM:    Constitutional: Well developed and nourished  Eyes:Perrla  ENMT: normal  Neck:supple  Respiratory: rales posteriorly  Cardiovascular: S1 S2 regular  Gastrointestinal: Soft, Non tender  Extremities: No edema  Vascular:normal  Neurological:Awake, alert,Ox3  Musculoskeletal:Normal      MEDICATIONS  (STANDING):  ALBUTerol/ipratropium for Nebulization 3 milliLiter(s) Nebulizer every 6 hours  aspirin enteric coated 81 milliGRAM(s) Oral daily  cholecalciferol 1000 Unit(s) Oral daily  cyanocobalamin 100 MICROGram(s) Oral daily  dabigatran 150 milliGRAM(s) Oral every 12 hours  docusate sodium 100 milliGRAM(s) Oral two times a day  donepezil 10 milliGRAM(s) Oral at bedtime  famotidine    Tablet 20 milliGRAM(s) Oral two times a day  lidocaine   Patch 1 Patch Transdermal daily  pantoprazole    Tablet 40 milliGRAM(s) Oral before breakfast  tamsulosin 0.8 milliGRAM(s) Oral at bedtime  tiotropium 18 MICROgram(s) Capsule 1 Capsule(s) Inhalation daily    MEDICATIONS  (PRN):  acetaminophen   Tablet 650 milliGRAM(s) Oral every 6 hours PRN pain      Allergies    No Known Drug Allergies  zosyn (Drowsiness)    Intolerances        LABS:                    CAPILLARY BLOOD GLUCOSE        pro-bnp 1249 01-01 @ 03:42     d-dimer --  01-01 @ 03:42      RADIOLOGY & ADDITIONAL TESTS:    CXR:    Ct scan chest:    ekg;    echo:< from: Transthoracic Echocardiogram (01.03.18 @ 13:03) >  CONCLUSIONS:  1. Mild mitral regurgitation.  2.  Mild aortic regurgitation.  3. Mild left atrial enlargement.  4. Normal left ventricular internal dimensions and wall  thicknesses.  5. Endocardium not well visualized; grossly normal left  ventricular systolic function. Segmental wall motion could  not be assessed.  6. Normal right ventricular size and function.      < end of copied text >

## 2018-01-06 NOTE — DIETITIAN INITIAL EVALUATION ADULT. - OTHER INFO
Pt visited. OOb to chair. Pt is on o2. On bipap at night.  Reports good appetite. No chewing  or swallowing difficulty Reported.. Po tolerated. NKFA reported

## 2018-01-06 NOTE — DIETITIAN INITIAL EVALUATION ADULT. - PROBLEM SELECTOR PLAN 3
B/l pleural effusions seen in July 2017  CT chest- 1/1/2018- No acute infiltrate. Stable mild pulmonary fibrosis and   stable small left pleural effusion.  - Patient received 40 IV lasix in ED for suspicion of CHF  - Per wife patient has no prior history of CHF  - ECHO done in Aug 2017 was inconclusive of LV function  - BNP wnl for age  - Patient has chronic venous insufficiency hence b/l LE edema  - No rales heard on Lung exam  - doubt CHF, however will repeat ECHO to confirm  -pulm eval

## 2018-01-06 NOTE — PROGRESS NOTE ADULT - SUBJECTIVE AND OBJECTIVE BOX
_________________________________________________________________________________________  ========>>  M E D I C A L   A T T E N D I N G    F O L L O W  U P  N O T E  <<=========  -----------------------------------------------------------------------------------------------------    - Patient seen and examined by me earlier today.  - In summary, patient is a 77y year old man who originally presented with fall, cough, SOB  - Patient today overall doing fairly, comfortable, eating better, back pain seems better.      as noted, a PT re-eval done again and recommending GINNY, so discharge was cancelled ..    ==================>> REVIEW OF SYSTEM <<=================    GEN: no fever, no chills, + occasional pain in back   RESP: some occasional SOB and cough   CVS: no chest pain, no palpitations, + chronic edema  GI: no abdominal pain, no nausea, no constipation, no diarrhea  : no dysuria, no frequency, no hematuria  Neuro: no headache, no dizziness  Derm : no itching, no rash    ==================>> PHYSICAL EXAM <<=================    GEN: A&O X 2 , NAD , comfortable, a bit forgetful, on commode!   HEENT: NCAT, PERRL, MMM, hearing intact  Neck: supple , no JVD  CVS: S1S2 , regular , No M/R/G appreciated  PULM: CTA B/L,  no W/R/R appreciated  ABD.: soft. non tender, non distended,  bowel sounds present, obese  Extrem: intact pulses , + b/l mild nonpitting edema  stable  PSYCH : normal mood,  not anxious       ==================>> MEDICATIONS <<====================    ALBUTerol/ipratropium for Nebulization 3 milliLiter(s) Nebulizer every 6 hours  aspirin enteric coated 81 milliGRAM(s) Oral daily  cholecalciferol 1000 Unit(s) Oral daily  cyanocobalamin 100 MICROGram(s) Oral daily  dabigatran 150 milliGRAM(s) Oral every 12 hours  docusate sodium 100 milliGRAM(s) Oral two times a day  donepezil 10 milliGRAM(s) Oral at bedtime  famotidine    Tablet 20 milliGRAM(s) Oral two times a day  lidocaine   Patch 1 Patch Transdermal daily  pantoprazole    Tablet 40 milliGRAM(s) Oral before breakfast  tamsulosin 0.8 milliGRAM(s) Oral at bedtime  tiotropium 18 MICROgram(s) Capsule 1 Capsule(s) Inhalation daily    MEDICATIONS  (PRN):  acetaminophen   Tablet 650 milliGRAM(s) Oral every 6 hours PRN pain    ==================>> VITAL SIGNS <<==================    Vital Signs Last 24 Hrs  T(C): 36.3 (01-06-18 @ 05:36)  T(F): 97.4 (01-06-18 @ 05:36), Max: 98 (01-05-18 @ 21:24)  HR: 83 (01-06-18 @ 05:36) (71 - 83)  BP: 136/78 (01-06-18 @ 05:36)  BP(mean): --  RR: 16 (01-06-18 @ 05:36) (15 - 17)  SpO2: 100% (01-06-18 @ 05:36) (97% - 100%)    CAPILLARY BLOOD GLUCOSE         ==================>> LAB AND IMAGING <<==================       no labs today    ___________________________________________________________________________________  ===============>>  A S S E S S M E N T   A N D   P L A N <<===============  ------------------------------------------------------------------------------------------    · Assessment		  76 y/o Male, ambulatory with a cane and a walker, with h/o A. fib on Pradaxa, COPD, GERD, HLD, abdominal hernia, pulmonary fibrosis, RBBB, BRIEN on CPAP at night, chronic b/l LE lymphedema, mild dementia, urinary incontinence, and recurrent UTIs came in after a fall.       Problem/Plan - 1:  ·  Problem:  likely mechanical fall with back pain likely bone contusion   - XR lumbar spine- negative for acute changes or fracture  - orthostatics not done!!  - OOB, fall precautions   - Tylenol PRN pain.   - rehab    Problem/Plan - 2:  ·  Problem: COPD exacerbation.  : Likely 2/2 to viral bronchitis, overall improved  RVP- positive for corona virus  - Duoneb  -  observe off abx,   -pulm follow up  appreciated    Problem/Plan - 3:  ·  Problem: Atrial fibrillation.  Plan: Rate controlled  C/w Pradaxa 150mg BID.     Problem/Plan - 4:  Problem: GERD    c/w omeprazole and ranitidine.    Problem/Plan - 5:  ·  Problem: BRIEN   c/w nocturnal BiPAP.   pulm f/u    -GI/DVT Prophylaxis.  - DC planing now to rehab  --------------------------------------------  Case discussed with pt wife, NINFA, RN, Np  Education given on plan of care, supportive care  ___________________________  H. JAELYN Hunter.  Pager: [phone]

## 2018-01-07 RX ADMIN — TIOTROPIUM BROMIDE 1 CAPSULE(S): 18 CAPSULE ORAL; RESPIRATORY (INHALATION) at 12:46

## 2018-01-07 RX ADMIN — FAMOTIDINE 20 MILLIGRAM(S): 10 INJECTION INTRAVENOUS at 05:38

## 2018-01-07 RX ADMIN — Medication 3 MILLILITER(S): at 21:08

## 2018-01-07 RX ADMIN — DABIGATRAN ETEXILATE MESYLATE 150 MILLIGRAM(S): 150 CAPSULE ORAL at 17:24

## 2018-01-07 RX ADMIN — DABIGATRAN ETEXILATE MESYLATE 150 MILLIGRAM(S): 150 CAPSULE ORAL at 05:39

## 2018-01-07 RX ADMIN — Medication 3 MILLILITER(S): at 15:11

## 2018-01-07 RX ADMIN — LIDOCAINE 1 PATCH: 4 CREAM TOPICAL at 12:46

## 2018-01-07 RX ADMIN — Medication 1000 UNIT(S): at 12:46

## 2018-01-07 RX ADMIN — PANTOPRAZOLE SODIUM 40 MILLIGRAM(S): 20 TABLET, DELAYED RELEASE ORAL at 06:02

## 2018-01-07 RX ADMIN — DONEPEZIL HYDROCHLORIDE 10 MILLIGRAM(S): 10 TABLET, FILM COATED ORAL at 21:04

## 2018-01-07 RX ADMIN — Medication 81 MILLIGRAM(S): at 12:46

## 2018-01-07 RX ADMIN — Medication 3 MILLILITER(S): at 02:16

## 2018-01-07 RX ADMIN — Medication 100 MILLIGRAM(S): at 05:38

## 2018-01-07 RX ADMIN — TAMSULOSIN HYDROCHLORIDE 0.8 MILLIGRAM(S): 0.4 CAPSULE ORAL at 21:04

## 2018-01-07 RX ADMIN — Medication 3 MILLILITER(S): at 09:53

## 2018-01-07 RX ADMIN — Medication 100 MILLIGRAM(S): at 17:24

## 2018-01-07 RX ADMIN — FAMOTIDINE 20 MILLIGRAM(S): 10 INJECTION INTRAVENOUS at 17:24

## 2018-01-07 RX ADMIN — PREGABALIN 100 MICROGRAM(S): 225 CAPSULE ORAL at 12:46

## 2018-01-07 NOTE — PROGRESS NOTE ADULT - SUBJECTIVE AND OBJECTIVE BOX
Patient is a 77y old  Male who presents with a chief complaint of Fall (01 Jan 2018 18:43)  Awake, alert, comfortable in NAD. OOB to chair. Awaiting rehab placement.    INTERVAL HPI/OVERNIGHT EVENTS:      VITAL SIGNS:  T(F): 98.2 (01-07-18 @ 05:33)  HR: 82 (01-07-18 @ 05:33)  BP: 142/79 (01-07-18 @ 05:33)  RR: 16 (01-07-18 @ 05:33)  SpO2: 95% (01-07-18 @ 05:33)  Wt(kg): --  I&O's Detail          REVIEW OF SYSTEMS:    CONSTITUTIONAL:  No fevers, chills, sweats    HEENT:  Eyes:  No diplopia or blurred vision. ENT:  No earache, sore throat or runny nose.    CARDIOVASCULAR:  No pressure, squeezing, tightness, or heaviness about the chest; no palpitations.    RESPIRATORY:  Per HPI    GASTROINTESTINAL:  No abdominal pain, nausea, vomiting or diarrhea.    GENITOURINARY:  No dysuria, frequency or urgency.    NEUROLOGIC:  No paresthesias, fasciculations, seizures or weakness.    PSYCHIATRIC:  No disorder of thought or mood.      PHYSICAL EXAM:    Constitutional: Well developed and nourished  Eyes:Perrla  ENMT: normal  Neck:supple  Respiratory: Rales post  Cardiovascular: S1 S2 regular  Gastrointestinal: Soft, Non tender  Extremities: + edema  Vascular:normal  Neurological:Awake, alert,Ox3  Musculoskeletal:Normal      MEDICATIONS  (STANDING):  ALBUTerol/ipratropium for Nebulization 3 milliLiter(s) Nebulizer every 6 hours  aspirin enteric coated 81 milliGRAM(s) Oral daily  cholecalciferol 1000 Unit(s) Oral daily  cyanocobalamin 100 MICROGram(s) Oral daily  dabigatran 150 milliGRAM(s) Oral every 12 hours  docusate sodium 100 milliGRAM(s) Oral two times a day  donepezil 10 milliGRAM(s) Oral at bedtime  famotidine    Tablet 20 milliGRAM(s) Oral two times a day  lidocaine   Patch 1 Patch Transdermal daily  pantoprazole    Tablet 40 milliGRAM(s) Oral before breakfast  tamsulosin 0.8 milliGRAM(s) Oral at bedtime  tiotropium 18 MICROgram(s) Capsule 1 Capsule(s) Inhalation daily    MEDICATIONS  (PRN):  acetaminophen   Tablet 650 milliGRAM(s) Oral every 6 hours PRN pain      Allergies    No Known Drug Allergies  zosyn (Drowsiness)    Intolerances        LABS:                    CAPILLARY BLOOD GLUCOSE        pro-bnp 1249 01-01 @ 03:42     d-dimer --  01-01 @ 03:42      RADIOLOGY & ADDITIONAL TESTS:    CXR:    Ct scan chest:    ekg;    echo:

## 2018-01-08 VITALS — OXYGEN SATURATION: 98 % | DIASTOLIC BLOOD PRESSURE: 82 MMHG | HEART RATE: 86 BPM | SYSTOLIC BLOOD PRESSURE: 127 MMHG

## 2018-01-08 LAB
ALBUMIN SERPL ELPH-MCNC: 2.8 G/DL — LOW (ref 3.5–5)
ALP SERPL-CCNC: 86 U/L — SIGNIFICANT CHANGE UP (ref 40–120)
ALT FLD-CCNC: 25 U/L DA — SIGNIFICANT CHANGE UP (ref 10–60)
ANION GAP SERPL CALC-SCNC: 6 MMOL/L — SIGNIFICANT CHANGE UP (ref 5–17)
AST SERPL-CCNC: 25 U/L — SIGNIFICANT CHANGE UP (ref 10–40)
BILIRUB SERPL-MCNC: 1 MG/DL — SIGNIFICANT CHANGE UP (ref 0.2–1.2)
BUN SERPL-MCNC: 14 MG/DL — SIGNIFICANT CHANGE UP (ref 7–18)
CALCIUM SERPL-MCNC: 8.5 MG/DL — SIGNIFICANT CHANGE UP (ref 8.4–10.5)
CHLORIDE SERPL-SCNC: 102 MMOL/L — SIGNIFICANT CHANGE UP (ref 96–108)
CO2 SERPL-SCNC: 29 MMOL/L — SIGNIFICANT CHANGE UP (ref 22–31)
CREAT SERPL-MCNC: 0.64 MG/DL — SIGNIFICANT CHANGE UP (ref 0.5–1.3)
GLUCOSE SERPL-MCNC: 106 MG/DL — HIGH (ref 70–99)
HCT VFR BLD CALC: 43.7 % — SIGNIFICANT CHANGE UP (ref 39–50)
HGB BLD-MCNC: 13.9 G/DL — SIGNIFICANT CHANGE UP (ref 13–17)
MCHC RBC-ENTMCNC: 30.2 PG — SIGNIFICANT CHANGE UP (ref 27–34)
MCHC RBC-ENTMCNC: 31.8 GM/DL — LOW (ref 32–36)
MCV RBC AUTO: 94.9 FL — SIGNIFICANT CHANGE UP (ref 80–100)
PLATELET # BLD AUTO: 192 K/UL — SIGNIFICANT CHANGE UP (ref 150–400)
POTASSIUM SERPL-MCNC: 4.1 MMOL/L — SIGNIFICANT CHANGE UP (ref 3.5–5.3)
POTASSIUM SERPL-SCNC: 4.1 MMOL/L — SIGNIFICANT CHANGE UP (ref 3.5–5.3)
PROT SERPL-MCNC: 7.4 G/DL — SIGNIFICANT CHANGE UP (ref 6–8.3)
RBC # BLD: 4.61 M/UL — SIGNIFICANT CHANGE UP (ref 4.2–5.8)
RBC # FLD: 12.4 % — SIGNIFICANT CHANGE UP (ref 10.3–14.5)
SODIUM SERPL-SCNC: 137 MMOL/L — SIGNIFICANT CHANGE UP (ref 135–145)
WBC # BLD: 9 K/UL — SIGNIFICANT CHANGE UP (ref 3.8–10.5)
WBC # FLD AUTO: 9 K/UL — SIGNIFICANT CHANGE UP (ref 3.8–10.5)

## 2018-01-08 PROCEDURE — 83880 ASSAY OF NATRIURETIC PEPTIDE: CPT

## 2018-01-08 PROCEDURE — 72074 X-RAY EXAM THORAC SPINE4/>VW: CPT

## 2018-01-08 PROCEDURE — 71250 CT THORAX DX C-: CPT

## 2018-01-08 PROCEDURE — 81003 URINALYSIS AUTO W/O SCOPE: CPT

## 2018-01-08 PROCEDURE — 80053 COMPREHEN METABOLIC PANEL: CPT

## 2018-01-08 PROCEDURE — 85027 COMPLETE CBC AUTOMATED: CPT

## 2018-01-08 PROCEDURE — 83036 HEMOGLOBIN GLYCOSYLATED A1C: CPT

## 2018-01-08 PROCEDURE — 85730 THROMBOPLASTIN TIME PARTIAL: CPT

## 2018-01-08 PROCEDURE — 82962 GLUCOSE BLOOD TEST: CPT

## 2018-01-08 PROCEDURE — 87798 DETECT AGENT NOS DNA AMP: CPT

## 2018-01-08 PROCEDURE — 80061 LIPID PANEL: CPT

## 2018-01-08 PROCEDURE — 97162 PT EVAL MOD COMPLEX 30 MIN: CPT

## 2018-01-08 PROCEDURE — 99285 EMERGENCY DEPT VISIT HI MDM: CPT | Mod: 25

## 2018-01-08 PROCEDURE — 71045 X-RAY EXAM CHEST 1 VIEW: CPT

## 2018-01-08 PROCEDURE — 84484 ASSAY OF TROPONIN QUANT: CPT

## 2018-01-08 PROCEDURE — 87486 CHLMYD PNEUM DNA AMP PROBE: CPT

## 2018-01-08 PROCEDURE — 93306 TTE W/DOPPLER COMPLETE: CPT

## 2018-01-08 PROCEDURE — 85610 PROTHROMBIN TIME: CPT

## 2018-01-08 PROCEDURE — 84443 ASSAY THYROID STIM HORMONE: CPT

## 2018-01-08 PROCEDURE — 80048 BASIC METABOLIC PNL TOTAL CA: CPT

## 2018-01-08 PROCEDURE — 97110 THERAPEUTIC EXERCISES: CPT

## 2018-01-08 PROCEDURE — 87040 BLOOD CULTURE FOR BACTERIA: CPT

## 2018-01-08 PROCEDURE — 93005 ELECTROCARDIOGRAM TRACING: CPT

## 2018-01-08 PROCEDURE — 87633 RESP VIRUS 12-25 TARGETS: CPT

## 2018-01-08 PROCEDURE — 87581 M.PNEUMON DNA AMP PROBE: CPT

## 2018-01-08 PROCEDURE — 87086 URINE CULTURE/COLONY COUNT: CPT

## 2018-01-08 PROCEDURE — 94640 AIRWAY INHALATION TREATMENT: CPT

## 2018-01-08 PROCEDURE — 94660 CPAP INITIATION&MGMT: CPT

## 2018-01-08 PROCEDURE — 97116 GAIT TRAINING THERAPY: CPT

## 2018-01-08 PROCEDURE — 82803 BLOOD GASES ANY COMBINATION: CPT

## 2018-01-08 RX ORDER — FAMOTIDINE 10 MG/ML
1 INJECTION INTRAVENOUS
Qty: 0 | Refills: 0 | COMMUNITY
Start: 2018-01-08

## 2018-01-08 RX ORDER — RANITIDINE HYDROCHLORIDE 150 MG/1
1 TABLET, FILM COATED ORAL
Qty: 0 | Refills: 0 | COMMUNITY

## 2018-01-08 RX ORDER — DONEPEZIL HYDROCHLORIDE 10 MG/1
1 TABLET, FILM COATED ORAL
Qty: 0 | Refills: 0 | COMMUNITY
Start: 2018-01-08

## 2018-01-08 RX ORDER — OMEPRAZOLE 10 MG/1
1 CAPSULE, DELAYED RELEASE ORAL
Qty: 0 | Refills: 0 | COMMUNITY

## 2018-01-08 RX ORDER — DONEPEZIL HYDROCHLORIDE 10 MG/1
1 TABLET, FILM COATED ORAL
Qty: 0 | Refills: 0 | COMMUNITY

## 2018-01-08 RX ORDER — TIOTROPIUM BROMIDE 18 UG/1
1 CAPSULE ORAL; RESPIRATORY (INHALATION)
Qty: 0 | Refills: 0 | COMMUNITY
Start: 2018-01-08

## 2018-01-08 RX ORDER — PITAVASTATIN CALCIUM 1.04 MG/1
1 TABLET, FILM COATED ORAL
Qty: 0 | Refills: 0 | COMMUNITY

## 2018-01-08 RX ORDER — ARMODAFINIL 200 MG/1
1 TABLET ORAL
Qty: 0 | Refills: 0 | COMMUNITY

## 2018-01-08 RX ORDER — LEVALBUTEROL 1.25 MG/.5ML
3 SOLUTION, CONCENTRATE RESPIRATORY (INHALATION)
Qty: 0 | Refills: 0 | COMMUNITY

## 2018-01-08 RX ORDER — LIDOCAINE 4 G/100G
0 CREAM TOPICAL
Qty: 0 | Refills: 0 | COMMUNITY
Start: 2018-01-08

## 2018-01-08 RX ADMIN — Medication 3 MILLILITER(S): at 02:10

## 2018-01-08 RX ADMIN — Medication 650 MILLIGRAM(S): at 08:48

## 2018-01-08 RX ADMIN — Medication 100 MILLIGRAM(S): at 05:57

## 2018-01-08 RX ADMIN — LIDOCAINE 1 PATCH: 4 CREAM TOPICAL at 01:30

## 2018-01-08 RX ADMIN — Medication 3 MILLILITER(S): at 14:25

## 2018-01-08 RX ADMIN — Medication 1000 UNIT(S): at 13:15

## 2018-01-08 RX ADMIN — LIDOCAINE 1 PATCH: 4 CREAM TOPICAL at 13:15

## 2018-01-08 RX ADMIN — Medication 81 MILLIGRAM(S): at 13:15

## 2018-01-08 RX ADMIN — PREGABALIN 100 MICROGRAM(S): 225 CAPSULE ORAL at 13:16

## 2018-01-08 RX ADMIN — FAMOTIDINE 20 MILLIGRAM(S): 10 INJECTION INTRAVENOUS at 05:57

## 2018-01-08 RX ADMIN — PANTOPRAZOLE SODIUM 40 MILLIGRAM(S): 20 TABLET, DELAYED RELEASE ORAL at 05:58

## 2018-01-08 RX ADMIN — TIOTROPIUM BROMIDE 1 CAPSULE(S): 18 CAPSULE ORAL; RESPIRATORY (INHALATION) at 13:15

## 2018-01-08 RX ADMIN — DABIGATRAN ETEXILATE MESYLATE 150 MILLIGRAM(S): 150 CAPSULE ORAL at 05:57

## 2018-01-08 NOTE — PROGRESS NOTE ADULT - PROBLEM SELECTOR PLAN 5
DVT PPX
PPI  GI eval
Continue with home dose of pradaxa for anticoagulation  Rate currently controlled without any medication
Continue with home dose of pradaxa for anticoagulation  Rate currently controlled without any medication
rate controlled  c/w pradaxa

## 2018-01-08 NOTE — PROGRESS NOTE ADULT - PROBLEM SELECTOR PROBLEM 6
Need for prophylactic measure
GERD (gastroesophageal reflux disease)

## 2018-01-08 NOTE — PROGRESS NOTE ADULT - SUBJECTIVE AND OBJECTIVE BOX
Patient is a 77y old Male who presents with a chief complaint of Fall.   Awake, alert, comfortable in NAD. OOB to chair. Awaiting rehab placement.        INTERVAL HPI/OVERNIGHT EVENTS:      VITAL SIGNS:  T(F): 97.8 (01-08-18 @ 08:56)  HR: 63 (01-08-18 @ 08:56)  BP: 128/72 (01-08-18 @ 06:16)  RR: 16 (01-08-18 @ 06:16)  SpO2: 100% (01-08-18 @ 06:16)  Wt(kg): --  I&O's Detail          REVIEW OF SYSTEMS:    CONSTITUTIONAL:  No fevers, chills, sweats    HEENT:  Eyes:  No diplopia or blurred vision. ENT:  No earache, sore throat or runny nose.    CARDIOVASCULAR:  No pressure, squeezing, tightness, or heaviness about the chest; no palpitations.    RESPIRATORY:  Per HPI    GASTROINTESTINAL:  No abdominal pain, nausea, vomiting or diarrhea.    GENITOURINARY:  No dysuria, frequency or urgency.    NEUROLOGIC:  No paresthesias, fasciculations, seizures or weakness.    PSYCHIATRIC:  No disorder of thought or mood.      PHYSICAL EXAM:    Constitutional: Well developed and nourished  Eyes:Perrla  ENMT: normal  Neck:supple  Respiratory: good air entry  Cardiovascular: S1 S2 regular  Gastrointestinal: Soft, Non tender  Extremities: No edema  Vascular:normal  Neurological:Awake, alert,Ox3  Musculoskeletal:Normal      MEDICATIONS  (STANDING):  ALBUTerol/ipratropium for Nebulization 3 milliLiter(s) Nebulizer every 6 hours  aspirin enteric coated 81 milliGRAM(s) Oral daily  cholecalciferol 1000 Unit(s) Oral daily  cyanocobalamin 100 MICROGram(s) Oral daily  dabigatran 150 milliGRAM(s) Oral every 12 hours  docusate sodium 100 milliGRAM(s) Oral two times a day  donepezil 10 milliGRAM(s) Oral at bedtime  famotidine    Tablet 20 milliGRAM(s) Oral two times a day  lidocaine   Patch 1 Patch Transdermal daily  pantoprazole    Tablet 40 milliGRAM(s) Oral before breakfast  tamsulosin 0.8 milliGRAM(s) Oral at bedtime  tiotropium 18 MICROgram(s) Capsule 1 Capsule(s) Inhalation daily    MEDICATIONS  (PRN):  acetaminophen   Tablet 650 milliGRAM(s) Oral every 6 hours PRN pain      Allergies    No Known Drug Allergies  zosyn (Drowsiness)    Intolerances        LABS:                        13.9   9.0   )-----------( 192      ( 08 Jan 2018 08:47 )             43.7     01-08    137  |  102  |  14  ----------------------------<  106<H>  4.1   |  29  |  0.64    Ca    8.5      08 Jan 2018 08:47    TPro  7.4  /  Alb  2.8<L>  /  TBili  1.0  /  DBili  x   /  AST  25  /  ALT  25  /  AlkPhos  86  01-08              CAPILLARY BLOOD GLUCOSE            RADIOLOGY & ADDITIONAL TESTS:    CXR:    Ct scan chest:    ekg;    echo: Patient is a 77y old Male who presents with a chief complaint of Fall.   Awake, alert, comfortable in NAD. Resting comfortable in bed in NAD. Awaiting rehab placement.        INTERVAL HPI/OVERNIGHT EVENTS:      VITAL SIGNS:  T(F): 97.8 (01-08-18 @ 08:56)  HR: 63 (01-08-18 @ 08:56)  BP: 128/72 (01-08-18 @ 06:16)  RR: 16 (01-08-18 @ 06:16)  SpO2: 100% (01-08-18 @ 06:16)  Wt(kg): --  I&O's Detail          REVIEW OF SYSTEMS:    CONSTITUTIONAL:  No fevers, chills, sweats    HEENT:  Eyes:  No diplopia or blurred vision. ENT:  No earache, sore throat or runny nose.    CARDIOVASCULAR:  No pressure, squeezing, tightness, or heaviness about the chest; no palpitations.    RESPIRATORY:  Per HPI    GASTROINTESTINAL:  No abdominal pain, nausea, vomiting or diarrhea.    GENITOURINARY:  No dysuria, frequency or urgency.    NEUROLOGIC:  No paresthesias, fasciculations, seizures or weakness.    PSYCHIATRIC:  No disorder of thought or mood.      PHYSICAL EXAM:    Constitutional: Well developed and nourished  Eyes:Perrla  ENMT: normal  Neck:supple  Respiratory: good air entry  Cardiovascular: S1 S2 regular  Gastrointestinal: Soft, Non tender  Extremities: No edema  Vascular:normal  Neurological:Awake, alert,Ox3  Musculoskeletal:Normal      MEDICATIONS  (STANDING):  ALBUTerol/ipratropium for Nebulization 3 milliLiter(s) Nebulizer every 6 hours  aspirin enteric coated 81 milliGRAM(s) Oral daily  cholecalciferol 1000 Unit(s) Oral daily  cyanocobalamin 100 MICROGram(s) Oral daily  dabigatran 150 milliGRAM(s) Oral every 12 hours  docusate sodium 100 milliGRAM(s) Oral two times a day  donepezil 10 milliGRAM(s) Oral at bedtime  famotidine    Tablet 20 milliGRAM(s) Oral two times a day  lidocaine   Patch 1 Patch Transdermal daily  pantoprazole    Tablet 40 milliGRAM(s) Oral before breakfast  tamsulosin 0.8 milliGRAM(s) Oral at bedtime  tiotropium 18 MICROgram(s) Capsule 1 Capsule(s) Inhalation daily    MEDICATIONS  (PRN):  acetaminophen   Tablet 650 milliGRAM(s) Oral every 6 hours PRN pain      Allergies    No Known Drug Allergies  zosyn (Drowsiness)    Intolerances        LABS:                        13.9   9.0   )-----------( 192      ( 08 Jan 2018 08:47 )             43.7     01-08    137  |  102  |  14  ----------------------------<  106<H>  4.1   |  29  |  0.64    Ca    8.5      08 Jan 2018 08:47    TPro  7.4  /  Alb  2.8<L>  /  TBili  1.0  /  DBili  x   /  AST  25  /  ALT  25  /  AlkPhos  86  01-08              CAPILLARY BLOOD GLUCOSE            RADIOLOGY & ADDITIONAL TESTS:    CXR:    Ct scan chest:    ekg;    echo:

## 2018-01-08 NOTE — PROGRESS NOTE ADULT - PROBLEM SELECTOR PROBLEM 5
GERD (gastroesophageal reflux disease)
Need for prophylactic measure
Atrial fibrillation

## 2018-01-08 NOTE — PROGRESS NOTE ADULT - PROBLEM SELECTOR PLAN 4
PPI  GI eval
lasix prn  ECHO  cardio eval
lasix prn  cardio eval
lasix prn  cardio follow up
lasix prn  cardio follow up
Stable as per CT   Continue with oxygen supplementation and duoneb PRN  Pulmonology Dr. Montes following
Stable as per CT   Continue with oxygen supplementation and duoneb PRN  Pulmonology Dr. Montes following
pulm kimmie noted  c/w Bronchodilators prn  oxygen supp.

## 2018-01-08 NOTE — PROGRESS NOTE ADULT - PROBLEM SELECTOR PLAN 2
Bronchodilators  steroids  oxygen supp  spiriva
Likely 2/2 to viral bronchitis  RVP- positive for corona virus  - c/w duonebs  - CT chest shows no acute infilitrate, hold off on abx  -pulm eval for follow up given pulm fibrosis and effusion noted  - BRIEN, c/w bipap hs
Likely secondary to viral bronchitis  RVP positive for coronavirus  Continue with duoneb  Pulmonology Dr. Schaeffer following
Likely secondary to viral bronchitis  RVP positive for coronavirus  Continue with duoneb  Pulmonology Dr. Schaeffer following

## 2018-01-08 NOTE — PROGRESS NOTE ADULT - PROBLEM SELECTOR PROBLEM 4
GERD (gastroesophageal reflux disease)
R/O CHF, left ventricular
Pulmonary fibrosis

## 2018-01-08 NOTE — PROGRESS NOTE ADULT - PROBLEM SELECTOR PROBLEM 3
BRIEN (obstructive sleep apnea)
R/O CHF, left ventricular
Pleural effusion
Pleural effusion
R/O CHF, left ventricular

## 2018-01-08 NOTE — PROGRESS NOTE ADULT - PROBLEM SELECTOR PLAN 3
Bipap at night  PFTs as OP
lasix prn  ECHO  cardio eval
CT chest revealed stable left-sided pleural effusion; BNP 1249  S/p 40mg IV lasix in ED  TTE 8/2017 showed no evidence of LV systolic dysfunction  F/U repeat TTE
CT chest revealed stable left-sided pleural effusion; BNP 1249  S/p 40mg IV lasix in ED  TTE 8/2017 showed no evidence of LV systolic dysfunction  F/U repeat TTE
f/u echocardiogram results

## 2018-01-08 NOTE — PROGRESS NOTE ADULT - PROBLEM SELECTOR PROBLEM 2
COPD (chronic obstructive pulmonary disease)

## 2018-01-08 NOTE — PROGRESS NOTE ADULT - PROBLEM SELECTOR PROBLEM 1
Pulmonary fibrosis
Fall

## 2018-02-09 ENCOUNTER — APPOINTMENT (OUTPATIENT)
Dept: PULMONOLOGY | Facility: CLINIC | Age: 78
End: 2018-02-09

## 2018-03-01 ENCOUNTER — APPOINTMENT (OUTPATIENT)
Dept: PULMONOLOGY | Facility: CLINIC | Age: 78
End: 2018-03-01

## 2018-03-12 ENCOUNTER — APPOINTMENT (OUTPATIENT)
Dept: PULMONOLOGY | Facility: CLINIC | Age: 78
End: 2018-03-12
Payer: MEDICARE

## 2018-03-12 VITALS
HEART RATE: 67 BPM | RESPIRATION RATE: 14 BRPM | DIASTOLIC BLOOD PRESSURE: 60 MMHG | BODY MASS INDEX: 27.63 KG/M2 | SYSTOLIC BLOOD PRESSURE: 110 MMHG | HEIGHT: 72 IN | WEIGHT: 204 LBS | OXYGEN SATURATION: 97 %

## 2018-03-12 PROCEDURE — 99214 OFFICE O/P EST MOD 30 MIN: CPT | Mod: 25

## 2018-03-12 PROCEDURE — 94010 BREATHING CAPACITY TEST: CPT

## 2018-03-12 RX ORDER — FAMOTIDINE 20 MG/1
20 TABLET, FILM COATED ORAL
Qty: 60 | Refills: 0 | Status: ACTIVE | COMMUNITY
Start: 2018-02-23

## 2018-03-12 RX ORDER — BUDESONIDE 0.5 MG/2ML
0.5 INHALANT ORAL
Qty: 60 | Refills: 5 | Status: ACTIVE | COMMUNITY
Start: 2018-03-12 | End: 1900-01-01

## 2018-03-12 RX ORDER — LIDOCAINE 5% 700 MG/1
5 PATCH TOPICAL
Qty: 30 | Refills: 0 | Status: ACTIVE | COMMUNITY
Start: 2018-02-23

## 2018-03-12 RX ORDER — DONEPEZIL HYDROCHLORIDE 10 MG/1
10 TABLET ORAL
Qty: 2 | Refills: 0 | Status: ACTIVE | COMMUNITY
Start: 2017-11-01

## 2018-03-12 RX ORDER — CHLORHEXIDINE GLUCONATE 4 %
1000 LIQUID (ML) TOPICAL
Qty: 60 | Refills: 0 | Status: ACTIVE | COMMUNITY
Start: 2018-02-23

## 2018-03-12 RX ORDER — ASPIRIN 81 MG
81 TABLET,CHEWABLE ORAL
Qty: 30 | Refills: 0 | Status: ACTIVE | COMMUNITY
Start: 2018-02-23

## 2018-03-12 RX ORDER — RESVER/WINE/BFL/GRPSD/PC/C/POM 200MG-60MG
25 MCG CAPSULE ORAL
Qty: 30 | Refills: 0 | Status: ACTIVE | COMMUNITY
Start: 2018-02-23

## 2018-03-13 ENCOUNTER — MEDICATION RENEWAL (OUTPATIENT)
Age: 78
End: 2018-03-13

## 2018-03-13 RX ORDER — ACLIDINIUM BROMIDE 400 UG/1
400 POWDER, METERED RESPIRATORY (INHALATION) TWICE DAILY
Qty: 3 | Refills: 1 | Status: ACTIVE | COMMUNITY
Start: 2018-03-12 | End: 1900-01-01

## 2018-04-12 ENCOUNTER — OUTPATIENT (OUTPATIENT)
Dept: OUTPATIENT SERVICES | Facility: HOSPITAL | Age: 78
LOS: 1 days | End: 2018-04-12
Payer: MEDICARE

## 2018-04-12 ENCOUNTER — APPOINTMENT (OUTPATIENT)
Dept: ULTRASOUND IMAGING | Facility: IMAGING CENTER | Age: 78
End: 2018-04-12
Payer: MEDICARE

## 2018-04-12 DIAGNOSIS — Z00.8 ENCOUNTER FOR OTHER GENERAL EXAMINATION: ICD-10-CM

## 2018-04-12 DIAGNOSIS — Z87.19 PERSONAL HISTORY OF OTHER DISEASES OF THE DIGESTIVE SYSTEM: Chronic | ICD-10-CM

## 2018-04-12 DIAGNOSIS — Z90.49 ACQUIRED ABSENCE OF OTHER SPECIFIED PARTS OF DIGESTIVE TRACT: Chronic | ICD-10-CM

## 2018-04-12 PROCEDURE — 76700 US EXAM ABDOM COMPLETE: CPT | Mod: 26

## 2018-04-12 PROCEDURE — 76700 US EXAM ABDOM COMPLETE: CPT

## 2018-05-14 ENCOUNTER — APPOINTMENT (OUTPATIENT)
Dept: CT IMAGING | Facility: IMAGING CENTER | Age: 78
End: 2018-05-14
Payer: MEDICARE

## 2018-05-14 ENCOUNTER — OUTPATIENT (OUTPATIENT)
Dept: OUTPATIENT SERVICES | Facility: HOSPITAL | Age: 78
LOS: 1 days | End: 2018-05-14
Payer: MEDICARE

## 2018-05-14 DIAGNOSIS — D47.9 NEOPLASM OF UNCERTAIN BEHAVIOR OF LYMPHOID, HEMATOPOIETIC AND RELATED TISSUE, UNSPECIFIED: ICD-10-CM

## 2018-05-14 DIAGNOSIS — D72.820 LYMPHOCYTOSIS (SYMPTOMATIC): ICD-10-CM

## 2018-05-14 DIAGNOSIS — Z87.19 PERSONAL HISTORY OF OTHER DISEASES OF THE DIGESTIVE SYSTEM: Chronic | ICD-10-CM

## 2018-05-14 DIAGNOSIS — Z90.49 ACQUIRED ABSENCE OF OTHER SPECIFIED PARTS OF DIGESTIVE TRACT: Chronic | ICD-10-CM

## 2018-05-14 PROCEDURE — 74177 CT ABD & PELVIS W/CONTRAST: CPT

## 2018-05-14 PROCEDURE — 74177 CT ABD & PELVIS W/CONTRAST: CPT | Mod: 26

## 2018-05-14 PROCEDURE — 82565 ASSAY OF CREATININE: CPT

## 2018-05-24 ENCOUNTER — MEDICATION RENEWAL (OUTPATIENT)
Age: 78
End: 2018-05-24

## 2018-07-19 ENCOUNTER — APPOINTMENT (OUTPATIENT)
Dept: PULMONOLOGY | Facility: CLINIC | Age: 78
End: 2018-07-19
Payer: MEDICARE

## 2018-07-19 VITALS
HEIGHT: 72 IN | OXYGEN SATURATION: 94 % | WEIGHT: 205 LBS | SYSTOLIC BLOOD PRESSURE: 105 MMHG | HEART RATE: 80 BPM | BODY MASS INDEX: 27.77 KG/M2 | DIASTOLIC BLOOD PRESSURE: 70 MMHG

## 2018-07-19 DIAGNOSIS — K21.9 GASTRO-ESOPHAGEAL REFLUX DISEASE W/OUT ESOPHAGITIS: ICD-10-CM

## 2018-07-19 DIAGNOSIS — J44.9 CHRONIC OBSTRUCTIVE PULMONARY DISEASE, UNSPECIFIED: ICD-10-CM

## 2018-07-19 DIAGNOSIS — R26.9 UNSPECIFIED ABNORMALITIES OF GAIT AND MOBILITY: ICD-10-CM

## 2018-07-19 DIAGNOSIS — E66.9 OBESITY, UNSPECIFIED: ICD-10-CM

## 2018-07-19 DIAGNOSIS — G47.33 OBSTRUCTIVE SLEEP APNEA (ADULT) (PEDIATRIC): ICD-10-CM

## 2018-07-19 DIAGNOSIS — R06.02 SHORTNESS OF BREATH: ICD-10-CM

## 2018-07-19 DIAGNOSIS — J84.9 INTERSTITIAL PULMONARY DISEASE, UNSPECIFIED: ICD-10-CM

## 2018-07-19 PROBLEM — N39.0 URINARY TRACT INFECTION, SITE NOT SPECIFIED: Chronic | Status: ACTIVE | Noted: 2017-08-06

## 2018-07-19 PROBLEM — I48.91 UNSPECIFIED ATRIAL FIBRILLATION: Chronic | Status: ACTIVE | Noted: 2017-08-06

## 2018-07-19 PROBLEM — I45.10 UNSPECIFIED RIGHT BUNDLE-BRANCH BLOCK: Chronic | Status: ACTIVE | Noted: 2018-01-01

## 2018-07-19 PROBLEM — F03.90 UNSPECIFIED DEMENTIA WITHOUT BEHAVIORAL DISTURBANCE: Chronic | Status: ACTIVE | Noted: 2017-08-06

## 2018-07-19 PROBLEM — J84.10 PULMONARY FIBROSIS, UNSPECIFIED: Chronic | Status: ACTIVE | Noted: 2018-01-01

## 2018-07-19 PROCEDURE — 99214 OFFICE O/P EST MOD 30 MIN: CPT | Mod: 25

## 2018-07-19 PROCEDURE — 94060 EVALUATION OF WHEEZING: CPT

## 2018-07-19 PROCEDURE — 94727 GAS DIL/WSHOT DETER LNG VOL: CPT

## 2018-07-19 RX ORDER — LEVALBUTEROL HYDROCHLORIDE 0.63 MG/3ML
0.63 SOLUTION RESPIRATORY (INHALATION)
Qty: 120 | Refills: 3 | Status: ACTIVE | COMMUNITY
Start: 2018-07-19 | End: 1900-01-01

## 2018-07-19 RX ORDER — LEVALBUTEROL HYDROCHLORIDE 1.25 MG/3ML
1.25 SOLUTION RESPIRATORY (INHALATION)
Qty: 90 | Refills: 2 | Status: DISCONTINUED | COMMUNITY
Start: 2017-10-09 | End: 2018-07-19

## 2018-08-10 ENCOUNTER — MEDICATION RENEWAL (OUTPATIENT)
Age: 78
End: 2018-08-10

## 2018-08-15 ENCOUNTER — RX RENEWAL (OUTPATIENT)
Age: 78
End: 2018-08-15

## 2018-08-15 RX ORDER — LEVALBUTEROL HYDROCHLORIDE 0.63 MG/3ML
0.63 SOLUTION RESPIRATORY (INHALATION)
Qty: 120 | Refills: 3 | Status: ACTIVE | COMMUNITY
Start: 2018-08-15 | End: 1900-01-01

## 2018-09-12 ENCOUNTER — INPATIENT (INPATIENT)
Facility: HOSPITAL | Age: 78
LOS: 7 days | Discharge: NURSING, REHAB OR LONG-TERM CARE | DRG: 884 | End: 2018-09-20
Attending: INTERNAL MEDICINE | Admitting: INTERNAL MEDICINE
Payer: MEDICARE

## 2018-09-12 VITALS
SYSTOLIC BLOOD PRESSURE: 138 MMHG | WEIGHT: 210.1 LBS | TEMPERATURE: 98 F | OXYGEN SATURATION: 98 % | DIASTOLIC BLOOD PRESSURE: 92 MMHG | HEART RATE: 91 BPM | RESPIRATION RATE: 17 BRPM

## 2018-09-12 DIAGNOSIS — Z90.49 ACQUIRED ABSENCE OF OTHER SPECIFIED PARTS OF DIGESTIVE TRACT: Chronic | ICD-10-CM

## 2018-09-12 DIAGNOSIS — I48.91 UNSPECIFIED ATRIAL FIBRILLATION: ICD-10-CM

## 2018-09-12 DIAGNOSIS — R41.0 DISORIENTATION, UNSPECIFIED: ICD-10-CM

## 2018-09-12 DIAGNOSIS — F39 UNSPECIFIED MOOD [AFFECTIVE] DISORDER: ICD-10-CM

## 2018-09-12 DIAGNOSIS — J44.9 CHRONIC OBSTRUCTIVE PULMONARY DISEASE, UNSPECIFIED: ICD-10-CM

## 2018-09-12 DIAGNOSIS — G47.33 OBSTRUCTIVE SLEEP APNEA (ADULT) (PEDIATRIC): ICD-10-CM

## 2018-09-12 DIAGNOSIS — Z87.19 PERSONAL HISTORY OF OTHER DISEASES OF THE DIGESTIVE SYSTEM: Chronic | ICD-10-CM

## 2018-09-12 DIAGNOSIS — Z29.9 ENCOUNTER FOR PROPHYLACTIC MEASURES, UNSPECIFIED: ICD-10-CM

## 2018-09-12 DIAGNOSIS — N17.9 ACUTE KIDNEY FAILURE, UNSPECIFIED: ICD-10-CM

## 2018-09-12 LAB
ALBUMIN SERPL ELPH-MCNC: 2.9 G/DL — LOW (ref 3.5–5)
ALP SERPL-CCNC: 94 U/L — SIGNIFICANT CHANGE UP (ref 40–120)
ALT FLD-CCNC: 15 U/L DA — SIGNIFICANT CHANGE UP (ref 10–60)
AMMONIA BLD-MCNC: 16 UMOL/L — SIGNIFICANT CHANGE UP (ref 11–32)
ANION GAP SERPL CALC-SCNC: 12 MMOL/L — SIGNIFICANT CHANGE UP (ref 5–17)
APPEARANCE UR: CLEAR — SIGNIFICANT CHANGE UP
AST SERPL-CCNC: 18 U/L — SIGNIFICANT CHANGE UP (ref 10–40)
BASE EXCESS BLDV CALC-SCNC: 2 MMOL/L — SIGNIFICANT CHANGE UP (ref -2–2)
BASOPHILS # BLD AUTO: 0.1 K/UL — SIGNIFICANT CHANGE UP (ref 0–0.2)
BASOPHILS NFR BLD AUTO: 1 % — SIGNIFICANT CHANGE UP (ref 0–2)
BILIRUB SERPL-MCNC: 0.7 MG/DL — SIGNIFICANT CHANGE UP (ref 0.2–1.2)
BILIRUB UR-MCNC: NEGATIVE — SIGNIFICANT CHANGE UP
BUN SERPL-MCNC: 18 MG/DL — SIGNIFICANT CHANGE UP (ref 7–18)
CALCIUM SERPL-MCNC: 8.6 MG/DL — SIGNIFICANT CHANGE UP (ref 8.4–10.5)
CHLORIDE SERPL-SCNC: 104 MMOL/L — SIGNIFICANT CHANGE UP (ref 96–108)
CO2 SERPL-SCNC: 25 MMOL/L — SIGNIFICANT CHANGE UP (ref 22–31)
COLOR SPEC: YELLOW — SIGNIFICANT CHANGE UP
CREAT SERPL-MCNC: 1.8 MG/DL — HIGH (ref 0.5–1.3)
DIFF PNL FLD: ABNORMAL
EOSINOPHIL # BLD AUTO: 0.1 K/UL — SIGNIFICANT CHANGE UP (ref 0–0.5)
EOSINOPHIL NFR BLD AUTO: 0.8 % — SIGNIFICANT CHANGE UP (ref 0–6)
GLUCOSE SERPL-MCNC: 106 MG/DL — HIGH (ref 70–99)
GLUCOSE UR QL: NEGATIVE — SIGNIFICANT CHANGE UP
HCO3 BLDV-SCNC: 26 MMOL/L — SIGNIFICANT CHANGE UP (ref 21–29)
HCT VFR BLD CALC: 39 % — SIGNIFICANT CHANGE UP (ref 39–50)
HGB BLD-MCNC: 12.9 G/DL — LOW (ref 13–17)
HOROWITZ INDEX BLDV+IHG-RTO: 21 — SIGNIFICANT CHANGE UP
KETONES UR-MCNC: NEGATIVE — SIGNIFICANT CHANGE UP
LEUKOCYTE ESTERASE UR-ACNC: NEGATIVE — SIGNIFICANT CHANGE UP
LIDOCAIN IGE QN: 70 U/L — LOW (ref 73–393)
LYMPHOCYTES # BLD AUTO: 23.6 % — SIGNIFICANT CHANGE UP (ref 13–44)
LYMPHOCYTES # BLD AUTO: 3 K/UL — SIGNIFICANT CHANGE UP (ref 1–3.3)
MCHC RBC-ENTMCNC: 30.9 PG — SIGNIFICANT CHANGE UP (ref 27–34)
MCHC RBC-ENTMCNC: 32.9 GM/DL — SIGNIFICANT CHANGE UP (ref 32–36)
MCV RBC AUTO: 93.8 FL — SIGNIFICANT CHANGE UP (ref 80–100)
MONOCYTES # BLD AUTO: 0.8 K/UL — SIGNIFICANT CHANGE UP (ref 0–0.9)
MONOCYTES NFR BLD AUTO: 6.4 % — SIGNIFICANT CHANGE UP (ref 2–14)
NEUTROPHILS # BLD AUTO: 8.8 K/UL — HIGH (ref 1.8–7.4)
NEUTROPHILS NFR BLD AUTO: 68.2 % — SIGNIFICANT CHANGE UP (ref 43–77)
NITRITE UR-MCNC: NEGATIVE — SIGNIFICANT CHANGE UP
PCO2 BLDV: 39 MMHG — SIGNIFICANT CHANGE UP (ref 35–50)
PH BLDV: 7.44 — SIGNIFICANT CHANGE UP (ref 7.35–7.45)
PH UR: 6 — SIGNIFICANT CHANGE UP (ref 5–8)
PLATELET # BLD AUTO: 225 K/UL — SIGNIFICANT CHANGE UP (ref 150–400)
PO2 BLDV: SIGNIFICANT CHANGE UP MMHG (ref 25–45)
POTASSIUM SERPL-MCNC: 4.1 MMOL/L — SIGNIFICANT CHANGE UP (ref 3.5–5.3)
POTASSIUM SERPL-SCNC: 4.1 MMOL/L — SIGNIFICANT CHANGE UP (ref 3.5–5.3)
PROT SERPL-MCNC: 8 G/DL — SIGNIFICANT CHANGE UP (ref 6–8.3)
PROT UR-MCNC: NEGATIVE — SIGNIFICANT CHANGE UP
RBC # BLD: 4.16 M/UL — LOW (ref 4.2–5.8)
RBC # FLD: 12.4 % — SIGNIFICANT CHANGE UP (ref 10.3–14.5)
SAO2 % BLDV: 74 % — SIGNIFICANT CHANGE UP (ref 67–88)
SODIUM SERPL-SCNC: 141 MMOL/L — SIGNIFICANT CHANGE UP (ref 135–145)
SP GR SPEC: 1.01 — SIGNIFICANT CHANGE UP (ref 1.01–1.02)
TROPONIN I SERPL-MCNC: 0.02 NG/ML — SIGNIFICANT CHANGE UP (ref 0–0.04)
UROBILINOGEN FLD QL: NEGATIVE — SIGNIFICANT CHANGE UP
WBC # BLD: 12.9 K/UL — HIGH (ref 3.8–10.5)
WBC # FLD AUTO: 12.9 K/UL — HIGH (ref 3.8–10.5)

## 2018-09-12 PROCEDURE — 71045 X-RAY EXAM CHEST 1 VIEW: CPT | Mod: 26

## 2018-09-12 PROCEDURE — 70450 CT HEAD/BRAIN W/O DYE: CPT | Mod: 26

## 2018-09-12 PROCEDURE — 99285 EMERGENCY DEPT VISIT HI MDM: CPT

## 2018-09-12 RX ORDER — ATORVASTATIN CALCIUM 80 MG/1
20 TABLET, FILM COATED ORAL AT BEDTIME
Qty: 0 | Refills: 0 | Status: DISCONTINUED | OUTPATIENT
Start: 2018-09-12 | End: 2018-09-20

## 2018-09-12 RX ORDER — CEFTRIAXONE 500 MG/1
1 INJECTION, POWDER, FOR SOLUTION INTRAMUSCULAR; INTRAVENOUS ONCE
Qty: 0 | Refills: 0 | Status: COMPLETED | OUTPATIENT
Start: 2018-09-12 | End: 2018-09-12

## 2018-09-12 RX ORDER — ESCITALOPRAM OXALATE 10 MG/1
10 TABLET, FILM COATED ORAL DAILY
Qty: 0 | Refills: 0 | Status: DISCONTINUED | OUTPATIENT
Start: 2018-09-12 | End: 2018-09-20

## 2018-09-12 RX ORDER — IPRATROPIUM/ALBUTEROL SULFATE 18-103MCG
3 AEROSOL WITH ADAPTER (GRAM) INHALATION EVERY 6 HOURS
Qty: 0 | Refills: 0 | Status: DISCONTINUED | OUTPATIENT
Start: 2018-09-12 | End: 2018-09-20

## 2018-09-12 RX ORDER — SODIUM CHLORIDE 9 MG/ML
1000 INJECTION INTRAMUSCULAR; INTRAVENOUS; SUBCUTANEOUS ONCE
Qty: 0 | Refills: 0 | Status: COMPLETED | OUTPATIENT
Start: 2018-09-12 | End: 2018-09-12

## 2018-09-12 RX ORDER — APIXABAN 2.5 MG/1
5 TABLET, FILM COATED ORAL EVERY 12 HOURS
Qty: 0 | Refills: 0 | Status: DISCONTINUED | OUTPATIENT
Start: 2018-09-12 | End: 2018-09-17

## 2018-09-12 RX ORDER — ASPIRIN/CALCIUM CARB/MAGNESIUM 324 MG
81 TABLET ORAL DAILY
Qty: 0 | Refills: 0 | Status: DISCONTINUED | OUTPATIENT
Start: 2018-09-12 | End: 2018-09-20

## 2018-09-12 RX ORDER — CEFTRIAXONE 500 MG/1
1000 INJECTION, POWDER, FOR SOLUTION INTRAMUSCULAR; INTRAVENOUS ONCE
Qty: 0 | Refills: 0 | Status: DISCONTINUED | OUTPATIENT
Start: 2018-09-12 | End: 2018-09-12

## 2018-09-12 RX ORDER — CEFTRIAXONE 500 MG/1
1 INJECTION, POWDER, FOR SOLUTION INTRAMUSCULAR; INTRAVENOUS EVERY 24 HOURS
Qty: 0 | Refills: 0 | Status: DISCONTINUED | OUTPATIENT
Start: 2018-09-12 | End: 2018-09-18

## 2018-09-12 RX ORDER — HEPARIN SODIUM 5000 [USP'U]/ML
5000 INJECTION INTRAVENOUS; SUBCUTANEOUS EVERY 8 HOURS
Qty: 0 | Refills: 0 | Status: DISCONTINUED | OUTPATIENT
Start: 2018-09-12 | End: 2018-09-12

## 2018-09-12 RX ADMIN — Medication 3 MILLILITER(S): at 20:44

## 2018-09-12 RX ADMIN — SODIUM CHLORIDE 4000 MILLILITER(S): 9 INJECTION INTRAMUSCULAR; INTRAVENOUS; SUBCUTANEOUS at 10:56

## 2018-09-12 RX ADMIN — SODIUM CHLORIDE 1000 MILLILITER(S): 9 INJECTION INTRAMUSCULAR; INTRAVENOUS; SUBCUTANEOUS at 11:30

## 2018-09-12 RX ADMIN — SODIUM CHLORIDE 1000 MILLILITER(S): 9 INJECTION INTRAMUSCULAR; INTRAVENOUS; SUBCUTANEOUS at 13:08

## 2018-09-12 RX ADMIN — ATORVASTATIN CALCIUM 20 MILLIGRAM(S): 80 TABLET, FILM COATED ORAL at 22:41

## 2018-09-12 RX ADMIN — SODIUM CHLORIDE 4000 MILLILITER(S): 9 INJECTION INTRAMUSCULAR; INTRAVENOUS; SUBCUTANEOUS at 11:32

## 2018-09-12 RX ADMIN — SODIUM CHLORIDE 1000 MILLILITER(S): 9 INJECTION INTRAMUSCULAR; INTRAVENOUS; SUBCUTANEOUS at 12:30

## 2018-09-12 RX ADMIN — APIXABAN 5 MILLIGRAM(S): 2.5 TABLET, FILM COATED ORAL at 18:32

## 2018-09-12 RX ADMIN — CEFTRIAXONE 1 GRAM(S): 500 INJECTION, POWDER, FOR SOLUTION INTRAMUSCULAR; INTRAVENOUS at 11:40

## 2018-09-12 RX ADMIN — Medication 3 MILLILITER(S): at 15:26

## 2018-09-12 RX ADMIN — SODIUM CHLORIDE 4000 MILLILITER(S): 9 INJECTION INTRAMUSCULAR; INTRAVENOUS; SUBCUTANEOUS at 11:31

## 2018-09-12 RX ADMIN — Medication 81 MILLIGRAM(S): at 22:41

## 2018-09-12 RX ADMIN — CEFTRIAXONE 100 GRAM(S): 500 INJECTION, POWDER, FOR SOLUTION INTRAMUSCULAR; INTRAVENOUS at 10:58

## 2018-09-12 NOTE — H&P ADULT - ASSESSMENT
78 Male from home, walks with walker having PMH of Afib (Eliquis), COPD, BRIEN (Cpap at night), CHFpEF, GERD, HLD, Pulmonary fibrosis, RBBB, Chronic B/L LE lymphedema, dementia, recurrent UTIs and multiple falls came in with acute change in mental status. Likely Delirium with underlying dementia.

## 2018-09-12 NOTE — H&P ADULT - PROBLEM SELECTOR PLAN 1
Acute worsening of mental status overnight with underlying dementia  CT head: Normal  Pt is recurrent history of UTIs in past however UA here is negative  Started Rocephin and wait until Urine Cx results  F/u Blood Cx  F/u Drug screen and ammonia levels Acute worsening of mental status overnight with underlying dementia  CT head: Normal  No focal deficit and Normal pupil reflex, less likely stroke   Pt is recurrent history of UTIs in past however UA here is negative  Started Rocephin and wait until Urine Cx results  F/u Blood Cx  F/u Drug screen and ammonia levels Acute worsening of mental status overnight with underlying dementia  CT head: Normal  No focal deficit and Normal pupil reflex, less likely stroke   Pt is recurrent history of UTIs in past however UA here is negative  Mild Leukocytosis  Started Rocephin and wait until Urine Cx results  F/u Blood Cx  F/u Drug screen and ammonia levels

## 2018-09-12 NOTE — H&P ADULT - PROBLEM SELECTOR PLAN 3
History of Pulmonary fibrosis  Currently stable  C/w bronchodilators Pt used Cpap at night  Recently his machine broke down  Lack of sleep can lead to delirium  Will need new Cpap machine at discharge

## 2018-09-12 NOTE — ED ADULT NURSE NOTE - ED STAT RN HANDOFF DETAILS
report given to ELMIRA Gann on [facility] room 615 in stable condition for continuation of care. pt a&ox2, to self and place. admitted for dehydration and delirium. 20G right arm. family at bedside.

## 2018-09-12 NOTE — ED ADULT NURSE NOTE - NSIMPLEMENTINTERV_GEN_ALL_ED
Implemented All Fall Risk Interventions:  Herod to call system. Call bell, personal items and telephone within reach. Instruct patient to call for assistance. Room bathroom lighting operational. Non-slip footwear when patient is off stretcher. Physically safe environment: no spills, clutter or unnecessary equipment. Stretcher in lowest position, wheels locked, appropriate side rails in place. Provide visual cue, wrist band, yellow gown, etc. Monitor gait and stability. Monitor for mental status changes and reorient to person, place, and time. Review medications for side effects contributing to fall risk. Reinforce activity limits and safety measures with patient and family.

## 2018-09-12 NOTE — CONSULT NOTE ADULT - PROBLEM SELECTOR RECOMMENDATION 4
Baseline Cr 0.6  Currently elevated to 1.8  Likely pre-renal due to poor PO intake  Pt received 3 liters Bolus in ED  Holding further hydration due to LE edema

## 2018-09-12 NOTE — PATIENT PROFILE ADULT. - FUNCTIONAL SCREEN CURRENT LEVEL: COMMUNICATION, MLM
(0) understands/communicates without difficulty periods of confusion/(2) difficulty understanding (not related to language barrier)

## 2018-09-12 NOTE — ED PROVIDER NOTE - MEDICAL DECISION MAKING DETAILS
iv, labs, culture, ct head, blood gas, reassess  likely related to dehydration, back to baseline. will screen for infection/ischemia/metabolic derangement, will need admission

## 2018-09-12 NOTE — ED ADULT NURSE NOTE - OBJECTIVE STATEMENT
pt a&ox2 to self and place, BIBEMS from home for AMS. as per wife, pt has been delirious since last night. as per wife, last time pt had these sx he had a UTI. pt denies any pain.

## 2018-09-12 NOTE — H&P ADULT - PROBLEM SELECTOR PLAN 4
Pt used Cpap at night  Recently his machine broke down  Lack of sleep can lead to delirium  Will need new Cpap machine at discharge Baseline Cr 0.6  Currently elevated to 1.8  Likely pre-renal due to poor PO intake  Pt received 3 liters Bolus in ED  Holding further hydration due to LE edema

## 2018-09-12 NOTE — H&P ADULT - HISTORY OF PRESENT ILLNESS
78 Male from home, walks with walker having PMH of Afib (Eliquis), COPD, BRIEN (Cpap at night), GERD, HLD, Pulmonary fibrosis, RBBB, Chronic B/L LE lymphedema, demantia, recurrent UTIs and multiple falls came in with acute change in mental status. Patient is confused, most of the history is by wife. Pt was in [place] 2 weeks ago where he fell and was admitted to hospital where he was started on antibiotics for UTI. After returning to [place], he again had a fall last week and was admitted in [facility] for a day where CT head was normal. Overnight, pt had acute worsening delirium when started having visual hallucinations. Pt denies fever, chest pain, shortness of breath, abdominal discomfort, weight loss, sick contacts, any new medications. Wife has also noticed generalized weakness and painful urination.       PSH: SBO and appendectomy  GOC: Spoke to wife, Full code    ED Course: 78 Male from home, walks with walker having PMH of Afib (Eliquis), COPD, BRIEN (Cpap at night), CHFpEF, GERD, HLD, Pulmonary fibrosis, RBBB, Chronic B/L LE lymphedema, dementia, recurrent UTIs and multiple falls came in with acute change in mental status. Patient is confused, most of the history is by wife. Pt was in [place] 2 weeks ago where he fell and was admitted to hospital where he was started on antibiotics for UTI. After returning to [place], he again had a fall last week and was admitted in [facility] for a day where CT head was normal. Overnight, pt had acute worsening delirium when started having visual hallucinations. Pt denies fever, chest pain, shortness of breath, abdominal discomfort, weight loss, sick contacts, any new medications. Wife has also noticed generalized weakness and painful urination.       PSH: SBO and appendectomy  GOC: Spoke to wife, Full code    ED Course: Hemodynamically stable. Labs showed mild leukocytosis and elevated Cr of 1.8 (Baseline 0.6). UA and CT head was negative. EKG showed Afib @ 80 bpm with T wave inversions in lateral leads. Pt was given 3 Liter bolus and Rocephin.

## 2018-09-12 NOTE — CONSULT NOTE ADULT - SUBJECTIVE AND OBJECTIVE BOX
PULMONARY CONSULT NOTE      SATINDER HARPER  MRN-[id number]    Patient is a 78y old  Male who presents with a chief complaint of Altered mental status (12 Sep 2018 13:45)       History of Present Illness:  Reason for Admission: Altered mental status	  History of Present Illness: 	  78 Male from home, walks with walker having PMH of Afib (Eliquis), COPD, BRIEN (Cpap at night), CHFpEF, GERD, HLD, Pulmonary fibrosis, RBBB, Chronic B/L LE lymphedema, dementia, recurrent UTIs and multiple falls came in with acute change in mental status. Patient is confused, most of the history is by wife. Pt was in [place] 2 weeks ago where he fell and was admitted to hospital where he was started on antibiotics for UTI. After returning to [place], he again had a fall last week and was admitted in [facility] for a day where CT head was normal. Overnight, pt had acute worsening delirium when started having visual hallucinations. Pt denies fever, chest pain, shortness of breath, abdominal discomfort, weight loss, sick contacts, any new medications. Wife has also noticed generalized weakness and painful urination.         HISTORY OF PRESENT ILLNESS: As above , awake , lying in bed in NAD.     MEDICATIONS  (STANDING):  ALBUTerol/ipratropium for Nebulization 3 milliLiter(s) Nebulizer every 6 hours  apixaban 5 milliGRAM(s) Oral every 12 hours  aspirin enteric coated 81 milliGRAM(s) Oral daily  atorvastatin 20 milliGRAM(s) Oral at bedtime  cefTRIAXone   IVPB 1 Gram(s) IV Intermittent every 24 hours  escitalopram 10 milliGRAM(s) Oral daily      MEDICATIONS  (PRN):      Allergies    No Known Drug Allergies  zosyn (Drowsiness)    Intolerances        PAST MEDICAL & SURGICAL HISTORY:  COPD (chronic obstructive pulmonary disease)  Bundle branch block, right  Pulmonary fibrosis  Dementia  Atrial fibrillation  GERD (gastroesophageal reflux disease)  Recurrent UTI  Hyperlipidemia  Obstructive sleep apnea on CPAP  H/O small bowel obstruction  History of appendectomy      FAMILY HISTORY:  Family history of colon cancer (Sibling)  Family history of myocardial infarction (Sibling)      SOCIAL HISTORY  Smoking History:     REVIEW OF SYSTEMS:    CONSTITUTIONAL:  No fevers, chills, sweats    HEENT:  Eyes:  No diplopia or blurred vision. ENT:  No earache, sore throat or runny nose.    CARDIOVASCULAR:  No pressure, squeezing, tightness, or heaviness about the chest; no palpitations.    RESPIRATORY:  Per HPI    GASTROINTESTINAL:  No abdominal pain, nausea, vomiting or diarrhea.    GENITOURINARY:  No dysuria, frequency or urgency.    NEUROLOGIC:  No paresthesias, fasciculations, seizures or weakness.    PSYCHIATRIC:  No disorder of thought or mood.    Vital Signs Last 24 Hrs  T(C): 36.7 (12 Sep 2018 10:39), Max: 36.8 (12 Sep 2018 09:01)  T(F): 98 (12 Sep 2018 10:39), Max: 98.3 (12 Sep 2018 09:01)  HR: 100 (12 Sep 2018 10:39) (91 - 100)  BP: 147/80 (12 Sep 2018 10:39) (138/92 - 147/80)  BP(mean): --  RR: 18 (12 Sep 2018 10:39) (17 - 18)  SpO2: 98% (12 Sep 2018 10:39) (98% - 98%)  I&O's Detail      PHYSICAL EXAMINATION:    GENERAL: The patient is a well-developed, well-nourished _____in no apparent distress.     HEENT: Head is normocephalic and atraumatic. Extraocular muscles are intact. Mucous membranes are moist.     NECK: Supple.     LUNGS: Clear to auscultation without wheezing, rales, or rhonchi. Respirations unlabored    HEART: Regular rate and rhythm without murmur.    ABDOMEN: Soft, nontender, and nondistended.  No hepatosplenomegaly is noted.    EXTREMITIES: + edema.    NEUROLOGIC:  awake  x1       LABS:                        12.9   12.9  )-----------( 225      ( 12 Sep 2018 09:44 )             39.0     12    141  |  104  |  18  ----------------------------<  106<H>  4.1   |  25  |  1.80<H>    Ca    8.6      12 Sep 2018 09:44    TPro  8.0  /  Alb  2.9<L>  /  TBili  0.7  /  DBili  x   /  AST  18  /  ALT  15  /  AlkPhos  94  09-12      Urinalysis Basic - ( 12 Sep 2018 10:20 )    Color: Yellow / Appearance: Clear / S.010 / pH: x  Gluc: x / Ketone: Negative  / Bili: Negative / Urobili: Negative   Blood: x / Protein: Negative / Nitrite: Negative   Leuk Esterase: Negative / RBC: 0-2 /HPF / WBC 3-5 /HPF   Sq Epi: x / Non Sq Epi: Few /HPF / Bacteria: Trace /HPF        CARDIAC MARKERS ( 12 Sep 2018 09:44 )  0.022 ng/mL / x     / x     / x     / x                    MICROBIOLOGY:    RADIOLOGY & ADDITIONAL STUDIES:    CXR:  < from: Xray Chest 1 View-PORTABLE IMMEDIATE (18 @ 13:28) >    COMPARISON STUDY: 2018    Frontal expiratory view of the chest shows the heart to be similarly   enlarged in size. The lungs show increased peripheral markings compatible   with pulmonary fibrosis and there is no evidence of pneumothorax nor   definite pleural effusion. Likely skinfold projects over the left chest.    IMPRESSION:  Pulmonary markings as described.    Thank you for the courtesy of this referral.    < end of copied text >    Ct scan chest:    ekg;    echo:

## 2018-09-12 NOTE — CONSULT NOTE ADULT - PROBLEM SELECTOR RECOMMENDATION 9
Mild Leukocytosis  C/W Rocephin and wait until Urine Cx results  F/u Blood Cx  F/u Drug screen and ammonia levels.

## 2018-09-12 NOTE — ED PROVIDER NOTE - OBJECTIVE STATEMENT
79 y/o M with PMHx of atrial fibrillation, COPD, dementia, GERD, HLD, pulmonary fibrosis and PSHx of appendectomy presents to the ED brought in by wife with altered mental status. Patient was diagnosed with UTI two weeks ago and placed on course of antibiotics, however wife feels as though UTI is not resolved. Wife states patient is with decreased PO intake and decreased urination. Wife states patient was delirious last night saying things that were not consistent with what was occurring. Denies fever or any other complaints. Allergies: Zosyn. 77 y/o M with PMHx of atrial fibrillation, COPD, dementia, GERD, HLD, pulmonary fibrosis and PSHx of appendectomy presents to the ED brought in by wife with altered mental status. Patient was diagnosed with UTI two weeks ago and placed on course of antibiotics, however wife feels as though UTI is not resolved. Wife states patient is with decreased PO intake and decreased urination. Wife states patient was delirious last night saying things that were not consistent with what was occurring, patient having visual hallucinations of people who were not there. Denies fever or any other complaints. Allergies: Zosyn.

## 2018-09-12 NOTE — H&P ADULT - NSHPPHYSICALEXAM_GEN_ALL_CORE
.  GENERAL: Well developed, Elderly confused male   HEENT:  Normocephalic/Atraumatic, reactive light reflex, dry mucous membranes  NECK: Supple, no JVD  RESP: Symmetric movement of the chest, clear to auscultation bilaterally, no wheeze, no rhonchi, no crackles  CVS: + irregular rate and rhythm, S1 and S2 audible, no murmur, rubs or gallops  GI: Normal active bowel sounds present, abdomen soft, non tender, non distended  EXTREMITIES:  2+ edema, no clubbing, cyanosis  MSK: 4/5 strength bilateral upper and lower extremities, intact sensations to light & crude touch  PSYCH: Confused and Delirious     NEURO: alert and oriented x 1

## 2018-09-12 NOTE — PATIENT PROFILE ADULT. - ABILITY TO HEAR (WITH HEARING AID OR HEARING APPLIANCE IF NORMALLY USED):
Mildly to Moderately Impaired: difficulty hearing in some environments or speaker may need to increase volume or speak distinctly/hearing aids at home

## 2018-09-12 NOTE — CONSULT NOTE ADULT - SUBJECTIVE AND OBJECTIVE BOX
CHIEF COMPLAINT:Patient is a 78y old  Male who presents with a chief complaint of Altered mental status.      HPI:  78 yr old  Male from home, walks with walker having PMHX of Afib (Eliquis), COPD, BRIEN (Cpap at night), CHFpEF, GERD, HLD, Pulmonary fibrosis, RBBB, Chronic B/L LE lymphedema, dementia, recurrent UTIs and multiple falls came in with acute change in mental status. Patient is confused, most of the history is by wife. Pt was in [place] 2 weeks ago where he fell and was admitted to hospital where he was started on antibiotics for UTI. After returning to [place], he again had a fall last week and was admitted in [facility] for a day where CT head was normal. Overnight, pt had acute worsening delirium when started having visual hallucinations. Pt denies fever, chest pain, shortness of breath, abdominal discomfort, weight loss, sick contacts, any new medications. Wife has also noticed generalized weakness and painful urination.     ED Course: Hemodynamically stable. Labs showed mild leukocytosis and elevated Cr of 1.8 (Baseline 0.6). UA and CT head was negative. EKG showed Afib @ 80 bpm with T wave inversions in lateral leads. Pt was given 3 Liter bolus and Rocephin. (12 Sep 2018 12:33)    PAST MEDICAL & SURGICAL HISTORY:  COPD (chronic obstructive pulmonary disease)  Bundle branch block, right  Pulmonary fibrosis  Dementia  Atrial fibrillation  GERD (gastroesophageal reflux disease)  Recurrent UTI  Hyperlipidemia  Obstructive sleep apnea on CPAP  H/O small bowel obstruction  History of appendectomy      MEDICATIONS  (STANDING):  ALBUTerol/ipratropium for Nebulization 3 milliLiter(s) Nebulizer every 6 hours  apixaban 5 milliGRAM(s) Oral every 12 hours  aspirin enteric coated 81 milliGRAM(s) Oral daily  atorvastatin 20 milliGRAM(s) Oral at bedtime  cefTRIAXone   IVPB 1 Gram(s) IV Intermittent every 24 hours  escitalopram 10 milliGRAM(s) Oral daily    MEDICATIONS  (PRN):      FAMILY HISTORY:  Family history of colon cancer (Sibling)  Family history of myocardial infarction (Sibling)      SOCIAL HISTORY:    [x ] Non-smoker    [x ] Alcohol-denies    Allergies    No Known Drug Allergies  zosyn (Drowsiness)    Intolerances    	    REVIEW OF SYSTEMS:  	  [ x] Unable to obtain    PHYSICAL EXAM:  T(C): 36.7 (09-12-18 @ 10:39), Max: 36.8 (09-12-18 @ 09:01)  HR: 100 (09-12-18 @ 10:39) (91 - 100)  BP: 147/80 (09-12-18 @ 10:39) (138/92 - 147/80)  RR: 18 (09-12-18 @ 10:39) (17 - 18)  SpO2: 98% (09-12-18 @ 10:39) (98% - 98%)      Appearance: Normal	  HEENT:   Normal oral mucosa, PERRL, EOMI	  Lymphatic: No lymphadenopathy  Cardiovascular: Normal S1 S2, No JVD, No murmurs, +1 edema  Respiratory: Lungs clear to auscultation	  Gastrointestinal:  Soft, Non-tender, + BS	  Skin: No rashes, No ecchymoses, No cyanosis	  Neurologic: Non-focal  Extremities: Normal range of motion, No clubbing, cyanosis +1 edema  Vascular: Peripheral pulses palpable 2+ bilaterally    	    ECG:  	afb with rbbb  	  LABS:	 	    CARDIAC MARKERS:  CARDIAC MARKERS ( 12 Sep 2018 09:44 )  0.022 ng/mL / x     / x     / x     / x                                  12.9   12.9  )-----------( 225      ( 12 Sep 2018 09:44 )             39.0     09-12    141  |  104  |  18  ----------------------------<  106<H>  4.1   |  25  |  1.80<H>    Ca    8.6      12 Sep 2018 09:44    TPro  8.0  /  Alb  2.9<L>  /  TBili  0.7  /  DBili  x   /  AST  18  /  ALT  15  /  AlkPhos  94  09-12        PROCEDURE DATE:  09/12/2018          INTERPRETATION:  CT HEAD WITHOUT CONTRAST    INDICATION: 78 years old. Male. confusion.     COMPARISON: 8/8/2017.    TECHNIQUE: Noncontrast axial CT head was obtained from the skull base to   vertex.    FINDINGS:  Study is limited by motion artifact. The lower half of the cerebral   hemispheres are not well evaluated.    No definite acute intracranial hemorrhage, mass effect or midline shift   in the visualized portions of the brain.  No CT evidence of acute large territory vascular infarct.  The ventricles and cortical sulci are prominent reflecting parenchymal   volume loss.  Patchy hypodensities in the periventricular white matter are nonspecific,   but likely sequela of small vessel ischemic disease.    The visualized paranasal sinuses are well aerated. Partial opacification   of the left mastoid air cells.    IMPRESSION: Limited study.  No definite acute intracranial hemorrhage or mass effect in the   visualized brain. Repeat study may be performed as clinically indicated.

## 2018-09-12 NOTE — H&P ADULT - PROBLEM SELECTOR PLAN 2
EKG showed Afib 80 bpm  Currently rate controlled off meds  Anticoagulation with Eliquis 5 BID  Previous Echo in Jan 2018 showed Normal EF  Consulted Dr Davis

## 2018-09-13 DIAGNOSIS — J84.10 PULMONARY FIBROSIS, UNSPECIFIED: ICD-10-CM

## 2018-09-13 DIAGNOSIS — R41.82 ALTERED MENTAL STATUS, UNSPECIFIED: ICD-10-CM

## 2018-09-13 LAB
ALBUMIN SERPL ELPH-MCNC: 2.6 G/DL — LOW (ref 3.5–5)
ALP SERPL-CCNC: 84 U/L — SIGNIFICANT CHANGE UP (ref 40–120)
ALT FLD-CCNC: 13 U/L DA — SIGNIFICANT CHANGE UP (ref 10–60)
ANION GAP SERPL CALC-SCNC: 9 MMOL/L — SIGNIFICANT CHANGE UP (ref 5–17)
AST SERPL-CCNC: 17 U/L — SIGNIFICANT CHANGE UP (ref 10–40)
BASOPHILS # BLD AUTO: 0.1 K/UL — SIGNIFICANT CHANGE UP (ref 0–0.2)
BASOPHILS NFR BLD AUTO: 1.1 % — SIGNIFICANT CHANGE UP (ref 0–2)
BILIRUB SERPL-MCNC: 0.6 MG/DL — SIGNIFICANT CHANGE UP (ref 0.2–1.2)
BUN SERPL-MCNC: 17 MG/DL — SIGNIFICANT CHANGE UP (ref 7–18)
CALCIUM SERPL-MCNC: 8.4 MG/DL — SIGNIFICANT CHANGE UP (ref 8.4–10.5)
CHLORIDE SERPL-SCNC: 108 MMOL/L — SIGNIFICANT CHANGE UP (ref 96–108)
CHOLEST SERPL-MCNC: 114 MG/DL — SIGNIFICANT CHANGE UP (ref 10–199)
CO2 SERPL-SCNC: 25 MMOL/L — SIGNIFICANT CHANGE UP (ref 22–31)
CREAT SERPL-MCNC: 1.6 MG/DL — HIGH (ref 0.5–1.3)
CULTURE RESULTS: NO GROWTH — SIGNIFICANT CHANGE UP
EOSINOPHIL # BLD AUTO: 0.3 K/UL — SIGNIFICANT CHANGE UP (ref 0–0.5)
EOSINOPHIL NFR BLD AUTO: 2.9 % — SIGNIFICANT CHANGE UP (ref 0–6)
FOLATE SERPL-MCNC: 14 NG/ML — SIGNIFICANT CHANGE UP
GLUCOSE SERPL-MCNC: 85 MG/DL — SIGNIFICANT CHANGE UP (ref 70–99)
HBA1C BLD-MCNC: 5.8 % — HIGH (ref 4–5.6)
HCT VFR BLD CALC: 37.2 % — LOW (ref 39–50)
HDLC SERPL-MCNC: 41 MG/DL — SIGNIFICANT CHANGE UP
HGB BLD-MCNC: 12 G/DL — LOW (ref 13–17)
LIPID PNL WITH DIRECT LDL SERPL: 47 MG/DL — SIGNIFICANT CHANGE UP
LYMPHOCYTES # BLD AUTO: 27.5 % — SIGNIFICANT CHANGE UP (ref 13–44)
LYMPHOCYTES # BLD AUTO: 3.4 K/UL — HIGH (ref 1–3.3)
MAGNESIUM SERPL-MCNC: 2 MG/DL — SIGNIFICANT CHANGE UP (ref 1.6–2.6)
MCHC RBC-ENTMCNC: 30.6 PG — SIGNIFICANT CHANGE UP (ref 27–34)
MCHC RBC-ENTMCNC: 32.4 GM/DL — SIGNIFICANT CHANGE UP (ref 32–36)
MCV RBC AUTO: 94.7 FL — SIGNIFICANT CHANGE UP (ref 80–100)
MONOCYTES # BLD AUTO: 0.8 K/UL — SIGNIFICANT CHANGE UP (ref 0–0.9)
MONOCYTES NFR BLD AUTO: 6.7 % — SIGNIFICANT CHANGE UP (ref 2–14)
NEUTROPHILS # BLD AUTO: 7.6 K/UL — HIGH (ref 1.8–7.4)
NEUTROPHILS NFR BLD AUTO: 61.9 % — SIGNIFICANT CHANGE UP (ref 43–77)
PHOSPHATE SERPL-MCNC: 3.8 MG/DL — SIGNIFICANT CHANGE UP (ref 2.5–4.5)
PLATELET # BLD AUTO: 209 K/UL — SIGNIFICANT CHANGE UP (ref 150–400)
POTASSIUM SERPL-MCNC: 3.8 MMOL/L — SIGNIFICANT CHANGE UP (ref 3.5–5.3)
POTASSIUM SERPL-SCNC: 3.8 MMOL/L — SIGNIFICANT CHANGE UP (ref 3.5–5.3)
PROT SERPL-MCNC: 7.2 G/DL — SIGNIFICANT CHANGE UP (ref 6–8.3)
RBC # BLD: 3.93 M/UL — LOW (ref 4.2–5.8)
RBC # FLD: 12.7 % — SIGNIFICANT CHANGE UP (ref 10.3–14.5)
SODIUM SERPL-SCNC: 142 MMOL/L — SIGNIFICANT CHANGE UP (ref 135–145)
SPECIMEN SOURCE: SIGNIFICANT CHANGE UP
TOTAL CHOLESTEROL/HDL RATIO MEASUREMENT: 2.8 RATIO — LOW (ref 3.4–9.6)
TRIGL SERPL-MCNC: 128 MG/DL — SIGNIFICANT CHANGE UP (ref 10–149)
TSH SERPL-MCNC: 3.61 UU/ML — SIGNIFICANT CHANGE UP (ref 0.34–4.82)
VIT B12 SERPL-MCNC: 1157 PG/ML — SIGNIFICANT CHANGE UP (ref 232–1245)
VIT D25+D1,25 OH+D1,25 PNL SERPL-MCNC: 39.3 PG/ML — SIGNIFICANT CHANGE UP (ref 19.9–79.3)
WBC # BLD: 12.2 K/UL — HIGH (ref 3.8–10.5)
WBC # FLD AUTO: 12.2 K/UL — HIGH (ref 3.8–10.5)

## 2018-09-13 PROCEDURE — 76775 US EXAM ABDO BACK WALL LIM: CPT | Mod: 26

## 2018-09-13 PROCEDURE — 70551 MRI BRAIN STEM W/O DYE: CPT | Mod: 26

## 2018-09-13 RX ORDER — TAMSULOSIN HYDROCHLORIDE 0.4 MG/1
0.4 CAPSULE ORAL AT BEDTIME
Qty: 0 | Refills: 0 | Status: DISCONTINUED | OUTPATIENT
Start: 2018-09-13 | End: 2018-09-20

## 2018-09-13 RX ORDER — FINASTERIDE 5 MG/1
5 TABLET, FILM COATED ORAL DAILY
Qty: 0 | Refills: 0 | Status: DISCONTINUED | OUTPATIENT
Start: 2018-09-13 | End: 2018-09-20

## 2018-09-13 RX ADMIN — Medication 81 MILLIGRAM(S): at 11:59

## 2018-09-13 RX ADMIN — APIXABAN 5 MILLIGRAM(S): 2.5 TABLET, FILM COATED ORAL at 17:20

## 2018-09-13 RX ADMIN — APIXABAN 5 MILLIGRAM(S): 2.5 TABLET, FILM COATED ORAL at 05:53

## 2018-09-13 RX ADMIN — ESCITALOPRAM OXALATE 10 MILLIGRAM(S): 10 TABLET, FILM COATED ORAL at 11:59

## 2018-09-13 RX ADMIN — CEFTRIAXONE 100 GRAM(S): 500 INJECTION, POWDER, FOR SOLUTION INTRAMUSCULAR; INTRAVENOUS at 11:59

## 2018-09-13 RX ADMIN — Medication 3 MILLILITER(S): at 20:41

## 2018-09-13 RX ADMIN — Medication 3 MILLILITER(S): at 09:27

## 2018-09-13 NOTE — CONSULT NOTE ADULT - SUBJECTIVE AND OBJECTIVE BOX
Chief complain HPI  78 Male from home, walks with walker having PMH of Afib (Eliquis), COPD, BRIEN (Cpap at night), CHFpEF, GERD, HLD, Pulmonary fibrosis, RBBB, Chronic B/L LE lymphedema, dementia, recurrent UTIs and multiple falls came in with acute change in mental status. Patient is confused, most of the history is by wife. Pt was in [place] 2 weeks ago where he fell and was admitted to hospital where he was started on antibiotics for UTI. After returning to [place], he again had a fall last week and was admitted in [facility] for a day where CT head was normal. Overnight, pt had acute worsening delirium when started having visual hallucinations. Pt denies fever, chest pain, shortness of breath, abdominal discomfort, weight loss, sick contacts, any new medications. Wife has also noticed generalized weakness and painful urination.       CT chest showed chronic lung dsieae with small bialteral pleural effusion and pericardial effusionsmall  CT Abdomen showed mild to moderate bilateral hydronephrosis.  Creatinine in  was 1.4  [facility] admission creatinine was;  Creatinine, Serum: 1.80 mg/dL (.18 @ 09:44)  Today Creatinine 1.6      Creatinine, Serum: 0.64 mg/dL (..18 @ 08:47)      From admission he is sleepy and confused  Poor Appetite.      INTERPRETATION:  CLINICAL INFORMATION: Lymphocytosis.    COMPARISON: Ultrasound of the kidneys dated 2017    TECHNIQUE: Sonography of the abdomen.     FINDINGS:    Liver: Within normal limits.    Bile ducts: Normal caliber. Common bile duct measures 3 mm.     Gallbladder: There are multiple small gallstones. The gallbladder wall is   normal in thickness. There is no pericholecystic fluid.     Pancreas: Visualized portions are within normal limits.    Spleen: 10.1 cm. Within normal limits.    Right kidney: 10.0 cm. No hydronephrosis. There is a cyst in the upper   pole showing 2.6 x 2.2 x 2.1 cm.    Left kidney: 11.2 cm.  No hydronephrosis.        Ascites: None.    Aorta and IVC: Visualized portions are within normal limits.    IMPRESSION:     Cholelithiasis.  Simple right renal cyst.          PAST MEDICAL & SURGICAL HISTORY:  COPD (chronic obstructive pulmonary disease)  Bundle branch block, right  Pulmonary fibrosis  Dementia  Atrial fibrillation  GERD (gastroesophageal reflux disease)  Recurrent UTI  Hyperlipidemia  Obstructive sleep apnea on CPAP  H/O small bowel obstruction  History of appendectomy      Home Medications Reviewed    Hospital Medications:   MEDICATIONS  (STANDING):  ALBUTerol/ipratropium for Nebulization 3 milliLiter(s) Nebulizer every 6 hours  apixaban 5 milliGRAM(s) Oral every 12 hours  aspirin enteric coated 81 milliGRAM(s) Oral daily  atorvastatin 20 milliGRAM(s) Oral at bedtime  cefTRIAXone   IVPB 1 Gram(s) IV Intermittent every 24 hours  escitalopram 10 milliGRAM(s) Oral daily  tamsulosin 0.4 milliGRAM(s) Oral at bedtime    MEDICATIONS  (PRN):      Allergies    No Known Drug Allergies  zosyn (Drowsiness)    Intolerances                              12.0   12.2  )-----------( 209      ( 13 Sep 2018 07:04 )             37.2     -    142  |  108  |  17  ----------------------------<  85  3.8   |  25  |  1.60<H>    Ca    8.4      13 Sep 2018 07:04  Phos  3.8       Mg     2.0         TPro  7.2  /  Alb  2.6<L>  /  TBili  0.6  /  DBili  x   /  AST  17  /  ALT  13  /  AlkPhos  84        Urinalysis Basic - ( 12 Sep 2018 10:20 )    Color: Yellow / Appearance: Clear / S.010 / pH: x  Gluc: x / Ketone: Negative  / Bili: Negative / Urobili: Negative   Blood: x / Protein: Negative / Nitrite: Negative   Leuk Esterase: Negative / RBC: 0-2 /HPF / WBC 3-5 /HPF   Sq Epi: x / Non Sq Epi: Few /HPF / Bacteria: Trace /HPF    INTERPRETATION:  Chest one view    HISTORY: Disorientation with a history of pulmonary fibrosis    COMPARISON STUDY: 2018    Frontal expiratory view of the chest shows the heart to be similarly   enlarged in size. The lungs show increased peripheral markings compatible   with pulmonary fibrosis and there is no evidence of pneumothorax nor   definite pleural effusion. Likely skinfold projects over the left chest.    IMPRESSION:  Pulmonary markings as described.    Thank you for the courtesy of this referral.          RADIOLOGY & ADDITIONAL STUDIES:        FAMILY HISTORY:  Family history of colon cancer (Sibling)  Family history of myocardial infarction (Sibling)      REVIEW OF SYSTEMS:  patient is sleepy and confused.      VITALS:  Vital Signs Last 24 Hrs  T(C): 36.7 (13 Sep 2018 05:04), Max: 36.8 (12 Sep 2018 15:33)  T(F): 98 (13 Sep 2018 05:04), Max: 98.3 (12 Sep 2018 15:33)  HR: 80 (13 Sep 2018 05:04) (77 - 83)  BP: 125/75 (13 Sep 2018 05:04) (125/75 - 146/93)  BP(mean): --  RR: 16 (13 Sep 2018 05:04) (16 - 18)  SpO2: 98% (13 Sep 2018 05:04) (97% - 98%)        PHYSICAL EXAM:  Constitutional: NAD  HEENT: anicteric sclera, oropharynx clear, MMM  Neck: No JVD  Respiratory: good air entrance B/L, no wheezes, rales or rhonchi  Cardiovascular: S1, S2, RRR, no pericardial rub, no murmur  Gastrointestinal: BS+, soft, no tenderness, no distension, no bruit  Pelvis: bladder non-distended, no CVA tenderness  Extremities: No cyanosis or clubbing. No peripheral edema  Neurological: A/O x 3, no focal deficits  Psychiatric: sleepy and not follow commends.  Skin turgor reduced.  LLE trace tibial edema  Right lower extremity no edema. Chief complain HPI  78 Male from home, walks with walker having PMH of Afib (Eliquis), COPD, BRIEN (Cpap at night), CHFpEF, GERD, HLD, Pulmonary fibrosis, RBBB, Chronic B/L LE lymphedema, dementia, recurrent UTIs and multiple falls came in with acute change in mental status. Patient is confused, most of the history is by wife. Pt was in [place] 2 weeks ago where he fell and was admitted to hospital where he was started on antibiotics for UTI. After returning to [place], he again had a fall last week and was admitted in [facility] for a day where CT head was normal. Overnight, pt had acute worsening delirium when started having visual hallucinations. Pt denies fever, chest pain, shortness of breath, abdominal discomfort, weight loss, sick contacts, any new medications. Wife has also noticed generalized weakness and painful urination.       CT chest showed chronic lung dsieae with small bialteral pleural effusion and pericardial effusionsmall  CT Abdomen showed mild to moderate bilateral hydronephrosis.  Creatinine in  was 1.4  [facility] admission creatinine was;  Creatinine, Serum: 1.80 mg/dL (18 @ 09:44)  Today Creatinine 1.6      Creatinine, Serum: 0.64 mg/dL (18 @ 08:47)      From admission he is sleepy and confused  Poor Appetite.    Nurse bladder scan showed 800 ml of urine.  Attempt to place a Davidson cathetr failed and after that patient passed urine and repeat scan was 200 ml  US showed mild hydronephrosis      COMPARISON: CT abdomen and pelvis 2018. Abdominal ultrasound   2018.    TECHNIQUE: Sonography of the kidneys and bladder.     FINDINGS:    Right kidney:  12.8 cm. Mild to moderate right hydronephrosis. Visualized   portions of the right ureter are dilated. Right upper pole cyst measuring   2.5 cm.    Left kidney:  12.9 cm. Mild to moderate left hydronephrosis.    Urinary bladder: Limited evaluation demonstrates no acute abnormality.    IMPRESSION:     Mild to moderate bilateral hydronephrosis. Limited evaluation of the   bladder. Dedicated bladder sonogram can be obtained to evaluate for   ureteral jets and pre and post void volumes as clinically indicated.              PAST MEDICAL & SURGICAL HISTORY:  COPD (chronic obstructive pulmonary disease)  Bundle branch block, right  Pulmonary fibrosis  Dementia  Atrial fibrillation  GERD (gastroesophageal reflux disease)  Recurrent UTI  Hyperlipidemia  Obstructive sleep apnea on CPAP  H/O small bowel obstruction  History of appendectomy      Home Medications Reviewed    Hospital Medications:   MEDICATIONS  (STANDING):  ALBUTerol/ipratropium for Nebulization 3 milliLiter(s) Nebulizer every 6 hours  apixaban 5 milliGRAM(s) Oral every 12 hours  aspirin enteric coated 81 milliGRAM(s) Oral daily  atorvastatin 20 milliGRAM(s) Oral at bedtime  cefTRIAXone   IVPB 1 Gram(s) IV Intermittent every 24 hours  escitalopram 10 milliGRAM(s) Oral daily  tamsulosin 0.4 milliGRAM(s) Oral at bedtime    MEDICATIONS  (PRN):      Allergies    No Known Drug Allergies  zosyn (Drowsiness)    Intolerances                              12.0   12.2  )-----------( 209      ( 13 Sep 2018 07:04 )             37.2     -    142  |  108  |  17  ----------------------------<  85  3.8   |  25  |  1.60<H>    Ca    8.4      13 Sep 2018 07:04  Phos  3.8       Mg     2.0         TPro  7.2  /  Alb  2.6<L>  /  TBili  0.6  /  DBili  x   /  AST  17  /  ALT  13  /  AlkPhos  84        Urinalysis Basic - ( 12 Sep 2018 10:20 )    Color: Yellow / Appearance: Clear / S.010 / pH: x  Gluc: x / Ketone: Negative  / Bili: Negative / Urobili: Negative   Blood: x / Protein: Negative / Nitrite: Negative   Leuk Esterase: Negative / RBC: 0-2 /HPF / WBC 3-5 /HPF   Sq Epi: x / Non Sq Epi: Few /HPF / Bacteria: Trace /HPF    INTERPRETATION:  Chest one view    HISTORY: Disorientation with a history of pulmonary fibrosis    COMPARISON STUDY: 2018    Frontal expiratory view of the chest shows the heart to be similarly   enlarged in size. The lungs show increased peripheral markings compatible   with pulmonary fibrosis and there is no evidence of pneumothorax nor   definite pleural effusion. Likely skinfold projects over the left chest.    IMPRESSION:  Pulmonary markings as described.    Thank you for the courtesy of this referral.          RADIOLOGY & ADDITIONAL STUDIES:        FAMILY HISTORY:  Family history of colon cancer (Sibling)  Family history of myocardial infarction (Sibling)      REVIEW OF SYSTEMS:  patient is sleepy and confused.      VITALS:  Vital Signs Last 24 Hrs  T(C): 36.7 (13 Sep 2018 05:04), Max: 36.8 (12 Sep 2018 15:33)  T(F): 98 (13 Sep 2018 05:04), Max: 98.3 (12 Sep 2018 15:33)  HR: 80 (13 Sep 2018 05:04) (77 - 83)  BP: 125/75 (13 Sep 2018 05:04) (125/75 - 146/93)  BP(mean): --  RR: 16 (13 Sep 2018 05:04) (16 - 18)  SpO2: 98% (13 Sep 2018 05:04) (97% - 98%)        PHYSICAL EXAM:  Constitutional: NAD  HEENT: anicteric sclera, oropharynx clear, MMM  Neck: No JVD  Respiratory: good air entrance B/L, no wheezes, rales or rhonchi  Cardiovascular: S1, S2, RRR, no pericardial rub, no murmur  Gastrointestinal: BS+, soft, no tenderness, no distension, no bruit  Pelvis: bladder non-distended, no CVA tenderness  Extremities: No cyanosis or clubbing. No peripheral edema  Neurological: A/O x 3, no focal deficits  Psychiatric: sleepy and not follow commends.  Skin turgor reduced.  LLE trace tibial edema  Right lower extremity no edema.

## 2018-09-13 NOTE — PROGRESS NOTE ADULT - SUBJECTIVE AND OBJECTIVE BOX
Patient is a 78y old  Male who presents with a chief complaint of Altered mental status (13 Sep 2018 11:01)    Pt lying in bed in NAD. Today more awake and alert.  INTERVAL HPI/OVERNIGHT EVENTS:      VITAL SIGNS:  T(F): 98 (18 @ 05:04)  HR: 80 (18 @ 05:04)  BP: 125/75 (18 @ 05:04)  RR: 16 (18 @ 05:04)  SpO2: 98% (18 @ 05:04)  Wt(kg): --  I&O's Detail          REVIEW OF SYSTEMS:    CONSTITUTIONAL:  No fevers, chills, sweats    HEENT:  Eyes:  No diplopia or blurred vision. ENT:  No earache, sore throat or runny nose.    CARDIOVASCULAR:  No pressure, squeezing, tightness, or heaviness about the chest; no palpitations.    RESPIRATORY:  Per HPI    GASTROINTESTINAL:  No abdominal pain, nausea, vomiting or diarrhea.    GENITOURINARY:  No dysuria, frequency or urgency.    NEUROLOGIC:  No paresthesias, fasciculations, seizures or weakness.    PSYCHIATRIC:  No disorder of thought or mood.      PHYSICAL EXAM:    Constitutional: Well developed and nourished  Eyes:Perrla  ENMT: normal  Neck:supple  Respiratory: good air entry  Cardiovascular: S1 S2 regular  Gastrointestinal: Soft, Non tender  Extremities: + edema  Vascular:normal  Neurological:Awake, alert,Ox3  Musculoskeletal:Normal      MEDICATIONS  (STANDING):  ALBUTerol/ipratropium for Nebulization 3 milliLiter(s) Nebulizer every 6 hours  apixaban 5 milliGRAM(s) Oral every 12 hours  aspirin enteric coated 81 milliGRAM(s) Oral daily  atorvastatin 20 milliGRAM(s) Oral at bedtime  cefTRIAXone   IVPB 1 Gram(s) IV Intermittent every 24 hours  escitalopram 10 milliGRAM(s) Oral daily  tamsulosin 0.4 milliGRAM(s) Oral at bedtime    MEDICATIONS  (PRN):      Allergies    No Known Drug Allergies  zosyn (Drowsiness)    Intolerances        LABS:                        12.0   12.2  )-----------( 209      ( 13 Sep 2018 07:04 )             37.2     09-13    142  |  108  |  17  ----------------------------<  85  3.8   |  25  |  1.60<H>    Ca    8.4      13 Sep 2018 07:04  Phos  3.8       Mg     2.0         TPro  7.2  /  Alb  2.6<L>  /  TBili  0.6  /  DBili  x   /  AST  17  /  ALT  13  /  AlkPhos  84  -      Urinalysis Basic - ( 12 Sep 2018 10:20 )    Color: Yellow / Appearance: Clear / S.010 / pH: x  Gluc: x / Ketone: Negative  / Bili: Negative / Urobili: Negative   Blood: x / Protein: Negative / Nitrite: Negative   Leuk Esterase: Negative / RBC: 0-2 /HPF / WBC 3-5 /HPF   Sq Epi: x / Non Sq Epi: Few /HPF / Bacteria: Trace /HPF        CARDIAC MARKERS ( 12 Sep 2018 09:44 )  0.022 ng/mL / x     / x     / x     / x          CAPILLARY BLOOD GLUCOSE            RADIOLOGY & ADDITIONAL TESTS:    CXR:  < from: Xray Chest 1 View-PORTABLE IMMEDIATE (18 @ 13:28) >  INTERPRETATION:  Chest one view    HISTORY: Disorientation with a history of pulmonary fibrosis    COMPARISON STUDY: 2018    Frontal expiratory view of the chest shows the heart to be similarly   enlarged in size. The lungs show increased peripheral markings compatible   with pulmonary fibrosis and there is no evidence of pneumothorax nor   definite pleural effusion. Likely skinfold projects over the left chest.    IMPRESSION:  Pulmonary markings as described.    < end of copied text >    Ct scan chest:      ekg;    echo:

## 2018-09-13 NOTE — PROGRESS NOTE ADULT - PROBLEM SELECTOR PLAN 4
Baseline Cr 0.6  Currently elevated to 1.8  Likely pre-renal due to poor PO intake  Pt received 3 liters Bolus in ED  Holding further hydration due to LE edema.

## 2018-09-13 NOTE — PROGRESS NOTE ADULT - SUBJECTIVE AND OBJECTIVE BOX
PGY 2  Note discussed with supervising primary attending    Patient is a 78y old  Male who presents with a chief complaint of Altered mental status (12 Sep 2018 14:08)      INTERVAL HPI/OVERNIGHT EVENTS: Patient is still confused, denies any symptoms    MEDICATIONS  (STANDING):  ALBUTerol/ipratropium for Nebulization 3 milliLiter(s) Nebulizer every 6 hours  apixaban 5 milliGRAM(s) Oral every 12 hours  aspirin enteric coated 81 milliGRAM(s) Oral daily  atorvastatin 20 milliGRAM(s) Oral at bedtime  cefTRIAXone   IVPB 1 Gram(s) IV Intermittent every 24 hours  escitalopram 10 milliGRAM(s) Oral daily    MEDICATIONS  (PRN):      __________________________________________________  REVIEW OF SYSTEMS:    CONSTITUTIONAL: No fever,   EYES: no acute visual disturbances  NECK: No pain or stiffness  RESPIRATORY: No cough; No shortness of breath  CARDIOVASCULAR: No chest pain, no palpitations  GASTROINTESTINAL: No pain. No nausea or vomiting; No diarrhea   NEUROLOGICAL: No headache or numbness, no tremors  MUSCULOSKELETAL: No joint pain, no muscle pain  GENITOURINARY: no dysuria, no frequency, no hesitancy  PSYCHIATRY: no depression , no anxiety  ALL OTHER  ROS negative        Vital Signs Last 24 Hrs  T(C): 36.7 (13 Sep 2018 05:04), Max: 36.8 (12 Sep 2018 15:33)  T(F): 98 (13 Sep 2018 05:04), Max: 98.3 (12 Sep 2018 15:33)  HR: 80 (13 Sep 2018 05:04) (77 - 100)  BP: 125/75 (13 Sep 2018 05:04) (125/75 - 147/80)  BP(mean): --  RR: 16 (13 Sep 2018 05:04) (16 - 18)  SpO2: 98% (13 Sep 2018 05:04) (97% - 98%)    ________________________________________________  PHYSICAL EXAM:  GENERAL: NAD  HEENT: Normocephalic;  conjunctivae and sclerae clear; moist mucous membranes;   NECK : supple  CHEST/LUNG: Clear to auscultation bilaterally with good air entry   HEART: S1 S2  regular; no murmurs, gallops or rubs  ABDOMEN: Soft, Nontender, Nondistended; Bowel sounds present  EXTREMITIES: no cyanosis; no edema; no calf tenderness  SKIN: warm and dry; no rash  NERVOUS SYSTEM:  AAOx1    _________________________________________________  LABS:                        12.0   12.2  )-----------( 209      ( 13 Sep 2018 07:04 )             37.2         142  |  108  |  17  ----------------------------<  85  3.8   |  25  |  1.60<H>    Ca    8.4      13 Sep 2018 07:04  Phos  3.8       Mg     2.0         TPro  7.2  /  Alb  2.6<L>  /  TBili  0.6  /  DBili  x   /  AST  17  /  ALT  13  /  AlkPhos  84        Urinalysis Basic - ( 12 Sep 2018 10:20 )    Color: Yellow / Appearance: Clear / S.010 / pH: x  Gluc: x / Ketone: Negative  / Bili: Negative / Urobili: Negative   Blood: x / Protein: Negative / Nitrite: Negative   Leuk Esterase: Negative / RBC: 0-2 /HPF / WBC 3-5 /HPF   Sq Epi: x / Non Sq Epi: Few /HPF / Bacteria: Trace /HPF      CAPILLARY BLOOD GLUCOSE        RADIOLOGY & ADDITIONAL TESTS:  < from: CT Head No Cont (18 @ 09:30) >  EXAM:  CT BRAIN                            PROCEDURE DATE:  2018          INTERPRETATION:  CT HEAD WITHOUT CONTRAST    INDICATION: 78 years old. Male. confusion.     COMPARISON: 2017.    TECHNIQUE: Noncontrast axial CT head was obtained from the skull base to   vertex.    FINDINGS:  Study is limited by motion artifact. The lower half of the cerebral   hemispheres are not well evaluated.    No definite acute intracranial hemorrhage, mass effect or midline shift   in the visualized portions of the brain.  No CT evidence of acute large territory vascular infarct.  The ventricles and cortical sulci are prominent reflecting parenchymal   volume loss.  Patchy hypodensities in the periventricular white matter are nonspecific,   but likely sequela of small vessel ischemic disease.    The visualized paranasal sinuses are well aerated. Partial opacification   of the left mastoid air cells.    IMPRESSION: Limited study.  No definite acute intracranial hemorrhage or mass effect in the   visualized brain. Repeat study may be performed as clinically indicated.    < end of copied text >    < from: Xray Chest 1 View-PORTABLE IMMEDIATE (18 @ 13:28) >    EXAM:  XR CHEST PORTABLE IMMED 1V                            PROCEDURE DATE:  2018          INTERPRETATION:  Chest one view    HISTORY: Disorientation with a history of pulmonary fibrosis    COMPARISON STUDY: 2018    Frontal expiratory view of the chest shows the heart to be similarly   enlarged in size. The lungs show increased peripheral markings compatible   with pulmonary fibrosis and there is no evidence of pneumothorax nor   definite pleural effusion. Likely skinfold projects over the left chest.    IMPRESSION:  Pulmonary markings as described.    Thank you for the courtesy of this referral.    < end of copied text >      Imaging Personally Reviewed:  YES    Consultant(s) Notes Reviewed:   YES    Care Discussed with Consultants :     Plan of care was discussed with patient and /or primary care giver; all questions and concerns were addressed and care was aligned with patient's wishes.

## 2018-09-13 NOTE — PROGRESS NOTE ADULT - SUBJECTIVE AND OBJECTIVE BOX
CHIEF COMPLAINT:Patient is a 78y old  Male who presents with a chief complaint of Altered mental status.Pt appears comfortable,more awake and alert.    	  REVIEW OF SYSTEMS:  [x ] Unable to obtain    PHYSICAL EXAM:  T(C): 36.7 (09-13-18 @ 05:04), Max: 36.8 (09-12-18 @ 15:33)  HR: 80 (09-13-18 @ 05:04) (77 - 83)  BP: 125/75 (09-13-18 @ 05:04) (125/75 - 146/93)  RR: 16 (09-13-18 @ 05:04) (16 - 18)  SpO2: 98% (09-13-18 @ 05:04) (97% - 98%)  Wt(kg): --  I&O's Summary      Appearance: Normal	  HEENT:   Normal oral mucosa, PERRL, EOMI	  Lymphatic: No lymphadenopathy  Cardiovascular: Normal S1 S2, No JVD, No murmurs, No edema  Respiratory: Lungs clear to auscultation	  Gastrointestinal:  Soft, Non-tender, + BS	  Skin: No rashes, No ecchymoses, No cyanosis	  Neurologic: Non-focal  Extremities: Normal range of motion, No clubbing, cyanosis or edema  Vascular: Peripheral pulses palpable 2+ bilaterally    MEDICATIONS  (STANDING):  ALBUTerol/ipratropium for Nebulization 3 milliLiter(s) Nebulizer every 6 hours  apixaban 5 milliGRAM(s) Oral every 12 hours  aspirin enteric coated 81 milliGRAM(s) Oral daily  atorvastatin 20 milliGRAM(s) Oral at bedtime  cefTRIAXone   IVPB 1 Gram(s) IV Intermittent every 24 hours  escitalopram 10 milliGRAM(s) Oral daily        	  LABS:	 	    CARDIAC MARKERS:  CARDIAC MARKERS ( 12 Sep 2018 09:44 )  0.022 ng/mL / x     / x     / x     / x                                    12.0   12.2  )-----------( 209      ( 13 Sep 2018 07:04 )             37.2     09-13    142  |  108  |  17  ----------------------------<  85  3.8   |  25  |  1.60<H>    Ca    8.4      13 Sep 2018 07:04  Phos  3.8     09-13  Mg     2.0     09-13    TPro  7.2  /  Alb  2.6<L>  /  TBili  0.6  /  DBili  x   /  AST  17  /  ALT  13  /  AlkPhos  84  09-13      Lipid Profile: Cholesterol 114  LDL 47  HDL 41        TSH: Thyroid Stimulating Hormone, Serum: 3.61 uU/mL (09-13 @ 07:04)      Culture Results:   No growth (09.12.18 @ 14:29)

## 2018-09-13 NOTE — PROGRESS NOTE ADULT - PROBLEM SELECTOR PLAN 1
Acute worsening of mental status overnight with underlying dementia  CT head: Normal  No focal deficit and Normal pupil reflex, less likely stroke   Pt is recurrent history of UTIs in past however UA here is negative  Mild Leukocytosis  Started Rocephin and wait until Urine Cx results  F/u Blood Cx  ammonia wnl

## 2018-09-13 NOTE — PROGRESS NOTE ADULT - SUBJECTIVE AND OBJECTIVE BOX
Patient is a 78y old  Male who presents with a chief complaint of Altered mental status (13 Sep 2018 11:01)  Awake, alert, comfortable in bed in NAD.    INTERVAL HPI/OVERNIGHT EVENTS:      VITAL SIGNS:  T(F): 98 (18 @ 05:04)  HR: 80 (18 @ 05:04)  BP: 125/75 (18 @ 05:04)  RR: 16 (18 @ 05:04)  SpO2: 98% (18 @ 05:04)  Wt(kg): --  I&O's Detail          REVIEW OF SYSTEMS:    CONSTITUTIONAL:  No fevers, chills, sweats    HEENT:  Eyes:  No diplopia or blurred vision. ENT:  No earache, sore throat or runny nose.    CARDIOVASCULAR:  No pressure, squeezing, tightness, or heaviness about the chest; no palpitations.    RESPIRATORY:  Per HPI    GASTROINTESTINAL:  No abdominal pain, nausea, vomiting or diarrhea.    GENITOURINARY:  No dysuria, frequency or urgency.    NEUROLOGIC:  No paresthesias, fasciculations, seizures or weakness.    PSYCHIATRIC:  No disorder of thought or mood.      PHYSICAL EXAM:    Constitutional: Well developed and nourished  Eyes:Perrla  ENMT: normal  Neck:supple  Respiratory: good air entry  Cardiovascular: S1 S2 regular  Gastrointestinal: Soft, Non tender  Extremities: + edema  Vascular:normal  Neurological:Awake, alert,Ox3  Musculoskeletal:Normal      MEDICATIONS  (STANDING):  ALBUTerol/ipratropium for Nebulization 3 milliLiter(s) Nebulizer every 6 hours  apixaban 5 milliGRAM(s) Oral every 12 hours  aspirin enteric coated 81 milliGRAM(s) Oral daily  atorvastatin 20 milliGRAM(s) Oral at bedtime  cefTRIAXone   IVPB 1 Gram(s) IV Intermittent every 24 hours  escitalopram 10 milliGRAM(s) Oral daily  tamsulosin 0.4 milliGRAM(s) Oral at bedtime    MEDICATIONS  (PRN):      Allergies    No Known Drug Allergies  zosyn (Drowsiness)    Intolerances        LABS:                        12.0   12.2  )-----------( 209      ( 13 Sep 2018 07:04 )             37.2     -    142  |  108  |  17  ----------------------------<  85  3.8   |  25  |  1.60<H>    Ca    8.4      13 Sep 2018 07:04  Phos  3.8     -  Mg     2.0     -    TPro  7.2  /  Alb  2.6<L>  /  TBili  0.6  /  DBili  x   /  AST  17  /  ALT  13  /  AlkPhos  84  -      Urinalysis Basic - ( 12 Sep 2018 10:20 )    Color: Yellow / Appearance: Clear / S.010 / pH: x  Gluc: x / Ketone: Negative  / Bili: Negative / Urobili: Negative   Blood: x / Protein: Negative / Nitrite: Negative   Leuk Esterase: Negative / RBC: 0-2 /HPF / WBC 3-5 /HPF   Sq Epi: x / Non Sq Epi: Few /HPF / Bacteria: Trace /HPF        CARDIAC MARKERS ( 12 Sep 2018 09:44 )  0.022 ng/mL / x     / x     / x     / x          CAPILLARY BLOOD GLUCOSE            RADIOLOGY & ADDITIONAL TESTS:    CXR:  < from: Xray Chest 1 View-PORTABLE IMMEDIATE (18 @ 13:28) >  Frontal expiratory view of the chest shows the heart to be similarly   enlarged in size. The lungs show increased peripheral markings compatible   with pulmonary fibrosis and there is no evidence of pneumothorax nor   definite pleural effusion. Likely skinfold projects over the left chest.    IMPRESSION:  Pulmonary markings as described.    Thank you for the courtesy of this referral.    < end of copied text >    Ct scan chest:    < from: CT Chest No Cont (18 @ 11:31) >  IMPRESSION: No acute infiltrate. Stable mild pulmonary fibrosis and   stable small left pleural effusion.      < end of copied text >  ekg;    echo:

## 2018-09-13 NOTE — PROGRESS NOTE ADULT - PROBLEM SELECTOR PLAN 5
History of Pulmonary fibrosis  Currently stable  C/w bronchodilators  f/u to eval pulmonary artery pressure

## 2018-09-13 NOTE — PROGRESS NOTE ADULT - SUBJECTIVE AND OBJECTIVE BOX
Patient is a 78y old  Male who presents with a chief complaint of Altered mental status (13 Sep 2018 09:49)    pt seen in icu [  ], reg med floor [   ], bed [  ], chair at bedside [   ], a+o x3 [  ], lethargic [  ],  nad [  ]    shah [  ], ngt [  ], peg [  ], et tube [  ], cent line [  ], picc line [  ]        Allergies    No Known Drug Allergies  zosyn (Drowsiness)        Vitals    T(F): 98 (09-13-18 @ 05:04), Max: 98.3 (09-12-18 @ 15:33)  HR: 80 (09-13-18 @ 05:04) (77 - 100)  BP: 125/75 (09-13-18 @ 05:04) (125/75 - 147/80)  RR: 16 (09-13-18 @ 05:04) (16 - 18)  SpO2: 98% (09-13-18 @ 05:04) (97% - 98%)  Wt(kg): --  CAPILLARY BLOOD GLUCOSE      POCT Blood Glucose.: 117 mg/dL (12 Sep 2018 09:05)      Labs                          12.0   12.2  )-----------( 209      ( 13 Sep 2018 07:04 )             37.2       09-13    142  |  108  |  17  ----------------------------<  85  3.8   |  25  |  1.60<H>    Ca    8.4      13 Sep 2018 07:04  Phos  3.8     09-13  Mg     2.0     09-13    TPro  7.2  /  Alb  2.6<L>  /  TBili  0.6  /  DBili  x   /  AST  17  /  ALT  13  /  AlkPhos  84  09-13      CARDIAC MARKERS ( 12 Sep 2018 09:44 )  0.022 ng/mL / x     / x     / x     / x            .Urine Clean Catch (Midstream)  09-12 @ 14:29   No growth  --  --          Radiology Results      Meds    MEDICATIONS  (STANDING):  ALBUTerol/ipratropium for Nebulization 3 milliLiter(s) Nebulizer every 6 hours  apixaban 5 milliGRAM(s) Oral every 12 hours  aspirin enteric coated 81 milliGRAM(s) Oral daily  atorvastatin 20 milliGRAM(s) Oral at bedtime  cefTRIAXone   IVPB 1 Gram(s) IV Intermittent every 24 hours  escitalopram 10 milliGRAM(s) Oral daily      MEDICATIONS  (PRN):      Physical Exam    Neuro :  no focal deficits  Respiratory: CTA B/L  CV: RRR, S1S2, no murmurs,   Abdominal: Soft, NT, ND +BS,  Extremities: No edema, + peripheral pulses    ASSESSMENT    Disorientation  COPD (chronic obstructive pulmonary disease)  Bundle branch block, right  Pulmonary fibrosis  Dementia  Atrial fibrillation  GERD (gastroesophageal reflux disease)  Recurrent UTI  Hyperlipidemia  Recurrent UTI (urinary tract infection)  Obstructive sleep apnea on CPAP  H/O small bowel obstruction  History of appendectomy      PLAN 78 Male from home, walks with walker having PMH of Afib (Eliquis), COPD, BRIEN (Cpap at night), CHFpEF, GERD, HLD, Pulmonary fibrosis, RBBB, Chronic B/L LE lymphedema, dementia, recurrent UTIs and multiple falls came in with acute change in mental status. Patient is confused, most of the history is by wife. Pt was in [place] 2 weeks ago where he fell and was admitted to hospital where he was started on antibiotics for UTI. After returning to [place], he again had a fall last week and was admitted in [facility] for a day where CT head was normal. Overnight, pt had acute worsening delirium when started having visual hallucinations. Pt denies fever, chest pain, shortness of breath, abdominal discomfort, weight loss, sick contacts, any new medications. Wife has also noticed generalized weakness and painful urination.       PSH: SBO and appendectomy  GOC: Spoke to wife, Full code    ED Course: Hemodynamically stable. Labs showed mild leukocytosis and elevated Cr of 1.8 (Baseline 0.6). UA and CT head was negative. EKG showed Afib @ 80 bpm with T wave inversions in lateral leads. Pt was given 3 Liter bolus and Rocephin.      Review of Systems:  Review of Systems: Change in mental status, Painful urination, Visual hallucinations	      pt seen in icu [  ], reg med floor [ x  ], bed [ x ], chair at bedside [   ], awake and responsive [ x ], lethargic [  ],  nad [ x ]        Allergies    No Known Drug Allergies  zosyn (Drowsiness)        Vitals    T(F): 98 (09-13-18 @ 05:04), Max: 98.3 (09-12-18 @ 15:33)  HR: 80 (09-13-18 @ 05:04) (77 - 100)  BP: 125/75 (09-13-18 @ 05:04) (125/75 - 147/80)  RR: 16 (09-13-18 @ 05:04) (16 - 18)  SpO2: 98% (09-13-18 @ 05:04) (97% - 98%)  Wt(kg): --  CAPILLARY BLOOD GLUCOSE      POCT Blood Glucose.: 117 mg/dL (12 Sep 2018 09:05)      Labs                          12.0   12.2  )-----------( 209      ( 13 Sep 2018 07:04 )             37.2       09-13    142  |  108  |  17  ----------------------------<  85  3.8   |  25  |  1.60<H>    Ca    8.4      13 Sep 2018 07:04  Phos  3.8     09-13  Mg     2.0     09-13    TPro  7.2  /  Alb  2.6<L>  /  TBili  0.6  /  DBili  x   /  AST  17  /  ALT  13  /  AlkPhos  84  09-13      CARDIAC MARKERS ( 12 Sep 2018 09:44 )  0.022 ng/mL / x     / x     / x     / x            .Urine Clean Catch (Midstream)  09-12 @ 14:29   No growth  --  --          Radiology Results    < from: CT Head No Cont (09.12.18 @ 09:30) >  IMPRESSION: Limited study.  No definite acute intracranial hemorrhage or mass effect in the   visualized brain. Repeat study may be performed as clinically indicated.    < end of copied text >      < from: Xray Chest 1 View-PORTABLE IMMEDIATE (09.12.18 @ 13:28) >    Frontal expiratory view of the chest shows the heart to be similarly   enlarged in size. The lungs show increased peripheral markings compatible   with pulmonary fibrosis and there is no evidence of pneumothorax nor   definite pleural effusion. Likely skinfold projects over the left chest.    IMPRESSION:  Pulmonary markings as described.    < end of copied text >        Meds    MEDICATIONS  (STANDING):  ALBUTerol/ipratropium for Nebulization 3 milliLiter(s) Nebulizer every 6 hours  apixaban 5 milliGRAM(s) Oral every 12 hours  aspirin enteric coated 81 milliGRAM(s) Oral daily  atorvastatin 20 milliGRAM(s) Oral at bedtime  cefTRIAXone   IVPB 1 Gram(s) IV Intermittent every 24 hours  escitalopram 10 milliGRAM(s) Oral daily      MEDICATIONS  (PRN):      Physical Exam    Neuro :  no focal deficits  Respiratory: B/L crackles  CV: RRR, S1S2, no murmurs,   Abdominal: Soft, NT, ND +BS,  Extremities: No edema, + peripheral pulses    ASSESSMENT    delerium   worsening dementia  h/o COPD (chronic obstructive pulmonary disease)  Bundle branch block, right  Pulmonary fibrosis  Dementia  Atrial fibrillation  GERD (gastroesophageal reflux disease)  Recurrent UTI  Hyperlipidemia  Recurrent UTI (urinary tract infection)  Obstructive sleep apnea on CPAP  H/O small bowel obstruction  History of appendectomy      PLAN      r/o infectious process  f/u blood and ucx  f/u mma, homocysteine, vit b12, rpr  psych cons  pulm f/u  cardio f/u  phys tx  cont current meds

## 2018-09-13 NOTE — PROGRESS NOTE ADULT - ASSESSMENT
78 yr old  Male from home, walks with walker having PMHX of Afib (Eliquis), COPD, BRIEN (Cpap at night), CHFpEF, GERD, HLD, Pulmonary fibrosis, RBBB, Chronic B/L LE lymphedema, dementia, recurrent UTIs and multiple falls came in with acute change in mental status.  1.Urine cx-.  2.Afib-eliquis.  3.Pulmonary fibrosis-duoneb.  4.Lipid D/O-statin.  5.PPI.  6.CRI-Renal sono.  7.Echocardiogram.  8.MRI-R/O CVA.

## 2018-09-13 NOTE — PROGRESS NOTE ADULT - PROBLEM SELECTOR PLAN 3
Pt used Cpap at night  Recently his machine broke down  Lack of sleep can lead to delirium  Will need new Cpap machine at discharge

## 2018-09-13 NOTE — PROGRESS NOTE ADULT - PROBLEM SELECTOR PLAN 4
Baseline Cr 0.6  Currently elevated to 1.8  Likely pre-renal due to poor PO intake  Pt received 3 liters Bolus in ED  Holding further hydration due to LE edema  F/u US renal

## 2018-09-14 DIAGNOSIS — G30.1 ALZHEIMER'S DISEASE WITH LATE ONSET: ICD-10-CM

## 2018-09-14 DIAGNOSIS — F05 DELIRIUM DUE TO KNOWN PHYSIOLOGICAL CONDITION: ICD-10-CM

## 2018-09-14 DIAGNOSIS — N39.0 URINARY TRACT INFECTION, SITE NOT SPECIFIED: ICD-10-CM

## 2018-09-14 LAB
ANION GAP SERPL CALC-SCNC: 10 MMOL/L — SIGNIFICANT CHANGE UP (ref 5–17)
BASOPHILS # BLD AUTO: 0.1 K/UL — SIGNIFICANT CHANGE UP (ref 0–0.2)
BASOPHILS NFR BLD AUTO: 1 % — SIGNIFICANT CHANGE UP (ref 0–2)
BUN SERPL-MCNC: 14 MG/DL — SIGNIFICANT CHANGE UP (ref 7–18)
CALCIUM SERPL-MCNC: 8.4 MG/DL — SIGNIFICANT CHANGE UP (ref 8.4–10.5)
CHLORIDE SERPL-SCNC: 107 MMOL/L — SIGNIFICANT CHANGE UP (ref 96–108)
CO2 SERPL-SCNC: 24 MMOL/L — SIGNIFICANT CHANGE UP (ref 22–31)
CREAT SERPL-MCNC: 1.33 MG/DL — HIGH (ref 0.5–1.3)
EOSINOPHIL # BLD AUTO: 0.3 K/UL — SIGNIFICANT CHANGE UP (ref 0–0.5)
EOSINOPHIL NFR BLD AUTO: 3.1 % — SIGNIFICANT CHANGE UP (ref 0–6)
GLUCOSE SERPL-MCNC: 76 MG/DL — SIGNIFICANT CHANGE UP (ref 70–99)
HAV IGM SER-ACNC: SIGNIFICANT CHANGE UP
HBV CORE IGM SER-ACNC: SIGNIFICANT CHANGE UP
HBV SURFACE AG SER-ACNC: SIGNIFICANT CHANGE UP
HCT VFR BLD CALC: 36.4 % — LOW (ref 39–50)
HCV AB S/CO SERPL IA: 0.27 S/CO — SIGNIFICANT CHANGE UP
HCV AB SERPL-IMP: SIGNIFICANT CHANGE UP
HGB BLD-MCNC: 11.8 G/DL — LOW (ref 13–17)
LYMPHOCYTES # BLD AUTO: 2.3 K/UL — SIGNIFICANT CHANGE UP (ref 1–3.3)
LYMPHOCYTES # BLD AUTO: 23.9 % — SIGNIFICANT CHANGE UP (ref 13–44)
MAGNESIUM SERPL-MCNC: 1.9 MG/DL — SIGNIFICANT CHANGE UP (ref 1.6–2.6)
MCHC RBC-ENTMCNC: 30.5 PG — SIGNIFICANT CHANGE UP (ref 27–34)
MCHC RBC-ENTMCNC: 32.4 GM/DL — SIGNIFICANT CHANGE UP (ref 32–36)
MCV RBC AUTO: 94.1 FL — SIGNIFICANT CHANGE UP (ref 80–100)
MONOCYTES # BLD AUTO: 0.7 K/UL — SIGNIFICANT CHANGE UP (ref 0–0.9)
MONOCYTES NFR BLD AUTO: 6.8 % — SIGNIFICANT CHANGE UP (ref 2–14)
NEUTROPHILS # BLD AUTO: 6.3 K/UL — SIGNIFICANT CHANGE UP (ref 1.8–7.4)
NEUTROPHILS NFR BLD AUTO: 65.1 % — SIGNIFICANT CHANGE UP (ref 43–77)
PHOSPHATE SERPL-MCNC: 3.5 MG/DL — SIGNIFICANT CHANGE UP (ref 2.5–4.5)
PLATELET # BLD AUTO: 169 K/UL — SIGNIFICANT CHANGE UP (ref 150–400)
POTASSIUM SERPL-MCNC: 3.6 MMOL/L — SIGNIFICANT CHANGE UP (ref 3.5–5.3)
POTASSIUM SERPL-SCNC: 3.6 MMOL/L — SIGNIFICANT CHANGE UP (ref 3.5–5.3)
RBC # BLD: 3.87 M/UL — LOW (ref 4.2–5.8)
RBC # FLD: 12.6 % — SIGNIFICANT CHANGE UP (ref 10.3–14.5)
SODIUM SERPL-SCNC: 141 MMOL/L — SIGNIFICANT CHANGE UP (ref 135–145)
T PALLIDUM AB TITR SER: NEGATIVE — SIGNIFICANT CHANGE UP
VIT B12 SERPL-MCNC: 1255 PG/ML — HIGH (ref 232–1245)
WBC # BLD: 9.7 K/UL — SIGNIFICANT CHANGE UP (ref 3.8–10.5)
WBC # FLD AUTO: 9.7 K/UL — SIGNIFICANT CHANGE UP (ref 3.8–10.5)

## 2018-09-14 PROCEDURE — 99223 1ST HOSP IP/OBS HIGH 75: CPT

## 2018-09-14 PROCEDURE — 93306 TTE W/DOPPLER COMPLETE: CPT | Mod: 26

## 2018-09-14 RX ORDER — LACTULOSE 10 G/15ML
10 SOLUTION ORAL DAILY
Qty: 0 | Refills: 0 | Status: DISCONTINUED | OUTPATIENT
Start: 2018-09-14 | End: 2018-09-20

## 2018-09-14 RX ORDER — MULTIVIT WITH MIN/MFOLATE/K2 340-15/3 G
300 POWDER (GRAM) ORAL ONCE
Qty: 0 | Refills: 0 | Status: COMPLETED | OUTPATIENT
Start: 2018-09-14 | End: 2018-09-14

## 2018-09-14 RX ADMIN — LACTULOSE 10 GRAM(S): 10 SOLUTION ORAL at 11:56

## 2018-09-14 RX ADMIN — TAMSULOSIN HYDROCHLORIDE 0.4 MILLIGRAM(S): 0.4 CAPSULE ORAL at 21:39

## 2018-09-14 RX ADMIN — ESCITALOPRAM OXALATE 10 MILLIGRAM(S): 10 TABLET, FILM COATED ORAL at 11:56

## 2018-09-14 RX ADMIN — Medication 5 MILLIGRAM(S): at 21:39

## 2018-09-14 RX ADMIN — APIXABAN 5 MILLIGRAM(S): 2.5 TABLET, FILM COATED ORAL at 17:22

## 2018-09-14 RX ADMIN — APIXABAN 5 MILLIGRAM(S): 2.5 TABLET, FILM COATED ORAL at 05:55

## 2018-09-14 RX ADMIN — Medication 3 MILLILITER(S): at 09:45

## 2018-09-14 RX ADMIN — FINASTERIDE 5 MILLIGRAM(S): 5 TABLET, FILM COATED ORAL at 11:56

## 2018-09-14 RX ADMIN — Medication 300 MILLILITER(S): at 13:22

## 2018-09-14 RX ADMIN — Medication 3 MILLILITER(S): at 20:58

## 2018-09-14 RX ADMIN — Medication 3 MILLILITER(S): at 02:41

## 2018-09-14 RX ADMIN — ATORVASTATIN CALCIUM 20 MILLIGRAM(S): 80 TABLET, FILM COATED ORAL at 21:39

## 2018-09-14 RX ADMIN — CEFTRIAXONE 100 GRAM(S): 500 INJECTION, POWDER, FOR SOLUTION INTRAMUSCULAR; INTRAVENOUS at 11:56

## 2018-09-14 RX ADMIN — Medication 81 MILLIGRAM(S): at 11:56

## 2018-09-14 NOTE — CONSULT NOTE ADULT - SUBJECTIVE AND OBJECTIVE BOX
HPI:  78 Male from home, walks with walker, PMH of Afib (Eliquis), COPD, BRIEN (Cpap at night), CHFpEF, GERD, HLD, Pulmonary fibrosis, RBBB, Chronic B/L LE lymphedema, dementia, recurrent UTIs and multiple falls came in with acute change in mental status. Patient is confused, most of the history is by wife. Pt was in [place] 2 weeks ago where he fell and was admitted to hospital where he was started on antibiotics for UTI. After returning to [place], he again had a fall last week and was admitted in [facility] for a day where CT head was normal. Overnight, pt had acute worsening delirium when started having visual hallucinations. Pt denies fever, chest pain, shortness of breath, abdominal discomfort, weight loss, sick contacts, any new medications. Wife has also noticed generalized weakness and painful urination.       Patient seen and examined at bedside with Dr. Barraza for urology consultation. Patient is confused and unable to provide accurate ROS, no family present at bedside during the time of examination.    PAST MEDICAL & SURGICAL HISTORY:  COPD (chronic obstructive pulmonary disease)  Bundle branch block, right  Pulmonary fibrosis  Dementia  Atrial fibrillation  GERD (gastroesophageal reflux disease)  Recurrent UTI  Hyperlipidemia  Obstructive sleep apnea on CPAP  H/O small bowel obstruction  History of appendectomy    Review of Systems: Unable to obtain accurate ROS.     MEDICATIONS  (STANDING):  ALBUTerol/ipratropium for Nebulization 3 milliLiter(s) Nebulizer every 6 hours  apixaban 5 milliGRAM(s) Oral every 12 hours  aspirin enteric coated 81 milliGRAM(s) Oral daily  atorvastatin 20 milliGRAM(s) Oral at bedtime  bisacodyl 5 milliGRAM(s) Oral at bedtime  cefTRIAXone   IVPB 1 Gram(s) IV Intermittent every 24 hours  escitalopram 10 milliGRAM(s) Oral daily  finasteride 5 milliGRAM(s) Oral daily  lactulose Syrup 10 Gram(s) Oral daily  magnesium citrate Solution 230 milliLiter(s) Oral once  tamsulosin 0.4 milliGRAM(s) Oral at bedtime    FAMILY HISTORY:  Family history of colon cancer (Sibling)  Family history of myocardial infarction (Sibling)    Vital Signs Last 24 Hrs  T(C): 36.5 (14 Sep 2018 05:38), Max: 36.6 (13 Sep 2018 17:08)  T(F): 97.7 (14 Sep 2018 05:38), Max: 97.8 (13 Sep 2018 17:08)  HR: 72 (14 Sep 2018 09:33) (63 - 72)  BP: 132/74 (14 Sep 2018 09:33) (132/74 - 141/67)  RR: 18 (14 Sep 2018 05:38) (17 - 18)  SpO2: 97% (14 Sep 2018 05:38) (97% - 100%)    Physical Exam:    General:  Appears stated age, well-groomed, well-nourished, no distress  Eyes: EOMI  Chest: respirations nonlabored  Abdomen:  soft, nontender   : shah catheter in place draining clear yellow urine  Rectal: done by Dr. Barraza, Prostate 2+, smooth, fecal impaction   Skin: warm and dry   Neuro:  Alert and oriented x2 to person and place. Not oriented to time.   Psych: normal affect    LABS:                        11.8   9.7   )-----------( 169      ( 14 Sep 2018 06:53 )             36.4     09-14    141  |  107  |  14  ----------------------------<  76  3.6   |  24  |  1.33<H>    Ca    8.4      14 Sep 2018 06:53  Phos  3.5     09-14  Mg     1.9     09-14    TPro  7.2  /  Alb  2.6<L>  /  TBili  0.6  /  DBili  x   /  AST  17  /  ALT  13  /  AlkPhos  84  09-13    RADIOLOGY & ADDITIONAL STUDIES:  < from: US Renal (09.13.18 @ 13:19) >  IMPRESSION:     Mild to moderate bilateral hydronephrosis. Limited evaluation of the   bladder. Dedicated bladder sonogram can be obtained to evaluate for   ureteral jets and pre and post void volumes as clinically indicated.    < end of copied text >

## 2018-09-14 NOTE — PROGRESS NOTE ADULT - SUBJECTIVE AND OBJECTIVE BOX
Patient is a 78y old  Male who presents with a chief complaint of Altered mental status (14 Sep 2018 09:30)  Awake, alert, comfortable in bed in NAD. Constipated. Renal function improved after shah. Seen by Urology.    INTERVAL HPI/OVERNIGHT EVENTS:      VITAL SIGNS:  T(F): 97.7 (09-14-18 @ 05:38)  HR: 72 (09-14-18 @ 09:33)  BP: 132/74 (09-14-18 @ 09:33)  RR: 18 (09-14-18 @ 05:38)  SpO2: 97% (09-14-18 @ 05:38)  Wt(kg): --  I&O's Detail    13 Sep 2018 07:01  -  14 Sep 2018 07:00  --------------------------------------------------------  IN:  Total IN: 0 mL    OUT:    Indwelling Catheter - Urethral: 1100 mL  Total OUT: 1100 mL    Total NET: -1100 mL              REVIEW OF SYSTEMS:    CONSTITUTIONAL:  No fevers, chills, sweats    HEENT:  Eyes:  No diplopia or blurred vision. ENT:  No earache, sore throat or runny nose.    CARDIOVASCULAR:  No pressure, squeezing, tightness, or heaviness about the chest; no palpitations.    RESPIRATORY:  Per HPI    GASTROINTESTINAL:  No abdominal pain, nausea, vomiting or diarrhea.    GENITOURINARY:  No dysuria, frequency or urgency.    NEUROLOGIC:  No paresthesias, fasciculations, seizures or weakness.    PSYCHIATRIC:  No disorder of thought or mood.      PHYSICAL EXAM:    Constitutional: Well developed and nourished  Eyes:Perrla  ENMT: normal  Neck:supple  Respiratory: rales post  Cardiovascular: S1 S2 regular  Gastrointestinal: Soft, Non tender  Extremities: No edema  Vascular:normal  Neurological:Awake, alert  Musculoskeletal:Normal      MEDICATIONS  (STANDING):  ALBUTerol/ipratropium for Nebulization 3 milliLiter(s) Nebulizer every 6 hours  apixaban 5 milliGRAM(s) Oral every 12 hours  aspirin enteric coated 81 milliGRAM(s) Oral daily  atorvastatin 20 milliGRAM(s) Oral at bedtime  bisacodyl 5 milliGRAM(s) Oral at bedtime  cefTRIAXone   IVPB 1 Gram(s) IV Intermittent every 24 hours  escitalopram 10 milliGRAM(s) Oral daily  finasteride 5 milliGRAM(s) Oral daily  lactulose Syrup 10 Gram(s) Oral daily  magnesium citrate Solution 230 milliLiter(s) Oral once  tamsulosin 0.4 milliGRAM(s) Oral at bedtime    MEDICATIONS  (PRN):      Allergies    No Known Drug Allergies  zosyn (Drowsiness)    Intolerances        LABS:                        11.8   9.7   )-----------( 169      ( 14 Sep 2018 06:53 )             36.4     09-14    141  |  107  |  14  ----------------------------<  76  3.6   |  24  |  1.33<H>    Ca    8.4      14 Sep 2018 06:53  Phos  3.5     09-14  Mg     1.9     09-14    TPro  7.2  /  Alb  2.6<L>  /  TBili  0.6  /  DBili  x   /  AST  17  /  ALT  13  /  AlkPhos  84  09-13              CAPILLARY BLOOD GLUCOSE            RADIOLOGY & ADDITIONAL TESTS:    < from: US Renal (09.13.18 @ 13:19) >  Mild to moderate bilateral hydronephrosis. Limited evaluation of the   bladder. Dedicated bladder sonogram can be obtained to evaluate for   ureteral jets and pre and post void volumes as clinically indicated.    < end of copied text >  CXR:    Ct scan chest:    ekg;    echo:

## 2018-09-14 NOTE — PROGRESS NOTE ADULT - ASSESSMENT
78 yr old  Male from home, walks with walker having PMHX of Afib (Eliquis), COPD, BRIEN (Cpap at night), CHFpEF, GERD, HLD, Pulmonary fibrosis, RBBB, Chronic B/L LE lymphedema, dementia, recurrent UTIs and multiple falls came in with acute change in mental status,COLUMBA,urinary retention and B/L hydronephrosis.  1.Urine cx-.  2.Afib-eliquis.  3.Pulmonary fibrosis-duoneb.  4.Lipid D/O-statin.  5.PPI.  6. eval noted, f/u lytes.  7.Echocardiogram.

## 2018-09-14 NOTE — BEHAVIORAL HEALTH ASSESSMENT NOTE - HPI (INCLUDE ILLNESS QUALITY, SEVERITY, DURATION, TIMING, CONTEXT, MODIFYING FACTORS, ASSOCIATED SIGNS AND SYMPTOMS)
78 Male from home, walks with walker having PMH of Afib (Eliquis), COPD, BRIEN (Cpap at night), CHFpEF, GERD, HLD, Pulmonary fibrosis, RBBB, Chronic B/L LE lymphedema, dementia, recurrent UTIs and multiple falls came in with acute change in mental status. Patient is confused, most of the history is by wife. Pt was in [place] 2 weeks ago where he fell and was admitted to hospital where he was started on antibiotics for UTI. After returning to [place], he again had a fall last week and was admitted in [facility] for a day where CT head was normal. Overnight, pt had acute worsening delirium when started having visual hallucinations. Pt denies fever, chest pain, shortness of breath, abdominal discomfort, weight loss, sick contacts, any new medications. Wife has also noticed generalized weakness and painful urination.     Spoke to wife who states that patient is demented at baseline, but has been progressively worse over the last 3 weeks with 2 falls and has been hallucinating at home, and very confused, has seen only a PMD on the outside otherwise has been doing pretty well, currently pt looks much better as per wife, grossly AOx2 speech is appropriate no prior psych history

## 2018-09-14 NOTE — PROGRESS NOTE ADULT - SUBJECTIVE AND OBJECTIVE BOX
PGY 2 Note discussed with supervising  primary attending    Patient is a 78y old  Male who presents with a chief complaint of Altered mental status (14 Sep 2018 12:50)      INTERVAL HPI/OVERNIGHT EVENTS: offers no new complaints; current symptoms resolving    MEDICATIONS  (STANDING):  ALBUTerol/ipratropium for Nebulization 3 milliLiter(s) Nebulizer every 6 hours  apixaban 5 milliGRAM(s) Oral every 12 hours  aspirin enteric coated 81 milliGRAM(s) Oral daily  atorvastatin 20 milliGRAM(s) Oral at bedtime  bisacodyl 5 milliGRAM(s) Oral at bedtime  cefTRIAXone   IVPB 1 Gram(s) IV Intermittent every 24 hours  escitalopram 10 milliGRAM(s) Oral daily  finasteride 5 milliGRAM(s) Oral daily  lactulose Syrup 10 Gram(s) Oral daily  tamsulosin 0.4 milliGRAM(s) Oral at bedtime    MEDICATIONS  (PRN):      __________________________________________________  REVIEW OF SYSTEMS:    CONSTITUTIONAL: No fever,   EYES: no acute visual disturbances  NECK: No pain or stiffness  RESPIRATORY: No cough; No shortness of breath  CARDIOVASCULAR: No chest pain, no palpitations  GASTROINTESTINAL: No pain. No nausea or vomiting; No diarrhea   NEUROLOGICAL: No headache or numbness, no tremors  MUSCULOSKELETAL: No joint pain, no muscle pain  GENITOURINARY: no dysuria, no frequency, no hesitancy  PSYCHIATRY: no depression , no anxiety  ALL OTHER  ROS negative        Vital Signs Last 24 Hrs  T(C): 36.8 (14 Sep 2018 14:29), Max: 36.8 (14 Sep 2018 14:29)  T(F): 98.3 (14 Sep 2018 14:29), Max: 98.3 (14 Sep 2018 14:29)  HR: 83 (14 Sep 2018 14:29) (64 - 83)  BP: 154/91 (14 Sep 2018 14:29) (132/74 - 154/91)  BP(mean): --  RR: 18 (14 Sep 2018 14:29) (18 - 18)  SpO2: 96% (14 Sep 2018 14:29) (96% - 100%)    ________________________________________________  PHYSICAL EXAM:  GENERAL: NAD  HEENT: Normocephalic;  conjunctivae and sclerae clear; moist mucous membranes;   NECK : supple  CHEST/LUNG: Clear to auscultation bilaterally with good air entry   HEART: S1 S2  regular; no murmurs, gallops or rubs  ABDOMEN: Soft, Nontender, Nondistended; Bowel sounds present  EXTREMITIES: no cyanosis; no edema; no calf tenderness  SKIN: warm and dry; no rash  NERVOUS SYSTEM:  AAOx1    _________________________________________________  LABS:                        11.8   9.7   )-----------( 169      ( 14 Sep 2018 06:53 )             36.4     09-14    141  |  107  |  14  ----------------------------<  76  3.6   |  24  |  1.33<H>    Ca    8.4      14 Sep 2018 06:53  Phos  3.5     09-14  Mg     1.9     09-14    TPro  7.2  /  Alb  2.6<L>  /  TBili  0.6  /  DBili  x   /  AST  17  /  ALT  13  /  AlkPhos  84  09-13        CAPILLARY BLOOD GLUCOSE            RADIOLOGY & ADDITIONAL TESTS:        Consultant(s) Notes Reviewed:   YES    Care Discussed with Consultants :     Plan of care was discussed with patient and /or primary care giver; all questions and concerns were addressed and care was aligned with patient's wishes.

## 2018-09-14 NOTE — BEHAVIORAL HEALTH ASSESSMENT NOTE - NSBHCHARTREVIEWVS_PSY_A_CORE FT
Vital Signs Last 24 Hrs  T(C): 36.8 (14 Sep 2018 14:29), Max: 36.8 (14 Sep 2018 14:29)  T(F): 98.3 (14 Sep 2018 14:29), Max: 98.3 (14 Sep 2018 14:29)  HR: 83 (14 Sep 2018 14:29) (63 - 83)  BP: 154/91 (14 Sep 2018 14:29) (132/74 - 154/91)  BP(mean): --  RR: 18 (14 Sep 2018 14:29) (17 - 18)  SpO2: 96% (14 Sep 2018 14:29) (96% - 100%)

## 2018-09-14 NOTE — PROGRESS NOTE ADULT - PROBLEM SELECTOR PLAN 5
Renal follow up  Urology follow up  check PSA  Flomax 0.4 mgs po daily  monitor BMP  Avoid nephrotoxic drugs

## 2018-09-14 NOTE — BEHAVIORAL HEALTH ASSESSMENT NOTE - NSBHCHARTREVIEWLAB_PSY_A_CORE FT
CBC Full  -  ( 14 Sep 2018 06:53 )  WBC Count : 9.7 K/uL  Hemoglobin : 11.8 g/dL  Hematocrit : 36.4 %  Platelet Count - Automated : 169 K/uL  Mean Cell Volume : 94.1 fl  Mean Cell Hemoglobin : 30.5 pg  Mean Cell Hemoglobin Concentration : 32.4 gm/dL  Auto Neutrophil # : 6.3 K/uL  Auto Lymphocyte # : 2.3 K/uL  Auto Monocyte # : 0.7 K/uL  Auto Eosinophil # : 0.3 K/uL  Auto Basophil # : 0.1 K/uL  Auto Neutrophil % : 65.1 %  Auto Lymphocyte % : 23.9 %  Auto Monocyte % : 6.8 %  Auto Eosinophil % : 3.1 %  Auto Basophil % : 1.0 %

## 2018-09-14 NOTE — PROGRESS NOTE ADULT - SUBJECTIVE AND OBJECTIVE BOX
Patient is a 78y old  Male who presents with a chief complaint of Altered mental status (13 Sep 2018 12:46)    pt seen in icu [  ], reg med floor [ x  ], bed [ x ], chair at bedside [   ], awake and responsive [ x ], lethargic [  ],  nad [ x ]      Allergies    No Known Drug Allergies  zosyn (Drowsiness)        Vitals    T(F): 97.7 (09-14-18 @ 05:38), Max: 97.8 (09-13-18 @ 17:08)  HR: 71 (09-14-18 @ 05:38) (63 - 71)  BP: 140/63 (09-14-18 @ 05:38) (134/72 - 141/67)  RR: 18 (09-14-18 @ 05:38) (17 - 18)  SpO2: 97% (09-14-18 @ 05:38) (97% - 100%)  Wt(kg): --  CAPILLARY BLOOD GLUCOSE          Labs                          11.8   9.7   )-----------( 169      ( 14 Sep 2018 06:53 )             36.4       09-14    141  |  107  |  14  ----------------------------<  76  3.6   |  24  |  1.33<H>    Ca    8.4      14 Sep 2018 06:53  Phos  3.5     09-14  Mg     1.9     09-14    TPro  7.2  /  Alb  2.6<L>  /  TBili  0.6  /  DBili  x   /  AST  17  /  ALT  13  /  AlkPhos  84  09-13      CARDIAC MARKERS ( 12 Sep 2018 09:44 )  0.022 ng/mL / x     / x     / x     / x            .Blood Blood-Peripheral  09-12 @ 18:53   No growth to date.  --  --      .Urine Clean Catch (Midstream)  09-12 @ 14:29   No growth  --  --          Radiology Results      < from: US Renal (09.13.18 @ 13:19) >  IMPRESSION:     Mild to moderate bilateral hydronephrosis. Limited evaluation of the   bladder. Dedicated bladder sonogram can be obtained to evaluate for   ureteral jets and pre and post void volumes as clinically indicated.        < end of copied text >      Meds    MEDICATIONS  (STANDING):  ALBUTerol/ipratropium for Nebulization 3 milliLiter(s) Nebulizer every 6 hours  apixaban 5 milliGRAM(s) Oral every 12 hours  aspirin enteric coated 81 milliGRAM(s) Oral daily  atorvastatin 20 milliGRAM(s) Oral at bedtime  cefTRIAXone   IVPB 1 Gram(s) IV Intermittent every 24 hours  escitalopram 10 milliGRAM(s) Oral daily  finasteride 5 milliGRAM(s) Oral daily  tamsulosin 0.4 milliGRAM(s) Oral at bedtime      MEDICATIONS  (PRN):      Physical Exam      Neuro :  no focal deficits  Respiratory: B/L crackles  CV: RRR, S1S2, no murmurs,   Abdominal: Soft, NT, ND +BS,  Extremities: No edema, + peripheral pulses    ASSESSMENT    delerium   worsening dementia  paige  h/o COPD (chronic obstructive pulmonary disease)  Bundle branch block, right  Pulmonary fibrosis  Dementia  Atrial fibrillation  GERD (gastroesophageal reflux disease)  Recurrent UTI  Hyperlipidemia  Recurrent UTI (urinary tract infection)  Obstructive sleep apnea on CPAP  H/O small bowel obstruction  History of appendectomy      PLAN      r/o infectious process  blood and ucx neg noted above  d/c abx  f/u mma, homocysteine, vit b12, rpr  psych cons  pulm f/u  cardio f/u  renal f/u  ccont  IV fluids  serum creat improving  renal sono with Mild to moderate bilateral hydronephrosis noted above   IV fluids  Continue with Flomax and Finasteride.  cont shah  urology cons  phys tx  cont current meds

## 2018-09-14 NOTE — PROGRESS NOTE ADULT - PROBLEM SELECTOR PLAN 5
History of Pulmonary fibrosis  Currently stable  C/w bronchodilators  echo EF 67%  f/u to eval pulmonary artery pressure

## 2018-09-14 NOTE — PROGRESS NOTE ADULT - PROBLEM SELECTOR PLAN 1
Acute worsening of mental status overnight with underlying dementia  CT head: Normal  No focal deficit and Normal pupil reflex, less likely stroke   Pt is recurrent history of UTIs in past however UA here is negative  Mild Leukocytosis  Started Rocephin and wait until Urine Cx results   Blood Cx neg   ammonia wnl  no sign of infectious etiology   will hold Antibiotics   Psych was consulted

## 2018-09-14 NOTE — PROGRESS NOTE ADULT - SUBJECTIVE AND OBJECTIVE BOX
CHIEF COMPLAINT:Patient is a 78y old  Male who presents with a chief complaint of Altered mental status.Pt appears comfortable.    	  REVIEW OF SYSTEMS:  CONSTITUTIONAL: No fever, weight loss, or fatigue  EYES: No eye pain, visual disturbances, or discharge  ENT:  No difficulty hearing, tinnitus, vertigo; No sinus or throat pain  NECK: No pain or stiffness  RESPIRATORY: No cough, wheezing, chills or hemoptysis; No Shortness of Breath  CARDIOVASCULAR: No chest pain, palpitations, passing out, dizziness, or leg swelling  GASTROINTESTINAL: No abdominal or epigastric pain. No nausea, vomiting, or hematemesis; No diarrhea or constipation. No melena or hematochezia.  GENITOURINARY: No dysuria, frequency, hematuria, or incontinence  NEUROLOGICAL: No headaches, memory loss, loss of strength, numbness, or tremors  SKIN: No itching, burning, rashes, or lesions   LYMPH Nodes: No enlarged glands  ENDOCRINE: No heat or cold intolerance; No hair loss  MUSCULOSKELETAL: No joint pain or swelling; No muscle, back, or extremity pain  PSYCHIATRIC: No depression, anxiety, mood swings, or difficulty sleeping  HEME/LYMPH: No easy bruising, or bleeding gums  ALLERGY AND IMMUNOLOGIC: No hives or eczema	      PHYSICAL EXAM:  T(C): 36.5 (09-14-18 @ 05:38), Max: 36.6 (09-13-18 @ 17:08)  HR: 72 (09-14-18 @ 09:33) (63 - 72)  BP: 132/74 (09-14-18 @ 09:33) (132/74 - 141/67)  RR: 18 (09-14-18 @ 05:38) (17 - 18)  SpO2: 97% (09-14-18 @ 05:38) (97% - 100%)  Wt(kg): --  I&O's Summary    13 Sep 2018 07:01  -  14 Sep 2018 07:00  --------------------------------------------------------  IN: 0 mL / OUT: 1100 mL / NET: -1100 mL        Appearance: Normal	  HEENT:   Normal oral mucosa, PERRL, EOMI	  Lymphatic: No lymphadenopathy  Cardiovascular: Normal S1 S2, No JVD, No murmurs, No edema  Respiratory: Lungs clear to auscultation	  Psychiatry: A & O x 3, Mood & affect appropriate  Gastrointestinal:  Soft, Non-tender, + BS	  Skin: No rashes, No ecchymoses, No cyanosis	  Neurologic: Non-focal  Extremities: Normal range of motion, No clubbing, cyanosis or edema  Vascular: Peripheral pulses palpable 2+ bilaterally    MEDICATIONS  (STANDING):  ALBUTerol/ipratropium for Nebulization 3 milliLiter(s) Nebulizer every 6 hours  apixaban 5 milliGRAM(s) Oral every 12 hours  aspirin enteric coated 81 milliGRAM(s) Oral daily  atorvastatin 20 milliGRAM(s) Oral at bedtime  bisacodyl 5 milliGRAM(s) Oral at bedtime  cefTRIAXone   IVPB 1 Gram(s) IV Intermittent every 24 hours  escitalopram 10 milliGRAM(s) Oral daily  finasteride 5 milliGRAM(s) Oral daily  lactulose Syrup 10 Gram(s) Oral daily  magnesium citrate Solution 300 milliLiter(s) Oral once  tamsulosin 0.4 milliGRAM(s) Oral at bedtime      	  LABS:	 	                      11.8   9.7   )-----------( 169      ( 14 Sep 2018 06:53 )             36.4     09-14    141  |  107  |  14  ----------------------------<  76  3.6   |  24  |  1.33<H>    Ca    8.4      14 Sep 2018 06:53  Phos  3.5     09-14  Mg     1.9     09-14    TPro  7.2  /  Alb  2.6<L>  /  TBili  0.6  /  DBili  x   /  AST  17  /  ALT  13  /  AlkPhos  84  09-13    Lipid Profile: Cholesterol 114  LDL 47  HDL 41      HgA1c: Hemoglobin A1C, Whole Blood: 5.8 % (09-13 @ 10:48)    TSH: Thyroid Stimulating Hormone, Serum: 3.61 uU/mL (09-13 @ 07:04)      	  EXAM:  US KIDNEY(S)                            PROCEDURE DATE:  09/13/2018          INTERPRETATION:  CLINICAL INFORMATION: Acute kidney injury.    COMPARISON: CT abdomen and pelvis 5/14/2018. Abdominal ultrasound   4/12/2018.    TECHNIQUE: Sonography of the kidneys and bladder.     FINDINGS:    Right kidney:  12.8 cm. Mild to moderate right hydronephrosis. Visualized   portions of the right ureter are dilated. Right upper pole cyst measuring   2.5 cm.    Left kidney:  12.9 cm. Mild to moderate left hydronephrosis.    Urinary bladder: Limited evaluation demonstrates no acute abnormality.    IMPRESSION:     Mild to moderate bilateral hydronephrosis. Limited evaluation of the   bladder. Dedicated bladder sonogram can be obtained to evaluate for   ureteral jets and pre and post void volumes as clinically indicated.      EXAM:  MR BRAIN                            PROCEDURE DATE:  09/13/2018          INTERPRETATION:  MRI BRAIN WITHOUT CONTRAST     INDICATION: 78 years old. Male. Altered mental status. Confusion. CVA   evaluation.     COMPARISON: CT head 9/12/2018.    TECHNIQUE: Multiplanar, multisequential MR imaging of the brain without   contrast was performed on a 1.5 Rhonda magnet.    FINDINGS:  Study is degraded by motion artifact.    The ventricles and cortical sulci are prominent reflecting parenchymal   volume loss. There is no evidence of acute infarct, intracranial   hemorrhage, mass effect or midline shift. The basal cisterns are patent.    Patchy foci of T2/FLAIR hyperintensity are noted in the periventricular   and subcortical white matter and laura, nonspecific but likely sequela of   small vessel ischemic disease. Chronic ischemic changes are also present   in the bilateral basal ganglia.    The major intracranial flow voids at the skull base are preserved. The   paranasal sinuses and mastoid air cells are well aerated.    IMPRESSION: Motion degraded study.    No intracranial hemorrhage or evidence of acute infarct.

## 2018-09-14 NOTE — CONSULT NOTE ADULT - ASSESSMENT
78 Male from home, walks with walker having PMH of Afib (Eliquis), COPD, BRIEN (Cpap at night), CHFpEF, GERD, HLD, Pulmonary fibrosis, RBBB, Chronic B/L LE lymphedema, dementia, recurrent UTIs and multiple falls came in with acute change in mental status. Likely Delirium with underlying dementia.
78 year old male admitted with bilateral hydronephrosis and COLUMBA secondary to urinary retention due to BPH and fecal impaction. COLUMBA improving s/p shah cathter placement    - continue shah catheter, monitor urine output  - continue to trend renal function  - Flomax daily  - recommend multiple enema's for fecal disimpaction   - continue IV antibioitcs  - discussed with Dr. Medellin and medical team
78 yr old  Male from home, walks with walker having PMHX of Afib (Eliquis), COPD, BRIEN (Cpap at night), CHFpEF, GERD, HLD, Pulmonary fibrosis, RBBB, Chronic B/L LE lymphedema, dementia, recurrent UTIs and multiple falls came in with acute change in mental status.  1.Pan cx, ABX.  2.Afib-eliquis.  3.Pulmonary fibrosis-duoneb.  4.Lipid D/O-statin.  5.PPI.  6.CRI-Renal sono.  7.Echocardiogram.
COLUMBA due to dehydration and partial obstruction.  Continue IV fluids  Continue with Flomax and Finasteride.    Follow bladder scan q 12 hours  Follow urology evaluation.

## 2018-09-14 NOTE — PROGRESS NOTE ADULT - PROBLEM SELECTOR PLAN 4
Baseline Cr 0.6  Currently elevated to 1.8  Likely pre-renal due to poor PO intake  Pt received 3 liters Bolus in ED  Holding further hydration due to LE edema   US renal B/l hydronephrosis   urology consulted   started on Flomax and Proscar   likely post obstructed 2/2 BPH   urology consult appreciated

## 2018-09-15 LAB
ANION GAP SERPL CALC-SCNC: 9 MMOL/L — SIGNIFICANT CHANGE UP (ref 5–17)
BUN SERPL-MCNC: 13 MG/DL — SIGNIFICANT CHANGE UP (ref 7–18)
CALCIUM SERPL-MCNC: 8.4 MG/DL — SIGNIFICANT CHANGE UP (ref 8.4–10.5)
CHLORIDE SERPL-SCNC: 103 MMOL/L — SIGNIFICANT CHANGE UP (ref 96–108)
CO2 SERPL-SCNC: 26 MMOL/L — SIGNIFICANT CHANGE UP (ref 22–31)
CREAT SERPL-MCNC: 1.08 MG/DL — SIGNIFICANT CHANGE UP (ref 0.5–1.3)
GLUCOSE SERPL-MCNC: 80 MG/DL — SIGNIFICANT CHANGE UP (ref 70–99)
POTASSIUM SERPL-MCNC: 4 MMOL/L — SIGNIFICANT CHANGE UP (ref 3.5–5.3)
POTASSIUM SERPL-SCNC: 4 MMOL/L — SIGNIFICANT CHANGE UP (ref 3.5–5.3)
SODIUM SERPL-SCNC: 138 MMOL/L — SIGNIFICANT CHANGE UP (ref 135–145)

## 2018-09-15 RX ORDER — SODIUM CHLORIDE 9 MG/ML
1000 INJECTION, SOLUTION INTRAVENOUS
Qty: 0 | Refills: 0 | Status: DISCONTINUED | OUTPATIENT
Start: 2018-09-15 | End: 2018-09-20

## 2018-09-15 RX ADMIN — ESCITALOPRAM OXALATE 10 MILLIGRAM(S): 10 TABLET, FILM COATED ORAL at 12:26

## 2018-09-15 RX ADMIN — Medication 5 MILLIGRAM(S): at 21:29

## 2018-09-15 RX ADMIN — LACTULOSE 10 GRAM(S): 10 SOLUTION ORAL at 12:26

## 2018-09-15 RX ADMIN — TAMSULOSIN HYDROCHLORIDE 0.4 MILLIGRAM(S): 0.4 CAPSULE ORAL at 21:29

## 2018-09-15 RX ADMIN — Medication 3 MILLILITER(S): at 20:45

## 2018-09-15 RX ADMIN — CEFTRIAXONE 100 GRAM(S): 500 INJECTION, POWDER, FOR SOLUTION INTRAMUSCULAR; INTRAVENOUS at 12:26

## 2018-09-15 RX ADMIN — Medication 81 MILLIGRAM(S): at 12:27

## 2018-09-15 RX ADMIN — APIXABAN 5 MILLIGRAM(S): 2.5 TABLET, FILM COATED ORAL at 06:13

## 2018-09-15 RX ADMIN — Medication 3 MILLILITER(S): at 15:48

## 2018-09-15 RX ADMIN — Medication 3 MILLILITER(S): at 09:17

## 2018-09-15 RX ADMIN — APIXABAN 5 MILLIGRAM(S): 2.5 TABLET, FILM COATED ORAL at 18:18

## 2018-09-15 RX ADMIN — FINASTERIDE 5 MILLIGRAM(S): 5 TABLET, FILM COATED ORAL at 12:26

## 2018-09-15 RX ADMIN — ATORVASTATIN CALCIUM 20 MILLIGRAM(S): 80 TABLET, FILM COATED ORAL at 21:29

## 2018-09-15 NOTE — PROGRESS NOTE ADULT - SUBJECTIVE AND OBJECTIVE BOX
Patient is a 78y old  Male who presents with a chief complaint of Altered mental status (14 Sep 2018 17:35)    pt seen in icu [  ], reg med floor [   ], bed [  ], chair at bedside [   ], a+o x3 [  ], lethargic [  ],  nad [  ]    shah [  ], ngt [  ], peg [  ], et tube [  ], cent line [  ], picc line [  ]        Allergies    No Known Drug Allergies  zosyn (Drowsiness)        Vitals    T(F): 97.7 (09-15-18 @ 04:59), Max: 98.3 (09-14-18 @ 14:29)  HR: 72 (09-15-18 @ 04:59) (72 - 83)  BP: 135/92 (09-15-18 @ 04:59) (132/74 - 154/91)  RR: 18 (09-15-18 @ 04:59) (18 - 18)  SpO2: 96% (09-15-18 @ 04:59) (96% - 98%)  Wt(kg): --  CAPILLARY BLOOD GLUCOSE          Labs                          11.8   9.7   )-----------( 169      ( 14 Sep 2018 06:53 )             36.4       09-15    138  |  103  |  13  ----------------------------<  80  4.0   |  26  |  1.08    Ca    8.4      15 Sep 2018 07:17  Phos  3.5     09-14  Mg     1.9     09-14              .Blood Blood-Peripheral  09-12 @ 18:53   No growth to date.  --  --      .Urine Clean Catch (Midstream)  09-12 @ 14:29   No growth  --  --          Radiology Results      Meds    MEDICATIONS  (STANDING):  ALBUTerol/ipratropium for Nebulization 3 milliLiter(s) Nebulizer every 6 hours  apixaban 5 milliGRAM(s) Oral every 12 hours  aspirin enteric coated 81 milliGRAM(s) Oral daily  atorvastatin 20 milliGRAM(s) Oral at bedtime  bisacodyl 5 milliGRAM(s) Oral at bedtime  cefTRIAXone   IVPB 1 Gram(s) IV Intermittent every 24 hours  escitalopram 10 milliGRAM(s) Oral daily  finasteride 5 milliGRAM(s) Oral daily  lactulose Syrup 10 Gram(s) Oral daily  tamsulosin 0.4 milliGRAM(s) Oral at bedtime      MEDICATIONS  (PRN):      Physical Exam    Neuro :  no focal deficits  Respiratory: CTA B/L  CV: RRR, S1S2, no murmurs,   Abdominal: Soft, NT, ND +BS,  Extremities: No edema, + peripheral pulses    ASSESSMENT    Disorientation  COPD (chronic obstructive pulmonary disease)  Bundle branch block, right  Pulmonary fibrosis  Dementia  Atrial fibrillation  GERD (gastroesophageal reflux disease)  Recurrent UTI  Hyperlipidemia  Recurrent UTI (urinary tract infection)  Obstructive sleep apnea on CPAP  H/O small bowel obstruction  History of appendectomy      PLAN Patient is a 78y old  Male who presents with a chief complaint of Altered mental status (14 Sep 2018 17:35)    pt seen in icu [  ], reg med floor [ x  ], bed [ x ], chair at bedside [   ], awake and responsive [ x ], lethargic [  ],  nad [ x ]    Allergies    No Known Drug Allergies  zosyn (Drowsiness)        Vitals    T(F): 97.7 (09-15-18 @ 04:59), Max: 98.3 (09-14-18 @ 14:29)  HR: 72 (09-15-18 @ 04:59) (72 - 83)  BP: 135/92 (09-15-18 @ 04:59) (132/74 - 154/91)  RR: 18 (09-15-18 @ 04:59) (18 - 18)  SpO2: 96% (09-15-18 @ 04:59) (96% - 98%)  Wt(kg): --  CAPILLARY BLOOD GLUCOSE          Labs                          11.8   9.7   )-----------( 169      ( 14 Sep 2018 06:53 )             36.4       09-15    138  |  103  |  13  ----------------------------<  80  4.0   |  26  |  1.08    Ca    8.4      15 Sep 2018 07:17  Phos  3.5     09-14  Mg     1.9     09-14      Vitamin B12, Serum: 1255: Note: Reference Range Change on 12/18/2017. pg/mL (09.14.18 @ 09:23)    Syphilis Screen (09.14.18 @ 09:23)    Treponema Pallidum Antibody Interpretation: Negative: A negative treponemal  antibody screen indicates no serologic evidence of  syphilis (early infection cannot be excluded) and no additional testing  is required.   A positive screen is followed by testing for RPR.  Positive RPR indicates serologic evidence of new, inadequately treated,  or persistent syphilis infection and titer will be performed.  A negative  RPR following a positive treponemal screen may indicate adequately  treated or resolved past syphilis infection.  A negative RPR will trigger  a specific treponemal  antibody test, TPPA (treponema pallidum passive  particle agglutination).   A positive TPPA confirms previous or current  syphilis infection.   A negative TPPA suggests that the original  treponemal antibody screen was a false positive, but clinical assessment  of the patient is recommended.              .Blood Blood-Peripheral  09-12 @ 18:53   No growth to date.  --  --      .Urine Clean Catch (Midstream)  09-12 @ 14:29   No growth  --  --          Radiology Results      Meds    MEDICATIONS  (STANDING):  ALBUTerol/ipratropium for Nebulization 3 milliLiter(s) Nebulizer every 6 hours  apixaban 5 milliGRAM(s) Oral every 12 hours  aspirin enteric coated 81 milliGRAM(s) Oral daily  atorvastatin 20 milliGRAM(s) Oral at bedtime  bisacodyl 5 milliGRAM(s) Oral at bedtime  cefTRIAXone   IVPB 1 Gram(s) IV Intermittent every 24 hours  escitalopram 10 milliGRAM(s) Oral daily  finasteride 5 milliGRAM(s) Oral daily  lactulose Syrup 10 Gram(s) Oral daily  tamsulosin 0.4 milliGRAM(s) Oral at bedtime      MEDICATIONS  (PRN):      Physical Exam    Neuro :  no focal deficits  Respiratory: B/L crackles  CV: RRR, S1S2, no murmurs,   Abdominal: Soft, NT, ND +BS,  Extremities: No edema, + peripheral pulses    ASSESSMENT    Delirium due to another medical condition  s/p paige  h/o COPD (chronic obstructive pulmonary disease)  Bundle branch block, right  Pulmonary fibrosis  Dementia  Atrial fibrillation  GERD (gastroesophageal reflux disease)  Recurrent UTI  Hyperlipidemia  Recurrent UTI (urinary tract infection)  Obstructive sleep apnea on CPAP  H/O small bowel obstruction  History of appendectomy      PLAN      r/o infectious process  blood and ucx neg noted above  d/c abx  vit b12 elevated noted above,   rpr neg noted above  psych cons noted  cont lexapro  haldol prn agitation  pulm f/u  cardio f/u  renal f/u  cont  IV fluids  serum creat wnl  renal sono with Mild to moderate bilateral hydronephrosis noted above  Continue with Flomax and Finasteride.  cont shah  urology cons noted  continue shah catheter, monitor urine output  continue to trend renal function  Flomax daily  recommend multiple enema's for fecal disimpaction  phys tx  cont current meds

## 2018-09-15 NOTE — CHART NOTE - NSCHARTNOTEFT_GEN_A_CORE
78 year old male admitted with bilateral hydronephrosis and COLUMBA secondary to urinary retention due to BPH and fecal impaction. COLUMBA improving s/p shah catheter placement    Vital Signs Last 24 Hrs  T(C): 36.5 (15 Sep 2018 04:59), Max: 36.8 (14 Sep 2018 14:29)  T(F): 97.7 (15 Sep 2018 04:59), Max: 98.3 (14 Sep 2018 14:29)  HR: 72 (15 Sep 2018 04:59) (72 - 83)  BP: 135/92 (15 Sep 2018 04:59) (135/92 - 154/91)  BP(mean): --  RR: 18 (15 Sep 2018 04:59) (18 - 18)  SpO2: 96% (15 Sep 2018 04:59) (96% - 98%)    received enemas  had bm's    renal function improved this am    cont shah x 1 week  flomax  d/w Dr Barraza

## 2018-09-15 NOTE — PROGRESS NOTE ADULT - SUBJECTIVE AND OBJECTIVE BOX
Patient is a 78y old  Male who presents with a chief complaint of Altered mental status (15 Sep 2018 09:23)    Awake, alert, comfortable in bed in NAD. Constipated. Renal function improved after shah. Seen by Urology. Feeling better this am. Pt has no new complaints.     INTERVAL HPI/OVERNIGHT EVENTS:      VITAL SIGNS:  T(F): 97.7 (09-15-18 @ 04:59)  HR: 72 (09-15-18 @ 04:59)  BP: 135/92 (09-15-18 @ 04:59)  RR: 18 (09-15-18 @ 04:59)  SpO2: 96% (09-15-18 @ 04:59)  Wt(kg): --  I&O's Detail    14 Sep 2018 07:01  -  15 Sep 2018 07:00  --------------------------------------------------------  IN:  Total IN: 0 mL    OUT:    Indwelling Catheter - Urethral: 1100 mL  Total OUT: 1100 mL    Total NET: -1100 mL              REVIEW OF SYSTEMS:    CONSTITUTIONAL:  No fevers, chills, sweats    HEENT:  Eyes:  No diplopia or blurred vision. ENT:  No earache, sore throat or runny nose.    CARDIOVASCULAR:  No pressure, squeezing, tightness, or heaviness about the chest; no palpitations.    RESPIRATORY:  Per HPI    GASTROINTESTINAL:  No abdominal pain, nausea, vomiting or diarrhea.    GENITOURINARY:  No dysuria, frequency or urgency.    NEUROLOGIC:  No paresthesias, fasciculations, seizures or weakness.    PSYCHIATRIC:  No disorder of thought or mood.      PHYSICAL EXAM:    Constitutional: Well developed and nourished  Eyes:Perrla  ENMT: normal  Neck:supple  Respiratory: + rales post  Cardiovascular: S1 S2 regular  Gastrointestinal: Soft, Non tender  Extremities: No edema  Vascular:normal  Neurological:Awake, alert,Ox3  Musculoskeletal:Normal      MEDICATIONS  (STANDING):  ALBUTerol/ipratropium for Nebulization 3 milliLiter(s) Nebulizer every 6 hours  apixaban 5 milliGRAM(s) Oral every 12 hours  aspirin enteric coated 81 milliGRAM(s) Oral daily  atorvastatin 20 milliGRAM(s) Oral at bedtime  bisacodyl 5 milliGRAM(s) Oral at bedtime  cefTRIAXone   IVPB 1 Gram(s) IV Intermittent every 24 hours  escitalopram 10 milliGRAM(s) Oral daily  finasteride 5 milliGRAM(s) Oral daily  lactulose Syrup 10 Gram(s) Oral daily  tamsulosin 0.4 milliGRAM(s) Oral at bedtime    MEDICATIONS  (PRN):  aluminum hydroxide/magnesium hydroxide/simethicone Suspension 30 milliLiter(s) Oral every 4 hours PRN Dyspepsia      Allergies    No Known Drug Allergies  zosyn (Drowsiness)    Intolerances        LABS:                        11.8   9.7   )-----------( 169      ( 14 Sep 2018 06:53 )             36.4     09-15    138  |  103  |  13  ----------------------------<  80  4.0   |  26  |  1.08    Ca    8.4      15 Sep 2018 07:17  Phos  3.5     09-14  Mg     1.9     09-14                CAPILLARY BLOOD GLUCOSE            RADIOLOGY & ADDITIONAL TESTS:    < from: US Renal (09.13.18 @ 13:19) >  Mild to moderate bilateral hydronephrosis. Limited evaluation of the   bladder. Dedicated bladder sonogram can be obtained to evaluate for   ureteral jets and pre and post void volumes as clinically indicated.    < end of copied text >  CXR:  < from: Xray Chest 1 View-PORTABLE IMMEDIATE (09.12.18 @ 13:28) >  COMPARISON STUDY: 1/1/2018    Frontal expiratory view of the chest shows the heart to be similarly   enlarged in size. The lungs show increased peripheral markings compatible   with pulmonary fibrosis and there is no evidence of pneumothorax nor   definite pleural effusion. Likely skinfold projects over the left chest.    IMPRESSION:  Pulmonary markings as described.    < end of copied text >    Ct scan chest:    ekg;    echo:  < from: Transthoracic Echocardiogram (09.14.18 @ 10:30) >  CONCLUSIONS:  1. Normal mitral valve. Mild to moderate mitral  regurgitation.  2. Calcified trileaflet aortic valve with normal opening.  No aortic stenosis. Mild aortic regurgitation.  3. Normal aortic root.  4. Mildly dilated left atrium.  LA volume index = 35 cc/m2.  5. Normal left ventricular internal dimensions and wall  thicknesses.  6. Normal Left Ventricular Systolic Function,  (EF =67% by  biplane)  7. Normal right atrium.  8. Normal right ventricular size and systolic function  (TAPSE 1.9 cm).  9. RV systolic pressure is normal at  27 mm Hg.  10. Normal tricuspid valve. Trace tricuspid regurgitation.  11. Pulmonic valve not well seen. Trace pulmonic  insufficiency is noted.  12. No pericardial effusion.      < end of copied text > Patient is a 78y old  Male who presents with a chief complaint of Altered mental status (15 Sep 2018 09:23)    Awake, alert, comfortable in bed in NAD. Constipated but had BM already. Renal function improved after shah. Seen by Urology. Feeling better this am. Pt has no new complaints.     INTERVAL HPI/OVERNIGHT EVENTS:      VITAL SIGNS:  T(F): 97.7 (09-15-18 @ 04:59)  HR: 72 (09-15-18 @ 04:59)  BP: 135/92 (09-15-18 @ 04:59)  RR: 18 (09-15-18 @ 04:59)  SpO2: 96% (09-15-18 @ 04:59)  Wt(kg): --  I&O's Detail    14 Sep 2018 07:01  -  15 Sep 2018 07:00  --------------------------------------------------------  IN:  Total IN: 0 mL    OUT:    Indwelling Catheter - Urethral: 1100 mL  Total OUT: 1100 mL    Total NET: -1100 mL              REVIEW OF SYSTEMS:    CONSTITUTIONAL:  No fevers, chills, sweats    HEENT:  Eyes:  No diplopia or blurred vision. ENT:  No earache, sore throat or runny nose.    CARDIOVASCULAR:  No pressure, squeezing, tightness, or heaviness about the chest; no palpitations.    RESPIRATORY:  Per HPI    GASTROINTESTINAL:  No abdominal pain, nausea, vomiting or diarrhea.    GENITOURINARY:  No dysuria, frequency or urgency.    NEUROLOGIC:  No paresthesias, fasciculations, seizures or weakness.    PSYCHIATRIC:  No disorder of thought or mood.      PHYSICAL EXAM:    Constitutional: Well developed and nourished  Eyes:Perrla  ENMT: normal  Neck:supple  Respiratory: + rales post  Cardiovascular: S1 S2 regular  Gastrointestinal: Soft, Non tender  Extremities: No edema  Vascular:normal  Neurological:Awake, alert,Ox3  Musculoskeletal:Normal      MEDICATIONS  (STANDING):  ALBUTerol/ipratropium for Nebulization 3 milliLiter(s) Nebulizer every 6 hours  apixaban 5 milliGRAM(s) Oral every 12 hours  aspirin enteric coated 81 milliGRAM(s) Oral daily  atorvastatin 20 milliGRAM(s) Oral at bedtime  bisacodyl 5 milliGRAM(s) Oral at bedtime  cefTRIAXone   IVPB 1 Gram(s) IV Intermittent every 24 hours  escitalopram 10 milliGRAM(s) Oral daily  finasteride 5 milliGRAM(s) Oral daily  lactulose Syrup 10 Gram(s) Oral daily  tamsulosin 0.4 milliGRAM(s) Oral at bedtime    MEDICATIONS  (PRN):  aluminum hydroxide/magnesium hydroxide/simethicone Suspension 30 milliLiter(s) Oral every 4 hours PRN Dyspepsia      Allergies    No Known Drug Allergies  zosyn (Drowsiness)    Intolerances        LABS:                        11.8   9.7   )-----------( 169      ( 14 Sep 2018 06:53 )             36.4     09-15    138  |  103  |  13  ----------------------------<  80  4.0   |  26  |  1.08    Ca    8.4      15 Sep 2018 07:17  Phos  3.5     09-14  Mg     1.9     09-14                CAPILLARY BLOOD GLUCOSE            RADIOLOGY & ADDITIONAL TESTS:    < from: US Renal (09.13.18 @ 13:19) >  Mild to moderate bilateral hydronephrosis. Limited evaluation of the   bladder. Dedicated bladder sonogram can be obtained to evaluate for   ureteral jets and pre and post void volumes as clinically indicated.    < end of copied text >  CXR:  < from: Xray Chest 1 View-PORTABLE IMMEDIATE (09.12.18 @ 13:28) >  COMPARISON STUDY: 1/1/2018    Frontal expiratory view of the chest shows the heart to be similarly   enlarged in size. The lungs show increased peripheral markings compatible   with pulmonary fibrosis and there is no evidence of pneumothorax nor   definite pleural effusion. Likely skinfold projects over the left chest.    IMPRESSION:  Pulmonary markings as described.    < end of copied text >    Ct scan chest:    ekg;    echo:  < from: Transthoracic Echocardiogram (09.14.18 @ 10:30) >  CONCLUSIONS:  1. Normal mitral valve. Mild to moderate mitral  regurgitation.  2. Calcified trileaflet aortic valve with normal opening.  No aortic stenosis. Mild aortic regurgitation.  3. Normal aortic root.  4. Mildly dilated left atrium.  LA volume index = 35 cc/m2.  5. Normal left ventricular internal dimensions and wall  thicknesses.  6. Normal Left Ventricular Systolic Function,  (EF =67% by  biplane)  7. Normal right atrium.  8. Normal right ventricular size and systolic function  (TAPSE 1.9 cm).  9. RV systolic pressure is normal at  27 mm Hg.  10. Normal tricuspid valve. Trace tricuspid regurgitation.  11. Pulmonic valve not well seen. Trace pulmonic  insufficiency is noted.  12. No pericardial effusion.      < end of copied text >

## 2018-09-15 NOTE — PROGRESS NOTE ADULT - PROBLEM SELECTOR PLAN 4
Renal follow up  Urology follow up  check PSA  Flomax 0.4 mgs po daily  monitor BMP  Avoid nephrotoxic drugs.

## 2018-09-15 NOTE — PROGRESS NOTE ADULT - SUBJECTIVE AND OBJECTIVE BOX
CARDIOLOGY     PROGRESS  NOTE   ________________________________________________    CHIEF COMPLAINT:Patient is a 78y old  Male who presents with a chief complaint of Altered mental status (15 Sep 2018 08:08)  doing better.  	  REVIEW OF SYSTEMS:  CONSTITUTIONAL: No fever, weight loss, or fatigue  EYES: No eye pain, visual disturbances, or discharge  ENT:  No difficulty hearing, tinnitus, vertigo; No sinus or throat pain  NECK: No pain or stiffness  RESPIRATORY: No cough, wheezing, chills or hemoptysis; No Shortness of Breath  CARDIOVASCULAR: No chest pain, palpitations, passing out, dizziness, or leg swelling  GASTROINTESTINAL: No abdominal or epigastric pain. No nausea, vomiting, or hematemesis; No diarrhea or constipation. No melena or hematochezia.  GENITOURINARY: No dysuria, frequency, hematuria, or incontinence  NEUROLOGICAL: No headaches, memory loss, loss of strength, numbness, or tremors  SKIN: No itching, burning, rashes, or lesions   LYMPH Nodes: No enlarged glands  ENDOCRINE: No heat or cold intolerance; No hair loss  MUSCULOSKELETAL: No joint pain or swelling; No muscle, back, or extremity pain  PSYCHIATRIC: No depression, anxiety, mood swings, or difficulty sleeping  HEME/LYMPH: No easy bruising, or bleeding gums  ALLERGY AND IMMUNOLOGIC: No hives or eczema	    [ ] All others negative	  [ ] Unable to obtain    PHYSICAL EXAM:  T(C): 36.5 (09-15-18 @ 04:59), Max: 36.8 (09-14-18 @ 14:29)  HR: 72 (09-15-18 @ 04:59) (72 - 83)  BP: 135/92 (09-15-18 @ 04:59) (132/74 - 154/91)  RR: 18 (09-15-18 @ 04:59) (18 - 18)  SpO2: 96% (09-15-18 @ 04:59) (96% - 98%)  Wt(kg): --  I&O's Summary    14 Sep 2018 07:01  -  15 Sep 2018 07:00  --------------------------------------------------------  IN: 0 mL / OUT: 1100 mL / NET: -1100 mL        Appearance: Normal	  HEENT:   Normal oral mucosa, PERRL, EOMI	  Lymphatic: No lymphadenopathy  Cardiovascular: Normal S1 S2, No JVD, + murmurs, No edema  Respiratory: Lungs clear to auscultation	  Psychiatry: A & O x 3, Mood & affect appropriate  Gastrointestinal:  Soft, Non-tender, + BS	  Skin: No rashes, No ecchymoses, No cyanosis	  Neurologic: Non-focal  Extremities: Normal range of motion, No clubbing, cyanosis or edema  Vascular: Peripheral pulses palpable 2+ bilaterally    MEDICATIONS  (STANDING):  ALBUTerol/ipratropium for Nebulization 3 milliLiter(s) Nebulizer every 6 hours  apixaban 5 milliGRAM(s) Oral every 12 hours  aspirin enteric coated 81 milliGRAM(s) Oral daily  atorvastatin 20 milliGRAM(s) Oral at bedtime  bisacodyl 5 milliGRAM(s) Oral at bedtime  cefTRIAXone   IVPB 1 Gram(s) IV Intermittent every 24 hours  escitalopram 10 milliGRAM(s) Oral daily  finasteride 5 milliGRAM(s) Oral daily  lactulose Syrup 10 Gram(s) Oral daily  tamsulosin 0.4 milliGRAM(s) Oral at bedtime      TELEMETRY: 	    ECG:  	  RADIOLOGY:  OTHER: 	  	  LABS:	 	    CARDIAC MARKERS:                                11.8   9.7   )-----------( 169      ( 14 Sep 2018 06:53 )             36.4     09-15    138  |  103  |  13  ----------------------------<  80  4.0   |  26  |  1.08    Ca    8.4      15 Sep 2018 07:17  Phos  3.5     09-14  Mg     1.9     09-14      proBNP:   Lipid Profile: Cholesterol 114  LDL 47  HDL 41      HgA1c: Hemoglobin A1C, Whole Blood: 5.8 % (09-13 @ 10:48)    TSH: Thyroid Stimulating Hormone, Serum: 3.61 uU/mL (09-13 @ 07:04)    < from: Transthoracic Echocardiogram (09.14.18 @ 10:30) >  1. Normal mitral valve. Mild to moderate mitral  regurgitation.  2. Calcified trileaflet aortic valve with normal opening.  No aortic stenosis. Mild aortic regurgitation.  3. Normal aortic root.  4. Mildly dilated left atrium.  LA volume index = 35 cc/m2.  5. Normal left ventricular internal dimensions and wall  thicknesses.  6. Normal Left Ventricular Systolic Function,  (EF =67% by  biplane)  7. Normal right atrium.  8. Normal right ventricular size and systolic function  (TAPSE 1.9 cm).  9. RV systolic pressure is normal at  27 mm Hg.  10. Normal tricuspid valve. Trace tricuspid regurgitation.  11. Pulmonic valve not well seen. Trace pulmonic  insufficiency is noted.  12. No pericardial effusion.    < end of copied text >        Assessment and plan  ---------------------------  78 yr old  Male from home, walks with walker having PMHX of Afib (Eliquis), COPD, BRIEN (Cpap at night), CHFpEF, GERD, HLD, Pulmonary fibrosis, RBBB, Chronic B/L LE lymphedema, dementia, recurrent UTIs and multiple falls came in with acute change in mental status,COLUMBA,urinary retention and B/L hydronephrosis.  1.Urine cx-.  2.Afib-eliquis.  3.Pulmonary fibrosis-duoneb.  4.Lipid D/O-statin.  5.PPI.  6. eval noted, f/u lytes.

## 2018-09-16 LAB
APPEARANCE UR: ABNORMAL
BACTERIA # UR AUTO: ABNORMAL /HPF
BILIRUB UR-MCNC: NEGATIVE — SIGNIFICANT CHANGE UP
COLOR SPEC: ABNORMAL
DIFF PNL FLD: ABNORMAL
GLUCOSE UR QL: NEGATIVE — SIGNIFICANT CHANGE UP
KETONES UR-MCNC: ABNORMAL
LEUKOCYTE ESTERASE UR-ACNC: ABNORMAL
NITRITE UR-MCNC: NEGATIVE — SIGNIFICANT CHANGE UP
PH UR: 8 — SIGNIFICANT CHANGE UP (ref 5–8)
PROT UR-MCNC: 500 MG/DL
RBC CASTS # UR COMP ASSIST: >50 /HPF (ref 0–2)
SP GR SPEC: 1.01 — SIGNIFICANT CHANGE UP (ref 1.01–1.02)
UROBILINOGEN FLD QL: NEGATIVE — SIGNIFICANT CHANGE UP
WBC UR QL: ABNORMAL /HPF (ref 0–5)

## 2018-09-16 RX ADMIN — Medication 3 MILLILITER(S): at 20:55

## 2018-09-16 RX ADMIN — SODIUM CHLORIDE 50 MILLILITER(S): 9 INJECTION, SOLUTION INTRAVENOUS at 05:53

## 2018-09-16 RX ADMIN — APIXABAN 5 MILLIGRAM(S): 2.5 TABLET, FILM COATED ORAL at 05:53

## 2018-09-16 RX ADMIN — FINASTERIDE 5 MILLIGRAM(S): 5 TABLET, FILM COATED ORAL at 11:54

## 2018-09-16 RX ADMIN — TAMSULOSIN HYDROCHLORIDE 0.4 MILLIGRAM(S): 0.4 CAPSULE ORAL at 21:17

## 2018-09-16 RX ADMIN — ESCITALOPRAM OXALATE 10 MILLIGRAM(S): 10 TABLET, FILM COATED ORAL at 13:22

## 2018-09-16 RX ADMIN — Medication 3 MILLILITER(S): at 09:23

## 2018-09-16 RX ADMIN — Medication 5 MILLIGRAM(S): at 21:17

## 2018-09-16 RX ADMIN — LACTULOSE 10 GRAM(S): 10 SOLUTION ORAL at 11:55

## 2018-09-16 RX ADMIN — ATORVASTATIN CALCIUM 20 MILLIGRAM(S): 80 TABLET, FILM COATED ORAL at 21:18

## 2018-09-16 RX ADMIN — APIXABAN 5 MILLIGRAM(S): 2.5 TABLET, FILM COATED ORAL at 17:52

## 2018-09-16 RX ADMIN — Medication 81 MILLIGRAM(S): at 11:54

## 2018-09-16 RX ADMIN — Medication 3 MILLILITER(S): at 14:20

## 2018-09-16 RX ADMIN — CEFTRIAXONE 100 GRAM(S): 500 INJECTION, POWDER, FOR SOLUTION INTRAMUSCULAR; INTRAVENOUS at 11:57

## 2018-09-16 NOTE — PROGRESS NOTE ADULT - SUBJECTIVE AND OBJECTIVE BOX
Patient is a 78y old  Male who presents with a chief complaint of Altered mental status (16 Sep 2018 09:37)  Awake, alert, comfortable in bed in NAD    INTERVAL HPI/OVERNIGHT EVENTS:      VITAL SIGNS:  T(F): 97.8 (18 @ 06:05)  HR: 77 (18 @ 06:05)  BP: 128/61 (18 @ 06:05)  RR: 18 (18 @ 06:05)  SpO2: 98% (18 @ 06:05)  Wt(kg): --  I&O's Detail    15 Sep 2018 07:01  -  16 Sep 2018 07:00  --------------------------------------------------------  IN:  Total IN: 0 mL    OUT:    Indwelling Catheter - Urethral: 1050 mL  Total OUT: 1050 mL    Total NET: -1050 mL              REVIEW OF SYSTEMS:    CONSTITUTIONAL:  No fevers, chills, sweats    HEENT:  Eyes:  No diplopia or blurred vision. ENT:  No earache, sore throat or runny nose.    CARDIOVASCULAR:  No pressure, squeezing, tightness, or heaviness about the chest; no palpitations.    RESPIRATORY:  Per HPI    GASTROINTESTINAL:  No abdominal pain, nausea, vomiting or diarrhea.    GENITOURINARY:  No dysuria, frequency or urgency.    NEUROLOGIC:  No paresthesias, fasciculations, seizures or weakness.    PSYCHIATRIC:  No disorder of thought or mood.      PHYSICAL EXAM:    Constitutional: Well developed and nourished  Eyes:Perrla  ENMT: normal  Neck:supple  Respiratory: good air entry  Cardiovascular: S1 S2 regular  Gastrointestinal: Soft, Non tender  Extremities: No edema  Vascular:normal  Neurological:Awake, alert  Musculoskeletal:Normal      MEDICATIONS  (STANDING):  ALBUTerol/ipratropium for Nebulization 3 milliLiter(s) Nebulizer every 6 hours  apixaban 5 milliGRAM(s) Oral every 12 hours  aspirin enteric coated 81 milliGRAM(s) Oral daily  atorvastatin 20 milliGRAM(s) Oral at bedtime  bisacodyl 5 milliGRAM(s) Oral at bedtime  cefTRIAXone   IVPB 1 Gram(s) IV Intermittent every 24 hours  dextrose 5% + sodium chloride 0.9%. 1000 milliLiter(s) (50 mL/Hr) IV Continuous <Continuous>  escitalopram 10 milliGRAM(s) Oral daily  finasteride 5 milliGRAM(s) Oral daily  lactulose Syrup 10 Gram(s) Oral daily  tamsulosin 0.4 milliGRAM(s) Oral at bedtime    MEDICATIONS  (PRN):  aluminum hydroxide/magnesium hydroxide/simethicone Suspension 30 milliLiter(s) Oral every 4 hours PRN Dyspepsia      Allergies    No Known Drug Allergies  zosyn (Drowsiness)    Intolerances        LABS:    09-15    138  |  103  |  13  ----------------------------<  80  4.0   |  26  |  1.08    Ca    8.4      15 Sep 2018 07:17        Urinalysis Basic - ( 16 Sep 2018 09:37 )    Color: Red / Appearance: bloody / S.015 / pH: x  Gluc: x / Ketone: Trace  / Bili: Negative / Urobili: Negative   Blood: x / Protein: 500 mg/dL / Nitrite: Negative   Leuk Esterase: Small / RBC: >50 /HPF / WBC 6-10 /HPF   Sq Epi: x / Non Sq Epi: x / Bacteria: Trace /HPF            CAPILLARY BLOOD GLUCOSE            RADIOLOGY & ADDITIONAL TESTS:    CXR:  < from: Xray Chest 1 View-PORTABLE IMMEDIATE (18 @ 13:28) >    IMPRESSION:  Pulmonary markings as described.    Thank you for the courtesy of this referral.      < end of copied text >  < from: CT Head No Cont (18 @ 09:30) >  IMPRESSION: Limited study.  No definite acute intracranial hemorrhage or mass effect in the   visualized brain. Repeat study may be performed as clinically indicated.      < end of copied text >    Ct scan chest:    ekg;    echo:

## 2018-09-16 NOTE — PROGRESS NOTE ADULT - SUBJECTIVE AND OBJECTIVE BOX
Patient is a 78y old  Male who presents with a chief complaint of Altered mental status (15 Sep 2018 10:59)    pt seen in icu [  ], reg med floor [ x  ], bed [ x ], chair at bedside [   ], awake and responsive [ x ], lethargic [  ],  nad [ x ]      Allergies    No Known Drug Allergies  zosyn (Drowsiness)        Vitals    T(F): 97.8 (09-16-18 @ 06:05), Max: 97.8 (09-16-18 @ 06:05)  HR: 77 (09-16-18 @ 06:05) (62 - 77)  BP: 128/61 (09-16-18 @ 06:05) (128/61 - 147/70)  RR: 18 (09-16-18 @ 06:05) (18 - 18)  SpO2: 98% (09-16-18 @ 06:05) (96% - 99%)  Wt(kg): --  CAPILLARY BLOOD GLUCOSE          Labs        09-15    138  |  103  |  13  ----------------------------<  80  4.0   |  26  |  1.08    Ca    8.4      15 Sep 2018 07:17              .Blood Blood-Peripheral  09-12 @ 18:53   No growth to date.  --  --      .Urine Clean Catch (Midstream)  09-12 @ 14:29   No growth  --  --          Radiology Results    < from: MR Head No Cont (09.13.18 @ 16:51) >  IMPRESSION: Motion degraded study.    No intracranial hemorrhage or evidence of acute infarct.    < end of copied text >            Meds    MEDICATIONS  (STANDING):  ALBUTerol/ipratropium for Nebulization 3 milliLiter(s) Nebulizer every 6 hours  apixaban 5 milliGRAM(s) Oral every 12 hours  aspirin enteric coated 81 milliGRAM(s) Oral daily  atorvastatin 20 milliGRAM(s) Oral at bedtime  bisacodyl 5 milliGRAM(s) Oral at bedtime  cefTRIAXone   IVPB 1 Gram(s) IV Intermittent every 24 hours  dextrose 5% + sodium chloride 0.9%. 1000 milliLiter(s) (50 mL/Hr) IV Continuous <Continuous>  escitalopram 10 milliGRAM(s) Oral daily  finasteride 5 milliGRAM(s) Oral daily  lactulose Syrup 10 Gram(s) Oral daily  tamsulosin 0.4 milliGRAM(s) Oral at bedtime      MEDICATIONS  (PRN):  aluminum hydroxide/magnesium hydroxide/simethicone Suspension 30 milliLiter(s) Oral every 4 hours PRN Dyspepsia      Physical Exam    Neuro :  no focal deficits  Respiratory: B/L crackles  CV: RRR, S1S2, no murmurs,   Abdominal: Soft, NT, ND +BS,  Extremities: No edema, + peripheral pulses    ASSESSMENT    Delirium due to another medical condition  s/p paige  h/o COPD (chronic obstructive pulmonary disease)  Bundle branch block, right  Pulmonary fibrosis  Dementia  Atrial fibrillation  GERD (gastroesophageal reflux disease)  Recurrent UTI  Hyperlipidemia  Recurrent UTI (urinary tract infection)  Obstructive sleep apnea on CPAP  H/O small bowel obstruction  History of appendectomy      PLAN      r/o infectious process  blood and ucx neg noted above  d/c abx  vit b12 elevated noted   rpr neg noted above  mri neg for acute patho noted above  psych cons noted  cont lexapro  haldol prn agitation  pulm f/u  cardio f/u  renal f/u  cont  IV fluids  serum creat wnl  renal sono with Mild to moderate bilateral hydronephrosis noted   Continue with Flomax and Finasteride.  cont shah  urology cons noted  continue shah catheter, monitor urine output  recommend multiple enema's for fecal disimpaction  phys tx  cont current meds

## 2018-09-16 NOTE — PROGRESS NOTE ADULT - ASSESSMENT
B/L hydronephrosis, mild hematuria    -Cont FC  -Flomax  -UA and U Cx B/L hydronephrosis, mild hematuria    -Cont FC  -Flomax  -UA and U Cx  -D/W Dr Barraza

## 2018-09-16 NOTE — PROGRESS NOTE ADULT - SUBJECTIVE AND OBJECTIVE BOX
CARDIOLOGY     PROGRESS  NOTE   ________________________________________________    CHIEF COMPLAINT:Patient is a 78y old  Male who presents with a chief complaint of Altered mental status (16 Sep 2018 08:59)    	  REVIEW OF SYSTEMS:  CONSTITUTIONAL: No fever, weight loss, or fatigue  EYES: No eye pain, visual disturbances, or discharge  ENT:  No difficulty hearing, tinnitus, vertigo; No sinus or throat pain  NECK: No pain or stiffness  RESPIRATORY: No cough, wheezing, chills or hemoptysis; No Shortness of Breath  CARDIOVASCULAR: No chest pain, palpitations, passing out, dizziness, or leg swelling  GASTROINTESTINAL: No abdominal or epigastric pain. No nausea, vomiting, or hematemesis; No diarrhea or constipation. No melena or hematochezia.  GENITOURINARY: No dysuria, frequency, hematuria, or incontinence  NEUROLOGICAL: No headaches, memory loss, loss of strength, numbness, or tremors  SKIN: No itching, burning, rashes, or lesions   LYMPH Nodes: No enlarged glands  ENDOCRINE: No heat or cold intolerance; No hair loss  MUSCULOSKELETAL: No joint pain or swelling; No muscle, back, or extremity pain  PSYCHIATRIC: No depression, anxiety, mood swings, or difficulty sleeping  HEME/LYMPH: No easy bruising, or bleeding gums  ALLERGY AND IMMUNOLOGIC: No hives or eczema	    [ ] All others negative	  [ ] Unable to obtain    PHYSICAL EXAM:  T(C): 36.6 (09-16-18 @ 06:05), Max: 36.6 (09-16-18 @ 06:05)  HR: 77 (09-16-18 @ 06:05) (62 - 77)  BP: 128/61 (09-16-18 @ 06:05) (128/61 - 147/70)  RR: 18 (09-16-18 @ 06:05) (18 - 18)  SpO2: 98% (09-16-18 @ 06:05) (96% - 99%)  Wt(kg): --  I&O's Summary    15 Sep 2018 07:01  -  16 Sep 2018 07:00  --------------------------------------------------------  IN: 0 mL / OUT: 1050 mL / NET: -1050 mL        Appearance: Normal	  HEENT:   Normal oral mucosa, PERRL, EOMI	  Lymphatic: No lymphadenopathy  Cardiovascular: Normal S1 S2, No JVD, + murmurs, No edema  Respiratory: Lungs clear to auscultation	  Psychiatry: A & O x 3, Mood & affect appropriate  Gastrointestinal:  Soft, Non-tender, + BS	  Skin: No rashes, No ecchymoses, No cyanosis	  Neurologic: Non-focal  Extremities: Normal range of motion, No clubbing, cyanosis or edema  Vascular: Peripheral pulses palpable 2+ bilaterally    MEDICATIONS  (STANDING):  ALBUTerol/ipratropium for Nebulization 3 milliLiter(s) Nebulizer every 6 hours  apixaban 5 milliGRAM(s) Oral every 12 hours  aspirin enteric coated 81 milliGRAM(s) Oral daily  atorvastatin 20 milliGRAM(s) Oral at bedtime  bisacodyl 5 milliGRAM(s) Oral at bedtime  cefTRIAXone   IVPB 1 Gram(s) IV Intermittent every 24 hours  dextrose 5% + sodium chloride 0.9%. 1000 milliLiter(s) (50 mL/Hr) IV Continuous <Continuous>  escitalopram 10 milliGRAM(s) Oral daily  finasteride 5 milliGRAM(s) Oral daily  lactulose Syrup 10 Gram(s) Oral daily  tamsulosin 0.4 milliGRAM(s) Oral at bedtime      TELEMETRY: 	    ECG:  	  RADIOLOGY:  OTHER: 	  	  LABS:	 	    CARDIAC MARKERS:            09-15    138  |  103  |  13  ----------------------------<  80  4.0   |  26  |  1.08    Ca    8.4      15 Sep 2018 07:17      proBNP:   Lipid Profile: Cholesterol 114  LDL 47  HDL 41      HgA1c: Hemoglobin A1C, Whole Blood: 5.8 % (09-13 @ 10:48)    TSH: Thyroid Stimulating Hormone, Serum: 3.61 uU/mL (09-13 @ 07:04)          Assessment and plan  ---------------------------  78 yr old  Male from home, walks with walker having PMHX of Afib (Eliquis), COPD, BRIEN (Cpap at night), CHFpEF, GERD, HLD, Pulmonary fibrosis, RBBB, Chronic B/L LE lymphedema, dementia, recurrent UTIs and multiple falls came in with acute change in mental status,COLUMBA,urinary retention and B/L hydronephrosis.  1.Urine cx-.  2.Afib-eliquis.  3.Pulmonary fibrosis-duoneb.  4.Lipid D/O-statin.  5.PPI.  6. eval noted, f/u lytes.

## 2018-09-16 NOTE — PROGRESS NOTE ADULT - SUBJECTIVE AND OBJECTIVE BOX
Pt denies flank pain, fever/chills, dysuria  + BM's    PE: comfortable, NAD    Vital Signs Last 24 Hrs  T(C): 36.6 (16 Sep 2018 06:05), Max: 36.6 (16 Sep 2018 06:05)  T(F): 97.8 (16 Sep 2018 06:05), Max: 97.8 (16 Sep 2018 06:05)  HR: 77 (16 Sep 2018 06:05) (62 - 77)  BP: 128/61 (16 Sep 2018 06:05) (128/61 - 147/70)  BP(mean): --  RR: 18 (16 Sep 2018 06:05) (18 - 18)  SpO2: 98% (16 Sep 2018 06:05) (96% - 99%)    Abd: soft, Nd, no flank tenderness, suprapubic flat, NT  FC with pinkish urine w/o gross clots  UO 1050 cc    09-15    138  |  103  |  13  ----------------------------<  80  4.0   |  26  |  1.08    Ca    8.4      15 Sep 2018 07:17    I&O's Detail    15 Sep 2018 07:01  -  16 Sep 2018 07:00  --------------------------------------------------------  IN:  Total IN: 0 mL    OUT:    Indwelling Catheter - Urethral: 1050 mL  Total OUT: 1050 mL    Total NET: -1050 mL      MEDICATIONS  (STANDING):  ALBUTerol/ipratropium for Nebulization 3 milliLiter(s) Nebulizer every 6 hours  apixaban 5 milliGRAM(s) Oral every 12 hours  aspirin enteric coated 81 milliGRAM(s) Oral daily  atorvastatin 20 milliGRAM(s) Oral at bedtime  bisacodyl 5 milliGRAM(s) Oral at bedtime  cefTRIAXone   IVPB 1 Gram(s) IV Intermittent every 24 hours  dextrose 5% + sodium chloride 0.9%. 1000 milliLiter(s) (50 mL/Hr) IV Continuous <Continuous>  escitalopram 10 milliGRAM(s) Oral daily  finasteride 5 milliGRAM(s) Oral daily  lactulose Syrup 10 Gram(s) Oral daily  tamsulosin 0.4 milliGRAM(s) Oral at bedtime    MEDICATIONS  (PRN):  aluminum hydroxide/magnesium hydroxide/simethicone Suspension 30 milliLiter(s) Oral every 4 hours PRN Dyspepsia

## 2018-09-17 LAB
CULTURE RESULTS: NO GROWTH — SIGNIFICANT CHANGE UP
CULTURE RESULTS: SIGNIFICANT CHANGE UP
CULTURE RESULTS: SIGNIFICANT CHANGE UP
SPECIMEN SOURCE: SIGNIFICANT CHANGE UP

## 2018-09-17 RX ADMIN — CEFTRIAXONE 100 GRAM(S): 500 INJECTION, POWDER, FOR SOLUTION INTRAMUSCULAR; INTRAVENOUS at 12:02

## 2018-09-17 RX ADMIN — Medication 3 MILLILITER(S): at 14:22

## 2018-09-17 RX ADMIN — Medication 81 MILLIGRAM(S): at 12:02

## 2018-09-17 RX ADMIN — SODIUM CHLORIDE 50 MILLILITER(S): 9 INJECTION, SOLUTION INTRAVENOUS at 22:26

## 2018-09-17 RX ADMIN — LACTULOSE 10 GRAM(S): 10 SOLUTION ORAL at 12:02

## 2018-09-17 RX ADMIN — SODIUM CHLORIDE 50 MILLILITER(S): 9 INJECTION, SOLUTION INTRAVENOUS at 06:15

## 2018-09-17 RX ADMIN — Medication 3 MILLILITER(S): at 20:10

## 2018-09-17 RX ADMIN — Medication 1 MILLIGRAM(S): at 03:11

## 2018-09-17 RX ADMIN — ESCITALOPRAM OXALATE 10 MILLIGRAM(S): 10 TABLET, FILM COATED ORAL at 12:02

## 2018-09-17 RX ADMIN — APIXABAN 5 MILLIGRAM(S): 2.5 TABLET, FILM COATED ORAL at 06:15

## 2018-09-17 RX ADMIN — Medication 3 MILLILITER(S): at 08:10

## 2018-09-17 RX ADMIN — Medication 1 MILLIGRAM(S): at 15:29

## 2018-09-17 RX ADMIN — FINASTERIDE 5 MILLIGRAM(S): 5 TABLET, FILM COATED ORAL at 12:02

## 2018-09-17 NOTE — PROGRESS NOTE ADULT - ASSESSMENT
78 yr old  Male from home, walks with walker having PMHX of Afib (Eliquis), COPD, BRIEN (Cpap at night), CHFpEF, GERD, HLD, Pulmonary fibrosis, RBBB, Chronic B/L LE lymphedema, dementia, recurrent UTIs and multiple falls came in with acute change in mental status,COLUMBA,urinary retention and B/L hydronephrosis.  1.Urine cx-.  2.Afib-eliquis on  hold for hematurea.  3.Pulmonary fibrosis-duoneb.  4.Lipid D/O-statin.  5.PPI.  6. f/u.

## 2018-09-17 NOTE — PROGRESS NOTE ADULT - PROBLEM SELECTOR PLAN 1
Acute worsening of mental status overnight with underlying dementia  CT head: Normal  No focal deficit and Normal pupil reflex, less likely stroke   Pt is recurrent history of UTIs in past however UA here is negative  Mild Leukocytosis  Started Rocephin and wait until Urine Cx results   Blood Cx neg   ammonia wnl  no sign of infectious etiology   UA positive, urine cx negative   c/w rocephin  s/p ativan one dose today for agitation

## 2018-09-17 NOTE — PROGRESS NOTE ADULT - ASSESSMENT
79 y/o Male w/ B/L hydronephrosis, hematuria    -C/w Davidson catheter   -C/w Flomax  -Care as per medical team   -Will follow

## 2018-09-17 NOTE — PROGRESS NOTE ADULT - SUBJECTIVE AND OBJECTIVE BOX
CHIEF COMPLAINT:Patient is a 78y old  Male who presents with a chief complaint of Altered mental status.Pt appears comfortable.    	  REVIEW OF SYSTEMS:  [x ] Unable to obtain    PHYSICAL EXAM:  T(C): 36.8 (09-17-18 @ 05:52), Max: 36.8 (09-16-18 @ 20:35)  HR: 88 (09-17-18 @ 05:52) (74 - 88)  BP: 163/93 (09-17-18 @ 05:52) (125/79 - 163/93)  RR: 18 (09-17-18 @ 05:52) (18 - 18)  SpO2: 96% (09-17-18 @ 05:52) (96% - 97%)  Wt(kg): --  I&O's Summary    16 Sep 2018 07:01  -  17 Sep 2018 07:00  --------------------------------------------------------  IN: 0 mL / OUT: 1500 mL / NET: -1500 mL        Appearance: Normal	  HEENT:   Normal oral mucosa, PERRL, EOMI	  Lymphatic: No lymphadenopathy  Cardiovascular: Normal S1 S2, No JVD, No murmurs, No edema  Respiratory: Lungs clear to auscultation	  Gastrointestinal:  Soft, Non-tender, + BS	  Skin: No rashes, No ecchymoses, No cyanosis	  Extremities: Normal range of motion, No clubbing, cyanosis or edema  Vascular: Peripheral pulses palpable 2+ bilaterally    MEDICATIONS  (STANDING):  ALBUTerol/ipratropium for Nebulization 3 milliLiter(s) Nebulizer every 6 hours  aspirin enteric coated 81 milliGRAM(s) Oral daily  atorvastatin 20 milliGRAM(s) Oral at bedtime  bisacodyl 5 milliGRAM(s) Oral at bedtime  cefTRIAXone   IVPB 1 Gram(s) IV Intermittent every 24 hours  dextrose 5% + sodium chloride 0.9%. 1000 milliLiter(s) (50 mL/Hr) IV Continuous <Continuous>  escitalopram 10 milliGRAM(s) Oral daily  finasteride 5 milliGRAM(s) Oral daily  lactulose Syrup 10 Gram(s) Oral daily  tamsulosin 0.4 milliGRAM(s) Oral at bedtime        	  LABS:	 	    Lipid Profile: Cholesterol 114  LDL 47  HDL 41      HgA1c: Hemoglobin A1C, Whole Blood: 5.8 % (09-13 @ 10:48)    TSH: Thyroid Stimulating Hormone, Serum: 3.61 uU/mL (09-13 @ 07:04)      	  OBSERVATIONS:  Mitral Valve: Normal mitral valve. Mild to moderate mitral  regurgitation.  Aortic Root: Normal aortic root.  Aortic Valve: Calcified trileaflet aortic valve with normal  opening. No aortic stenosis. Mild aortic regurgitation.  Left Atrium: Mildly dilated left atrium.  LA volume index =  35 cc/m2.  Left Ventricle: Normal Left Ventricular Systolic Function,  (EF =67% by biplane) No regional wall motion abnormalities.  Normal left ventricular internal dimensions and wall  thicknesses. Diastolic function incompletely assessed.  Right Heart: Normal right atrium. Normal right ventricular  size and systolic function (TAPSE 1.9 cm). Normal tricuspid  valve. Trace tricuspid regurgitation. Pulmonic valve not  well seen. Trace pulmonic insufficiency is noted.  Pericardium/PleuraNo pericardial effusion.  Hemodynamic: RA Pressure is 8 mm Hg. RV systolic pressure  is normal at  27 mm Hg.

## 2018-09-17 NOTE — PROGRESS NOTE ADULT - SUBJECTIVE AND OBJECTIVE BOX
Patient is a 78y old  Male who presents with a chief complaint of Altered mental status (17 Sep 2018 11:31)    Awake, alert, comfortable in bed in NAD    INTERVAL HPI/OVERNIGHT EVENTS:      VITAL SIGNS:  T(F): 98.3 (18 @ 05:52)  HR: 87 (18 @ 11:30)  BP: 163/93 (18 @ 05:52)  RR: 18 (18 @ 05:52)  SpO2: 97% (18 @ 11:30)  Wt(kg): --  I&O's Detail    16 Sep 2018 07:01  -  17 Sep 2018 07:00  --------------------------------------------------------  IN:  Total IN: 0 mL    OUT:    Indwelling Catheter - Urethral: 1500 mL  Total OUT: 1500 mL    Total NET: -1500 mL              REVIEW OF SYSTEMS:    CONSTITUTIONAL:  No fevers, chills, sweats    HEENT:  Eyes:  No diplopia or blurred vision. ENT:  No earache, sore throat or runny nose.    CARDIOVASCULAR:  No pressure, squeezing, tightness, or heaviness about the chest; no palpitations.    RESPIRATORY:  Per HPI    GASTROINTESTINAL:  No abdominal pain, nausea, vomiting or diarrhea.    GENITOURINARY:  No dysuria, frequency or urgency.    NEUROLOGIC:  No paresthesias, fasciculations, seizures or weakness.    PSYCHIATRIC:  No disorder of thought or mood.      PHYSICAL EXAM:    Constitutional: Well developed and nourished  Eyes:Perrla  ENMT: normal  Neck:supple  Respiratory: good air entry  Cardiovascular: S1 S2 regular  Gastrointestinal: Soft, Non tender  Extremities: No edema  Vascular:normal  Neurological:Awake, alert,Ox3  Musculoskeletal:Normal      MEDICATIONS  (STANDING):  ALBUTerol/ipratropium for Nebulization 3 milliLiter(s) Nebulizer every 6 hours  aspirin enteric coated 81 milliGRAM(s) Oral daily  atorvastatin 20 milliGRAM(s) Oral at bedtime  bisacodyl 5 milliGRAM(s) Oral at bedtime  cefTRIAXone   IVPB 1 Gram(s) IV Intermittent every 24 hours  dextrose 5% + sodium chloride 0.9%. 1000 milliLiter(s) (50 mL/Hr) IV Continuous <Continuous>  escitalopram 10 milliGRAM(s) Oral daily  finasteride 5 milliGRAM(s) Oral daily  lactulose Syrup 10 Gram(s) Oral daily  tamsulosin 0.4 milliGRAM(s) Oral at bedtime    MEDICATIONS  (PRN):  aluminum hydroxide/magnesium hydroxide/simethicone Suspension 30 milliLiter(s) Oral every 4 hours PRN Dyspepsia      Allergies    No Known Drug Allergies  zosyn (Drowsiness)    Intolerances        LABS:            Urinalysis Basic - ( 16 Sep 2018 09:37 )    Color: Red / Appearance: bloody / S.015 / pH: x  Gluc: x / Ketone: Trace  / Bili: Negative / Urobili: Negative   Blood: x / Protein: 500 mg/dL / Nitrite: Negative   Leuk Esterase: Small / RBC: >50 /HPF / WBC 6-10 /HPF   Sq Epi: x / Non Sq Epi: x / Bacteria: Trace /HPF            CAPILLARY BLOOD GLUCOSE            RADIOLOGY & ADDITIONAL TESTS:    CXR:  < from: Xray Chest 1 View-PORTABLE IMMEDIATE (18 @ 13:28) >    COMPARISON STUDY: 2018    Frontal expiratory view of the chest shows the heart to be similarly   enlarged in size. The lungs show increased peripheral markings compatible   with pulmonary fibrosis and there is no evidence of pneumothorax nor   definite pleural effusion. Likely skinfold projects over the left chest.    IMPRESSION:  Pulmonary markings as described.    < end of copied text >    Ct scan chest:    ekg;    echo:  < from: Transthoracic Echocardiogram (18 @ 10:30) >  CONCLUSIONS:  1. Normal mitral valve. Mild to moderate mitral  regurgitation.  2. Calcified trileaflet aortic valve with normal opening.  No aortic stenosis. Mild aortic regurgitation.  3. Normal aortic root.  4. Mildly dilated left atrium.  LA volume index = 35 cc/m2.  5. Normal left ventricular internal dimensions and wall  thicknesses.  6. Normal Left Ventricular Systolic Function,  (EF =67% by  biplane)  7. Normal right atrium.  8. Normal right ventricular size and systolic function  (TAPSE 1.9 cm).  9. RV systolic pressure is normal at  27 mm Hg.  10. Normal tricuspid valve. Trace tricuspid regurgitation.  11. Pulmonic valve not well seen. Trace pulmonic  insufficiency is noted.  12. No pericardial effusion.    < end of copied text > Patient is a 78y old  Male who presents with a chief complaint of Altered mental status (17 Sep 2018 11:31)    Awake, alert, comfortable in bed in NAD. Confused but no agitated    INTERVAL HPI/OVERNIGHT EVENTS:      VITAL SIGNS:  T(F): 98.3 (18 @ 05:52)  HR: 87 (18 @ 11:30)  BP: 163/93 (18 @ 05:52)  RR: 18 (18 @ 05:52)  SpO2: 97% (18 @ 11:30)  Wt(kg): --  I&O's Detail    16 Sep 2018 07:01  -  17 Sep 2018 07:00  --------------------------------------------------------  IN:  Total IN: 0 mL    OUT:    Indwelling Catheter - Urethral: 1500 mL  Total OUT: 1500 mL    Total NET: -1500 mL              REVIEW OF SYSTEMS:    CONSTITUTIONAL:  No fevers, chills, sweats    HEENT:  Eyes:  No diplopia or blurred vision. ENT:  No earache, sore throat or runny nose.    CARDIOVASCULAR:  No pressure, squeezing, tightness, or heaviness about the chest; no palpitations.    RESPIRATORY:  Per HPI    GASTROINTESTINAL:  No abdominal pain, nausea, vomiting or diarrhea.    GENITOURINARY:  No dysuria, frequency or urgency.    NEUROLOGIC:  No paresthesias, fasciculations, seizures or weakness.    PSYCHIATRIC:  No disorder of thought or mood.      PHYSICAL EXAM:    Constitutional: Well developed and nourished  Eyes:Perrla  ENMT: normal  Neck:supple  Respiratory: good air entry  Cardiovascular: S1 S2 regular  Gastrointestinal: Soft, Non tender  Extremities: No edema  Vascular:normal  Neurological:Awake, alert  Musculoskeletal:Normal      MEDICATIONS  (STANDING):  ALBUTerol/ipratropium for Nebulization 3 milliLiter(s) Nebulizer every 6 hours  aspirin enteric coated 81 milliGRAM(s) Oral daily  atorvastatin 20 milliGRAM(s) Oral at bedtime  bisacodyl 5 milliGRAM(s) Oral at bedtime  cefTRIAXone   IVPB 1 Gram(s) IV Intermittent every 24 hours  dextrose 5% + sodium chloride 0.9%. 1000 milliLiter(s) (50 mL/Hr) IV Continuous <Continuous>  escitalopram 10 milliGRAM(s) Oral daily  finasteride 5 milliGRAM(s) Oral daily  lactulose Syrup 10 Gram(s) Oral daily  tamsulosin 0.4 milliGRAM(s) Oral at bedtime    MEDICATIONS  (PRN):  aluminum hydroxide/magnesium hydroxide/simethicone Suspension 30 milliLiter(s) Oral every 4 hours PRN Dyspepsia      Allergies    No Known Drug Allergies  zosyn (Drowsiness)    Intolerances        LABS:            Urinalysis Basic - ( 16 Sep 2018 09:37 )    Color: Red / Appearance: bloody / S.015 / pH: x  Gluc: x / Ketone: Trace  / Bili: Negative / Urobili: Negative   Blood: x / Protein: 500 mg/dL / Nitrite: Negative   Leuk Esterase: Small / RBC: >50 /HPF / WBC 6-10 /HPF   Sq Epi: x / Non Sq Epi: x / Bacteria: Trace /HPF            CAPILLARY BLOOD GLUCOSE            RADIOLOGY & ADDITIONAL TESTS:    CXR:  < from: Xray Chest 1 View-PORTABLE IMMEDIATE (18 @ 13:28) >    COMPARISON STUDY: 2018    Frontal expiratory view of the chest shows the heart to be similarly   enlarged in size. The lungs show increased peripheral markings compatible   with pulmonary fibrosis and there is no evidence of pneumothorax nor   definite pleural effusion. Likely skinfold projects over the left chest.    IMPRESSION:  Pulmonary markings as described.    < end of copied text >    Ct scan chest:    ekg;    echo:  < from: Transthoracic Echocardiogram (18 @ 10:30) >  CONCLUSIONS:  1. Normal mitral valve. Mild to moderate mitral  regurgitation.  2. Calcified trileaflet aortic valve with normal opening.  No aortic stenosis. Mild aortic regurgitation.  3. Normal aortic root.  4. Mildly dilated left atrium.  LA volume index = 35 cc/m2.  5. Normal left ventricular internal dimensions and wall  thicknesses.  6. Normal Left Ventricular Systolic Function,  (EF =67% by  biplane)  7. Normal right atrium.  8. Normal right ventricular size and systolic function  (TAPSE 1.9 cm).  9. RV systolic pressure is normal at  27 mm Hg.  10. Normal tricuspid valve. Trace tricuspid regurgitation.  11. Pulmonic valve not well seen. Trace pulmonic  insufficiency is noted.  12. No pericardial effusion.    < end of copied text >

## 2018-09-17 NOTE — PROGRESS NOTE ADULT - SUBJECTIVE AND OBJECTIVE BOX
PGY 2 Note discussed with supervising  primary attending    Patient is a 78y old  Male who presents with a chief complaint of Altered mental status (17 Sep 2018 11:42)      INTERVAL HPI/OVERNIGHT EVENTS: Patient seen and examined at bed side, pt was agitated  offers no new complaints; current symptoms resolving    MEDICATIONS  (STANDING):  ALBUTerol/ipratropium for Nebulization 3 milliLiter(s) Nebulizer every 6 hours  aspirin enteric coated 81 milliGRAM(s) Oral daily  atorvastatin 20 milliGRAM(s) Oral at bedtime  bisacodyl 5 milliGRAM(s) Oral at bedtime  cefTRIAXone   IVPB 1 Gram(s) IV Intermittent every 24 hours  dextrose 5% + sodium chloride 0.9%. 1000 milliLiter(s) (50 mL/Hr) IV Continuous <Continuous>  escitalopram 10 milliGRAM(s) Oral daily  finasteride 5 milliGRAM(s) Oral daily  lactulose Syrup 10 Gram(s) Oral daily  tamsulosin 0.4 milliGRAM(s) Oral at bedtime    MEDICATIONS  (PRN):  aluminum hydroxide/magnesium hydroxide/simethicone Suspension 30 milliLiter(s) Oral every 4 hours PRN Dyspepsia      __________________________________________________  REVIEW OF SYSTEMS:    CONSTITUTIONAL: No fever,   EYES: no acute visual disturbances  NECK: No pain or stiffness  RESPIRATORY: No cough; No shortness of breath  CARDIOVASCULAR: No chest pain, no palpitations  GASTROINTESTINAL: No pain. No nausea or vomiting; No diarrhea   NEUROLOGICAL: No headache or numbness, no tremors  MUSCULOSKELETAL: No joint pain, no muscle pain  GENITOURINARY: no dysuria, no frequency, no hesitancy  PSYCHIATRY: no depression , no anxiety  ALL OTHER  ROS negative        Vital Signs Last 24 Hrs  T(C): 36.4 (17 Sep 2018 13:50), Max: 36.8 (16 Sep 2018 20:35)  T(F): 97.5 (17 Sep 2018 13:50), Max: 98.3 (16 Sep 2018 20:35)  HR: 83 (17 Sep 2018 13:50) (74 - 88)  BP: 141/80 (17 Sep 2018 13:50) (125/79 - 163/93)  BP(mean): --  RR: 18 (17 Sep 2018 13:50) (18 - 18)  SpO2: 98% (17 Sep 2018 13:50) (96% - 98%)    ________________________________________________  PHYSICAL EXAM:  GENERAL: agitated   HEENT: Normocephalic;  conjunctivae and sclerae clear; moist mucous membranes;   NECK : supple  CHEST/LUNG: Clear to auscultation bilaterally with good air entry   HEART: S1 S2  regular; no murmurs, gallops or rubs  ABDOMEN: Soft, Nontender, Nondistended; Bowel sounds present  EXTREMITIES: no cyanosis; no edema; no calf tenderness  SKIN: warm and dry; no rash  NERVOUS SYSTEM:  AAOx1    _________________________________________________  LABS:            Urinalysis Basic - ( 16 Sep 2018 09:37 )    Color: Red / Appearance: bloody / S.015 / pH: x  Gluc: x / Ketone: Trace  / Bili: Negative / Urobili: Negative   Blood: x / Protein: 500 mg/dL / Nitrite: Negative   Leuk Esterase: Small / RBC: >50 /HPF / WBC 6-10 /HPF   Sq Epi: x / Non Sq Epi: x / Bacteria: Trace /HPF      CAPILLARY BLOOD GLUCOSE            RADIOLOGY & ADDITIONAL TESTS:    Imaging Personally Reviewed:  YES    Consultant(s) Notes Reviewed:   YES    Care Discussed with Consultants :     Plan of care was discussed with patient and /or primary care giver; all questions and concerns were addressed and care was aligned with patient's wishes.

## 2018-09-17 NOTE — PROGRESS NOTE ADULT - PROBLEM SELECTOR PLAN 1
likely metabolic vs worsening dementia  Psych eval. likely metabolic vs worsening dementia  Psych follow up

## 2018-09-17 NOTE — PROGRESS NOTE ADULT - SUBJECTIVE AND OBJECTIVE BOX
INTERVAL HPI/OVERNIGHT EVENTS:    Pt seen and examined at bedside. No acute events over night.     Vital Signs Last 24 Hrs  T(C): 36.8 (17 Sep 2018 05:52), Max: 36.8 (16 Sep 2018 20:35)  T(F): 98.3 (17 Sep 2018 05:52), Max: 98.3 (16 Sep 2018 20:35)  HR: 88 (17 Sep 2018 05:52) (74 - 88)  BP: 163/93 (17 Sep 2018 05:52) (125/79 - 163/93)  BP(mean): --  RR: 18 (17 Sep 2018 05:52) (18 - 18)  SpO2: 96% (17 Sep 2018 05:52) (96% - 97%)  I&O's Detail    16 Sep 2018 07:01  -  17 Sep 2018 07:00  --------------------------------------------------------  IN:  Total IN: 0 mL    OUT:    Indwelling Catheter - Urethral: 1500 mL  Total OUT: 1500 mL    Total NET: -1500 mL        aluminum hydroxide/magnesium hydroxide/simethicone Suspension 30 milliLiter(s) Oral every 4 hours PRN  bisacodyl 5 milliGRAM(s) Oral at bedtime  cefTRIAXone   IVPB 1 Gram(s) IV Intermittent every 24 hours  lactulose Syrup 10 Gram(s) Oral daily      Physical Exam  General: Alert x1, No acute distress  Skin: No jaundice, no icterus  Abdomen: soft, nondistended, nontender, no rebound tenderness, no guarding, no palpable masses  : Normal external genitalia, shah in place, UO blood tinged, no clots seen       Culture - Urine (09.12.18 @ 14:29)    Specimen Source: .Urine Clean Catch (Midstream)    Culture Results:   No growth    Culture - Blood (09.12.18 @ 18:53)    Specimen Source: .Blood Blood-Peripheral    Culture Results:   No growth to date.

## 2018-09-17 NOTE — PROGRESS NOTE ADULT - PROBLEM SELECTOR PLAN 4
Baseline Cr 0.6  Currently elevated to 1.8  Likely pre-renal due to poor PO intake  Pt received 3 liters Bolus in ED  Holding further hydration due to LE edema   US renal B/l hydronephrosis   urology consulted   started on Flomax and Proscar   likely post obstructed 2/2 BPH   resolved   c/w shah and Proscar and flomax

## 2018-09-17 NOTE — PROGRESS NOTE ADULT - SUBJECTIVE AND OBJECTIVE BOX
Patient is a 78y old  Male who presents with a chief complaint of Altered mental status (16 Sep 2018 12:12)      pt seen in icu [  ], reg med floor [ x  ], bed [ x ], chair at bedside [   ], awake and responsive [ x ], lethargic [  ],  nad [ x ]      Allergies    No Known Drug Allergies  zosyn (Drowsiness)        Vitals    T(F): 98.3 (09-17-18 @ 05:52), Max: 98.3 (09-16-18 @ 20:35)  HR: 88 (09-17-18 @ 05:52) (74 - 88)  BP: 163/93 (09-17-18 @ 05:52) (125/79 - 163/93)  RR: 18 (09-17-18 @ 05:52) (18 - 18)  SpO2: 96% (09-17-18 @ 05:52) (96% - 97%)  Wt(kg): --  CAPILLARY BLOOD GLUCOSE          Labs                      .Blood Blood-Peripheral  09-12 @ 18:53   No growth to date.  --  --      .Urine Clean Catch (Midstream)  09-12 @ 14:29   No growth  --  --          Radiology Results      Meds    MEDICATIONS  (STANDING):  ALBUTerol/ipratropium for Nebulization 3 milliLiter(s) Nebulizer every 6 hours  apixaban 5 milliGRAM(s) Oral every 12 hours  aspirin enteric coated 81 milliGRAM(s) Oral daily  atorvastatin 20 milliGRAM(s) Oral at bedtime  bisacodyl 5 milliGRAM(s) Oral at bedtime  cefTRIAXone   IVPB 1 Gram(s) IV Intermittent every 24 hours  dextrose 5% + sodium chloride 0.9%. 1000 milliLiter(s) (50 mL/Hr) IV Continuous <Continuous>  escitalopram 10 milliGRAM(s) Oral daily  finasteride 5 milliGRAM(s) Oral daily  lactulose Syrup 10 Gram(s) Oral daily  tamsulosin 0.4 milliGRAM(s) Oral at bedtime      MEDICATIONS  (PRN):  aluminum hydroxide/magnesium hydroxide/simethicone Suspension 30 milliLiter(s) Oral every 4 hours PRN Dyspepsia      Physical Exam      Neuro :  no focal deficits  Respiratory: B/L crackles  CV: RRR, S1S2, no murmurs,   Abdominal: Soft, NT, ND +BS,  Extremities: No edema, + peripheral pulses    ASSESSMENT    Delirium due to another medical condition  s/p paige  hematuria likely 2nd to shah trauma  h/o COPD (chronic obstructive pulmonary disease)  Bundle branch block, right  Pulmonary fibrosis  Dementia  Atrial fibrillation  GERD (gastroesophageal reflux disease)  Recurrent UTI  Hyperlipidemia  Recurrent UTI (urinary tract infection)  Obstructive sleep apnea on CPAP  H/O small bowel obstruction  History of appendectomy      PLAN      r/o infectious process  blood and ucx neg noted above  d/c abx  vit b12 elevated noted   rpr neg noted above  mri neg for acute patho noted above  psych cons noted  cont lexapro  haldol prn agitation  pulm f/u  cardio f/u  renal f/u  cont  IV fluids  serum creat wnl  renal sono with Mild to moderate bilateral hydronephrosis noted   Continue with Flomax and Finasteride.  cont shah  urology f/u noted  recommend multiple enema's for fecal disimpaction  phys tx  hold eliquis for 24 hrs 2nd to hematuria  cont current meds

## 2018-09-18 LAB
ANION GAP SERPL CALC-SCNC: 7 MMOL/L — SIGNIFICANT CHANGE UP (ref 5–17)
BUN SERPL-MCNC: 8 MG/DL — SIGNIFICANT CHANGE UP (ref 7–18)
CALCIUM SERPL-MCNC: 8.1 MG/DL — LOW (ref 8.4–10.5)
CHLORIDE SERPL-SCNC: 102 MMOL/L — SIGNIFICANT CHANGE UP (ref 96–108)
CO2 SERPL-SCNC: 27 MMOL/L — SIGNIFICANT CHANGE UP (ref 22–31)
CREAT SERPL-MCNC: 0.99 MG/DL — SIGNIFICANT CHANGE UP (ref 0.5–1.3)
GLUCOSE SERPL-MCNC: 100 MG/DL — HIGH (ref 70–99)
HCT VFR BLD CALC: 38.3 % — LOW (ref 39–50)
HGB BLD-MCNC: 12.6 G/DL — LOW (ref 13–17)
MAGNESIUM SERPL-MCNC: 2 MG/DL — SIGNIFICANT CHANGE UP (ref 1.6–2.6)
MCHC RBC-ENTMCNC: 30.7 PG — SIGNIFICANT CHANGE UP (ref 27–34)
MCHC RBC-ENTMCNC: 32.9 GM/DL — SIGNIFICANT CHANGE UP (ref 32–36)
MCV RBC AUTO: 93.5 FL — SIGNIFICANT CHANGE UP (ref 80–100)
PHOSPHATE SERPL-MCNC: 2.9 MG/DL — SIGNIFICANT CHANGE UP (ref 2.5–4.5)
PLATELET # BLD AUTO: 176 K/UL — SIGNIFICANT CHANGE UP (ref 150–400)
POTASSIUM SERPL-MCNC: 3.9 MMOL/L — SIGNIFICANT CHANGE UP (ref 3.5–5.3)
POTASSIUM SERPL-SCNC: 3.9 MMOL/L — SIGNIFICANT CHANGE UP (ref 3.5–5.3)
RBC # BLD: 4.1 M/UL — LOW (ref 4.2–5.8)
RBC # FLD: 12.2 % — SIGNIFICANT CHANGE UP (ref 10.3–14.5)
SODIUM SERPL-SCNC: 136 MMOL/L — SIGNIFICANT CHANGE UP (ref 135–145)
WBC # BLD: 10.3 K/UL — SIGNIFICANT CHANGE UP (ref 3.8–10.5)
WBC # FLD AUTO: 10.3 K/UL — SIGNIFICANT CHANGE UP (ref 3.8–10.5)

## 2018-09-18 PROCEDURE — 70450 CT HEAD/BRAIN W/O DYE: CPT | Mod: 26

## 2018-09-18 RX ADMIN — Medication 3 MILLILITER(S): at 14:33

## 2018-09-18 RX ADMIN — CEFTRIAXONE 100 GRAM(S): 500 INJECTION, POWDER, FOR SOLUTION INTRAMUSCULAR; INTRAVENOUS at 13:01

## 2018-09-18 RX ADMIN — Medication 3 MILLILITER(S): at 09:07

## 2018-09-18 RX ADMIN — Medication 3 MILLILITER(S): at 20:29

## 2018-09-18 NOTE — PROGRESS NOTE ADULT - ASSESSMENT
78 yr old  Male from home, walks with walker having PMHX of Afib (Eliquis), COPD, BRIEN (Cpap at night), CHFpEF, GERD, HLD, Pulmonary fibrosis, RBBB, Chronic B/L LE lymphedema, dementia, recurrent UTIs and multiple falls came in with acute change in mental status,COLUMBA,urinary retention and B/L hydronephrosis.  1.Urine cx-.  2.Afib-eliquis on  hold for hematurea.  3.Pulmonary fibrosis-duoneb.  4.Lipid D/O-statin.  5.PPI.  6. f/u.  7.Delirium-Psych f/u.

## 2018-09-18 NOTE — PROGRESS NOTE ADULT - ASSESSMENT
79yo male with aaron hydronephrosis, BPH    1) cont shah  2) cont flomax  3) dvt prophylaxis  4) cont med manmagement

## 2018-09-18 NOTE — PROGRESS NOTE ADULT - SUBJECTIVE AND OBJECTIVE BOX
PGY 2  Note discussed with supervising primary attending    Patient is a 78y old  Male who presents with a chief complaint of Altered mental status (18 Sep 2018 12:51)      INTERVAL HPI/OVERNIGHT EVENTS: Patient was more lethargy today, hematuria resolving. offers no new complaints; current symptoms resolving    MEDICATIONS  (STANDING):  ALBUTerol/ipratropium for Nebulization 3 milliLiter(s) Nebulizer every 6 hours  aspirin enteric coated 81 milliGRAM(s) Oral daily  atorvastatin 20 milliGRAM(s) Oral at bedtime  bisacodyl 5 milliGRAM(s) Oral at bedtime  dextrose 5% + sodium chloride 0.9%. 1000 milliLiter(s) (50 mL/Hr) IV Continuous <Continuous>  escitalopram 10 milliGRAM(s) Oral daily  finasteride 5 milliGRAM(s) Oral daily  lactulose Syrup 10 Gram(s) Oral daily  tamsulosin 0.4 milliGRAM(s) Oral at bedtime    MEDICATIONS  (PRN):  aluminum hydroxide/magnesium hydroxide/simethicone Suspension 30 milliLiter(s) Oral every 4 hours PRN Dyspepsia      __________________________________________________  REVIEW OF SYSTEMS:    CONSTITUTIONAL: No fever,   EYES: no acute visual disturbances  NECK: No pain or stiffness  RESPIRATORY: No cough; No shortness of breath  CARDIOVASCULAR: No chest pain, no palpitations  GASTROINTESTINAL: No pain. No nausea or vomiting; No diarrhea   NEUROLOGICAL: No headache or numbness, no tremors  MUSCULOSKELETAL: No joint pain, no muscle pain  GENITOURINARY: no dysuria, no frequency, no hesitancy  PSYCHIATRY: no depression , no anxiety  ALL OTHER  ROS negative        Vital Signs Last 24 Hrs  T(C): 36.6 (18 Sep 2018 13:50), Max: 36.6 (18 Sep 2018 05:38)  T(F): 97.8 (18 Sep 2018 13:50), Max: 97.8 (18 Sep 2018 05:38)  HR: 74 (18 Sep 2018 13:50) (74 - 94)  BP: 140/75 (18 Sep 2018 13:50) (139/87 - 158/40)  BP(mean): --  RR: 16 (18 Sep 2018 13:50) (16 - 18)  SpO2: 97% (18 Sep 2018 13:50) (97% - 97%)    ________________________________________________  PHYSICAL EXAM:  GENERAL: NAD  HEENT: Normocephalic;  conjunctivae and sclerae clear; moist mucous membranes;   NECK : supple  CHEST/LUNG: Clear to auscultation bilaterally with good air entry   HEART: S1 S2  regular; no murmurs, gallops or rubs  ABDOMEN: Soft, Nontender, Nondistended; Bowel sounds present  EXTREMITIES: no cyanosis; no edema; no calf tenderness  SKIN: warm and dry; no rash  NERVOUS SYSTEM:  AAOx1    _________________________________________________  LABS:                        12.6   10.3  )-----------( 176      ( 18 Sep 2018 07:03 )             38.3     09-18    136  |  102  |  8   ----------------------------<  100<H>  3.9   |  27  |  0.99    Ca    8.1<L>      18 Sep 2018 07:03  Phos  2.9     09-18  Mg     2.0     09-18          CAPILLARY BLOOD GLUCOSE            RADIOLOGY & ADDITIONAL TESTS:        Consultant(s) Notes Reviewed:   YES    Care Discussed with Consultants :     Plan of care was discussed with patient and /or primary care giver; all questions and concerns were addressed and care was aligned with patient's wishes.

## 2018-09-18 NOTE — PROGRESS NOTE ADULT - SUBJECTIVE AND OBJECTIVE BOX
CHIEF COMPLAINT:Patient is a 78y old  Male who presents with a chief complaint of Altered mental status .Pt appears comfortable but confused.    	  REVIEW OF SYSTEMS:  [ x] Unable to obtain    PHYSICAL EXAM:  T(C): 36.6 (09-18-18 @ 05:38), Max: 36.6 (09-18-18 @ 05:38)  HR: 80 (09-18-18 @ 05:38) (80 - 94)  BP: 139/87 (09-18-18 @ 05:38) (139/87 - 158/40)  RR: 18 (09-18-18 @ 05:38) (17 - 18)  SpO2: 97% (09-18-18 @ 05:38) (97% - 98%)  Wt(kg): --  I&O's Summary    17 Sep 2018 07:01  -  18 Sep 2018 07:00  --------------------------------------------------------  IN: 0 mL / OUT: 1450 mL / NET: -1450 mL        Appearance: Normal	  HEENT:   Normal oral mucosa, PERRL, EOMI	  Lymphatic: No lymphadenopathy  Cardiovascular: Normal S1 S2, No JVD, No murmurs, No edema  Respiratory: Lungs clear to auscultation	  Gastrointestinal:  Soft, Non-tender, + BS	  Skin: No rashes, No ecchymoses, No cyanosis	  Extremities: Normal range of motion, No clubbing, cyanosis or edema  Vascular: Peripheral pulses palpable 2+ bilaterally    MEDICATIONS  (STANDING):  ALBUTerol/ipratropium for Nebulization 3 milliLiter(s) Nebulizer every 6 hours  aspirin enteric coated 81 milliGRAM(s) Oral daily  atorvastatin 20 milliGRAM(s) Oral at bedtime  bisacodyl 5 milliGRAM(s) Oral at bedtime  cefTRIAXone   IVPB 1 Gram(s) IV Intermittent every 24 hours  dextrose 5% + sodium chloride 0.9%. 1000 milliLiter(s) (50 mL/Hr) IV Continuous <Continuous>  escitalopram 10 milliGRAM(s) Oral daily  finasteride 5 milliGRAM(s) Oral daily  lactulose Syrup 10 Gram(s) Oral daily  tamsulosin 0.4 milliGRAM(s) Oral at bedtime      	  LABS:	 	                   12.6   10.3  )-----------( 176      ( 18 Sep 2018 07:03 )             38.3     09-18    136  |  102  |  8   ----------------------------<  100<H>  3.9   |  27  |  0.99    Ca    8.1<L>      18 Sep 2018 07:03  Phos  2.9     09-18  Mg     2.0     09-18        Lipid Profile: Cholesterol 114  LDL 47  HDL 41      HgA1c: Hemoglobin A1C, Whole Blood: 5.8 % (09-13 @ 10:48)    TSH: Thyroid Stimulating Hormone, Serum: 3.61 uU/mL (09-13 @ 07:04)

## 2018-09-18 NOTE — PROGRESS NOTE ADULT - PROBLEM SELECTOR PLAN 7
Improve score 2  Eliquis for Afib Improve score 2  currently holding VTE prophylaxis due to hematuria

## 2018-09-18 NOTE — PROGRESS NOTE ADULT - PROBLEM SELECTOR PLAN 4
Renal follow up  Urology follow up  check PSA  Flomax 0.4 mgs po daily  monitor BMP  Avoid nephrotoxic drugs.  Cont shah. Resolved  Flomax 0.4 mgs po daily  monitor BMP  Avoid nephrotoxic drugs.  Cont shah.

## 2018-09-18 NOTE — PROGRESS NOTE ADULT - SUBJECTIVE AND OBJECTIVE BOX
Patient examined at bedside, no complaints.   No nausea, no vomiting  Tolerating diet    T(C): 36.6 (09-18-18 @ 05:38), Max: 36.6 (09-18-18 @ 05:38)  HR: 80 (09-18-18 @ 05:38) (80 - 94)  BP: 139/87 (09-18-18 @ 05:38) (139/87 - 158/40)  RR: 18 (09-18-18 @ 05:38) (17 - 18)  SpO2: 97% (09-18-18 @ 05:38) (97% - 98%)  Wt(kg): --      09-17 @ 07:01  -  09-18 @ 07:00  --------------------------------------------------------  IN: 0 mL / OUT: 1450 mL / NET: -1450 mL      Physical Exam  General: AAOx3, No acute distress  Skin: No jaundice, no icterus  Abdomen: soft, nontender, nondistended  : Normal external genitalia, shah in place with clear urine, no clots, draining well  Extremities: non edematous, no calf pain bilaterally                          12.6   10.3  )-----------( 176      ( 18 Sep 2018 07:03 )             38.3   09-18    136  |  102  |  8   ----------------------------<  100<H>  3.9   |  27  |  0.99    Ca    8.1<L>      18 Sep 2018 07:03  Phos  2.9     09-18  Mg     2.0     09-18

## 2018-09-18 NOTE — PROGRESS NOTE ADULT - SUBJECTIVE AND OBJECTIVE BOX
Patient is a 78y old  Male who presents with a chief complaint of Altered mental status (18 Sep 2018 10:19)    Awake, comfortable in bed in NAD. Confused but no agitated.     INTERVAL HPI/OVERNIGHT EVENTS:      VITAL SIGNS:  T(F): 97.8 (09-18-18 @ 05:38)  HR: 80 (09-18-18 @ 05:38)  BP: 139/87 (09-18-18 @ 05:38)  RR: 18 (09-18-18 @ 05:38)  SpO2: 97% (09-18-18 @ 05:38)  Wt(kg): --  I&O's Detail    17 Sep 2018 07:01  -  18 Sep 2018 07:00  --------------------------------------------------------  IN:  Total IN: 0 mL    OUT:    Indwelling Catheter - Urethral: 1450 mL  Total OUT: 1450 mL    Total NET: -1450 mL              REVIEW OF SYSTEMS:    CONSTITUTIONAL:  No fevers, chills, sweats    HEENT:  Eyes:  No diplopia or blurred vision. ENT:  No earache, sore throat or runny nose.    CARDIOVASCULAR:  No pressure, squeezing, tightness, or heaviness about the chest; no palpitations.    RESPIRATORY:  Per HPI    GASTROINTESTINAL:  No abdominal pain, nausea, vomiting or diarrhea.    GENITOURINARY:  No dysuria, frequency or urgency.    NEUROLOGIC:  No paresthesias, fasciculations, seizures or weakness.    PSYCHIATRIC:  No disorder of thought or mood.      PHYSICAL EXAM:    Constitutional: Well developed and nourished  Eyes:Perrla  ENMT: normal  Neck:supple  Respiratory: CTA b/l  Cardiovascular: S1 S2 regular  Gastrointestinal: Soft, Non tender  Extremities: No edema  Vascular:normal  Neurological:Awake  Musculoskeletal:Normal      MEDICATIONS  (STANDING):  ALBUTerol/ipratropium for Nebulization 3 milliLiter(s) Nebulizer every 6 hours  aspirin enteric coated 81 milliGRAM(s) Oral daily  atorvastatin 20 milliGRAM(s) Oral at bedtime  bisacodyl 5 milliGRAM(s) Oral at bedtime  cefTRIAXone   IVPB 1 Gram(s) IV Intermittent every 24 hours  dextrose 5% + sodium chloride 0.9%. 1000 milliLiter(s) (50 mL/Hr) IV Continuous <Continuous>  escitalopram 10 milliGRAM(s) Oral daily  finasteride 5 milliGRAM(s) Oral daily  lactulose Syrup 10 Gram(s) Oral daily  tamsulosin 0.4 milliGRAM(s) Oral at bedtime    MEDICATIONS  (PRN):  aluminum hydroxide/magnesium hydroxide/simethicone Suspension 30 milliLiter(s) Oral every 4 hours PRN Dyspepsia      Allergies    No Known Drug Allergies  zosyn (Drowsiness)    Intolerances        LABS:                        12.6   10.3  )-----------( 176      ( 18 Sep 2018 07:03 )             38.3     09-18    136  |  102  |  8   ----------------------------<  100<H>  3.9   |  27  |  0.99    Ca    8.1<L>      18 Sep 2018 07:03  Phos  2.9     09-18  Mg     2.0     09-18                CAPILLARY BLOOD GLUCOSE            RADIOLOGY & ADDITIONAL TESTS:    CXR:  < from: Xray Chest 1 View-PORTABLE IMMEDIATE (09.12.18 @ 13:28) >    COMPARISON STUDY: 1/1/2018    Frontal expiratory view of the chest shows the heart to be similarly   enlarged in size. The lungs show increased peripheral markings compatible   with pulmonary fibrosis and there is no evidence of pneumothorax nor   definite pleural effusion. Likely skinfold projects over the left chest.    IMPRESSION:  Pulmonary markings as described.    < end of copied text >    Ct scan chest:    ekg;  echo:  < from: Transthoracic Echocardiogram (09.14.18 @ 10:30) >  CONCLUSIONS:  1. Normal mitral valve. Mild to moderate mitral  regurgitation.  2. Calcified trileaflet aortic valve with normal opening.  No aortic stenosis. Mild aortic regurgitation.  3. Normal aortic root.  4. Mildly dilated left atrium.  LA volume index = 35 cc/m2.  5. Normal left ventricular internal dimensions and wall  thicknesses.  6. Normal Left Ventricular Systolic Function,  (EF =67% by  biplane)  7. Normal right atrium.  8. Normal right ventricular size and systolic function  (TAPSE 1.9 cm).  9. RV systolic pressure is normal at  27 mm Hg.  10. Normal tricuspid valve. Trace tricuspid regurgitation.  11. Pulmonic valve not well seen. Trace pulmonic  insufficiency is noted.  12. No pericardial effusion.    < end of copied text > Patient is a 78y old  Male who presents with a chief complaint of Altered mental status (18 Sep 2018 10:19)    Patient is lethargic, poorly arousable, having hematuria but no sob or cough.    INTERVAL HPI/OVERNIGHT EVENTS:      VITAL SIGNS:  T(F): 97.8 (09-18-18 @ 05:38)  HR: 80 (09-18-18 @ 05:38)  BP: 139/87 (09-18-18 @ 05:38)  RR: 18 (09-18-18 @ 05:38)  SpO2: 97% (09-18-18 @ 05:38)  Wt(kg): --  I&O's Detail    17 Sep 2018 07:01  -  18 Sep 2018 07:00  --------------------------------------------------------  IN:  Total IN: 0 mL    OUT:    Indwelling Catheter - Urethral: 1450 mL  Total OUT: 1450 mL    Total NET: -1450 mL              REVIEW OF SYSTEMS:    CONSTITUTIONAL:  No fevers, chills, sweats    HEENT:  Eyes:  No diplopia or blurred vision. ENT:  No earache, sore throat or runny nose.    CARDIOVASCULAR:  No pressure, squeezing, tightness, or heaviness about the chest; no palpitations.    RESPIRATORY:  Per HPI    GASTROINTESTINAL:  No abdominal pain, nausea, vomiting or diarrhea.    GENITOURINARY:  No dysuria, frequency or urgency.    NEUROLOGIC:  No paresthesias, fasciculations, seizures or weakness.    PSYCHIATRIC:  No disorder of thought or mood.      PHYSICAL EXAM:    Constitutional: Well developed and nourished  Eyes:Perrla  ENMT: normal  Neck:supple  Respiratory: CTA b/l  Cardiovascular: S1 S2 regular  Gastrointestinal: Soft, Non tender  Extremities: No edema  Vascular:normal  Neurological:Awake  Musculoskeletal:Normal      MEDICATIONS  (STANDING):  ALBUTerol/ipratropium for Nebulization 3 milliLiter(s) Nebulizer every 6 hours  aspirin enteric coated 81 milliGRAM(s) Oral daily  atorvastatin 20 milliGRAM(s) Oral at bedtime  bisacodyl 5 milliGRAM(s) Oral at bedtime  cefTRIAXone   IVPB 1 Gram(s) IV Intermittent every 24 hours  dextrose 5% + sodium chloride 0.9%. 1000 milliLiter(s) (50 mL/Hr) IV Continuous <Continuous>  escitalopram 10 milliGRAM(s) Oral daily  finasteride 5 milliGRAM(s) Oral daily  lactulose Syrup 10 Gram(s) Oral daily  tamsulosin 0.4 milliGRAM(s) Oral at bedtime    MEDICATIONS  (PRN):  aluminum hydroxide/magnesium hydroxide/simethicone Suspension 30 milliLiter(s) Oral every 4 hours PRN Dyspepsia      Allergies    No Known Drug Allergies  zosyn (Drowsiness)    Intolerances        LABS:                        12.6   10.3  )-----------( 176      ( 18 Sep 2018 07:03 )             38.3     09-18    136  |  102  |  8   ----------------------------<  100<H>  3.9   |  27  |  0.99    Ca    8.1<L>      18 Sep 2018 07:03  Phos  2.9     09-18  Mg     2.0     09-18                CAPILLARY BLOOD GLUCOSE            RADIOLOGY & ADDITIONAL TESTS:    CXR:  < from: Xray Chest 1 View-PORTABLE IMMEDIATE (09.12.18 @ 13:28) >    COMPARISON STUDY: 1/1/2018    Frontal expiratory view of the chest shows the heart to be similarly   enlarged in size. The lungs show increased peripheral markings compatible   with pulmonary fibrosis and there is no evidence of pneumothorax nor   definite pleural effusion. Likely skinfold projects over the left chest.    IMPRESSION:  Pulmonary markings as described.    < end of copied text >    Ct scan chest:    ekg;  echo:  < from: Transthoracic Echocardiogram (09.14.18 @ 10:30) >  CONCLUSIONS:  1. Normal mitral valve. Mild to moderate mitral  regurgitation.  2. Calcified trileaflet aortic valve with normal opening.  No aortic stenosis. Mild aortic regurgitation.  3. Normal aortic root.  4. Mildly dilated left atrium.  LA volume index = 35 cc/m2.  5. Normal left ventricular internal dimensions and wall  thicknesses.  6. Normal Left Ventricular Systolic Function,  (EF =67% by  biplane)  7. Normal right atrium.  8. Normal right ventricular size and systolic function  (TAPSE 1.9 cm).  9. RV systolic pressure is normal at  27 mm Hg.  10. Normal tricuspid valve. Trace tricuspid regurgitation.  11. Pulmonic valve not well seen. Trace pulmonic  insufficiency is noted.  12. No pericardial effusion.    < end of copied text >
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
## 2018-09-18 NOTE — PROGRESS NOTE ADULT - PROBLEM SELECTOR PLAN 1
Acute worsening of mental status overnight with underlying dementia  CT head: Normal  No focal deficit and Normal pupil reflex, less likely stroke   Pt is recurrent history of UTIs in past however UA here is negative  Mild Leukocytosis  Started Rocephin and wait until Urine Cx results   Blood Cx neg   ammonia wnl  no sign of infectious etiology   UA positive, urine cx negative   patient is more lethargic today got ativan yesterday   Will repeat CT head

## 2018-09-19 LAB
HOMOCYSTEINE LEVEL: 10.5 UMOL/L — SIGNIFICANT CHANGE UP
METHYLMALONIC ACID LEVEL: 98 NMOL/L — SIGNIFICANT CHANGE UP (ref 87–318)

## 2018-09-19 RX ORDER — ACETAMINOPHEN 500 MG
650 TABLET ORAL ONCE
Qty: 0 | Refills: 0 | Status: COMPLETED | OUTPATIENT
Start: 2018-09-19 | End: 2018-09-19

## 2018-09-19 RX ADMIN — Medication 3 MILLILITER(S): at 14:57

## 2018-09-19 RX ADMIN — Medication 3 MILLILITER(S): at 09:10

## 2018-09-19 RX ADMIN — Medication 3 MILLILITER(S): at 20:02

## 2018-09-19 RX ADMIN — Medication 650 MILLIGRAM(S): at 23:48

## 2018-09-19 RX ADMIN — ESCITALOPRAM OXALATE 10 MILLIGRAM(S): 10 TABLET, FILM COATED ORAL at 12:00

## 2018-09-19 RX ADMIN — Medication 81 MILLIGRAM(S): at 12:00

## 2018-09-19 RX ADMIN — Medication 650 MILLIGRAM(S): at 23:12

## 2018-09-19 RX ADMIN — LACTULOSE 10 GRAM(S): 10 SOLUTION ORAL at 12:00

## 2018-09-19 RX ADMIN — ATORVASTATIN CALCIUM 20 MILLIGRAM(S): 80 TABLET, FILM COATED ORAL at 21:23

## 2018-09-19 RX ADMIN — FINASTERIDE 5 MILLIGRAM(S): 5 TABLET, FILM COATED ORAL at 12:00

## 2018-09-19 RX ADMIN — TAMSULOSIN HYDROCHLORIDE 0.4 MILLIGRAM(S): 0.4 CAPSULE ORAL at 21:24

## 2018-09-19 RX ADMIN — Medication 5 MILLIGRAM(S): at 21:24

## 2018-09-19 NOTE — PHYSICAL THERAPY INITIAL EVALUATION ADULT - GENERAL OBSERVATIONS, REHAB EVAL
pt revisit more awake aox3, clear RN activities per tolerance, tolerating assisted oob chair rw gait assessment
Patient was seen in supine on bed,+shah. Patient's spouse was at bed-side

## 2018-09-19 NOTE — PHYSICAL THERAPY INITIAL EVALUATION ADULT - DIAGNOSIS, PT EVAL
Aerobic Loss, Impaired Mobility, Weakness
Patient presented with generalized weakness, impaired balance with transfers and ambulation
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
## 2018-09-19 NOTE — PHYSICAL THERAPY INITIAL EVALUATION ADULT - LEVEL OF INDEPENDENCE: STAIR NEGOTIATION, REHAB EVAL
Unable to assess pt on the stairs at this time, pt does not exhibit appropriate pre requisite skills to safely negotiate unlevel surfaces due to aerobic loss, decrease trunk musculature and rw support  which placed pt significant risks for falls and injury
unable to perform/TBA

## 2018-09-19 NOTE — PHYSICAL THERAPY INITIAL EVALUATION ADULT - CRITERIA FOR SKILLED THERAPEUTIC INTERVENTIONS
functional limitations in following categories/impairments found/risk reduction/prevention
impairments found/functional limitations in following categories/risk reduction/prevention/rehab potential

## 2018-09-19 NOTE — PHYSICAL THERAPY INITIAL EVALUATION ADULT - ACTIVE RANGE OF MOTION EXAMINATION, REHAB EVAL
bilateral  lower extremity Active ROM was WFL (within functional limits)/bilateral upper extremity Active ROM was WFL (within functional limits)
no Active ROM deficits were identified

## 2018-09-19 NOTE — PHYSICAL THERAPY INITIAL EVALUATION ADULT - PLANNED THERAPY INTERVENTIONS, PT EVAL
strengthening/bed mobility training/gait training/transfer training
balance training/bed mobility training/transfer training/gait training

## 2018-09-19 NOTE — PHYSICAL THERAPY INITIAL EVALUATION ADULT - IMPAIRMENTS FOUND, PT EVAL
aerobic capacity/endurance/gross motor/gait, locomotion, and balance/ventilation and respiration/gas exchange/muscle strength
gait, locomotion, and balance/aerobic capacity/endurance/poor safety awareness

## 2018-09-19 NOTE — PROGRESS NOTE ADULT - SUBJECTIVE AND OBJECTIVE BOX
Patient is a 78y old  Male who presents with a chief complaint of Altered mental status (19 Sep 2018 09:55)    Patient is lethargic, poorly arousable, having hematuria but no sob or cough.    INTERVAL HPI/OVERNIGHT EVENTS:      VITAL SIGNS:  T(F): 97.8 (09-19-18 @ 05:02)  HR: 68 (09-19-18 @ 05:02)  BP: 143/99 (09-19-18 @ 05:02)  RR: 16 (09-19-18 @ 05:02)  SpO2: 95% (09-19-18 @ 05:02)  Wt(kg): --  I&O's Detail    18 Sep 2018 07:01  -  19 Sep 2018 07:00  --------------------------------------------------------  IN:  Total IN: 0 mL    OUT:    Indwelling Catheter - Urethral: 400 mL  Total OUT: 400 mL    Total NET: -400 mL              REVIEW OF SYSTEMS:    CONSTITUTIONAL:  No fevers, chills, sweats    HEENT:  Eyes:  No diplopia or blurred vision. ENT:  No earache, sore throat or runny nose.    CARDIOVASCULAR:  No pressure, squeezing, tightness, or heaviness about the chest; no palpitations.    RESPIRATORY:  Per HPI    GASTROINTESTINAL:  No abdominal pain, nausea, vomiting or diarrhea.    GENITOURINARY:  No dysuria, frequency or urgency.    NEUROLOGIC:  No paresthesias, fasciculations, seizures or weakness.    PSYCHIATRIC:  No disorder of thought or mood.      PHYSICAL EXAM:    Constitutional: Well developed and nourished  Eyes:Perrla  ENMT: normal  Neck:supple  Respiratory: CTA b/l  Cardiovascular: S1 S2 regular  Gastrointestinal: Soft, Non tender  Extremities: No edema  Vascular:normal  Neurological:Awake, alert,Ox3  Musculoskeletal:Normal      MEDICATIONS  (STANDING):  ALBUTerol/ipratropium for Nebulization 3 milliLiter(s) Nebulizer every 6 hours  aspirin enteric coated 81 milliGRAM(s) Oral daily  atorvastatin 20 milliGRAM(s) Oral at bedtime  bisacodyl 5 milliGRAM(s) Oral at bedtime  dextrose 5% + sodium chloride 0.9%. 1000 milliLiter(s) (50 mL/Hr) IV Continuous <Continuous>  escitalopram 10 milliGRAM(s) Oral daily  finasteride 5 milliGRAM(s) Oral daily  lactulose Syrup 10 Gram(s) Oral daily  tamsulosin 0.4 milliGRAM(s) Oral at bedtime    MEDICATIONS  (PRN):  aluminum hydroxide/magnesium hydroxide/simethicone Suspension 30 milliLiter(s) Oral every 4 hours PRN Dyspepsia      Allergies    No Known Drug Allergies  zosyn (Drowsiness)    Intolerances        LABS:                        12.6   10.3  )-----------( 176      ( 18 Sep 2018 07:03 )             38.3     09-18    136  |  102  |  8   ----------------------------<  100<H>  3.9   |  27  |  0.99    Ca    8.1<L>      18 Sep 2018 07:03  Phos  2.9     09-18  Mg     2.0     09-18                CAPILLARY BLOOD GLUCOSE            RADIOLOGY & ADDITIONAL TESTS:    CXR:  < from: Xray Chest 1 View-PORTABLE IMMEDIATE (09.12.18 @ 13:28) >    COMPARISON STUDY: 1/1/2018    Frontal expiratory view of the chest shows the heart to be similarly   enlarged in size. The lungs show increased peripheral markings compatible   with pulmonary fibrosis and there is no evidence of pneumothorax nor   definite pleural effusion. Likely skinfold projects over the left chest.    IMPRESSION:  Pulmonary markings as described.    < end of copied text >    Ct scan chest:  < from: CT Head No Cont (09.18.18 @ 16:36) >  IMPRESSION:  No acute intracranial hemorrhage or mass effect. Further evaluation with   MRI may be performed as clinically indicated.    < end of copied text >      ekg;  echo:  < from: Transthoracic Echocardiogram (09.14.18 @ 10:30) >  CONCLUSIONS:  1. Normal mitral valve. Mild to moderate mitral  regurgitation.  2. Calcified trileaflet aortic valve with normal opening.  No aortic stenosis. Mild aortic regurgitation.  3. Normal aortic root.  4. Mildly dilated left atrium.  LA volume index = 35 cc/m2.  5. Normal left ventricular internal dimensions and wall  thicknesses.  6. Normal Left Ventricular Systolic Function,  (EF =67% by  biplane)  7. Normal right atrium.  8. Normal right ventricular size and systolic function  (TAPSE 1.9 cm).  9. RV systolic pressure is normal at  27 mm Hg.  10. Normal tricuspid valve. Trace tricuspid regurgitation.  11. Pulmonic valve not well seen. Trace pulmonic  insufficiency is noted.  12. No pericardial effusion.    < end of copied text > Patient is a 78y old  Male who presents with a chief complaint of Altered mental status (19 Sep 2018 09:55)    Patient is lethargic, more arousable today.  Hematuria has improved. Urine now pinkish.    INTERVAL HPI/OVERNIGHT EVENTS:      VITAL SIGNS:  T(F): 97.8 (09-19-18 @ 05:02)  HR: 68 (09-19-18 @ 05:02)  BP: 143/99 (09-19-18 @ 05:02)  RR: 16 (09-19-18 @ 05:02)  SpO2: 95% (09-19-18 @ 05:02)  Wt(kg): --  I&O's Detail    18 Sep 2018 07:01  -  19 Sep 2018 07:00  --------------------------------------------------------  IN:  Total IN: 0 mL    OUT:    Indwelling Catheter - Urethral: 400 mL  Total OUT: 400 mL    Total NET: -400 mL              REVIEW OF SYSTEMS:    CONSTITUTIONAL:  No fevers, chills, sweats    HEENT:  Eyes:  No diplopia or blurred vision. ENT:  No earache, sore throat or runny nose.    CARDIOVASCULAR:  No pressure, squeezing, tightness, or heaviness about the chest; no palpitations.    RESPIRATORY:  Per HPI    GASTROINTESTINAL:  No abdominal pain, nausea, vomiting or diarrhea.    GENITOURINARY:  No dysuria, frequency or urgency.    NEUROLOGIC:  No paresthesias, fasciculations, seizures or weakness.    PSYCHIATRIC:  No disorder of thought or mood.      PHYSICAL EXAM:    Constitutional: Well developed and nourished  Eyes:Perrla  ENMT: normal  Neck:supple  Respiratory: CTA b/l  Cardiovascular: S1 S2 regular  Gastrointestinal: Soft, Non tender  Extremities: No edema  Vascular:normal  Neurological:Lethargic, arousable  Musculoskeletal:Normal      MEDICATIONS  (STANDING):  ALBUTerol/ipratropium for Nebulization 3 milliLiter(s) Nebulizer every 6 hours  aspirin enteric coated 81 milliGRAM(s) Oral daily  atorvastatin 20 milliGRAM(s) Oral at bedtime  bisacodyl 5 milliGRAM(s) Oral at bedtime  dextrose 5% + sodium chloride 0.9%. 1000 milliLiter(s) (50 mL/Hr) IV Continuous <Continuous>  escitalopram 10 milliGRAM(s) Oral daily  finasteride 5 milliGRAM(s) Oral daily  lactulose Syrup 10 Gram(s) Oral daily  tamsulosin 0.4 milliGRAM(s) Oral at bedtime    MEDICATIONS  (PRN):  aluminum hydroxide/magnesium hydroxide/simethicone Suspension 30 milliLiter(s) Oral every 4 hours PRN Dyspepsia      Allergies    No Known Drug Allergies  zosyn (Drowsiness)    Intolerances        LABS:                        12.6   10.3  )-----------( 176      ( 18 Sep 2018 07:03 )             38.3     09-18    136  |  102  |  8   ----------------------------<  100<H>  3.9   |  27  |  0.99    Ca    8.1<L>      18 Sep 2018 07:03  Phos  2.9     09-18  Mg     2.0     09-18                CAPILLARY BLOOD GLUCOSE            RADIOLOGY & ADDITIONAL TESTS:    CXR:  < from: Xray Chest 1 View-PORTABLE IMMEDIATE (09.12.18 @ 13:28) >    COMPARISON STUDY: 1/1/2018    Frontal expiratory view of the chest shows the heart to be similarly   enlarged in size. The lungs show increased peripheral markings compatible   with pulmonary fibrosis and there is no evidence of pneumothorax nor   definite pleural effusion. Likely skinfold projects over the left chest.    IMPRESSION:  Pulmonary markings as described.    < end of copied text >    Ct scan chest:  < from: CT Head No Cont (09.18.18 @ 16:36) >  IMPRESSION:  No acute intracranial hemorrhage or mass effect. Further evaluation with   MRI may be performed as clinically indicated.    < end of copied text >      ekg;  echo:  < from: Transthoracic Echocardiogram (09.14.18 @ 10:30) >  CONCLUSIONS:  1. Normal mitral valve. Mild to moderate mitral  regurgitation.  2. Calcified trileaflet aortic valve with normal opening.  No aortic stenosis. Mild aortic regurgitation.  3. Normal aortic root.  4. Mildly dilated left atrium.  LA volume index = 35 cc/m2.  5. Normal left ventricular internal dimensions and wall  thicknesses.  6. Normal Left Ventricular Systolic Function,  (EF =67% by  biplane)  7. Normal right atrium.  8. Normal right ventricular size and systolic function  (TAPSE 1.9 cm).  9. RV systolic pressure is normal at  27 mm Hg.  10. Normal tricuspid valve. Trace tricuspid regurgitation.  11. Pulmonic valve not well seen. Trace pulmonic  insufficiency is noted.  12. No pericardial effusion.    < end of copied text >

## 2018-09-20 ENCOUNTER — TRANSCRIPTION ENCOUNTER (OUTPATIENT)
Age: 78
End: 2018-09-20

## 2018-09-20 ENCOUNTER — RX CHANGE (OUTPATIENT)
Age: 78
End: 2018-09-20

## 2018-09-20 VITALS — SYSTOLIC BLOOD PRESSURE: 154 MMHG | DIASTOLIC BLOOD PRESSURE: 79 MMHG | HEART RATE: 75 BPM

## 2018-09-20 DIAGNOSIS — R31.9 HEMATURIA, UNSPECIFIED: ICD-10-CM

## 2018-09-20 PROCEDURE — 82803 BLOOD GASES ANY COMBINATION: CPT

## 2018-09-20 PROCEDURE — 83690 ASSAY OF LIPASE: CPT

## 2018-09-20 PROCEDURE — 80053 COMPREHEN METABOLIC PANEL: CPT

## 2018-09-20 PROCEDURE — 82746 ASSAY OF FOLIC ACID SERUM: CPT

## 2018-09-20 PROCEDURE — 82607 VITAMIN B-12: CPT

## 2018-09-20 PROCEDURE — 99285 EMERGENCY DEPT VISIT HI MDM: CPT | Mod: 25

## 2018-09-20 PROCEDURE — 70450 CT HEAD/BRAIN W/O DYE: CPT

## 2018-09-20 PROCEDURE — 70551 MRI BRAIN STEM W/O DYE: CPT

## 2018-09-20 PROCEDURE — 76775 US EXAM ABDO BACK WALL LIM: CPT

## 2018-09-20 PROCEDURE — 83090 ASSAY OF HOMOCYSTEINE: CPT

## 2018-09-20 PROCEDURE — 82652 VIT D 1 25-DIHYDROXY: CPT

## 2018-09-20 PROCEDURE — 82140 ASSAY OF AMMONIA: CPT

## 2018-09-20 PROCEDURE — 83735 ASSAY OF MAGNESIUM: CPT

## 2018-09-20 PROCEDURE — 87040 BLOOD CULTURE FOR BACTERIA: CPT

## 2018-09-20 PROCEDURE — 71045 X-RAY EXAM CHEST 1 VIEW: CPT

## 2018-09-20 PROCEDURE — 80048 BASIC METABOLIC PNL TOTAL CA: CPT

## 2018-09-20 PROCEDURE — 97161 PT EVAL LOW COMPLEX 20 MIN: CPT

## 2018-09-20 PROCEDURE — 87086 URINE CULTURE/COLONY COUNT: CPT

## 2018-09-20 PROCEDURE — 84443 ASSAY THYROID STIM HORMONE: CPT

## 2018-09-20 PROCEDURE — 83036 HEMOGLOBIN GLYCOSYLATED A1C: CPT

## 2018-09-20 PROCEDURE — 81001 URINALYSIS AUTO W/SCOPE: CPT

## 2018-09-20 PROCEDURE — 94640 AIRWAY INHALATION TREATMENT: CPT

## 2018-09-20 PROCEDURE — 85027 COMPLETE CBC AUTOMATED: CPT

## 2018-09-20 PROCEDURE — 82962 GLUCOSE BLOOD TEST: CPT

## 2018-09-20 PROCEDURE — 80074 ACUTE HEPATITIS PANEL: CPT

## 2018-09-20 PROCEDURE — 93306 TTE W/DOPPLER COMPLETE: CPT

## 2018-09-20 PROCEDURE — 80061 LIPID PANEL: CPT

## 2018-09-20 PROCEDURE — 94660 CPAP INITIATION&MGMT: CPT

## 2018-09-20 PROCEDURE — 84484 ASSAY OF TROPONIN QUANT: CPT

## 2018-09-20 PROCEDURE — 86780 TREPONEMA PALLIDUM: CPT

## 2018-09-20 PROCEDURE — 93005 ELECTROCARDIOGRAM TRACING: CPT

## 2018-09-20 PROCEDURE — 84100 ASSAY OF PHOSPHORUS: CPT

## 2018-09-20 PROCEDURE — 83921 ORGANIC ACID SINGLE QUANT: CPT

## 2018-09-20 RX ORDER — APIXABAN 2.5 MG/1
1 TABLET, FILM COATED ORAL
Qty: 0 | Refills: 0 | COMMUNITY

## 2018-09-20 RX ORDER — APIXABAN 2.5 MG/1
5 TABLET, FILM COATED ORAL EVERY 12 HOURS
Qty: 0 | Refills: 0 | Status: DISCONTINUED | OUTPATIENT
Start: 2018-09-20 | End: 2018-09-20

## 2018-09-20 RX ORDER — MULTIVIT WITH MIN/MFOLATE/K2 340-15/3 G
220 POWDER (GRAM) ORAL ONCE
Qty: 0 | Refills: 0 | Status: DISCONTINUED | OUTPATIENT
Start: 2018-09-20 | End: 2018-09-20

## 2018-09-20 RX ORDER — LACTULOSE 10 G/15ML
15 SOLUTION ORAL
Qty: 100 | Refills: 0 | OUTPATIENT
Start: 2018-09-20

## 2018-09-20 RX ORDER — FINASTERIDE 5 MG/1
1 TABLET, FILM COATED ORAL
Qty: 0 | Refills: 0 | COMMUNITY
Start: 2018-09-20

## 2018-09-20 RX ORDER — MULTIVIT WITH MIN/MFOLATE/K2 340-15/3 G
150 POWDER (GRAM) ORAL ONCE
Qty: 0 | Refills: 0 | Status: COMPLETED | OUTPATIENT
Start: 2018-09-20 | End: 2018-09-20

## 2018-09-20 RX ORDER — ARMODAFINIL 200 MG/1
1 TABLET ORAL
Qty: 0 | Refills: 0 | COMMUNITY

## 2018-09-20 RX ORDER — IPRATROPIUM/ALBUTEROL SULFATE 18-103MCG
3 AEROSOL WITH ADAPTER (GRAM) INHALATION
Qty: 0 | Refills: 0 | COMMUNITY
Start: 2018-09-20

## 2018-09-20 RX ORDER — ASPIRIN/CALCIUM CARB/MAGNESIUM 324 MG
1 TABLET ORAL
Qty: 0 | Refills: 0 | COMMUNITY
Start: 2018-09-20

## 2018-09-20 RX ORDER — ESCITALOPRAM OXALATE 10 MG/1
1 TABLET, FILM COATED ORAL
Qty: 0 | Refills: 0 | COMMUNITY
Start: 2018-09-20

## 2018-09-20 RX ORDER — TAMSULOSIN HYDROCHLORIDE 0.4 MG/1
1 CAPSULE ORAL
Qty: 0 | Refills: 0 | COMMUNITY
Start: 2018-09-20

## 2018-09-20 RX ORDER — ESCITALOPRAM OXALATE 10 MG/1
1 TABLET, FILM COATED ORAL
Qty: 0 | Refills: 0 | COMMUNITY

## 2018-09-20 RX ORDER — APIXABAN 2.5 MG/1
1 TABLET, FILM COATED ORAL
Qty: 0 | Refills: 0 | COMMUNITY
Start: 2018-09-20

## 2018-09-20 RX ORDER — POLYETHYLENE GLYCOL 3350 17 G/17G
17 POWDER, FOR SOLUTION ORAL
Qty: 100 | Refills: 0 | OUTPATIENT
Start: 2018-09-20 | End: 2018-09-26

## 2018-09-20 RX ORDER — DABIGATRAN ETEXILATE MESYLATE 150 MG/1
150 CAPSULE ORAL
Qty: 0 | Refills: 0 | COMMUNITY

## 2018-09-20 RX ADMIN — ESCITALOPRAM OXALATE 10 MILLIGRAM(S): 10 TABLET, FILM COATED ORAL at 12:38

## 2018-09-20 RX ADMIN — Medication 81 MILLIGRAM(S): at 17:42

## 2018-09-20 RX ADMIN — Medication 3 MILLILITER(S): at 09:35

## 2018-09-20 RX ADMIN — Medication 3 MILLILITER(S): at 14:27

## 2018-09-20 RX ADMIN — SODIUM CHLORIDE 50 MILLILITER(S): 9 INJECTION, SOLUTION INTRAVENOUS at 05:39

## 2018-09-20 RX ADMIN — APIXABAN 5 MILLIGRAM(S): 2.5 TABLET, FILM COATED ORAL at 17:42

## 2018-09-20 RX ADMIN — Medication 150 MILLILITER(S): at 12:36

## 2018-09-20 RX ADMIN — FINASTERIDE 5 MILLIGRAM(S): 5 TABLET, FILM COATED ORAL at 12:38

## 2018-09-20 RX ADMIN — LACTULOSE 10 GRAM(S): 10 SOLUTION ORAL at 12:39

## 2018-09-20 NOTE — PROGRESS NOTE ADULT - PROBLEM SELECTOR PLAN 2
Bronchodilators neb prn  oxygen supp.
Acute worsening of mental status overnight with underlying dementia  CT head: Normal  No focal deficit and Normal pupil reflex, less likely stroke   Pt is recurrent history of UTIs in past however UA here is negative  Mild Leukocytosis  Started Rocephin and wait until Urine Cx results   Blood Cx neg   ammonia wnl  no sign of infectious etiology   UA positive, urine cx negative    repeat CT head was neg  improved
Bronchodilators neb prn  oxygen supp
Bronchodilators neb prn  oxygen supp
EKG showed Afib 80 bpm  Currently rate controlled off meds  Anticoagulation with Eliquis 5 BID  Previous Echo in Jan 2018 showed Normal EF  Consulted Dr Davis  echo wnl
EKG showed Afib 80 bpm  Currently rate controlled off meds  Anticoagulation with Eliquis 5 BID  Previous Echo in Jan 2018 showed Normal EF  Consulted Dr Davis  echo wnl  Hold Eliquis due to Hematuria
EKG showed Afib 80 bpm  Currently rate controlled off meds  Anticoagulation with Eliquis 5 BID  Previous Echo in Jan 2018 showed Normal EF  Consulted Dr Davis  f/u ECHO
c/w with Eliquis 5 BID  Cardio Consult noted
EKG showed Afib 80 bpm  Currently rate controlled off meds  Anticoagulation with Eliquis 5 BID  Previous Echo in Jan 2018 showed Normal EF  Consulted Dr Davis  f/u ECHO

## 2018-09-20 NOTE — PROGRESS NOTE ADULT - PROBLEM SELECTOR PROBLEM 6
Mood disorder
Mood disorder
COPD (chronic obstructive pulmonary disease)
Mood disorder

## 2018-09-20 NOTE — PROGRESS NOTE ADULT - PROBLEM SELECTOR PROBLEM 4
COLUMBA (acute kidney injury)
BRIEN (obstructive sleep apnea)
COLUMBA (acute kidney injury)

## 2018-09-20 NOTE — PROGRESS NOTE ADULT - SUBJECTIVE AND OBJECTIVE BOX
Patient is a 78y old  Male who presents with a chief complaint of Altered mental status (20 Sep 2018 12:13)    Patient is lethargic, more arousable today.  Hematuria has improved. Urine now pinkish.    INTERVAL HPI/OVERNIGHT EVENTS:      VITAL SIGNS:  T(F): 98.2 (09-20-18 @ 05:11)  HR: 93 (09-20-18 @ 11:53)  BP: 147/85 (09-20-18 @ 11:53)  RR: 17 (09-20-18 @ 05:11)  SpO2: 96% (09-20-18 @ 11:53)  Wt(kg): --  I&O's Detail    19 Sep 2018 07:01  -  20 Sep 2018 07:00  --------------------------------------------------------  IN:  Total IN: 0 mL    OUT:    Indwelling Catheter - Urethral: 600 mL  Total OUT: 600 mL    Total NET: -600 mL      20 Sep 2018 07:01  -  20 Sep 2018 12:38  --------------------------------------------------------  IN:    Oral Fluid: 300 mL  Total IN: 300 mL    OUT:    Indwelling Catheter - Urethral: 400 mL  Total OUT: 400 mL    Total NET: -100 mL              REVIEW OF SYSTEMS:    CONSTITUTIONAL:  No fevers, chills, sweats    HEENT:  Eyes:  No diplopia or blurred vision. ENT:  No earache, sore throat or runny nose.    CARDIOVASCULAR:  No pressure, squeezing, tightness, or heaviness about the chest; no palpitations.    RESPIRATORY:  Per HPI    GASTROINTESTINAL:  No abdominal pain, nausea, vomiting or diarrhea.    GENITOURINARY:  No dysuria, frequency or urgency.    NEUROLOGIC:  No paresthesias, fasciculations, seizures or weakness.    PSYCHIATRIC:  No disorder of thought or mood.      PHYSICAL EXAM:    Constitutional: Well developed and nourished  Eyes:Perrla  ENMT: normal  Neck:supple  Respiratory: good air entry  Cardiovascular: S1 S2 regular  Gastrointestinal: Soft, Non tender  Extremities: No edema  Vascular:normal  Neurological:Lethargic, arousable  Musculoskeletal:Normal      MEDICATIONS  (STANDING):  ALBUTerol/ipratropium for Nebulization 3 milliLiter(s) Nebulizer every 6 hours  apixaban 5 milliGRAM(s) Oral every 12 hours  aspirin enteric coated 81 milliGRAM(s) Oral daily  atorvastatin 20 milliGRAM(s) Oral at bedtime  bisacodyl 5 milliGRAM(s) Oral at bedtime  dextrose 5% + sodium chloride 0.9%. 1000 milliLiter(s) (50 mL/Hr) IV Continuous <Continuous>  escitalopram 10 milliGRAM(s) Oral daily  finasteride 5 milliGRAM(s) Oral daily  lactulose Syrup 10 Gram(s) Oral daily  magnesium citrate Solution 150 milliLiter(s) Oral once  tamsulosin 0.4 milliGRAM(s) Oral at bedtime    MEDICATIONS  (PRN):  aluminum hydroxide/magnesium hydroxide/simethicone Suspension 30 milliLiter(s) Oral every 4 hours PRN Dyspepsia      Allergies    No Known Drug Allergies  zosyn (Drowsiness)    Intolerances        LABS:                    CAPILLARY BLOOD GLUCOSE            RADIOLOGY & ADDITIONAL TESTS:    CXR:  < from: Xray Chest 1 View-PORTABLE IMMEDIATE (09.12.18 @ 13:28) >    COMPARISON STUDY: 1/1/2018    Frontal expiratory view of the chest shows the heart to be similarly   enlarged in size. The lungs show increased peripheral markings compatible   with pulmonary fibrosis and there is no evidence of pneumothorax nor   definite pleural effusion. Likely skinfold projects over the left chest.    IMPRESSION:  Pulmonary markings as described.    < end of copied text >    Ct scan chest:  < from: CT Head No Cont (09.18.18 @ 16:36) >  IMPRESSION:  No acute intracranial hemorrhage or mass effect. Further evaluation with   MRI may be performed as clinically indicated.    < end of copied text >      ekg;  echo:  < from: Transthoracic Echocardiogram (09.14.18 @ 10:30) >  CONCLUSIONS:  1. Normal mitral valve. Mild to moderate mitral  regurgitation.  2. Calcified trileaflet aortic valve with normal opening.  No aortic stenosis. Mild aortic regurgitation.  3. Normal aortic root.  4. Mildly dilated left atrium.  LA volume index = 35 cc/m2.  5. Normal left ventricular internal dimensions and wall  thicknesses.  6. Normal Left Ventricular Systolic Function,  (EF =67% by  biplane)  7. Normal right atrium.  8. Normal right ventricular size and systolic function  (TAPSE 1.9 cm).  9. RV systolic pressure is normal at  27 mm Hg.  10. Normal tricuspid valve. Trace tricuspid regurgitation.  11. Pulmonic valve not well seen. Trace pulmonic  insufficiency is noted.  12. No pericardial effusion.    < end of copied text > Patient is a 78y old  Male who presents with a chief complaint of Altered mental status (20 Sep 2018 12:13)    Patient is  more awake today.  Hematuria has improved. Urine now clear. Family requested GINNY.    INTERVAL HPI/OVERNIGHT EVENTS:      VITAL SIGNS:  T(F): 98.2 (09-20-18 @ 05:11)  HR: 93 (09-20-18 @ 11:53)  BP: 147/85 (09-20-18 @ 11:53)  RR: 17 (09-20-18 @ 05:11)  SpO2: 96% (09-20-18 @ 11:53)  Wt(kg): --  I&O's Detail    19 Sep 2018 07:01  -  20 Sep 2018 07:00  --------------------------------------------------------  IN:  Total IN: 0 mL    OUT:    Indwelling Catheter - Urethral: 600 mL  Total OUT: 600 mL    Total NET: -600 mL      20 Sep 2018 07:01  -  20 Sep 2018 12:38  --------------------------------------------------------  IN:    Oral Fluid: 300 mL  Total IN: 300 mL    OUT:    Indwelling Catheter - Urethral: 400 mL  Total OUT: 400 mL    Total NET: -100 mL              REVIEW OF SYSTEMS:    CONSTITUTIONAL:  No fevers, chills, sweats    HEENT:  Eyes:  No diplopia or blurred vision. ENT:  No earache, sore throat or runny nose.    CARDIOVASCULAR:  No pressure, squeezing, tightness, or heaviness about the chest; no palpitations.    RESPIRATORY:  Per HPI    GASTROINTESTINAL:  No abdominal pain, nausea, vomiting or diarrhea.    GENITOURINARY:  No dysuria, frequency or urgency.    NEUROLOGIC:  No paresthesias, fasciculations, seizures or weakness.    PSYCHIATRIC:  No disorder of thought or mood.      PHYSICAL EXAM:    Constitutional: Well developed and nourished  Eyes:Perrla  ENMT: normal  Neck:supple  Respiratory: good air entry  Cardiovascular: S1 S2 regular  Gastrointestinal: Soft, Non tender  Extremities: No edema  Vascular:normal  Neurological: Awake, alert,  Musculoskeletal:Normal      MEDICATIONS  (STANDING):  ALBUTerol/ipratropium for Nebulization 3 milliLiter(s) Nebulizer every 6 hours  apixaban 5 milliGRAM(s) Oral every 12 hours  aspirin enteric coated 81 milliGRAM(s) Oral daily  atorvastatin 20 milliGRAM(s) Oral at bedtime  bisacodyl 5 milliGRAM(s) Oral at bedtime  dextrose 5% + sodium chloride 0.9%. 1000 milliLiter(s) (50 mL/Hr) IV Continuous <Continuous>  escitalopram 10 milliGRAM(s) Oral daily  finasteride 5 milliGRAM(s) Oral daily  lactulose Syrup 10 Gram(s) Oral daily  magnesium citrate Solution 150 milliLiter(s) Oral once  tamsulosin 0.4 milliGRAM(s) Oral at bedtime    MEDICATIONS  (PRN):  aluminum hydroxide/magnesium hydroxide/simethicone Suspension 30 milliLiter(s) Oral every 4 hours PRN Dyspepsia      Allergies    No Known Drug Allergies  zosyn (Drowsiness)    Intolerances        LABS:                    CAPILLARY BLOOD GLUCOSE            RADIOLOGY & ADDITIONAL TESTS:    CXR:  < from: Xray Chest 1 View-PORTABLE IMMEDIATE (09.12.18 @ 13:28) >    COMPARISON STUDY: 1/1/2018    Frontal expiratory view of the chest shows the heart to be similarly   enlarged in size. The lungs show increased peripheral markings compatible   with pulmonary fibrosis and there is no evidence of pneumothorax nor   definite pleural effusion. Likely skinfold projects over the left chest.    IMPRESSION:  Pulmonary markings as described.    < end of copied text >    Ct scan chest:  < from: CT Head No Cont (09.18.18 @ 16:36) >  IMPRESSION:  No acute intracranial hemorrhage or mass effect. Further evaluation with   MRI may be performed as clinically indicated.    < end of copied text >      ekg;  echo:  < from: Transthoracic Echocardiogram (09.14.18 @ 10:30) >  CONCLUSIONS:  1. Normal mitral valve. Mild to moderate mitral  regurgitation.  2. Calcified trileaflet aortic valve with normal opening.  No aortic stenosis. Mild aortic regurgitation.  3. Normal aortic root.  4. Mildly dilated left atrium.  LA volume index = 35 cc/m2.  5. Normal left ventricular internal dimensions and wall  thicknesses.  6. Normal Left Ventricular Systolic Function,  (EF =67% by  biplane)  7. Normal right atrium.  8. Normal right ventricular size and systolic function  (TAPSE 1.9 cm).  9. RV systolic pressure is normal at  27 mm Hg.  10. Normal tricuspid valve. Trace tricuspid regurgitation.  11. Pulmonic valve not well seen. Trace pulmonic  insufficiency is noted.  12. No pericardial effusion.    < end of copied text >

## 2018-09-20 NOTE — DISCHARGE NOTE ADULT - PLAN OF CARE
To treat the underlying medical condition You presented to hospital with agitation likely multifactorial infectious vs metabolic Vs poor oral intake. You were seen by psychiatry and recommended to continue with Lexapro. Your symptoms resolved, follow up with your primary care in 2 weeks Likely due to trauma from shah Catheter, currently resolved. continue with shah catheter until you have bowel movement. Likely due to dehydration Vs post obstructive. currently resolved. Avoid NSAID;s and nephrotoxic drugs rate controlled Eliquis for stroke prevention continue with [employer]rabs Cpap at night US showed B/l hydronephrosis, continue with shah catheter, outpatient follow up with Dr Barraza in 1 week

## 2018-09-20 NOTE — PROGRESS NOTE ADULT - SUBJECTIVE AND OBJECTIVE BOX
Patient is a 78y old  Male who presents with a chief complaint of Altered mental status (19 Sep 2018 12:08)    pt seen in icu [  ], reg med floor [ x  ], bed [ x ], chair at bedside [   ], awake and responsive [ x ], lethargic [ ],  nad [ x ]    c/o constipation    Allergies    No Known Drug Allergies  zosyn (Drowsiness)        Vitals    T(F): 98.2 (09-20-18 @ 05:11), Max: 98.2 (09-20-18 @ 05:11)  HR: 77 (09-20-18 @ 05:11) (70 - 80)  BP: 133/72 (09-20-18 @ 05:11) (113/52 - 147/80)  RR: 17 (09-20-18 @ 05:11) (16 - 17)  SpO2: 98% (09-20-18 @ 05:11) (95% - 99%)  Wt(kg): --  CAPILLARY BLOOD GLUCOSE          Labs        .Urine Catheterized  09-16 @ 12:53   No growth  --  --      .Blood Blood-Peripheral  09-12 @ 18:53   No growth at 5 days.  --  --      .Urine Clean Catch (Midstream)  09-12 @ 14:29   No growth  --  --          Radiology Results      Meds    MEDICATIONS  (STANDING):  ALBUTerol/ipratropium for Nebulization 3 milliLiter(s) Nebulizer every 6 hours  aspirin enteric coated 81 milliGRAM(s) Oral daily  atorvastatin 20 milliGRAM(s) Oral at bedtime  bisacodyl 5 milliGRAM(s) Oral at bedtime  dextrose 5% + sodium chloride 0.9%. 1000 milliLiter(s) (50 mL/Hr) IV Continuous <Continuous>  escitalopram 10 milliGRAM(s) Oral daily  finasteride 5 milliGRAM(s) Oral daily  lactulose Syrup 10 Gram(s) Oral daily  tamsulosin 0.4 milliGRAM(s) Oral at bedtime      MEDICATIONS  (PRN):  aluminum hydroxide/magnesium hydroxide/simethicone Suspension 30 milliLiter(s) Oral every 4 hours PRN Dyspepsia      Physical Exam    Neuro :  no focal deficits  Respiratory: B/L crackles  CV: RRR, S1S2, no murmurs,   Abdominal: Soft, NT, ND +BS,  Extremities: No edema, + peripheral pulses    ASSESSMENT    Delirium due to another medical condition  s/p paige  hematuria likely 2nd to shah trauma  h/o COPD (chronic obstructive pulmonary disease)  Bundle branch block, right  Pulmonary fibrosis  Dementia  Atrial fibrillation  GERD (gastroesophageal reflux disease)  Recurrent UTI  Hyperlipidemia  Recurrent UTI (urinary tract infection)  Obstructive sleep apnea on CPAP  H/O small bowel obstruction  History of appendectomy      PLAN      r/o infectious process  blood and ucx neg noted above  d/c abx  vit b12 elevated noted   rpr neg noted above  mri neg for acute patho noted above  psych cons noted  cont lexapro  haldol prn agitation  pulm f/u  cardio f/u  renal f/u  cont  IV fluids  serum creat wnl  renal sono with Mild to moderate bilateral hydronephrosis noted   Continue with Flomax and Finasteride.  shah to bsd with clear urine  urology f/u noted  recommend multiple enema's for fecal disimpaction  phys tx  oob  restart eliquis as hematuria has resolved  lethargy 2nd to ativan  ct head neg for acute patho noted above  add mag citrate 10 oz x1  cont current meds  d/c planning

## 2018-09-20 NOTE — PROGRESS NOTE ADULT - ASSESSMENT
78 yr old  Male from home, walks with walker having PMHX of Afib (Eliquis), COPD, BRIEN (Cpap at night), CHFpEF, GERD, HLD, Pulmonary fibrosis, RBBB, Chronic B/L LE lymphedema, dementia, recurrent UTIs and multiple falls came in with acute change in mental status,COLUMBA,urinary retention and B/L hydronephrosis.  1.Urine cx-.  2.Afib-eliquis.  3.Pulmonary fibrosis-duoneb.  4.Lipid D/O-statin.  5.PPI.  6. f/u.  7.Delirium-resolved.

## 2018-09-20 NOTE — PROGRESS NOTE ADULT - PROVIDER SPECIALTY LIST ADULT
Cardiology
Internal Medicine
Pulmonology
Urology
Internal Medicine
Pulmonology
Internal Medicine

## 2018-09-20 NOTE — PROGRESS NOTE ADULT - SUBJECTIVE AND OBJECTIVE BOX
Pt seen at bedside  Patient is a 78y old  Male who presents with a chief complaint of Altered mental status (20 Sep 2018 12:38)      INTERVAL HPI/OVERNIGHT EVENTS:  Pt states feels well this AM.  Per family member, pt appears better this morning  Pt unsure if having BMs    Vital Signs Last 24 Hrs  T(C): 36.8 (20 Sep 2018 05:11), Max: 36.8 (20 Sep 2018 05:11)  T(F): 98.2 (20 Sep 2018 05:11), Max: 98.2 (20 Sep 2018 05:11)  HR: 93 (20 Sep 2018 11:53) (70 - 93)  BP: 147/85 (20 Sep 2018 11:53) (113/52 - 147/85)  BP(mean): --  RR: 17 (20 Sep 2018 05:11) (16 - 17)  SpO2: 96% (20 Sep 2018 11:53) (95% - 99%)    Physical Exam:    Gen: awake, alert not oriented NAD  Abd: obese soft, NT ND  Pelvic: shah catheter in place with clear urine    MEDICATIONS  (STANDING):  ALBUTerol/ipratropium for Nebulization 3 milliLiter(s) Nebulizer every 6 hours  apixaban 5 milliGRAM(s) Oral every 12 hours  aspirin enteric coated 81 milliGRAM(s) Oral daily  atorvastatin 20 milliGRAM(s) Oral at bedtime  bisacodyl 5 milliGRAM(s) Oral at bedtime  dextrose 5% + sodium chloride 0.9%. 1000 milliLiter(s) (50 mL/Hr) IV Continuous <Continuous>  escitalopram 10 milliGRAM(s) Oral daily  finasteride 5 milliGRAM(s) Oral daily  lactulose Syrup 10 Gram(s) Oral daily  tamsulosin 0.4 milliGRAM(s) Oral at bedtime    MEDICATIONS  (PRN):  aluminum hydroxide/magnesium hydroxide/simethicone Suspension 30 milliLiter(s) Oral every 4 hours PRN Dyspepsia                  I&O's Detail    19 Sep 2018 07:01  -  20 Sep 2018 07:00  --------------------------------------------------------  IN:  Total IN: 0 mL    OUT:    Indwelling Catheter - Urethral: 600 mL  Total OUT: 600 mL    Total NET: -600 mL      20 Sep 2018 07:01  -  20 Sep 2018 12:59  --------------------------------------------------------  IN:    Oral Fluid: 300 mL  Total IN: 300 mL    OUT:    Indwelling Catheter - Urethral: 400 mL  Total OUT: 400 mL    Total NET: -100 mL

## 2018-09-20 NOTE — PROGRESS NOTE ADULT - PROBLEM SELECTOR PLAN 1
Likely traumatic from shah placement  and patient was on Eliquis   currently resolved   will re start on eliquis   Urology recommended Dc shah after seven days of Flomax  will Dc in am and TOV

## 2018-09-20 NOTE — PROGRESS NOTE ADULT - ASSESSMENT
B/l hydronephrosis, urinary retention secondary to fecal impaction and likely BPH  1. Keep shah catheter until having BM  2. Rec Enemas  3. will follow

## 2018-09-20 NOTE — PROGRESS NOTE ADULT - SUBJECTIVE AND OBJECTIVE BOX
PGY 2 Note discussed with supervising  primary attending    Patient is a 78y old  Male who presents with a chief complaint of Altered mental status (20 Sep 2018 11:09)      INTERVAL HPI/OVERNIGHT EVENTS: patient seen and examined at bed side, Mental status improved significantly, hematuria resolved.  offers no new complaints; current symptoms resolving    MEDICATIONS  (STANDING):  ALBUTerol/ipratropium for Nebulization 3 milliLiter(s) Nebulizer every 6 hours  apixaban 5 milliGRAM(s) Oral every 12 hours  aspirin enteric coated 81 milliGRAM(s) Oral daily  atorvastatin 20 milliGRAM(s) Oral at bedtime  bisacodyl 5 milliGRAM(s) Oral at bedtime  dextrose 5% + sodium chloride 0.9%. 1000 milliLiter(s) (50 mL/Hr) IV Continuous <Continuous>  escitalopram 10 milliGRAM(s) Oral daily  finasteride 5 milliGRAM(s) Oral daily  lactulose Syrup 10 Gram(s) Oral daily  magnesium citrate Solution 150 milliLiter(s) Oral once  tamsulosin 0.4 milliGRAM(s) Oral at bedtime    MEDICATIONS  (PRN):  aluminum hydroxide/magnesium hydroxide/simethicone Suspension 30 milliLiter(s) Oral every 4 hours PRN Dyspepsia      __________________________________________________  REVIEW OF SYSTEMS:    CONSTITUTIONAL: No fever,   EYES: no acute visual disturbances  NECK: No pain or stiffness  RESPIRATORY: No cough; No shortness of breath  CARDIOVASCULAR: No chest pain, no palpitations  GASTROINTESTINAL: No pain. No nausea or vomiting; No diarrhea   NEUROLOGICAL: No headache or numbness, no tremors  MUSCULOSKELETAL: No joint pain, no muscle pain  GENITOURINARY: no dysuria, no frequency, no hesitancy  PSYCHIATRY: no depression , no anxiety  ALL OTHER  ROS negative        Vital Signs Last 24 Hrs  T(C): 36.8 (20 Sep 2018 05:11), Max: 36.8 (20 Sep 2018 05:11)  T(F): 98.2 (20 Sep 2018 05:11), Max: 98.2 (20 Sep 2018 05:11)  HR: 93 (20 Sep 2018 11:53) (70 - 93)  BP: 147/85 (20 Sep 2018 11:53) (113/52 - 147/85)  BP(mean): --  RR: 17 (20 Sep 2018 05:11) (16 - 17)  SpO2: 96% (20 Sep 2018 11:53) (95% - 99%)    ________________________________________________  PHYSICAL EXAM:  GENERAL: NAD  HEENT: Normocephalic;  conjunctivae and sclerae clear; moist mucous membranes;   NECK : supple  CHEST/LUNG: Clear to auscultation bilaterally with good air entry   HEART: S1 S2  regular; no murmurs, gallops or rubs  ABDOMEN: Soft, Nontender, Nondistended; Bowel sounds present  EXTREMITIES: no cyanosis; no edema; no calf tenderness  SKIN: warm and dry; no rash  NERVOUS SYSTEM:  Awake and alert; Oriented  to place, person and time ; no new deficits            RADIOLOGY & ADDITIONAL TESTS:      Consultant(s) Notes Reviewed:   YES    Care Discussed with Consultants :     Plan of care was discussed with patient and /or primary care giver; all questions and concerns were addressed and care was aligned with patient's wishes.

## 2018-09-20 NOTE — PROGRESS NOTE ADULT - PROBLEM SELECTOR PLAN 1
Ct scan of head show: No acute intracranial hemorrhage or mass effect 09/18/2018. Ct scan of head show: No acute intracranial hemorrhage or mass effect 09/18/2018.  Likely metabolic

## 2018-09-20 NOTE — DISCHARGE NOTE ADULT - MEDICATION SUMMARY - MEDICATIONS TO TAKE
I will START or STAY ON the medications listed below when I get home from the hospital:    CPAP  -- For night use  40% oxygen  CPAP 10  Nasal tubing only  -- Indication: For COPD (chronic obstructive pulmonary disease)    Discontinue shah catheter once patient has bowel movement, followed by trial for void   -- Indication: For Urinary retentation     finasteride 5 mg oral tablet  -- 1 tab(s) by mouth once a day  -- Indication: For BPH    aspirin 81 mg oral delayed release tablet  -- 1 tab(s) by mouth once a day  -- Indication: For CVD    tamsulosin 0.4 mg oral capsule  -- 1 cap(s) by mouth once a day (at bedtime)  -- Indication: For BPH     apixaban 5 mg oral tablet  -- 1 tab(s) by mouth every 12 hours  -- Indication: For Afib    escitalopram 10 mg oral tablet  -- 1 tab(s) by mouth once a day  -- Indication: For Depression     Livalo 2 mg oral tablet  -- 1 tab(s) by mouth once a day  -- Indication: For HLD     ipratropium-albuterol 0.5 mg-2.5 mg/3 mLinhalation solution  -- 3 milliliter(s) inhaled every 6 hours  -- Indication: For COPD (chronic obstructive pulmonary disease)    bisacodyl 5 mg oral delayed release tablet  -- 1 tab(s) by mouth once a day (at bedtime)  -- Indication: For COnstipation     lactulose 10 g/15 mL oral syrup  -- 15 milliliter(s) by mouth once a day (at bedtime)   -- Indication: For COnstiaption     MiraLax oral powder for reconstitution  -- 17 gram(s) by mouth once a day   -- Dilute this medication with liquid before administration.  It is very important that you take or use this exactly as directed.  Do not skip doses or discontinue unless directed by your doctor.    -- Indication: For COnstipation     docusate sodium 100 mg oral capsule  -- 1 cap(s) by mouth 2 times a day  -- Indication: For COnstipation     Vitamin D3 1000 intl units oral capsule  -- 0.5 cap(s) by mouth once a day  -- Indication: For supplement     Vitamin B12 Methylcobalamin 5000 mcg sublingual tablet  -- 0.5 tab(s) under tongue once a day  -- Indication: For supplement

## 2018-09-20 NOTE — PROGRESS NOTE ADULT - PROBLEM SELECTOR PROBLEM 1
Altered mental status
Delirium
Hematuria
Delirium
Delirium

## 2018-09-20 NOTE — PROGRESS NOTE ADULT - PROBLEM SELECTOR PLAN 6
cont meds
C/w Escitalopram  haldol PRN   psych consulted
Bronchodilators  oxygen supp  trial of steroids.
Bronchodilators  oxygen supp  trial of steroids
Bronchodilators  oxygen supp  trial of steroids
C/w Escitalopram
C/w Escitalopram  haldol PRN   psych eval noted
C/w Escitalopram  haldol PRN   psych eval noted
History of Pulmonary fibrosis  Currently stable  C/w bronchodilators  echo EF 67%

## 2018-09-20 NOTE — PROGRESS NOTE ADULT - PROBLEM SELECTOR PROBLEM 3
Afib
BRIEN (obstructive sleep apnea)

## 2018-09-20 NOTE — PROGRESS NOTE ADULT - PROBLEM SELECTOR PLAN 3
EKG showed Afib 80 bpm  Currently rate controlled off meds  Anticoagulation with Eliquis 5 BID  Previous Echo in Jan 2018 showed Normal EF  Consulted Dr Davis  echo wnl  Hold Eliquis due to Hematuria

## 2018-09-20 NOTE — DISCHARGE NOTE ADULT - PATIENT PORTAL LINK FT
You can access the [facility] Patient Portal, offered by [facility], by registering with the following website: [email or web address]

## 2018-09-20 NOTE — PROGRESS NOTE ADULT - PROBLEM SELECTOR PROBLEM 7
Need for prophylactic measure
Mood disorder
Need for prophylactic measure

## 2018-09-20 NOTE — DISCHARGE NOTE ADULT - HOSPITAL COURSE
78 Male from home, walks with walker having PMH of Afib (Eliquis), COPD, BRIEN (Cpap at night), CHFpEF, GERD, HLD, Pulmonary fibrosis, RBBB, Chronic B/L LE lymphedema, dementia, recurrent UTIs and multiple falls came in with acute change in mental status. Patient is confused, most of the history is by wife. Pt was in [place] 2 weeks ago where he fell and was admitted to hospital where he was started on antibiotics for UTI. After returning to [place], he again had a fall last week and was admitted in [facility] for a day where CT head was normal. Overnight, pt had acute worsening delirium when started having visual hallucinations. Pt denies fever, chest pain, shortness of breath, abdominal discomfort, weight loss, sick contacts, any new medications. Wife has also noticed generalized weakness and painful urination.       PSH: SBO and appendectomy GOC: Spoke to wife, Full code  ED Course: Hemodynamically stable. Labs showed mild leukocytosis and elevated Cr of 1.8 (Baseline 0.6). UA and CT head was negative. EKG showed Afib @ 80 bpm with T wave inversions in lateral leads. Pt was given 3 Liter bolus and Rocephin. (12 Sep 2018 12:33)    Problem/Plan - 1:  ·  Problem: Hematuria.  Plan: Likely traumatic from shah placement  and patient was on Eliquis   currently resolved   will re start on eliquis   Urology recommended Dc shah after seven days of Flomax       Problem/Plan - 2:  ·  Problem: Delirium.  Plan: Acute worsening of mental status overnight with underlying dementia  CT head: Normal  No focal deficit and Normal pupil reflex, less likely stroke   Pt is recurrent history of UTIs in past however UA here is negative  Mild Leukocytosis  Started Rocephin and wait until Urine Cx results   Blood Cx neg   ammonia wnl  no sign of infectious etiology   UA positive, urine cx negative    repeat CT head was neg  improved.      Problem/Plan - 3:  ·  Problem: Afib.  Plan: EKG showed Afib 80 bpm  Currently rate controlled off meds  Anticoagulation with Eliquis 5 BID  Previous Echo in Jan 2018 showed Normal EF  Consulted Dr Davis  echo wnl       Problem/Plan - 4:  ·  Problem: BRIEN (obstructive sleep apnea).  Plan: Pt used Cpap at night  Recently his machine broke down  Lack of sleep can lead to delirium  Will need new Cpap machine at discharge.      Problem/Plan - 5:  ·  Problem: COLUMBA (acute kidney injury).  Plan: Baseline Cr 0.6  Currently elevated to 1.8  Likely pre-renal due to poor PO intake  Pt received 3 liters Bolus in ED  Holding further hydration due to LE edema   US renal B/l hydronephrosis   urology consulted   started on Flomax and Proscar   likely post obstructed 2/2 BPH   resolved   c/w shah and Proscar and flomax.      Problem/Plan - 6:  Problem: COPD (chronic obstructive pulmonary disease). Plan: History of Pulmonary fibrosis  Currently stable  C/w bronchodilators  echo EF 67%.     Problem/Plan - 7:  ·  Problem: Mood disorder.  Plan: C/w Escitalopram  haldol PRN   psych eval noted.

## 2018-09-20 NOTE — PROGRESS NOTE ADULT - SUBJECTIVE AND OBJECTIVE BOX
CHIEF COMPLAINT:Patient is a 78y old  Male who presents with a chief complaint of Altered mental status . Pt appears comfortable,awake and alert.    	  REVIEW OF SYSTEMS:  CONSTITUTIONAL: No fever, weight loss, or fatigue  EYES: No eye pain, visual disturbances, or discharge  ENT:  No difficulty hearing, tinnitus, vertigo; No sinus or throat pain  NECK: No pain or stiffness  RESPIRATORY: No cough, wheezing, chills or hemoptysis; No Shortness of Breath  CARDIOVASCULAR: No chest pain, palpitations, passing out, dizziness, or leg swelling  GASTROINTESTINAL: No abdominal or epigastric pain. No nausea, vomiting, or hematemesis; No diarrhea or constipation. No melena or hematochezia.  GENITOURINARY: No dysuria, frequency, hematuria, or incontinence  NEUROLOGICAL: No headaches, memory loss, loss of strength, numbness, or tremors  SKIN: No itching, burning, rashes, or lesions   LYMPH Nodes: No enlarged glands  ENDOCRINE: No heat or cold intolerance; No hair loss  MUSCULOSKELETAL: No joint pain or swelling; No muscle, back, or extremity pain  PSYCHIATRIC: No depression, anxiety, mood swings, or difficulty sleeping  HEME/LYMPH: No easy bruising, or bleeding gums  ALLERGY AND IMMUNOLOGIC: No hives or eczema	      PHYSICAL EXAM:  T(C): 36.8 (09-20-18 @ 05:11), Max: 36.8 (09-20-18 @ 05:11)  HR: 77 (09-20-18 @ 05:11) (70 - 80)  BP: 133/72 (09-20-18 @ 05:11) (113/52 - 147/80)  RR: 17 (09-20-18 @ 05:11) (16 - 17)  SpO2: 98% (09-20-18 @ 05:11) (95% - 99%)  Wt(kg): --  I&O's Summary    19 Sep 2018 07:01  -  20 Sep 2018 07:00  --------------------------------------------------------  IN: 0 mL / OUT: 600 mL / NET: -600 mL    20 Sep 2018 07:01  -  20 Sep 2018 11:10  --------------------------------------------------------  IN: 300 mL / OUT: 400 mL / NET: -100 mL        Appearance: Normal	  HEENT:   Normal oral mucosa, PERRL, EOMI	  Lymphatic: No lymphadenopathy  Cardiovascular: Normal S1 S2, No JVD, No murmurs, No edema  Respiratory: Lungs clear to auscultation	  Psychiatry: A & O x 3, Mood & affect appropriate  Gastrointestinal:  Soft, Non-tender, + BS	  Skin: No rashes, No ecchymoses, No cyanosis	  Neurologic: Non-focal  Extremities: Normal range of motion, No clubbing, cyanosis or edema  Vascular: Peripheral pulses palpable 2+ bilaterally    MEDICATIONS  (STANDING):  ALBUTerol/ipratropium for Nebulization 3 milliLiter(s) Nebulizer every 6 hours  apixaban 5 milliGRAM(s) Oral every 12 hours  aspirin enteric coated 81 milliGRAM(s) Oral daily  atorvastatin 20 milliGRAM(s) Oral at bedtime  bisacodyl 5 milliGRAM(s) Oral at bedtime  dextrose 5% + sodium chloride 0.9%. 1000 milliLiter(s) (50 mL/Hr) IV Continuous <Continuous>  escitalopram 10 milliGRAM(s) Oral daily  finasteride 5 milliGRAM(s) Oral daily  lactulose Syrup 10 Gram(s) Oral daily  magnesium citrate Solution 150 milliLiter(s) Oral once  tamsulosin 0.4 milliGRAM(s) Oral at bedtime        	  LABS:	 	      Lipid Profile: Cholesterol 114  LDL 47  HDL 41      HgA1c: Hemoglobin A1C, Whole Blood: 5.8 % (09-13 @ 10:48)    TSH: Thyroid Stimulating Hormone, Serum: 3.61 uU/mL (09-13 @ 07:04)

## 2018-09-20 NOTE — DISCHARGE NOTE ADULT - MEDICATION SUMMARY - MEDICATIONS TO STOP TAKING
I will STOP taking the medications listed below when I get home from the hospital:    Pradaxa  -- 150  by mouth 2 times a day    lidocaine 5% topical film  --  on skin    donepezil 10 mg oral tablet  -- 1 tab(s) by mouth once a day (at bedtime)    armodafinil 50 mg oral tablet  -- 1 tab(s) by mouth once a day

## 2018-09-20 NOTE — DISCHARGE NOTE ADULT - CARE PLAN
Principal Discharge DX:	Delirium  Goal:	To treat the underlying medical condition  Assessment and plan of treatment:	You presented to hospital with agitation likely multifactorial infectious vs metabolic Vs poor oral intake. You were seen by psychiatry and recommended to continue with Lexapro. Your symptoms resolved, follow up with your primary care in 2 weeks  Secondary Diagnosis:	Hematuria  Assessment and plan of treatment:	Likely due to trauma from shah Catheter, currently resolved. continue with shah catheter until you have bowel movement.  Secondary Diagnosis:	COLUMBA (acute kidney injury)  Assessment and plan of treatment:	Likely due to dehydration Vs post obstructive. currently resolved. Avoid NSAID;s and nephrotoxic drugs  Secondary Diagnosis:	Afib  Assessment and plan of treatment:	rate controlled Eliquis for stroke prevention  Secondary Diagnosis:	COPD (chronic obstructive pulmonary disease)  Assessment and plan of treatment:	continue with Duonebs  Secondary Diagnosis:	Obstructive sleep apnea on CPAP  Assessment and plan of treatment:	Cpap at night  Secondary Diagnosis:	Urinary retention  Assessment and plan of treatment:	US showed B/l hydronephrosis, continue with shah catheter, outpatient follow up with Dr Barraza in 1 week

## 2018-09-20 NOTE — PATIENT PROFILE ADULT. - PURPOSEFUL PROACTIVE ROUNDING
Interval History:   Tolerated HD yesterday with 3L net fluid removal.  BP better controlled today, remains off cardene gtt.  Plans for discharge today.    Review of patient's allergies indicates:   Allergen Reactions    Chloral hydrate      Other reaction(s): Hallucinations  Other reaction(s): Hives    Hydrocodone Other (See Comments)     Mental status changes     Current Facility-Administered Medications   Medication Frequency    0.9%  NaCl infusion Once    acetaminophen tablet 650 mg Q4H PRN    aspirin chewable tablet 81 mg Daily    cinacalcet tablet 30 mg Daily with breakfast    cloNIDine 0.3 mg/24 hr td ptwk 1 patch Q7 Days    famotidine tablet 20 mg Daily    heparin (porcine) injection 5,000 Units Q8H    hydrALAZINE tablet 100 mg Q12H    hydrOXYzine HCl tablet 25 mg TID PRN    irbesartan tablet 300 mg Daily    labetalol tablet 500 mg Q12H    NIFEdipine 24 hr tablet 120 mg Daily    promethazine tablet 25 mg Q6H PRN    sodium chloride 0.9% flush 3 mL PRN    tacrolimus capsule 6 mg BID       Objective:     Vital Signs (Most Recent):  Temp: 98.2 °F (36.8 °C) (09/20/18 0700)  Pulse: 81 (09/20/18 1200)  Resp: (!) 28 (09/20/18 1200)  BP: (!) 139/91 (09/20/18 1200)  SpO2: 100 % (09/20/18 1200)  O2 Device (Oxygen Therapy): room air (09/20/18 1100) Vital Signs (24h Range):  Temp:  [97.9 °F (36.6 °C)-98.2 °F (36.8 °C)] 98.2 °F (36.8 °C)  Pulse:  [] 81  Resp:  [6-36] 28  SpO2:  [92 %-100 %] 100 %  BP: (127-165)/() 139/91     Weight: 56.2 kg (123 lb 14.4 oz) (09/18/18 1828)  Body mass index is 20.62 kg/m².  Body surface area is 1.61 meters squared.    I/O last 3 completed shifts:  In: 1188 [P.O.:480; I.V.:108; Other:600]  Out: 3600 [Other:3600]    Physical Exam   Constitutional: She is oriented to person, place, and time. She appears well-developed and well-nourished. No distress.   HENT:   Head: Normocephalic and atraumatic.   Right Ear: External ear normal.   Left Ear: External ear normal.    Eyes: Conjunctivae and EOM are normal. Right eye exhibits no discharge. Left eye exhibits no discharge.   Neck: Normal range of motion. Neck supple. JVD present.   Cardiovascular: Normal rate and regular rhythm. Exam reveals no gallop and no friction rub.   No murmur heard.  Pulmonary/Chest: Effort normal and breath sounds normal. No respiratory distress. She has no wheezes. She has no rales.   Abdominal: Soft. Bowel sounds are normal. She exhibits no distension. There is no tenderness.   Musculoskeletal: Normal range of motion. She exhibits no edema or deformity.   Neurological: She is alert and oriented to person, place, and time.   Skin: Skin is warm and dry. She is not diaphoretic.       Significant Labs:  CBC:   Recent Labs   Lab  09/20/18   0305   WBC  3.47*   RBC  2.83*   HGB  7.8*   HCT  25.0*   PLT  58*   MCV  88   MCH  27.6   MCHC  31.2*     CMP:   Recent Labs   Lab  09/20/18   0305   GLU  85   CALCIUM  7.8*   ALBUMIN  2.3*   PROT  6.4   NA  140   K  3.7   CO2  25   CL  105   BUN  26*   CREATININE  5.8*   ALKPHOS  545*   ALT  31   AST  35   BILITOT  0.6         Patient

## 2018-09-20 NOTE — CHART NOTE - NSCHARTNOTEFT_GEN_A_CORE
Keep Shah for minimum of 7 days after being on Flomax  Can attempt trial of void after 7 days of flomax  Continue on flomax  Can d/c with shah  F/u as o/p with Dr. brown

## 2018-09-20 NOTE — PROGRESS NOTE ADULT - PROBLEM SELECTOR PROBLEM 5
COPD (chronic obstructive pulmonary disease)
COPD (chronic obstructive pulmonary disease)
BRIEN (obstructive sleep apnea)
COLUMBA (acute kidney injury)
COPD (chronic obstructive pulmonary disease)

## 2018-09-20 NOTE — DISCHARGE NOTE ADULT - SECONDARY DIAGNOSIS.
Hematuria COLUMBA (acute kidney injury) Afib COPD (chronic obstructive pulmonary disease) Obstructive sleep apnea on CPAP Urinary retention

## 2018-09-20 NOTE — PROGRESS NOTE ADULT - PROBLEM SELECTOR PROBLEM 2
Pulmonary fibrosis
Afib
Delirium
Pulmonary fibrosis
Pulmonary fibrosis
Afib
Afib

## 2018-09-20 NOTE — PROGRESS NOTE ADULT - REASON FOR ADMISSION
Altered mental status

## 2018-10-15 RX ORDER — LEVALBUTEROL HYDROCHLORIDE 0.63 MG/3ML
0.63 SOLUTION RESPIRATORY (INHALATION)
Qty: 120 | Refills: 3 | Status: ACTIVE | COMMUNITY
Start: 2017-03-08 | End: 1900-01-01

## 2018-10-16 ENCOUNTER — INPATIENT (INPATIENT)
Facility: HOSPITAL | Age: 78
LOS: 8 days | Discharge: NURSING, REHAB OR LONG-TERM CARE | DRG: 689 | End: 2018-10-25
Attending: HOSPITALIST | Admitting: HOSPITALIST
Payer: MEDICARE

## 2018-10-16 VITALS
SYSTOLIC BLOOD PRESSURE: 142 MMHG | RESPIRATION RATE: 16 BRPM | DIASTOLIC BLOOD PRESSURE: 88 MMHG | OXYGEN SATURATION: 96 % | HEART RATE: 76 BPM

## 2018-10-16 DIAGNOSIS — Z90.49 ACQUIRED ABSENCE OF OTHER SPECIFIED PARTS OF DIGESTIVE TRACT: Chronic | ICD-10-CM

## 2018-10-16 DIAGNOSIS — R41.0 DISORIENTATION, UNSPECIFIED: ICD-10-CM

## 2018-10-16 DIAGNOSIS — G93.41 METABOLIC ENCEPHALOPATHY: ICD-10-CM

## 2018-10-16 DIAGNOSIS — I48.1 PERSISTENT ATRIAL FIBRILLATION: ICD-10-CM

## 2018-10-16 DIAGNOSIS — Z87.19 PERSONAL HISTORY OF OTHER DISEASES OF THE DIGESTIVE SYSTEM: Chronic | ICD-10-CM

## 2018-10-16 DIAGNOSIS — G47.33 OBSTRUCTIVE SLEEP APNEA (ADULT) (PEDIATRIC): ICD-10-CM

## 2018-10-16 DIAGNOSIS — J44.9 CHRONIC OBSTRUCTIVE PULMONARY DISEASE, UNSPECIFIED: ICD-10-CM

## 2018-10-16 DIAGNOSIS — F03.90 UNSPECIFIED DEMENTIA WITHOUT BEHAVIORAL DISTURBANCE: ICD-10-CM

## 2018-10-16 DIAGNOSIS — Z29.9 ENCOUNTER FOR PROPHYLACTIC MEASURES, UNSPECIFIED: ICD-10-CM

## 2018-10-16 LAB
ALBUMIN SERPL ELPH-MCNC: 3.1 G/DL — LOW (ref 3.3–5)
ALP SERPL-CCNC: 85 U/L — SIGNIFICANT CHANGE UP (ref 40–120)
ALT FLD-CCNC: 13 U/L — SIGNIFICANT CHANGE UP (ref 10–45)
ANION GAP SERPL CALC-SCNC: 13 MMOL/L — SIGNIFICANT CHANGE UP (ref 5–17)
APPEARANCE UR: CLEAR — SIGNIFICANT CHANGE UP
APTT BLD: 37.6 SEC — HIGH (ref 27.5–37.4)
AST SERPL-CCNC: 39 U/L — SIGNIFICANT CHANGE UP (ref 10–40)
BACTERIA # UR AUTO: NEGATIVE — SIGNIFICANT CHANGE UP
BASOPHILS # BLD AUTO: 0.1 K/UL — SIGNIFICANT CHANGE UP (ref 0–0.2)
BASOPHILS NFR BLD AUTO: 0.7 % — SIGNIFICANT CHANGE UP (ref 0–2)
BILIRUB SERPL-MCNC: 0.8 MG/DL — SIGNIFICANT CHANGE UP (ref 0.2–1.2)
BILIRUB UR-MCNC: NEGATIVE — SIGNIFICANT CHANGE UP
BUN SERPL-MCNC: 17 MG/DL — SIGNIFICANT CHANGE UP (ref 7–23)
CALCIUM SERPL-MCNC: 8.7 MG/DL — SIGNIFICANT CHANGE UP (ref 8.4–10.5)
CHLORIDE SERPL-SCNC: 99 MMOL/L — SIGNIFICANT CHANGE UP (ref 96–108)
CO2 SERPL-SCNC: 25 MMOL/L — SIGNIFICANT CHANGE UP (ref 22–31)
COLOR SPEC: YELLOW — SIGNIFICANT CHANGE UP
CREAT SERPL-MCNC: 1.07 MG/DL — SIGNIFICANT CHANGE UP (ref 0.5–1.3)
DIFF PNL FLD: NEGATIVE — SIGNIFICANT CHANGE UP
EOSINOPHIL # BLD AUTO: 0.3 K/UL — SIGNIFICANT CHANGE UP (ref 0–0.5)
EOSINOPHIL NFR BLD AUTO: 2.4 % — SIGNIFICANT CHANGE UP (ref 0–6)
EPI CELLS # UR: 0 /HPF — SIGNIFICANT CHANGE UP
GLUCOSE SERPL-MCNC: 91 MG/DL — SIGNIFICANT CHANGE UP (ref 70–99)
GLUCOSE UR QL: NEGATIVE — SIGNIFICANT CHANGE UP
HCT VFR BLD CALC: 38.5 % — LOW (ref 39–50)
HGB BLD-MCNC: 13 G/DL — SIGNIFICANT CHANGE UP (ref 13–17)
HYALINE CASTS # UR AUTO: 0 /LPF — SIGNIFICANT CHANGE UP (ref 0–2)
INR BLD: 1.8 RATIO — HIGH (ref 0.88–1.16)
KETONES UR-MCNC: NEGATIVE — SIGNIFICANT CHANGE UP
LEUKOCYTE ESTERASE UR-ACNC: NEGATIVE — SIGNIFICANT CHANGE UP
LYMPHOCYTES # BLD AUTO: 29 % — SIGNIFICANT CHANGE UP (ref 13–44)
LYMPHOCYTES # BLD AUTO: 3.1 K/UL — SIGNIFICANT CHANGE UP (ref 1–3.3)
MCHC RBC-ENTMCNC: 31.6 PG — SIGNIFICANT CHANGE UP (ref 27–34)
MCHC RBC-ENTMCNC: 33.9 GM/DL — SIGNIFICANT CHANGE UP (ref 32–36)
MCV RBC AUTO: 93.1 FL — SIGNIFICANT CHANGE UP (ref 80–100)
MONOCYTES # BLD AUTO: 0.8 K/UL — SIGNIFICANT CHANGE UP (ref 0–0.9)
MONOCYTES NFR BLD AUTO: 7.5 % — SIGNIFICANT CHANGE UP (ref 2–14)
NEUTROPHILS # BLD AUTO: 6.4 K/UL — SIGNIFICANT CHANGE UP (ref 1.8–7.4)
NEUTROPHILS NFR BLD AUTO: 60.4 % — SIGNIFICANT CHANGE UP (ref 43–77)
NITRITE UR-MCNC: NEGATIVE — SIGNIFICANT CHANGE UP
PH UR: 6 — SIGNIFICANT CHANGE UP (ref 5–8)
PLATELET # BLD AUTO: 179 K/UL — SIGNIFICANT CHANGE UP (ref 150–400)
POTASSIUM SERPL-MCNC: 5 MMOL/L — SIGNIFICANT CHANGE UP (ref 3.5–5.3)
POTASSIUM SERPL-SCNC: 5 MMOL/L — SIGNIFICANT CHANGE UP (ref 3.5–5.3)
PROT SERPL-MCNC: 7.4 G/DL — SIGNIFICANT CHANGE UP (ref 6–8.3)
PROT UR-MCNC: ABNORMAL
PROTHROM AB SERPL-ACNC: 19.9 SEC — HIGH (ref 9.8–12.7)
RBC # BLD: 4.13 M/UL — LOW (ref 4.2–5.8)
RBC # FLD: 13.2 % — SIGNIFICANT CHANGE UP (ref 10.3–14.5)
RBC CASTS # UR COMP ASSIST: 6 /HPF — HIGH (ref 0–4)
SODIUM SERPL-SCNC: 137 MMOL/L — SIGNIFICANT CHANGE UP (ref 135–145)
SP GR SPEC: 1.02 — SIGNIFICANT CHANGE UP (ref 1.01–1.02)
UROBILINOGEN FLD QL: NEGATIVE — SIGNIFICANT CHANGE UP
WBC # BLD: 10.7 K/UL — HIGH (ref 3.8–10.5)
WBC # FLD AUTO: 10.7 K/UL — HIGH (ref 3.8–10.5)
WBC UR QL: 1 /HPF — SIGNIFICANT CHANGE UP (ref 0–5)

## 2018-10-16 PROCEDURE — 70450 CT HEAD/BRAIN W/O DYE: CPT | Mod: 26

## 2018-10-16 PROCEDURE — 93010 ELECTROCARDIOGRAM REPORT: CPT

## 2018-10-16 PROCEDURE — 71045 X-RAY EXAM CHEST 1 VIEW: CPT | Mod: 26

## 2018-10-16 PROCEDURE — 99223 1ST HOSP IP/OBS HIGH 75: CPT | Mod: GC

## 2018-10-16 PROCEDURE — 99285 EMERGENCY DEPT VISIT HI MDM: CPT | Mod: GC,25

## 2018-10-16 NOTE — H&P ADULT - ASSESSMENT
78M with PMH of Afib on Eliquis, COPD, BRIEN, dementia, HFrEF, GERD, HLD, pulmonary fibrosis, recurrent UTIs, and recent admission 9/12/-9/ for metabolic encephalopathy 2/2 UTI, now presenting with confusion.

## 2018-10-16 NOTE — H&P ADULT - PROBLEM SELECTOR PLAN 1
Unclear etiology at this time; does not have evidence to suggest acute infection as patient is afebrile with normal UA, normal CXR and no localizing exam findings. Isolated leukocytosis may be related to dehydration. CTH without acute pathology. Confusion and hallucinations may be in setting of delirium as patient recently returned home from rehab.   - Neurology consulted, recommending brain MRI to further evaluate for pathology  - F/u urine cultures  - Check vitamin B12, folate, TSH, syphilis screen  - Frequent reorientation, reduce unnecessary nighttime awakening  - Enhanced supervision  - Fall precautions Unclear etiology at this time; does not have evidence to suggest acute infection as patient is afebrile with normal UA, normal CXR and no localizing exam findings. Isolated leukocytosis may be related to dehydration however, will check ESR, CRP, procalcitonin to rule out infectious causes. CTH without acute pathology. Confusion and hallucinations may also be in setting of delirium as patient recently returned home from rehab vs. progression of his dementia.   - Neurology consulted, recommending brain MRI to further evaluate for pathology  - F/u urine cultures  - Check vitamin B12, folate, TSH, syphilis screen  - Check ESR, CRP, procalcitonin  - Frequent reorientation, reduce unnecessary nighttime awakening  - Enhanced supervision  - Fall precautions Unclear etiology at this time; does not have evidence to suggest acute infection as patient is afebrile with normal UA, normal CXR and no localizing exam findings. Isolated leukocytosis may be related to dehydration however, will check ESR, CRP, procalcitonin to rule out infectious causes. CTH without acute pathology. Confusion and hallucinations may also be in setting of delirium as patient recently returned home from rehab vs. progression of his dementia.   - Neurology consulted, recommending brain MRI to further evaluate for pathology  - F/u urine cultures  - Check vitamin B12, folate, TSH, syphilis screen  - Check ESR, CRP, procalcitonin  - Frequent reorientation, reduce unnecessary nighttime awakening  - Enhanced supervision  - Fall precautions  - hold escitalopram for now Unclear etiology at this time; does not have evidence to suggest acute infection as patient is afebrile with normal UA, normal CXR and no localizing exam findings. Isolated leukocytosis may be related to dehydration however, will check ESR, CRP, procalcitonin to rule out infectious causes. CTH without acute pathology. Confusion and hallucinations may also be in setting of delirium as patient recently returned home from rehab vs. progression of his dementia.   - Neurology consulted, recommending brain MRI to further evaluate for pathology  - F/u urine cultures  - Check vitamin B12, folate, TSH, syphilis screen  - Check ESR, CRP, procalcitonin  - given AST 2x ALT, will check alcohol level  - check urine tox  - Frequent reorientation, reduce unnecessary nighttime awakening  - Enhanced supervision  - Fall precautions  - hold escitalopram for now Unclear etiology at this time; does not have evidence to suggest acute infection as patient is afebrile with normal UA, normal CXR and no localizing exam findings. Isolated leukocytosis may be related to dehydration however, will check ESR, CRP, procalcitonin to rule out infectious causes. CTH without acute pathology. Confusion and hallucinations may also be in setting of delirium as patient recently returned home from rehab vs. progression of his dementia.   - Neurology consulted, recommending brain MRI to further evaluate for pathology  - F/u urine cultures  - Check vitamin B12, folate, TSH, syphilis screen  - Check ESR, CRP, procalcitonin  - Given AST 2x ALT, will check alcohol level  - Check urine tox  - Frequent reorientation, reduce unnecessary nighttime awakening  - Enhanced supervision  - Fall precautions  - Hold escitalopram for now  - If febrile, obtain BCx

## 2018-10-16 NOTE — H&P ADULT - NSHPLABSRESULTS_GEN_ALL_CORE
CBC Full  -  ( 16 Oct 2018 18:07 )  WBC Count : 10.7 K/uL  Hemoglobin : 13.0 g/dL  Hematocrit : 38.5 %  Platelet Count - Automated : 179 K/uL  Mean Cell Volume : 93.1 fl  Mean Cell Hemoglobin : 31.6 pg  Mean Cell Hemoglobin Concentration : 33.9 gm/dL  Auto Neutrophil # : 6.4 K/uL  Auto Lymphocyte # : 3.1 K/uL  Auto Monocyte # : 0.8 K/uL  Auto Eosinophil # : 0.3 K/uL  Auto Basophil # : 0.1 K/uL  Auto Neutrophil % : 60.4 %  Auto Lymphocyte % : 29.0 %  Auto Monocyte % : 7.5 %  Auto Eosinophil % : 2.4 %  Auto Basophil % : 0.7 %    10    137  |  99  |  17  ----------------------------<  91  5.0   |  25  |  1.07    Ca    8.7      16 Oct 2018 18:07    TPro  7.4  /  Alb  3.1<L>  /  TBili  0.8  /  DBili  x   /  AST  39  /  ALT  13  /  AlkPhos  85  10-16    PT/INR - ( 16 Oct 2018 18:07 )   PT: 19.9 sec;   INR: 1.80 ratio    PTT - ( 16 Oct 2018 18:07 )  PTT:37.6 sec    Urinalysis Basic - ( 16 Oct 2018 18:07 )  Color: Yellow / Appearance: Clear / S.021 / pH: x  Gluc: x / Ketone: Negative  / Bili: Negative / Urobili: Negative   Blood: x / Protein: Trace / Nitrite: Negative   Leuk Esterase: Negative / RBC: 6 /hpf / WBC 1 /hpf   Sq Epi: x / Non Sq Epi: 0 /hpf / Bacteria: Negative    CT Head No Contrast (10.16.18) -  FINDINGS:    VENTRICLES AND SULCI:  Age-appropriate involutional changes.  INTRA-AXIAL:  Microvascular ischemic changes involving the   periventricular and subcortical white matter, unchanged.  EXTRA-AXIAL:  No mass or collection is seen.  VISUALIZED SINUSES:  Clear.  VISUALIZED MASTOIDS:  Clear.  CALVARIUM:  Normal.  MISCELLANEOUS:  None.  IMPRESSION:  No acute intracranial hemorrhage or mass effect.   Microvascular ischemic changes, unchanged from the prior exam. Brain MRI   can be performed as clinically indicated. labs reviewed    CBC Full  -  ( 16 Oct 2018 18:07 )  WBC Count : 10.7 K/uL  Hemoglobin : 13.0 g/dL  Hematocrit : 38.5 %  Platelet Count - Automated : 179 K/uL  Mean Cell Volume : 93.1 fl  Mean Cell Hemoglobin : 31.6 pg  Mean Cell Hemoglobin Concentration : 33.9 gm/dL  Auto Neutrophil # : 6.4 K/uL  Auto Lymphocyte # : 3.1 K/uL  Auto Monocyte # : 0.8 K/uL  Auto Eosinophil # : 0.3 K/uL  Auto Basophil # : 0.1 K/uL  Auto Neutrophil % : 60.4 %  Auto Lymphocyte % : 29.0 %  Auto Monocyte % : 7.5 %  Auto Eosinophil % : 2.4 %  Auto Basophil % : 0.7 %    10    137  |  99  |  17  ----------------------------<  91  5.0   |  25  |  1.07    Ca    8.7      16 Oct 2018 18:07    TPro  7.4  /  Alb  3.1<L>  /  TBili  0.8  /  DBili  x   /  AST  39  /  ALT  13  /  AlkPhos  85  10-16    PT/INR - ( 16 Oct 2018 18:07 )   PT: 19.9 sec;   INR: 1.80 ratio    PTT - ( 16 Oct 2018 18:07 )  PTT:37.6 sec    Urinalysis Basic - ( 16 Oct 2018 18:07 )  Color: Yellow / Appearance: Clear / S.021 / pH: x  Gluc: x / Ketone: Negative  / Bili: Negative / Urobili: Negative   Blood: x / Protein: Trace / Nitrite: Negative   Leuk Esterase: Negative / RBC: 6 /hpf / WBC 1 /hpf   Sq Epi: x / Non Sq Epi: 0 /hpf / Bacteria: Negative    Imaging reviewed:  CT Head No Contrast (10.16.18) -  FINDINGS:    VENTRICLES AND SULCI:  Age-appropriate involutional changes.  INTRA-AXIAL:  Microvascular ischemic changes involving the   periventricular and subcortical white matter, unchanged.  EXTRA-AXIAL:  No mass or collection is seen.  VISUALIZED SINUSES:  Clear.  VISUALIZED MASTOIDS:  Clear.  CALVARIUM:  Normal.  MISCELLANEOUS:  None.  IMPRESSION:  No acute intracranial hemorrhage or mass effect.   Microvascular ischemic changes, unchanged from the prior exam. Brain MRI   can be performed as clinically indicated.    EKG reviewed: afib Labs reviewed:  CBC Full  -  ( 16 Oct 2018 18:07 )  WBC Count : 10.7 K/uL  Hemoglobin : 13.0 g/dL  Hematocrit : 38.5 %  Platelet Count - Automated : 179 K/uL  Mean Cell Volume : 93.1 fl  Mean Cell Hemoglobin : 31.6 pg  Mean Cell Hemoglobin Concentration : 33.9 gm/dL  Auto Neutrophil # : 6.4 K/uL  Auto Lymphocyte # : 3.1 K/uL  Auto Monocyte # : 0.8 K/uL  Auto Eosinophil # : 0.3 K/uL  Auto Basophil # : 0.1 K/uL  Auto Neutrophil % : 60.4 %  Auto Lymphocyte % : 29.0 %  Auto Monocyte % : 7.5 %  Auto Eosinophil % : 2.4 %  Auto Basophil % : 0.7 %    10    137  |  99  |  17  ----------------------------<  91  5.0   |  25  |  1.07    Ca    8.7      16 Oct 2018 18:07    TPro  7.4  /  Alb  3.1<L>  /  TBili  0.8  /  DBili  x   /  AST  39  /  ALT  13  /  AlkPhos  85  10-16    PT/INR - ( 16 Oct 2018 18:07 )   PT: 19.9 sec;   INR: 1.80 ratio    PTT - ( 16 Oct 2018 18:07 )  PTT:37.6 sec    Urinalysis Basic - ( 16 Oct 2018 18:07 )  Color: Yellow / Appearance: Clear / S.021 / pH: x  Gluc: x / Ketone: Negative  / Bili: Negative / Urobili: Negative   Blood: x / Protein: Trace / Nitrite: Negative   Leuk Esterase: Negative / RBC: 6 /hpf / WBC 1 /hpf   Sq Epi: x / Non Sq Epi: 0 /hpf / Bacteria: Negative    Imaging reviewed:  CT Head No Contrast (10.16.18) -  FINDINGS:    VENTRICLES AND SULCI:  Age-appropriate involutional changes.  INTRA-AXIAL:  Microvascular ischemic changes involving the   periventricular and subcortical white matter, unchanged.  EXTRA-AXIAL:  No mass or collection is seen.  VISUALIZED SINUSES:  Clear.  VISUALIZED MASTOIDS:  Clear.  CALVARIUM:  Normal.  MISCELLANEOUS:  None.  IMPRESSION:  No acute intracranial hemorrhage or mass effect.   Microvascular ischemic changes, unchanged from the prior exam. Brain MRI   can be performed as clinically indicated.    EKG reviewed: Afib

## 2018-10-16 NOTE — ED ADULT NURSE NOTE - OBJECTIVE STATEMENT
77 yo presents to the ED from home by EMS with wife at bedside. pt has dementia, at baseline pt walked with walker. 4 weeks ago pt was diagnosed with UTI, treated and sent to rehab. since that time pt has been disoriented. wife reports pt was sleeping for 24 hours and then being disoriented upon waking up. pt recently DC from rehab yesterday. wife reports pt was screaming at the air. pt is A&Ox1, disoriented to situation, time and place. pt denies pain. vital signs stable. afebrile upon assessment rectally. EKG done at bedside. pt denies CP, SOB. straight cath performed for UA and UC as per MD orders. 20G R forearm.

## 2018-10-16 NOTE — ED PROVIDER NOTE - OBJECTIVE STATEMENT
78M PMH of Afib (Eliquis), COPD, BRIEN (Cpap at night), CHFpEF, GERD, HLD, Pulmonary fibrosis, RBBB, Chronic B/L LE lymphedema, dementia, recurrent UTIs presenting with worsening dementia, confusion, hallucinations past few days. Family endorses frequent UTI in past which exacerbates confusion.  Pt noted to have slurred speech by family with workup of MRI/CT at OSH negative. 78M PMH of Afib (Eliquis), COPD, BRIEN (Cpap at night), CHFpEF, GERD, HLD, Pulmonary fibrosis, RBBB, Chronic B/L LE lymphedema, dementia, recurrent UTIs presenting with worsening dementia, confusion, hallucinations past few days. Family endorses frequent UTI in past which exacerbates confusion. Pt noted to have slurred speech by family with workup of MRI/CT at OSH negative 1 week ago.

## 2018-10-16 NOTE — ED PROVIDER NOTE - MEDICAL DECISION MAKING DETAILS
Attending Kenji Cerna DO: 77 yo male hx of Dementia, presents with worsening confusion, visual hallucinations for past 2 days. Pt recently discharged from [facility] for UTI with delerium, improved and sent to rehab but discharged yesterday from rehab. Pt also with slurred speech x 1 week, had neg MRI and CT at [facility]. Spoke with his neurologist and told to come to [facility] for evaluation. No trauma. No cough, no fevers. PE: well appearing, alert but confusion, no acute distress, lungs with diffuse rhonchi, no wheezing or rales, RRR, ab soft nt/nd, no rashes, speech slurred, no focal neuro deficits. A/P: ?delirium vs worsening dementia. Will eval for organic cause, labs, ekg, ct head, ua/culture, likely admission to hospital for further management. Attending Kenji Cerna DO: 77 yo male hx of Dementia, presents with worsening confusion, visual hallucinations for past 2 days. Pt recently discharged from [facility] for UTI with delirium, improved and sent to rehab but discharged yesterday from rehab. Pt also with slurred speech x 1 week, had neg MRI and CT at [facility]. Spoke with his neurologist and told to come to [facility] for evaluation. No trauma. No cough, no fevers. PE: well appearing, alert but confusion, no acute distress, lungs with diffuse rhonchi, no wheezing or rales, ab soft nt/nd, no rashes, speech slurred, no focal neuro deficits. A/P: ?delirium vs worsening dementia. Will eval for organic cause, labs, ekg, ct head, ua/culture, likely admission to hospital for further management.

## 2018-10-16 NOTE — CONSULT NOTE ADULT - ASSESSMENT
HPI: 78 year old gentleman pmhx afib on eliquis, COPD, BRIEN, dementia, CHF, GERD, HLD, pulmonary fibrosis, recurrent UTIs, recent admission for UTI then went to [place] rehab, came home yesterday and reportedly brought back to hospital with increased confusion past few days, hallucinations as well as some episodic slurred speech. CT head no acute changes. Labs without any significant elevation in white count and UA negative, afebrile. Pt denies any pain, no headaches. Reportedly had MRI brain at [facility] 4 weeks ago which did not demonstrate any acute findings per wife.     A/P: Most likely metabolic delirium superimposed on baseline dementia but labs so far unimpressive apart from mildly dry Bun/Cr ratio, doubt stroke but with slurred speech will obtain MRI brain to exclude    Plan  1. Admit to medicine  2. Follow up Ucx and CXR  3. Frequent reorientation, avoid delirium provoking medications  4. Continue eliquis, neurochecks, statin  5. Enhanced supervision, Fall precautions  6. Optimize hydration  Will follow  Plan reviewed with ED and family at bedside. no

## 2018-10-16 NOTE — H&P ADULT - NSHPREVIEWOFSYSTEMS_GEN_ALL_CORE
Unable to assess due to altered mental status. CONSTITUTIONAL: unable to obtain due to clinical condition  EYES/ENT: unable to obtain due to clinical condition  NECK: unable to obtain due to clinical condition  RESPIRATORY: unable to obtain due to clinical condition  CARDIOVASCULAR: unable to obtain due to clinical condition  EXTREMITIES: unable to obtain due to clinical condition  MUSCULOSKELETAL: unable to obtain due to clinical condition  GASTROINTESTINAL: unable to obtain due to clinical condition  BACK: unable to obtain due to clinical condition  GENITOURINARY: unable to obtain due to clinical condition  NEUROLOGICAL: unable to obtain due to clinical condition  SKIN: unable to obtain due to clinical condition  PSYCH: unable to obtain due to clinical condition  All other review of systems is negative unless indicated above.

## 2018-10-16 NOTE — ED ADULT NURSE NOTE - NSIMPLEMENTINTERV_GEN_ALL_ED
Implemented All Fall Risk Interventions:  [place] to call system. Call bell, personal items and telephone within reach. Instruct patient to call for assistance. Room bathroom lighting operational. Non-slip footwear when patient is off stretcher. Physically safe environment: no spills, clutter or unnecessary equipment. Stretcher in lowest position, wheels locked, appropriate side rails in place. Provide visual cue, wrist band, yellow gown, etc. Monitor gait and stability. Monitor for mental status changes and reorient to person, place, and time. Review medications for side effects contributing to fall risk. Reinforce activity limits and safety measures with patient and family.

## 2018-10-16 NOTE — H&P ADULT - PROBLEM SELECTOR PLAN 4
- Continue Cassandrabs Found to have prolonged QTc of 506 ms on today's ECG.   - Avoid QT-prolonging agents  - Repeat QTc in AM Found to have prolonged QTc of 506 ms on today's ECG.   - Avoid QT-prolonging agents  - Repeat ECG in AM

## 2018-10-16 NOTE — H&P ADULT - NSHPPHYSICALEXAM_GEN_ALL_CORE
Vital Signs (24 Hrs):  T(C): 36.5 (10-16-18 @ 21:00), Max: 37.4 (10-16-18 @ 18:03)  HR: 72 (10-16-18 @ 21:00) (68 - 76)  BP: 123/72 (10-16-18 @ 21:00) (123/72 - 142/88)  RR: 17 (10-16-18 @ 21:00) (16 - 18)  SpO2: 98% (10-16-18 @ 21:00) (96% - 98%)    Physical Exam:   General: Thin elderly man, lying comfortably, NAD  HEENT: Head atraumatic; EOMI, PERRLA, normal sclera and conjunctiva; poor dentition  Neck: Supple, no JVD, no LAD  Chest/Lungs: Coarse bilateral rhonchi  Heart: Irregularly irregular, no murmurs  Abd: Soft, nondistended, NTTP, normoactive bowel sounds  Extremities: 2+ peripheral pulses, no edema, clubbing or cyanosis  Skin: Warm, well-perfused, no rashes or lesions  Neurologic: A&Ox1, no focal deficits

## 2018-10-16 NOTE — H&P ADULT - PROBLEM SELECTOR PLAN 8
need to confirm meds in AM; family could not be reached Will need confirmation of home medications in AM; family unable to be reached overnight.

## 2018-10-16 NOTE — CONSULT NOTE ADULT - SUBJECTIVE AND OBJECTIVE BOX
HPI:  78 year old gentleman pmhx afib on eliquis, COPD, BRIEN, dementia, CHF, GERD, HLD, pulmonary fibrosis, recurrent UTIs, recent admission for UTI then went to [place] rehab, came home yesterday and reportedly brought back to hospital with increased confusion past few days, hallucinations as well as some episodic slurred speech. CT head no acute changes. Labs without any significant elevation in white count and UA negative, afebrile. Pt denies any pain, no headaches. Reportedly had MRI brain at [facility] 4 weeks ago which did not demonstrate any acute findings per wife.     Review of systems: as per chart    Allergies  No Known Drug Allergies  zosyn (Drowsiness)  Intolerances    MEDICATIONS  (STANDING):  Home Medications:   * Patient Currently Takes Medications as of 20-Sep-2018 15:19 documented in Structured Notes  · 	Discontinue shah catheter once patient has bowel movement, followed by trial for void :   · 	MiraLax oral powder for reconstitution: 17 gram(s) orally once a day   · 	lactulose 10 g/15 mL oral syrup: 15 milliliter(s) orally once a day (at bedtime)   · 	bisacodyl 5 mg oral delayed release tablet: 1 tab(s) orally once a day (at bedtime)  · 	ipratropium-albuterol 0.5 mg-2.5 mg/3 mLinhalation solution: 3 milliliter(s) inhaled every 6 hours  · 	aspirin 81 mg oral delayed release tablet: 1 tab(s) orally once a day  · 	escitalopram 10 mg oral tablet: 1 tab(s) orally once a day  · 	apixaban 5 mg oral tablet: 1 tab(s) orally every 12 hours  · 	tamsulosin 0.4 mg oral capsule: 1 cap(s) orally once a day (at bedtime)  · 	finasteride 5 mg oral tablet: 1 tab(s) orally once a day  · 	CPAP: For night use  	40% oxygen  	CPAP 10  	Nasal tubing only  · 	docusate sodium 100 mg oral capsule: 1 cap(s) orally 2 times a day  · 	Vitamin D3 1000 intl units oral capsule: 0.5 cap(s) orally once a day  · 	Vitamin B12 Methylcobalamin 5000 mcg sublingual tablet: 0.5 tab(s) sublingual once a day  · 	Livalo 2 mg oral tablet: 1 tab(s) orally once a day    MEDICATIONS  (PRN):    Social: no toxic habits  FAMILY HISTORY:  Family history of colon cancer (Sibling)  Family history of myocardial infarction (Sibling)    Advanced care directives reviewed and in chart    Vital Signs Last 24 Hrs  T(C): 37.4 (16 Oct 2018 18:03), Max: 37.4 (16 Oct 2018 18:03)  T(F): 99.4 (16 Oct 2018 18:03), Max: 99.4 (16 Oct 2018 18:03)  HR: 68 (16 Oct 2018 18:12) (68 - 76)  BP: 133/77 (16 Oct 2018 18:12) (133/77 - 142/88)  BP(mean): --  RR: 18 (16 Oct 2018 18:12) (16 - 18)  SpO2: 97% (16 Oct 2018 18:12) (96% - 97%)	    NEUROLOGICAL EXAM:    Mental status: Awake, alert, and in no apparent distress. Oriented to jorge, 2018, president Addy. Speech mild dysarthria fluctuates, fluent, follows commands.     Cranial Nerves: Pupils were equal, round, reactive to light. Extraocular movements were intact. B BTT. Fundoscopic exam was deferred. Facial sensation was intact to light touch. There was no facial asymmetry. The palate was upgoing symmetrically and tongue was midline. Hearing acuity was intact to finger rub AU. Shoulder shrug was full bilaterally    Motor exam: Bulk and tone were normal. Strength was 5/5 in all four extremities. Fine finger movements were symmetric and normal. There was no pronator drift    Reflexes: 2+ in the bilateral upper extremities. 1 in the bilateral lower extremities. Toes were downgoing bilaterally.     Sensation: Intact to light touch, temperature    Coordination: Finger-nose-finger  without dysmetria.     Gait: deferred    < from: CT Head No Cont (10.16.18 @ 18:29) >  EXAM:  CT BRAIN                            PROCEDURE DATE:  10/16/2018            INTERPRETATION:  INDICATIONS:  Slurred speech. Altered mental status.    TECHNIQUE:  Serial axial images were obtained from the skull base to the   vertex without intravenous contrast.    COMPARISON EXAMINATION: CT head dated 2018    FINDINGS:    VENTRICLES AND SULCI:  Age-appropriate involutional changes.  INTRA-AXIAL:  Microvascular ischemic changes involving the   periventricular and subcortical white matter, unchanged.  EXTRA-AXIAL:  No mass or collection is seen.  VISUALIZED SINUSES:  Clear.  VISUALIZED MASTOIDS:  Clear.  CALVARIUM:  Normal.  MISCELLANEOUS:  None.    IMPRESSION:  No acute intracranial hemorrhage or mass effect.   Microvascular ischemic changes, unchanged from the prior exam. Brain MRI   can be performed as clinically indicated.    IDALIA PINTO M.D., RADIOLOGY RESIDENT  This document has been electronically signed.  KYLAH MUJICA M.D., ATTENDING RADIOLOGIST  Thisdocument has been electronically signed. Oct 16 2018  7:08PM       Labs:  CBC Full  -  ( 16 Oct 2018 18:07 )  WBC Count : 10.7 K/uL  Hemoglobin : 13.0 g/dL  Hematocrit : 38.5 %  Platelet Count - Automated : 179 K/uL  Mean Cell Volume : 93.1 fl  Mean Cell Hemoglobin : 31.6 pg  Mean Cell Hemoglobin Concentration : 33.9 gm/dL  Auto Neutrophil # : 6.4 K/uL  Auto Lymphocyte # : 3.1 K/uL  Auto Monocyte # : 0.8 K/uL  Auto Eosinophil # : 0.3 K/uL  Auto Basophil # : 0.1 K/uL  Auto Neutrophil % : 60.4 %  Auto Lymphocyte % : 29.0 %  Auto Monocyte % : 7.5 %  Auto Eosinophil % : 2.4 %  Auto Basophil % : 0.7 %    10-16    137  |  99  |  17  ----------------------------<  91  5.0   |  25  |  1.07    Ca    8.7      16 Oct 2018 18:07    TPro  7.4  /  Alb  3.1<L>  /  TBili  0.8  /  DBili  x   /  AST  39  /  ALT  13  /  AlkPhos  85  10-    LIVER FUNCTIONS - ( 16 Oct 2018 18:07 )  Alb: 3.1 g/dL / Pro: 7.4 g/dL / ALK PHOS: 85 U/L / ALT: 13 U/L / AST: 39 U/L / GGT: x           PT/INR - ( 16 Oct 2018 18:07 )   PT: 19.9 sec;   INR: 1.80 ratio       PTT - ( 16 Oct 2018 18:07 )  PTT:37.6 sec  Urinalysis Basic - ( 16 Oct 2018 18:07 )    Color: Yellow / Appearance: Clear / S.021 / pH: x  Gluc: x / Ketone: Negative  / Bili: Negative / Urobili: Negative   Blood: x / Protein: Trace / Nitrite: Negative   Leuk Esterase: Negative / RBC: 6 /hpf / WBC 1 /hpf   Sq Epi: x / Non Sq Epi: 0 /hpf / Bacteria: Negative

## 2018-10-16 NOTE — H&P ADULT - HISTORY OF PRESENT ILLNESS
78M with PMH of Afib on Eliquis, COPD, BRIEN, dementia, HFrEF, GERD, HLD, pulmonary fibrosis, recurrent UTIs, and recent admission 9/12/-9/ for metabolic encephalopathy 2/2 UTI, now presenting with confusion. He was at subacute rehab following discharge and returned home two days ago. Since being home, family has noticed patient is confused and appears to be having hallucinations, pointing to things in front of him that aren't there. He has otherwise been asymptomatic with no recent fever, chills, headache, abdominal pain, n/v, diarrhea, rash, dysuria or urinary frequency.     In the ED, VS: T 99.4F (rectal), HR 76, /88, RR 16, SpO2 96% on room air. Labs notable for WBC 10.7, INR 1.80, albumin 3.1, and remaining labs WNL. UA with trace protein and RBCs, negative leukocyte esterase and negative nitrites. Head CT with microvascular ischemic changes, no acute pathology. CXR with bibasilar pulmonary fibrosis, no consolidations. He was admitted to Medicine for further work-up.

## 2018-10-16 NOTE — ED CLERICAL - NS ED CLERK UNITS
Date of visit: 7/20/2017      Chief Complaint   Patient presents with   • Rash     on buttocks x 1 1/2 weeks        MA note appreciated and accepted.      HPI:  Ms Jemima Ivory presents with c/o itchy bumps on buttocks x 1-2 weeks  Not really itchy or painful, but bothersome & she finds herself itching   Doesn't believe there is any drainage, but sometimes underwear sticks to spots   Self care: some OTC medicine to help thing scab    Had boil & lymph nodes removed from R axilla - was probably in her 40s     Pt still smoking - last cigarette was today - 1/3 pack per day        Past Medical History:   Diagnosis Date   • Anxiety Disorder    • COPD (chronic obstructive pulmonary disease) (CMS/[facility]) 6/24/2012   • Depressive disorder, not elsewhere classified 6/26/2012   • Hydradenitis     R axilla, surgically removed, in her 40s   • Obstructive sleep apnea     CPAP   • PVD (peripheral vascular disease) (CMS/[facility])     carotid bulb plaque   • Right knee meniscal tear          ROS:  Constitutional: denies fever, chills or undue fatigue  CV: denies   Resp: denies           EXAM:    Visit Vitals  /86   Pulse 60   Resp 16   Wt 104.8 kg   LMP 03/24/2013   BMI 40.92 kg/m²     General: pleasant, obese 58 yo female in no acute distress  Resp: normal resp effort  Skin: several scattered open lesions with some crusting on both buttocks   R axilla with lots of scar tissue       ASSESSMENT:   1.  2.  3. Hydradenitis   Tobacco use disorder  Obesity       PLAN:   Orders Placed This Encounter   • clindamycin (CLEOCIN-T) 1 % gel   Quit smoking - quit day is today  For wt loss: -max 1 swwet drink or beer / week    -Increase veggies   -Keep food diary  For hydradenitis: -avoid skin trauma   -Keep skin very clean   -Stop smoking   -Decrease dairy & carbs, especially sweets   -Wt loss    FU: prn     Written information given  Pt states understanding and agreement with plan  Collaborating physician: Dr. Elias Mo         APER

## 2018-10-17 DIAGNOSIS — Z79.899 OTHER LONG TERM (CURRENT) DRUG THERAPY: ICD-10-CM

## 2018-10-17 DIAGNOSIS — R94.31 ABNORMAL ELECTROCARDIOGRAM [ECG] [EKG]: ICD-10-CM

## 2018-10-17 LAB
ALBUMIN SERPL ELPH-MCNC: 2.9 G/DL — LOW (ref 3.3–5)
ALP SERPL-CCNC: 81 U/L — SIGNIFICANT CHANGE UP (ref 40–120)
ALT FLD-CCNC: 9 U/L — LOW (ref 10–45)
ANION GAP SERPL CALC-SCNC: 12 MMOL/L — SIGNIFICANT CHANGE UP (ref 5–17)
AST SERPL-CCNC: 17 U/L — SIGNIFICANT CHANGE UP (ref 10–40)
BASOPHILS # BLD AUTO: 0.1 K/UL — SIGNIFICANT CHANGE UP (ref 0–0.2)
BASOPHILS NFR BLD AUTO: 0.8 % — SIGNIFICANT CHANGE UP (ref 0–2)
BILIRUB SERPL-MCNC: 0.9 MG/DL — SIGNIFICANT CHANGE UP (ref 0.2–1.2)
BUN SERPL-MCNC: 16 MG/DL — SIGNIFICANT CHANGE UP (ref 7–23)
CALCIUM SERPL-MCNC: 8.6 MG/DL — SIGNIFICANT CHANGE UP (ref 8.4–10.5)
CHLORIDE SERPL-SCNC: 102 MMOL/L — SIGNIFICANT CHANGE UP (ref 96–108)
CO2 SERPL-SCNC: 26 MMOL/L — SIGNIFICANT CHANGE UP (ref 22–31)
CREAT SERPL-MCNC: 1.19 MG/DL — SIGNIFICANT CHANGE UP (ref 0.5–1.3)
CRP SERPL-MCNC: 2.78 MG/DL — HIGH (ref 0–0.4)
CULTURE RESULTS: NO GROWTH — SIGNIFICANT CHANGE UP
EOSINOPHIL # BLD AUTO: 0.2 K/UL — SIGNIFICANT CHANGE UP (ref 0–0.5)
EOSINOPHIL NFR BLD AUTO: 2.6 % — SIGNIFICANT CHANGE UP (ref 0–6)
ERYTHROCYTE [SEDIMENTATION RATE] IN BLOOD: 83 MM/HR — HIGH (ref 0–20)
ETHANOL SERPL-MCNC: SIGNIFICANT CHANGE UP MG/DL (ref 0–10)
FOLATE SERPL-MCNC: 13.8 NG/ML — SIGNIFICANT CHANGE UP
GLUCOSE SERPL-MCNC: 93 MG/DL — SIGNIFICANT CHANGE UP (ref 70–99)
HCT VFR BLD CALC: 37.4 % — LOW (ref 39–50)
HGB BLD-MCNC: 12.6 G/DL — LOW (ref 13–17)
LYMPHOCYTES # BLD AUTO: 2.5 K/UL — SIGNIFICANT CHANGE UP (ref 1–3.3)
LYMPHOCYTES # BLD AUTO: 26.4 % — SIGNIFICANT CHANGE UP (ref 13–44)
MAGNESIUM SERPL-MCNC: 2 MG/DL — SIGNIFICANT CHANGE UP (ref 1.6–2.6)
MCHC RBC-ENTMCNC: 31.5 PG — SIGNIFICANT CHANGE UP (ref 27–34)
MCHC RBC-ENTMCNC: 33.7 GM/DL — SIGNIFICANT CHANGE UP (ref 32–36)
MCV RBC AUTO: 93.7 FL — SIGNIFICANT CHANGE UP (ref 80–100)
MONOCYTES # BLD AUTO: 0.6 K/UL — SIGNIFICANT CHANGE UP (ref 0–0.9)
MONOCYTES NFR BLD AUTO: 6.3 % — SIGNIFICANT CHANGE UP (ref 2–14)
NEUTROPHILS # BLD AUTO: 6 K/UL — SIGNIFICANT CHANGE UP (ref 1.8–7.4)
NEUTROPHILS NFR BLD AUTO: 63.9 % — SIGNIFICANT CHANGE UP (ref 43–77)
PHOSPHATE SERPL-MCNC: 4.1 MG/DL — SIGNIFICANT CHANGE UP (ref 2.5–4.5)
PLATELET # BLD AUTO: 189 K/UL — SIGNIFICANT CHANGE UP (ref 150–400)
POTASSIUM SERPL-MCNC: 4.5 MMOL/L — SIGNIFICANT CHANGE UP (ref 3.5–5.3)
POTASSIUM SERPL-SCNC: 4.5 MMOL/L — SIGNIFICANT CHANGE UP (ref 3.5–5.3)
PROCALCITONIN SERPL-MCNC: 0.14 NG/ML — HIGH (ref 0.02–0.1)
PROT SERPL-MCNC: 7.3 G/DL — SIGNIFICANT CHANGE UP (ref 6–8.3)
RBC # BLD: 3.99 M/UL — LOW (ref 4.2–5.8)
RBC # FLD: 13.1 % — SIGNIFICANT CHANGE UP (ref 10.3–14.5)
SODIUM SERPL-SCNC: 140 MMOL/L — SIGNIFICANT CHANGE UP (ref 135–145)
SPECIMEN SOURCE: SIGNIFICANT CHANGE UP
T PALLIDUM AB TITR SER: NEGATIVE — SIGNIFICANT CHANGE UP
TSH SERPL-MCNC: 3.02 UIU/ML — SIGNIFICANT CHANGE UP (ref 0.27–4.2)
VIT B12 SERPL-MCNC: 717 PG/ML — SIGNIFICANT CHANGE UP (ref 232–1245)
WBC # BLD: 9.4 K/UL — SIGNIFICANT CHANGE UP (ref 3.8–10.5)
WBC # FLD AUTO: 9.4 K/UL — SIGNIFICANT CHANGE UP (ref 3.8–10.5)

## 2018-10-17 PROCEDURE — 99233 SBSQ HOSP IP/OBS HIGH 50: CPT | Mod: GC

## 2018-10-17 PROCEDURE — 99223 1ST HOSP IP/OBS HIGH 75: CPT

## 2018-10-17 RX ORDER — CHOLECALCIFEROL (VITAMIN D3) 125 MCG
500 CAPSULE ORAL DAILY
Qty: 0 | Refills: 0 | Status: DISCONTINUED | OUTPATIENT
Start: 2018-10-17 | End: 2018-10-25

## 2018-10-17 RX ORDER — LACTULOSE 10 G/15ML
10 SOLUTION ORAL AT BEDTIME
Qty: 0 | Refills: 0 | Status: DISCONTINUED | OUTPATIENT
Start: 2018-10-17 | End: 2018-10-25

## 2018-10-17 RX ORDER — TAMSULOSIN HYDROCHLORIDE 0.4 MG/1
0.4 CAPSULE ORAL AT BEDTIME
Qty: 0 | Refills: 0 | Status: DISCONTINUED | OUTPATIENT
Start: 2018-10-17 | End: 2018-10-25

## 2018-10-17 RX ORDER — BUDESONIDE AND FORMOTEROL FUMARATE DIHYDRATE 160; 4.5 UG/1; UG/1
2 AEROSOL RESPIRATORY (INHALATION)
Qty: 0 | Refills: 0 | Status: DISCONTINUED | OUTPATIENT
Start: 2018-10-17 | End: 2018-10-25

## 2018-10-17 RX ORDER — FINASTERIDE 5 MG/1
5 TABLET, FILM COATED ORAL DAILY
Qty: 0 | Refills: 0 | Status: DISCONTINUED | OUTPATIENT
Start: 2018-10-17 | End: 2018-10-25

## 2018-10-17 RX ORDER — DOCUSATE SODIUM 100 MG
100 CAPSULE ORAL
Qty: 0 | Refills: 0 | Status: DISCONTINUED | OUTPATIENT
Start: 2018-10-17 | End: 2018-10-25

## 2018-10-17 RX ORDER — TIOTROPIUM BROMIDE 18 UG/1
1 CAPSULE ORAL; RESPIRATORY (INHALATION) DAILY
Qty: 0 | Refills: 0 | Status: DISCONTINUED | OUTPATIENT
Start: 2018-10-17 | End: 2018-10-25

## 2018-10-17 RX ORDER — POLYETHYLENE GLYCOL 3350 17 G/17G
17 POWDER, FOR SOLUTION ORAL AT BEDTIME
Qty: 0 | Refills: 0 | Status: DISCONTINUED | OUTPATIENT
Start: 2018-10-17 | End: 2018-10-25

## 2018-10-17 RX ORDER — ATORVASTATIN CALCIUM 80 MG/1
20 TABLET, FILM COATED ORAL AT BEDTIME
Qty: 0 | Refills: 0 | Status: DISCONTINUED | OUTPATIENT
Start: 2018-10-17 | End: 2018-10-25

## 2018-10-17 RX ORDER — ASPIRIN/CALCIUM CARB/MAGNESIUM 324 MG
81 TABLET ORAL DAILY
Qty: 0 | Refills: 0 | Status: DISCONTINUED | OUTPATIENT
Start: 2018-10-17 | End: 2018-10-25

## 2018-10-17 RX ORDER — APIXABAN 2.5 MG/1
5 TABLET, FILM COATED ORAL EVERY 12 HOURS
Qty: 0 | Refills: 0 | Status: DISCONTINUED | OUTPATIENT
Start: 2018-10-17 | End: 2018-10-25

## 2018-10-17 RX ORDER — PITAVASTATIN CALCIUM 1.04 MG/1
1 TABLET, FILM COATED ORAL
Qty: 0 | Refills: 0 | COMMUNITY

## 2018-10-17 RX ORDER — MIRTAZAPINE 45 MG/1
7.5 TABLET, ORALLY DISINTEGRATING ORAL AT BEDTIME
Qty: 0 | Refills: 0 | Status: DISCONTINUED | OUTPATIENT
Start: 2018-10-17 | End: 2018-10-17

## 2018-10-17 RX ORDER — IPRATROPIUM/ALBUTEROL SULFATE 18-103MCG
3 AEROSOL WITH ADAPTER (GRAM) INHALATION EVERY 6 HOURS
Qty: 0 | Refills: 0 | Status: DISCONTINUED | OUTPATIENT
Start: 2018-10-17 | End: 2018-10-25

## 2018-10-17 RX ADMIN — TAMSULOSIN HYDROCHLORIDE 0.4 MILLIGRAM(S): 0.4 CAPSULE ORAL at 23:04

## 2018-10-17 RX ADMIN — BUDESONIDE AND FORMOTEROL FUMARATE DIHYDRATE 2 PUFF(S): 160; 4.5 AEROSOL RESPIRATORY (INHALATION) at 23:25

## 2018-10-17 RX ADMIN — POLYETHYLENE GLYCOL 3350 17 GRAM(S): 17 POWDER, FOR SOLUTION ORAL at 23:06

## 2018-10-17 RX ADMIN — ATORVASTATIN CALCIUM 20 MILLIGRAM(S): 80 TABLET, FILM COATED ORAL at 23:04

## 2018-10-17 RX ADMIN — Medication 100 MILLIGRAM(S): at 18:45

## 2018-10-17 RX ADMIN — LACTULOSE 10 GRAM(S): 10 SOLUTION ORAL at 23:03

## 2018-10-17 RX ADMIN — APIXABAN 5 MILLIGRAM(S): 2.5 TABLET, FILM COATED ORAL at 18:45

## 2018-10-17 NOTE — PROGRESS NOTE ADULT - PROBLEM SELECTOR PLAN 1
Unclear etiology at this time; does not have evidence to suggest acute infection as patient is afebrile with normal UA, normal CXR and no localizing exam findings. Isolated leukocytosis may be related to dehydration. ESR and procalcitonin were elevated. CTH without acute pathology. Confusion and hallucinations may also be in setting of delirium as patient recently returned home from rehab vs. progression of his dementia.   - neurology consulted, recommending MRI to look for acute changes, though predicts low yield. Believes likely delirium in the context of his dementia and returning home. Recommends going home with additional support  - F/u urine cultures  - Check vitamin B12, folate, syphilis screen. TSH wnl  - Check urine tox  - Frequent reorientation, reduce unnecessary nighttime awakening  - Enhanced supervision  - Fall precautions  - If febrile, obtain BCx 0 -2.99

## 2018-10-17 NOTE — CONSULT NOTE ADULT - SUBJECTIVE AND OBJECTIVE BOX
CHIEF COMPLAINT:    HPI:    PAST MEDICAL & SURGICAL HISTORY:  COPD (chronic obstructive pulmonary disease)  Bundle branch block, right  Pulmonary fibrosis  Dementia  Atrial fibrillation  GERD (gastroesophageal reflux disease)  Recurrent UTI  Hyperlipidemia  Obstructive sleep apnea on CPAP  H/O small bowel obstruction  History of appendectomy      FAMILY HISTORY:  Family history of colon cancer (Sibling)  Family history of myocardial infarction (Sibling)      SOCIAL HISTORY:  Smoking: [ ] Never Smoked [ ] Former Smoker (__ packs x ___ years) [ ] Current Smoker  (__ packs x ___ years)  Substance Use: [ ] Never Used [ ] Used ____  EtOH Use:  Marital Status: [ ] Single [ ]  [ ]  [ ]   Sexual History:   Occupation:  Recent Travel:  Country of Birth:  Advance Directives:    Allergies    No Known Drug Allergies  zosyn (Drowsiness)    Intolerances        HOME MEDICATIONS:    REVIEW OF SYSTEMS:  Constitutional: [ ] negative [ ] fevers [ ] chills [ ] weight loss [ ] weight gain  HEENT: [ ] negative [ ] dry eyes [ ] eye irritation [ ] postnasal drip [ ] nasal congestion  CV: [ ] negative  [ ] chest pain [ ] orthopnea [ ] palpitations [ ] murmur  Resp: [ ] negative [ ] cough [ ] shortness of breath [ ] dyspnea [ ] wheezing [ ] sputum [ ] hemoptysis  GI: [ ] negative [ ] nausea [ ] vomiting [ ] diarrhea [ ] constipation [ ] abd pain [ ] dysphagia   : [ ] negative [ ] dysuria [ ] nocturia [ ] hematuria [ ] increased urinary frequency  Musculoskeletal: [ ] negative [ ] back pain [ ] myalgias [ ] arthralgias [ ] fracture  Skin: [ ] negative [ ] rash [ ] itch  Neurological: [ ] negative [ ] headache [ ] dizziness [ ] syncope [ ] weakness [ ] numbness  Psychiatric: [ ] negative [ ] anxiety [ ] depression  Endocrine: [ ] negative [ ] diabetes [ ] thyroid problem  Hematologic/Lymphatic: [ ] negative [ ] anemia [ ] bleeding problem  Allergic/Immunologic: [ ] negative [ ] itchy eyes [ ] nasal discharge [ ] hives [ ] angioedema  [ ] All other systems negative  [ ] Unable to assess ROS because ________    OBJECTIVE:  ICU Vital Signs Last 24 Hrs  T(C): 36.4 (17 Oct 2018 07:48), Max: 37.4 (16 Oct 2018 18:03)  T(F): 97.6 (17 Oct 2018 07:48), Max: 99.4 (16 Oct 2018 18:03)  HR: 78 (17 Oct 2018 07:48) (68 - 92)  BP: 123/70 (17 Oct 2018 07:48) (120/72 - 142/88)  BP(mean): --  ABP: --  ABP(mean): --  RR: 18 (17 Oct 2018 07:48) (16 - 18)  SpO2: 97% (17 Oct 2018 07:48) (96% - 99%)        CAPILLARY BLOOD GLUCOSE          PHYSICAL EXAM:  General:   HEENT:   Lymph Nodes:  Neck:   Respiratory:   Cardiovascular:   Abdomen:   Extremities:   Skin:   Neurological:  Psychiatry:    HOSPITAL MEDICATIONS:  apixaban 5 milliGRAM(s) Oral every 12 hours  aspirin enteric coated 81 milliGRAM(s) Oral daily      tamsulosin 0.4 milliGRAM(s) Oral at bedtime    atorvastatin 20 milliGRAM(s) Oral at bedtime  finasteride 5 milliGRAM(s) Oral daily    ALBUTerol/ipratropium for Nebulization 3 milliLiter(s) Nebulizer every 6 hours PRN    mirtazapine 7.5 milliGRAM(s) Oral at bedtime    docusate sodium 100 milliGRAM(s) Oral two times a day  lactulose Syrup 10 Gram(s) Oral at bedtime  polyethylene glycol 3350 17 Gram(s) Oral at bedtime        cholecalciferol 500 Unit(s) Oral daily            LABS:                        12.6   9.4   )-----------( 189      ( 17 Oct 2018 11:10 )             37.4     Hgb Trend: 12.6<--, 13.0<--  10-17    140  |  102  |  16  ----------------------------<  93  4.5   |  26  |  1.19    Ca    8.6      17 Oct 2018 11:10  Phos  4.1     10  Mg     2.0     10-17    TPro  7.3  /  Alb  2.9<L>  /  TBili  0.9  /  DBili  x   /  AST  17  /  ALT  9<L>  /  AlkPhos  81  10-17    Creatinine Trend: 1.19<--, 1.07<--, 0.99<--  PT/INR - ( 16 Oct 2018 18:07 )   PT: 19.9 sec;   INR: 1.80 ratio         PTT - ( 16 Oct 2018 18:07 )  PTT:37.6 sec  Urinalysis Basic - ( 16 Oct 2018 18:07 )    Color: Yellow / Appearance: Clear / S.021 / pH: x  Gluc: x / Ketone: Negative  / Bili: Negative / Urobili: Negative   Blood: x / Protein: Trace / Nitrite: Negative   Leuk Esterase: Negative / RBC: 6 /hpf / WBC 1 /hpf   Sq Epi: x / Non Sq Epi: 0 /hpf / Bacteria: Negative            MICROBIOLOGY:     RADIOLOGY:  [ ] Reviewed and interpreted by me    PULMONARY FUNCTION TESTS:    EKG: CHIEF COMPLAINT: Brought in for altered mental status     HPI: 78 M with a PMH of HLD, HFrEF, A fib, dementia, GERD, COPD, pulmonary fibrosis, BRIEN on CPAP, recurrent UTIs, recent hospital admission in  for UTI, returned ome from rehab 2 days ago, now brought in by family for confusion. Family reports that he has been agitated and having hallucinations at home. He has been mostly bedbound and they deny any fever, chills, headache, abdominal pain, n/v, diarrhea, rash, dysuria or urinary frequency. Pt himself is not answering any Qs at this time. Ct head did not show any acute changes. Pt seen by neuro service and thought to have delirium. MRI brain was ordered. Resp status has remained stable and he is oxygenating well on RA.       PAST MEDICAL & SURGICAL HISTORY:  COPD (chronic obstructive pulmonary disease)  Bundle branch block, right  Pulmonary fibrosis  Dementia  Atrial fibrillation  GERD (gastroesophageal reflux disease)  Recurrent UTI  Hyperlipidemia  Obstructive sleep apnea on CPAP  H/O small bowel obstruction  History of appendectomy      FAMILY HISTORY:  Family history of colon cancer (Sibling)  Family history of myocardial infarction (Sibling)      SOCIAL HISTORY:  Smoking: [ x] Never Smoked [ ] Former Smoker (__ packs x ___ years) [ ] Current Smoker  (__ packs x ___ years)  Substance Use: [x ] Never Used [ ] Used ____  EtOH Use: social  Marital Status: [ ] Single [x ]  [ ]  [ ]   Sexual History: NC  Occupation: Retired , used to work for [employer]   Recent Travel: None  Advance Directives: Full code     Allergies    No Known Drug Allergies  zosyn (Drowsiness)    Intolerances        HOME MEDICATIONS:  Reviewed, pulm meds Levalbuterol Tudorza Pulmicort, Singulair    REVIEW OF SYSTEMS:  Constitutional: [ ] negative [ ] fevers [ ] chills [ ] weight loss [ ] weight gain  HEENT: [ ] negative [ ] dry eyes [ ] eye irritation [ ] postnasal drip [ ] nasal congestion  CV: [ ] negative  [ ] chest pain [ ] orthopnea [ ] palpitations [ ] murmur  Resp: [ ] negative [ ] cough [ ] shortness of breath [ ] dyspnea [ ] wheezing [ ] sputum [ ] hemoptysis  GI: [ ] negative [ ] nausea [ ] vomiting [ ] diarrhea [ ] constipation [ ] abd pain [ ] dysphagia   : [ ] negative [ ] dysuria [ ] nocturia [ ] hematuria [ ] increased urinary frequency  Musculoskeletal: [ ] negative [ ] back pain [ ] myalgias [ ] arthralgias [ ] fracture  Skin: [ ] negative [ ] rash [ ] itch  Neurological: [ ] negative [ ] headache [ ] dizziness [ ] syncope [ ] weakness [ ] numbness  Psychiatric: [ ] negative [ ] anxiety [ ] depression  Endocrine: [ ] negative [ ] diabetes [ ] thyroid problem  Hematologic/Lymphatic: [ ] negative [ ] anemia [ ] bleeding problem  Allergic/Immunologic: [ ] negative [ ] itchy eyes [ ] nasal discharge [ ] hives [ ] angioedema  [ ] All other systems negative  [x ] Unable to assess ROS because __pt confused ______    OBJECTIVE:  ICU Vital Signs Last 24 Hrs  T(C): 36.4 (17 Oct 2018 07:48), Max: 37.4 (16 Oct 2018 18:03)  T(F): 97.6 (17 Oct 2018 07:48), Max: 99.4 (16 Oct 2018 18:03)  HR: 78 (17 Oct 2018 07:48) (68 - 92)  BP: 123/70 (17 Oct 2018 07:48) (120/72 - 142/88)  BP(mean): --  ABP: --  ABP(mean): --  RR: 18 (17 Oct 2018 07:48) (16 - 18)  SpO2: 97% (17 Oct 2018 07:48) (96% - 99%)        CAPILLARY BLOOD GLUCOSE          PHYSICAL EXAM:  General: NAD, gets agitated when stimulated   HEENT: PERRLA  Lymph Nodes: No palpable cervical LNs  Neck: Supple  Respiratory: Fine inspiratory crackles b/l bases, no wheezing   Cardiovascular: Irregularly irregular, S1+S2+0  Abdomen: Soft, NT, NS, BS+  Extremities:  b/L LE edema, pulses + b/l LEs  Skin: Chronic skin changes  Neurological: Opens eyes and answers to name, moves all extremities spontaneously, not following any commands.   Psychiatry:  Unable to assess    HOSPITAL MEDICATIONS:  apixaban 5 milliGRAM(s) Oral every 12 hours  aspirin enteric coated 81 milliGRAM(s) Oral daily      tamsulosin 0.4 milliGRAM(s) Oral at bedtime    atorvastatin 20 milliGRAM(s) Oral at bedtime  finasteride 5 milliGRAM(s) Oral daily    ALBUTerol/ipratropium for Nebulization 3 milliLiter(s) Nebulizer every 6 hours PRN    mirtazapine 7.5 milliGRAM(s) Oral at bedtime    docusate sodium 100 milliGRAM(s) Oral two times a day  lactulose Syrup 10 Gram(s) Oral at bedtime  polyethylene glycol 3350 17 Gram(s) Oral at bedtime        cholecalciferol 500 Unit(s) Oral daily            LABS:                        12.6   9.4   )-----------( 189      ( 17 Oct 2018 11:10 )             37.4     Hgb Trend: 12.6<--, 13.0<--  10-17    140  |  102  |  16  ----------------------------<  93  4.5   |  26  |  1.19    Ca    8.6      17 Oct 2018 11:10  Phos  4.1     10  Mg     2.0     10-17    TPro  7.3  /  Alb  2.9<L>  /  TBili  0.9  /  DBili  x   /  AST  17  /  ALT  9<L>  /  AlkPhos  81  10-    Creatinine Trend: 1.19<--, 1.07<--, 0.99<--  PT/INR - ( 16 Oct 2018 18:07 )   PT: 19.9 sec;   INR: 1.80 ratio         PTT - ( 16 Oct 2018 18:07 )  PTT:37.6 sec  Urinalysis Basic - ( 16 Oct 2018 18:07 )    Color: Yellow / Appearance: Clear / S.021 / pH: x  Gluc: x / Ketone: Negative  / Bili: Negative / Urobili: Negative   Blood: x / Protein: Trace / Nitrite: Negative   Leuk Esterase: Negative / RBC: 6 /hpf / WBC 1 /hpf   Sq Epi: x / Non Sq Epi: 0 /hpf / Bacteria: Negative            MICROBIOLOGY:     RADIOLOGY:  < from: Xray Chest 1 View AP/PA (10.16.18 @ 20:49) >  Bibasilar pulmonary fibrosis. No focal consolidation.  Cardiac silhouette cannot accurately be assessed on this projection.  Degenerative osseous changes.    < from: CT Head No Cont (10.16.18 @ 18:29) >  No acute intracranial hemorrhage or mass effect.   Microvascular ischemic changes, unchanged from the prior exam.        [ ] Reviewed and interpreted by me    PULMONARY FUNCTION TESTS:    EKG:

## 2018-10-17 NOTE — CONSULT NOTE ADULT - ASSESSMENT
78 M with HLD, HFrEF, A fib, dementia, GERD, COPD, pulmonary fibrosis, BRIEN on CPAP, recurrent UTIs, recent hospital admission in 9/18 for UTI, returned ome from rehab 2 days ago, now brought in by family for confusion.    1. COPD/ Pulm fibrosis:  - Currently stable resp status  - Oxygenating well on RA, no wheezing on exam   - CXR with stable basilar fibriotic changes , no infiltrate   - Would cont Duonebs Q6H  - Can start Symbicort BID and Spiriva daily   - Cot home dose of Singulair 10 mg daily   - At present no indication for systemic steroids or antibiotics from pulm standpoint   - Aspiration precautions  - Chest PT/ pulm toilet   - Keep O2 sat > 88 %     2. BRIEN:  - Pt on CPAP of 7 cm H20 at home  - Would cont nocturnal CPAP at those settings   - Cont home dose of Nuvigil     3. AMS/ Delirium   - CT head negative. FUP MRI  - Frequent reorientation     4. Gen:  - DVT Px: on Eliquis   - Dr. Prieto will follow him during hospitalization

## 2018-10-17 NOTE — PROGRESS NOTE ADULT - SUBJECTIVE AND OBJECTIVE BOX
INTERVAL HPI/OVERNIGHT EVENTS:    Pt in ED overnight, was sleeping and wanted to keep sleeping during encounter. Denied any complaints of chest pain, shortness of breath, nausea, abdominal pain. Otherwise did not want to answer many other questions.    Reached out to wife to obtain collateral: She states that the patient, during his stay at subacute rehab after his hospitalization in September, had a waxing and waning of his alertness. He would sleep for two days, all day long and then wake up for a day with occasional hallucinations. When he arrived home, he continued to have hallucinations which were worsening; he was fearful of people invading his home and of gunshots. The day of his hospitalization he was yelling at people who weren't actually in his house to leave his home. He was also not eating much or sleeping since returning home.      T(C): 36.4 (10-17-18 @ 07:48), Max: 37.4 (10-16-18 @ 18:03)  HR: 78 (10-17-18 @ 07:48) (68 - 92)  BP: 123/70 (10-17-18 @ 07:48) (120/72 - 142/88)  RR: 18 (10-17-18 @ 07:48) (16 - 18)  SpO2: 97% (10-17-18 @ 07:48) (96% - 99%)  Wt(kg): --Vital Signs Last 24 Hrs  T(C): 36.4 (17 Oct 2018 07:48), Max: 37.4 (16 Oct 2018 18:03)  T(F): 97.6 (17 Oct 2018 07:48), Max: 99.4 (16 Oct 2018 18:03)  HR: 78 (17 Oct 2018 07:48) (68 - 92)  BP: 123/70 (17 Oct 2018 07:48) (120/72 - 142/88)  BP(mean): --  RR: 18 (17 Oct 2018 07:48) (16 - 18)  SpO2: 97% (17 Oct 2018 07:48) (96% - 99%)    PHYSICAL EXAM:  GENERAL: NAD, sleeping  HEAD:  Atraumatic, Normocephalic  EYES: EOMI, PERRLA, conjunctiva and sclera clear  ENMT: Moist mucous membranes, No lesions  NECK: Supple, No JVD  NERVOUS SYSTEM:  Alert & Oriented X2 (does not know year), otherwise uncooperative with neuro exam  CHEST/LUNG: Clear to percussion bilaterally; No rales, rhonchi, wheezing, or rubs  HEART: irregularly irregular; No murmurs, rubs, or gallops  ABDOMEN: Soft, Nontender, Nondistended; Bowel sounds present  EXTREMITIES:  2+ Peripheral Pulses, No clubbing, cyanosis, or edema  LYMPH: No lymphadenopathy noted  SKIN: Some dryness/scaliness of the left lower extremity    Consultant(s) Notes Reviewed:  [x ] YES  [ ] NO  Care Discussed with Consultants/Other Providers [ x] YES  [ ] NO    LABS:                        12.6   9.4   )-----------( 189      ( 17 Oct 2018 11:10 )             37.4     10-    140  |  102  |  16  ----------------------------<  93  4.5   |  26  |  1.19    Ca    8.6      17 Oct 2018 11:10  Phos  4.1     10-  Mg     2.0     10-    TPro  7.3  /  Alb  2.9<L>  /  TBili  0.9  /  DBili  x   /  AST  17  /  ALT  9<L>  /  AlkPhos  81  10-17    PT/INR - ( 16 Oct 2018 18:07 )   PT: 19.9 sec;   INR: 1.80 ratio         PTT - ( 16 Oct 2018 18:07 )  PTT:37.6 sec  Urinalysis Basic - ( 16 Oct 2018 18:07 )    Color: Yellow / Appearance: Clear / S.021 / pH: x  Gluc: x / Ketone: Negative  / Bili: Negative / Urobili: Negative   Blood: x / Protein: Trace / Nitrite: Negative   Leuk Esterase: Negative / RBC: 6 /hpf / WBC 1 /hpf   Sq Epi: x / Non Sq Epi: 0 /hpf / Bacteria: Negative      CAPILLARY BLOOD GLUCOSE            Urinalysis Basic - ( 16 Oct 2018 18:07 )    Color: Yellow / Appearance: Clear / S.021 / pH: x  Gluc: x / Ketone: Negative  / Bili: Negative / Urobili: Negative   Blood: x / Protein: Trace / Nitrite: Negative   Leuk Esterase: Negative / RBC: 6 /hpf / WBC 1 /hpf   Sq Epi: x / Non Sq Epi: 0 /hpf / Bacteria: Negative        RADIOLOGY & ADDITIONAL TESTS:    Imaging Personally Reviewed:  [X] YES  [ ] NO

## 2018-10-17 NOTE — PROGRESS NOTE ADULT - ASSESSMENT
HPI: 78 year old gentleman pmhx afib on eliquis, COPD, BRIEN, dementia, CHF, GERD, HLD, pulmonary fibrosis, recurrent UTIs, recent admission for UTI then went to [place] rehab, came home yesterday and reportedly brought back to hospital with increased confusion past few days, hallucinations as well as some episodic slurred speech. CT head no acute changes. Labs without any significant elevation in white count and UA negative, afebrile. Pt denies any pain, no headaches. Reportedly had [facility] brain at [facility] 4 weeks ago which did not demonstrate any acute findings per wife.     A/P: Most likely metabolic delirium superimposed on baseline dementia but labs so far unimpressive apart from mildly dry Bun/Cr ratio, doubt stroke but with slurred speech will obtain MRI brain to exclude    Plan  1. Admitted to medicine  2. no evidence primary acute neuro process.  MRI ordered for confirmation of no acute process.  not sure if he will cooperate.  can forego if too agitated.  would not sedate for low yield test  3. Frequent reorientation, avoid delirium provoking medications  4. Continue eliquis, neurochecks, statin  5. Enhanced supervision, Fall precautions  6. Optimize hydration  7.  Ultimately if goes home just needs enhanced supervision, HHA etc  8. d/w primary team

## 2018-10-17 NOTE — PROGRESS NOTE ADULT - PROBLEM SELECTOR PLAN 4
Found to have prolonged QTc of 506 ms on today's ECG.   - Avoid QT-prolonging agents  - Repeat ECG, especially if considering antipsychotics when discharged

## 2018-10-17 NOTE — PROGRESS NOTE ADULT - SUBJECTIVE AND OBJECTIVE BOX
SUBJECTIVE: In ER overnight.  no active issues.      Medications:  MEDICATIONS  (STANDING):  apixaban 5 milliGRAM(s) Oral every 12 hours  aspirin enteric coated 81 milliGRAM(s) Oral daily  cholecalciferol 500 Unit(s) Oral daily  docusate sodium 100 milliGRAM(s) Oral two times a day  finasteride 5 milliGRAM(s) Oral daily  lactulose Syrup 10 Gram(s) Oral at bedtime  polyethylene glycol 3350 17 Gram(s) Oral at bedtime  tamsulosin 0.4 milliGRAM(s) Oral at bedtime    MEDICATIONS  (PRN):  ALBUTerol/ipratropium for Nebulization 3 milliLiter(s) Nebulizer every 6 hours PRN Shortness of Breath and/or Wheezing      Labs:  CBC Full  -  ( 16 Oct 2018 18:07 )  WBC Count : 10.7 K/uL  Hemoglobin : 13.0 g/dL  Hematocrit : 38.5 %  Platelet Count - Automated : 179 K/uL  Mean Cell Volume : 93.1 fl  Mean Cell Hemoglobin : 31.6 pg  Mean Cell Hemoglobin Concentration : 33.9 gm/dL  Auto Neutrophil # : 6.4 K/uL  Auto Lymphocyte # : 3.1 K/uL  Auto Monocyte # : 0.8 K/uL  Auto Eosinophil # : 0.3 K/uL  Auto Basophil # : 0.1 K/uL  Auto Neutrophil % : 60.4 %  Auto Lymphocyte % : 29.0 %  Auto Monocyte % : 7.5 %  Auto Eosinophil % : 2.4 %  Auto Basophil % : 0.7 %    1016    137  |  99  |  17  ----------------------------<  91  5.0   |  25  |  1.07    Ca    8.7      16 Oct 2018 18:07    TPro  7.4  /  Alb  3.1<L>  /  TBili  0.8  /  DBili  x   /  AST  39  /  ALT  13  /  AlkPhos  85  10-16    CAPILLARY BLOOD GLUCOSE        LIVER FUNCTIONS - ( 16 Oct 2018 18:07 )  Alb: 3.1 g/dL / Pro: 7.4 g/dL / ALK PHOS: 85 U/L / ALT: 13 U/L / AST: 39 U/L / GGT: x           PT/INR - ( 16 Oct 2018 18:07 )   PT: 19.9 sec;   INR: 1.80 ratio         PTT - ( 16 Oct 2018 18:07 )  PTT:37.6 sec  Urinalysis Basic - ( 16 Oct 2018 18:07 )    Color: Yellow / Appearance: Clear / S.021 / pH: x  Gluc: x / Ketone: Negative  / Bili: Negative / Urobili: Negative   Blood: x / Protein: Trace / Nitrite: Negative   Leuk Esterase: Negative / RBC: 6 /hpf / WBC 1 /hpf   Sq Epi: x / Non Sq Epi: 0 /hpf / Bacteria: Negative        Vitals:  Vital Signs Last 24 Hrs  T(C): 36.4 (17 Oct 2018 07:48), Max: 37.4 (16 Oct 2018 18:03)  T(F): 97.6 (17 Oct 2018 07:48), Max: 99.4 (16 Oct 2018 18:03)  HR: 78 (17 Oct 2018 07:48) (68 - 92)  BP: 123/70 (17 Oct 2018 07:48) (120/72 - 142/88)  BP(mean): --  RR: 18 (17 Oct 2018 07:48) (16 - 18)  SpO2: 97% (17 Oct 2018 07:48) (96% - 99%)    	    NEUROLOGICAL EXAM:    Mental status: Awake, alert, and in no apparent distress. Oriented to jorge, 2018, president Obama. Speech mild dysarthria fluctuates, fluent, follows commands.     Cranial Nerves: Pupils were equal, round, reactive to light. Extraocular movements were intact. B BTT. Fundoscopic exam was deferred. Facial sensation was intact to light touch. There was no facial asymmetry. The palate was upgoing symmetrically and tongue was midline. Hearing acuity was intact to finger rub AU. Shoulder shrug was full bilaterally    Motor exam: Bulk and tone were normal. Strength was 5/5 in all four extremities. Fine finger movements were symmetric and normal. There was no pronator drift    Reflexes: 2+ in the bilateral upper extremities. 1 in the bilateral lower extremities. Toes were downgoing bilaterally.     Sensation: Intact to light touch, temperature    Coordination: Finger-nose-finger  without dysmetria.     Gait: deferred    < from: CT Head No Cont (10.16.18 @ 18:29) >  EXAM:  CT BRAIN                            PROCEDURE DATE:  10/16/2018            INTERPRETATION:  INDICATIONS:  Slurred speech. Altered mental status.    TECHNIQUE:  Serial axial images were obtained from the skull base to the   vertex without intravenous contrast.    COMPARISON EXAMINATION: CT head dated 2018    FINDINGS:    VENTRICLES AND SULCI:  Age-appropriate involutional changes.  INTRA-AXIAL:  Microvascular ischemic changes involving the   periventricular and subcortical white matter, unchanged.  EXTRA-AXIAL:  No mass or collection is seen.  VISUALIZED SINUSES:  Clear.  VISUALIZED MASTOIDS:  Clear.  CALVARIUM:  Normal.  MISCELLANEOUS:  None.    IMPRESSION:  No acute intracranial hemorrhage or mass effect.   Microvascular ischemic changes, unchanged from the prior exam. Brain MRI   can be performed as clinically indicated.    IDALIA PINTO M.D., RADIOLOGY RESIDENT  This document has been electronically signed.  KYLAH MUJICA M.D., ATTENDING RADIOLOGIST  Thisdocument has been electronically signed. Oct 16 2018  7:08PM       Labs:  CBC Full  -  ( 16 Oct 2018 18:07 )  WBC Count : 10.7 K/uL  Hemoglobin : 13.0 g/dL  Hematocrit : 38.5 %  Platelet Count - Automated : 179 K/uL  Mean Cell Volume : 93.1 fl  Mean Cell Hemoglobin : 31.6 pg  Mean Cell Hemoglobin Concentration : 33.9 gm/dL  Auto Neutrophil # : 6.4 K/uL  Auto Lymphocyte # : 3.1 K/uL  Auto Monocyte # : 0.8 K/uL  Auto Eosinophil # : 0.3 K/uL  Auto Basophil # : 0.1 K/uL  Auto Neutrophil % : 60.4 %  Auto Lymphocyte % : 29.0 %  Auto Monocyte % : 7.5 %  Auto Eosinophil % : 2.4 %  Auto Basophil % : 0.7 %    10-16    137  |  99  |  17  ----------------------------<  91  5.0   |  25  |  1.07    Ca    8.7      16 Oct 2018 18:07    TPro  7.4  /  Alb  3.1<L>  /  TBili  0.8  /  DBili  x   /  AST  39  /  ALT  13  /  AlkPhos  85  10-16    LIVER FUNCTIONS - ( 16 Oct 2018 18:07 )  Alb: 3.1 g/dL / Pro: 7.4 g/dL / ALK PHOS: 85 U/L / ALT: 13 U/L / AST: 39 U/L / GGT: x           PT/INR - ( 16 Oct 2018 18:07 )   PT: 19.9 sec;   INR: 1.80 ratio       PTT - ( 16 Oct 2018 18:07 )  PTT:37.6 sec  Urinalysis Basic - ( 16 Oct 2018 18:07 )    Color: Yellow / Appearance: Clear / S.021 / pH: x  Gluc: x / Ketone: Negative  / Bili: Negative / Urobili: Negative   Blood: x / Protein: Trace / Nitrite: Negative   Leuk Esterase: Negative / RBC: 6 /hpf / WBC 1 /hpf   Sq Epi: x / Non Sq Epi: 0 /hpf / Bacteria: Negative

## 2018-10-17 NOTE — PROGRESS NOTE ADULT - ASSESSMENT
78M with PMH of Afib on Eliquis, COPD, BRIEN, dementia, HFrEF, GERD, HLD, pulmonary fibrosis, recurrent UTIs, and recent admission 9/12/-9/ for metabolic encephalopathy 2/2 UTI, now presenting with altered mental status in the context of returning home from subacute rehab.

## 2018-10-18 ENCOUNTER — TRANSCRIPTION ENCOUNTER (OUTPATIENT)
Age: 78
End: 2018-10-18

## 2018-10-18 DIAGNOSIS — R41.0 DISORIENTATION, UNSPECIFIED: ICD-10-CM

## 2018-10-18 DIAGNOSIS — J84.10 PULMONARY FIBROSIS, UNSPECIFIED: ICD-10-CM

## 2018-10-18 DIAGNOSIS — K21.9 GASTRO-ESOPHAGEAL REFLUX DISEASE WITHOUT ESOPHAGITIS: ICD-10-CM

## 2018-10-18 DIAGNOSIS — R06.02 SHORTNESS OF BREATH: ICD-10-CM

## 2018-10-18 DIAGNOSIS — I48.91 UNSPECIFIED ATRIAL FIBRILLATION: ICD-10-CM

## 2018-10-18 LAB
ANION GAP SERPL CALC-SCNC: 17 MMOL/L — SIGNIFICANT CHANGE UP (ref 5–17)
BASOPHILS # BLD AUTO: 0.05 K/UL — SIGNIFICANT CHANGE UP (ref 0–0.2)
BASOPHILS NFR BLD AUTO: 0.4 % — SIGNIFICANT CHANGE UP (ref 0–2)
BUN SERPL-MCNC: 20 MG/DL — SIGNIFICANT CHANGE UP (ref 7–23)
CALCIUM SERPL-MCNC: 8.7 MG/DL — SIGNIFICANT CHANGE UP (ref 8.4–10.5)
CHLORIDE SERPL-SCNC: 100 MMOL/L — SIGNIFICANT CHANGE UP (ref 96–108)
CO2 SERPL-SCNC: 21 MMOL/L — LOW (ref 22–31)
CREAT SERPL-MCNC: 1.19 MG/DL — SIGNIFICANT CHANGE UP (ref 0.5–1.3)
EOSINOPHIL # BLD AUTO: 0.11 K/UL — SIGNIFICANT CHANGE UP (ref 0–0.5)
EOSINOPHIL NFR BLD AUTO: 0.9 % — SIGNIFICANT CHANGE UP (ref 0–6)
GAS PNL BLDA: SIGNIFICANT CHANGE UP
GLUCOSE BLDC GLUCOMTR-MCNC: 106 MG/DL — HIGH (ref 70–99)
GLUCOSE BLDC GLUCOMTR-MCNC: 72 MG/DL — SIGNIFICANT CHANGE UP (ref 70–99)
GLUCOSE BLDC GLUCOMTR-MCNC: 85 MG/DL — SIGNIFICANT CHANGE UP (ref 70–99)
GLUCOSE BLDC GLUCOMTR-MCNC: 90 MG/DL — SIGNIFICANT CHANGE UP (ref 70–99)
GLUCOSE BLDC GLUCOMTR-MCNC: 90 MG/DL — SIGNIFICANT CHANGE UP (ref 70–99)
GLUCOSE BLDC GLUCOMTR-MCNC: 94 MG/DL — SIGNIFICANT CHANGE UP (ref 70–99)
GLUCOSE SERPL-MCNC: 66 MG/DL — LOW (ref 70–99)
HCT VFR BLD CALC: 37.2 % — LOW (ref 39–50)
HGB BLD-MCNC: 11.7 G/DL — LOW (ref 13–17)
IMM GRANULOCYTES NFR BLD AUTO: 0.3 % — SIGNIFICANT CHANGE UP (ref 0–1.5)
LYMPHOCYTES # BLD AUTO: 2.96 K/UL — SIGNIFICANT CHANGE UP (ref 1–3.3)
LYMPHOCYTES # BLD AUTO: 25.3 % — SIGNIFICANT CHANGE UP (ref 13–44)
MAGNESIUM SERPL-MCNC: 1.9 MG/DL — SIGNIFICANT CHANGE UP (ref 1.6–2.6)
MCHC RBC-ENTMCNC: 29.8 PG — SIGNIFICANT CHANGE UP (ref 27–34)
MCHC RBC-ENTMCNC: 31.5 GM/DL — LOW (ref 32–36)
MCV RBC AUTO: 94.7 FL — SIGNIFICANT CHANGE UP (ref 80–100)
MONOCYTES # BLD AUTO: 0.79 K/UL — SIGNIFICANT CHANGE UP (ref 0–0.9)
MONOCYTES NFR BLD AUTO: 6.8 % — SIGNIFICANT CHANGE UP (ref 2–14)
NEUTROPHILS # BLD AUTO: 7.75 K/UL — HIGH (ref 1.8–7.4)
NEUTROPHILS NFR BLD AUTO: 66.3 % — SIGNIFICANT CHANGE UP (ref 43–77)
PHOSPHATE SERPL-MCNC: 3.5 MG/DL — SIGNIFICANT CHANGE UP (ref 2.5–4.5)
PLATELET # BLD AUTO: 195 K/UL — SIGNIFICANT CHANGE UP (ref 150–400)
POTASSIUM SERPL-MCNC: 4.3 MMOL/L — SIGNIFICANT CHANGE UP (ref 3.5–5.3)
POTASSIUM SERPL-SCNC: 4.3 MMOL/L — SIGNIFICANT CHANGE UP (ref 3.5–5.3)
RAPID RVP RESULT: SIGNIFICANT CHANGE UP
RBC # BLD: 3.93 M/UL — LOW (ref 4.2–5.8)
RBC # FLD: 14.2 % — SIGNIFICANT CHANGE UP (ref 10.3–14.5)
SODIUM SERPL-SCNC: 138 MMOL/L — SIGNIFICANT CHANGE UP (ref 135–145)
WBC # BLD: 11.69 K/UL — HIGH (ref 3.8–10.5)
WBC # FLD AUTO: 11.69 K/UL — HIGH (ref 3.8–10.5)

## 2018-10-18 PROCEDURE — 99233 SBSQ HOSP IP/OBS HIGH 50: CPT | Mod: GC

## 2018-10-18 PROCEDURE — 74177 CT ABD & PELVIS W/CONTRAST: CPT | Mod: 26

## 2018-10-18 PROCEDURE — 99232 SBSQ HOSP IP/OBS MODERATE 35: CPT

## 2018-10-18 RX ORDER — DEXTROSE 50 % IN WATER 50 %
25 SYRINGE (ML) INTRAVENOUS ONCE
Qty: 0 | Refills: 0 | Status: COMPLETED | OUTPATIENT
Start: 2018-10-18 | End: 2018-10-18

## 2018-10-18 RX ORDER — SODIUM CHLORIDE 9 MG/ML
1000 INJECTION, SOLUTION INTRAVENOUS
Qty: 0 | Refills: 0 | Status: DISCONTINUED | OUTPATIENT
Start: 2018-10-18 | End: 2018-10-21

## 2018-10-18 RX ADMIN — FINASTERIDE 5 MILLIGRAM(S): 5 TABLET, FILM COATED ORAL at 12:27

## 2018-10-18 RX ADMIN — BUDESONIDE AND FORMOTEROL FUMARATE DIHYDRATE 2 PUFF(S): 160; 4.5 AEROSOL RESPIRATORY (INHALATION) at 17:37

## 2018-10-18 RX ADMIN — Medication 25 MILLILITER(S): at 07:47

## 2018-10-18 RX ADMIN — APIXABAN 5 MILLIGRAM(S): 2.5 TABLET, FILM COATED ORAL at 17:36

## 2018-10-18 RX ADMIN — APIXABAN 5 MILLIGRAM(S): 2.5 TABLET, FILM COATED ORAL at 06:00

## 2018-10-18 RX ADMIN — SODIUM CHLORIDE 75 MILLILITER(S): 9 INJECTION, SOLUTION INTRAVENOUS at 07:47

## 2018-10-18 RX ADMIN — BUDESONIDE AND FORMOTEROL FUMARATE DIHYDRATE 2 PUFF(S): 160; 4.5 AEROSOL RESPIRATORY (INHALATION) at 06:02

## 2018-10-18 RX ADMIN — Medication 81 MILLIGRAM(S): at 12:27

## 2018-10-18 RX ADMIN — Medication 500 UNIT(S): at 12:26

## 2018-10-18 RX ADMIN — Medication 100 MILLIGRAM(S): at 06:00

## 2018-10-18 RX ADMIN — TIOTROPIUM BROMIDE 1 CAPSULE(S): 18 CAPSULE ORAL; RESPIRATORY (INHALATION) at 17:37

## 2018-10-18 NOTE — PROGRESS NOTE ADULT - ASSESSMENT
78 M with HLD, HFrEF, A fib, dementia, GERD, COPD, pulmonary fibrosis, BRIEN on CPAP, recurrent UTIs, recent hospital admission in 9/18 for UTI, returned ome from rehab 2 days ago, now brought in by family for confusion.    1. COPD/ Pulm fibrosis:  - Currently stable resp status  - Oxygenating well on RA, no wheezing on exam   - CXR with stable basilar fibriotic changes , no infiltrate   - Would cont Duonebs Q6H  - Can start Symbicort BID and Spiriva daily   - Cot home dose of Singulair 10 mg daily   - At present no indication for systemic steroids or antibiotics from pulm standpoint   - Aspiration precautions  - Chest PT/ pulm toilet   - Keep O2 sat > 88 %     2. BRIEN:  - Pt on CPAP of 7 cm H20 at home  - Would cont nocturnal CPAP at those settings   - Cont home dose of Nuvigil     3. AMS/ Delirium   - CT head negative. FUP MRI  - Frequent reorientation     4. Gen:  - DVT Px: on Eliquis   - Dr. Prieto will follow him during hospitalization 78 M with HLD, HFrEF, A fib, dementia, GERD, COPD, pulmonary fibrosis, BRIEN on CPAP, recurrent UTIs, recent hospital admission in 9/18 for UTI, returned ome from rehab 2 days ago, now brought in by family for confusion.    1. COPD/ Pulm fibrosis:  - Currently stable resp status  - Oxygenating well on RA, no wheezing on exam   - CXR with stable basilar fibriotic changes , no infiltrate   - Would cont Duonebs Q6H  - Can start Symbicort BID and Spiriva daily   - Cot home dose of Singulair 10 mg daily   - At present no indication for systemic steroids or antibiotics from pulm standpoint   - Aspiration precautions  - Chest PT/ pulm toilet   - Keep O2 sat > 88 %     2. BRIEN:  - Pt on CPAP of 7 cm H20 at home  - Would cont nocturnal CPAP at those settings   - Cont home dose of Nuvigil     3. AMS/ Delirium   - CT head negative. FUP MRI  - Frequent reorientation     4. Gen:  - DVT Px: on Eliquis

## 2018-10-18 NOTE — DISCHARGE NOTE ADULT - MEDICATION SUMMARY - MEDICATIONS TO TAKE
I will START or STAY ON the medications listed below when I get home from the hospital:    CPAP  -- Patient's Own Machine  To Be Used qhs and PRN while sleeping  Settings: 7  -- Indication: For Obstructive sleep apnea on CPAP    finasteride 5 mg oral tablet  -- 1 tab(s) by mouth once a day  -- Indication: For Urinary retention    aspirin 81 mg oral delayed release tablet  -- 1 tab(s) by mouth once a day  -- Indication: For Prophylactic measure    tamsulosin 0.4 mg oral capsule  -- 1 cap(s) by mouth once a day (at bedtime)  -- Indication: For Urinary retention    apixaban 5 mg oral tablet  -- 1 tab(s) by mouth every 12 hours  -- Indication: For Atrial fibrillation    escitalopram 10 mg oral tablet  -- 1 tab(s) by mouth once a day  -- Indication: For Dementia    Remeron 15 mg oral tablet  -- 0.5 tab(s) by mouth once a day (at bedtime)  -- Indication: For Dementia    atorvastatin 20 mg oral tablet  -- 1 tab(s) by mouth once a day  -- Indication: For Atrial fibrillation    levalbuterol 0.63 mg/3 mL inhalation solution  -- 3 milliliter(s) inhaled 3 times a day, As Needed  -- Indication: For COPD (chronic obstructive pulmonary disease)    budesonide-formoterol 80 mcg-4.5 mcg/inh inhalation aerosol  -- 2 puff(s) inhaled 2 times a day  -- Indication: For COPD (chronic obstructive pulmonary disease)    tiotropium 18 mcg inhalation capsule  -- 1 cap(s) inhaled once a day  -- Indication: For COPD (chronic obstructive pulmonary disease)    modafinil 200 mg oral tablet  -- 1 tab(s) by mouth every 24 hours  -- Indication: For Obstructive sleep apnea on CPAP    lactulose 10 g/15 mL oral syrup  -- 15 milliliter(s) by mouth once a day (at bedtime)   -- Indication: For Constipation    MiraLax oral powder for reconstitution  -- 17 gram(s) by mouth once a day  -- Indication: For Constipation    Vitamin D3 1000 intl units oral capsule  -- 0.5 cap(s) by mouth once a day  -- Indication: For Prophylactic measure    Vitamin B12 Methylcobalamin 5000 mcg sublingual tablet  -- 0.5 tab(s) under tongue once a day  -- Indication: For Prophylactic measure

## 2018-10-18 NOTE — PROGRESS NOTE ADULT - ATTENDING COMMENTS
Pt still with no improvement in encephalopathy - r/o PNA, UTI. Abdomen tender to palpate on exam and pt grimacing. Will obtain CTAP to rule out any intra-abdominal etiology to complete infectious workup. Discussed the care plan with wife at bedside who is in agreement with the plan    Deonna Varela MD  Division of Hospital Medicine  Pager: [phone]  Office: [phone]

## 2018-10-18 NOTE — DISCHARGE NOTE ADULT - PLAN OF CARE
Resolution You were having hallucinations and confusion which is why you came to the hospital. We looked for potential causes of the confusion and found that you likely had a urinary tract infection causing the confusion and so we started you on antibiotics. Resolution of infection; further investigation of mass outpatient You were found to have findings on CT scan of your abdomen suggestive of infection which could lead to increased confusion. We gave you antibiotics to help with potential infection. We also found that you have potential prostate cancer that may have spread to your liver; we would recommend following up with a urologist outpatient for biopsy. Hold QT-prolonging medications until repeat EKG QT is a part of an EKG that can be longer in certain people during various conditions (abnormalities of electrolytes or with exposure to certain medications). This means that you should avoid medications that prolong your QT. Manage symptoms Continue with nebulizers as needed for shortness of breath Continue with CPAP Anticoagulation Continue with eliquis as prescribed You were found to have findings on CT scan of your abdomen suggestive of infection which could lead to increased confusion. We gave you antibiotics to help with potential infection. We also found that you have potential prostate cancer that may have spread to your liver; we would recommend following up with a urologist outpatient for further workup. Continue with Eliquis as prescribed

## 2018-10-18 NOTE — DISCHARGE NOTE ADULT - OTHER SIGNIFICANT FINDINGS
EXAM:  CT BRAIN                            PROCEDURE DATE:  10/16/2018            INTERPRETATION:  INDICATIONS:  Slurred speech. Altered mental status.    TECHNIQUE:  Serial axial images were obtained from the skull base to the   vertex without intravenous contrast.    COMPARISON EXAMINATION: CT head dated 9/18/2018    FINDINGS:    VENTRICLES AND SULCI:  Age-appropriate involutional changes.  INTRA-AXIAL:  Microvascular ischemic changes involving the   periventricular and subcortical white matter, unchanged.  EXTRA-AXIAL:  No mass or collection is seen.  VISUALIZED SINUSES:  Clear.  VISUALIZED MASTOIDS:  Clear.  CALVARIUM:  Normal.  MISCELLANEOUS:  None.    IMPRESSION:  No acute intracranial hemorrhage or mass effect.   Microvascular ischemic changes, unchanged from the prior exam. Brain MRI   can be performed as clinically indicated.    EXAM:  CT ABDOMEN AND PELVIS IC                            PROCEDURE DATE:  10/18/2018            INTERPRETATION:  CLINICAL INFORMATION: Altered mental status and   leukocytosis with abdominal pain    COMPARISON: CT abdomen pelvis 5/14/2018    PROCEDURE:   CT of the Abdomen and Pelvis was performed with intravenous contrast.   Intravenous contrast: 90 ml Omnipaque 350. 10 ml discarded.  Oral contrast: None.  Sagittal and coronal reformats were performed.    FINDINGS:    LOWER CHEST: Bibasilar subpleural bronchiectasis and reticular opacities   and honeycombing, compatible with UIP fibrosis. Trace left pleural   effusion. Coronary atherosclerosis. Cardiomegaly.    LIVER: Hypoattenuating segment IVb ill-defined hepatic lesion measuring   1.7 cm.   BILE DUCTS: Normal caliber.  GALLBLADDER: Cholelithiasis.  SPLEEN: Within normal limits.  PANCREAS: Within normal limits.  ADRENALS: Within normal limits.  KIDNEYS/URETERS: Mild left perinephric fatty stranding diffuse   hypoenhancement of the left kidney. New mild bilateral hydronephrosis to   the level of a soft tissue nodularity in the pelvis. Right renal cyst.    BLADDER: Distended bladder with thickened wall and bladder wall   enhancement. There is perivesicular fat stranding.  REPRODUCTIVE ORGANS: Markedly enlarged and heterogeneous prostate gland   with extensive soft tissue nodularity extending anteriorly into the   pelvis and involves the posterior bladder wall in the anterior wall of   the rectum. There is extensive pelvic lymphadenopathy, which is new since   5/14/2018.    BOWEL: No bowel obstruction. Appendix is surgically absent.  PERITONEUM: No ascites.  VESSELS:  Within normal limits.  RETROPERITONEUM: No retroperitoneal lymphadenopathy.  ABDOMINAL WALL: Within normal limits.  BONES: Spondylosis. Chronic right rib fracture.    IMPRESSION:  Findings highly suspicious for metastatic prostate cancer, involving   posterior bladder wall and the anterior wall of the rectum with a   metastatic lesion to the liver. New bilateral hydronephrosis to the level   of pelvic mass.  Findings also suggestive of cystitis. EXAM:  CT BRAIN                        PROCEDURE DATE:  10/16/2018    INTERPRETATION:  INDICATIONS:  Slurred speech. Altered mental status.  TECHNIQUE:  Serial axial images were obtained from the skull base to the   vertex without intravenous contrast.  COMPARISON EXAMINATION: CT head dated 9/18/2018    FINDINGS:    VENTRICLES AND SULCI:  Age-appropriate involutional changes.  INTRA-AXIAL:  Microvascular ischemic changes involving the   periventricular and subcortical white matter, unchanged.  EXTRA-AXIAL:  No mass or collection is seen.  VISUALIZED SINUSES:  Clear.  VISUALIZED MASTOIDS:  Clear.  CALVARIUM:  Normal.  MISCELLANEOUS:  None.    IMPRESSION:  No acute intracranial hemorrhage or mass effect.   Microvascular ischemic changes, unchanged from the prior exam. Brain MRI   can be performed as clinically indicated.        EXAM:  CT ABDOMEN AND PELVIS IC                        PROCEDURE DATE:  10/18/2018    INTERPRETATION:  CLINICAL INFORMATION: Altered mental status and   leukocytosis with abdominal pain  COMPARISON: CT abdomen pelvis 5/14/2018  PROCEDURE:   CT of the Abdomen and Pelvis was performed with intravenous contrast.   Intravenous contrast: 90 ml Omnipaque 350. 10 ml discarded.  Oral contrast: None.  Sagittal and coronal reformats were performed.    FINDINGS:  LOWER CHEST: Bibasilar subpleural bronchiectasis and reticular opacities   and honeycombing, compatible with UIP fibrosis. Trace left pleural   effusion. Coronary atherosclerosis. Cardiomegaly.  LIVER: Hypoattenuating segment IVb ill-defined hepatic lesion measuring   1.7 cm.   BILE DUCTS: Normal caliber.  GALLBLADDER: Cholelithiasis.  SPLEEN: Within normal limits.  PANCREAS: Within normal limits.  ADRENALS: Within normal limits.  KIDNEYS/URETERS: Mild left perinephric fatty stranding diffuse   hypoenhancement of the left kidney. New mild bilateral hydronephrosis to   the level of a soft tissue nodularity in the pelvis. Right renal cyst.  BLADDER: Distended bladder with thickened wall and bladder wall   enhancement. There is perivesicular fat stranding.  REPRODUCTIVE ORGANS: Markedly enlarged and heterogeneous prostate gland   with extensive soft tissue nodularity extending anteriorly into the   pelvis and involves the posterior bladder wall in the anterior wall of   the rectum. There is extensive pelvic lymphadenopathy, which is new since   5/14/2018.  BOWEL: No bowel obstruction. Appendix is surgically absent.  PERITONEUM: No ascites.  VESSELS:  Within normal limits.  RETROPERITONEUM: No retroperitoneal lymphadenopathy.  ABDOMINAL WALL: Within normal limits.  BONES: Spondylosis. Chronic right rib fracture.    IMPRESSION:  Findings highly suspicious for metastatic prostate cancer, involving   posterior bladder wall and the anterior wall of the rectum with a   metastatic lesion to the liver. New bilateral hydronephrosis to the level   of pelvic mass.  Findings also suggestive of cystitis.

## 2018-10-18 NOTE — PROGRESS NOTE ADULT - PROBLEM SELECTOR PLAN 1
Unclear etiology at this time; does not have evidence to suggest acute infection as patient is afebrile with normal UA, normal CXR and no localizing exam findings. Isolated leukocytosis may be related to dehydration. ESR, CRP and procalcitonin were elevated. CTH without acute pathology. Confusion and hallucinations may also be in setting of delirium as patient recently returned home from rehab vs. progression of his dementia.   - neurology consulted, recommending MRI to look for acute changes, though predicts low yield. Believes likely delirium in the context of his dementia and returning home. Recommends going home with additional support  - Hypoglycemic this morning, gave dextrose IVP and started D5+1/2NS with decreased PO intake  - Consider restarting NuVigil if not improvement over course of day  - Other psychotropics held (mirtazipine and escitalopram).   - Urine culture negative  - TSH, B12, folate wnl. Negative syphilis screen  - Frequent reorientation, reduce unnecessary nighttime awakening  - Enhanced supervision  - Fall precautions  - If febrile, obtain BCx Unclear etiology at this time; does not have evidence to suggest acute infection as patient is afebrile with normal UA, normal CXR and no localizing exam findings. Isolated leukocytosis may be related to dehydration. ESR, CRP and procalcitonin were elevated. CTH without acute pathology. Confusion and hallucinations may also be in setting of delirium as patient recently returned home from rehab vs. progression of his dementia.   - neurology consulted, recommending MRI to look for acute changes, though predicts low yield. Believes likely delirium in the context of his dementia and returning home. Recommends going home with additional support  - Hypoglycemic this morning, gave dextrose IVP and started D5+1/2NS with decreased PO intake  - pt with abdominal tenderness on palpation. Will obtain CTAP to rule out infectious etiology  - Consider restarting NuVigil if not improvement over course of day  - Other psychotropics held (mirtazipine and escitalopram).   - Urine culture negative  - TSH, B12, folate wnl. Negative syphilis screen  - Frequent reorientation, reduce unnecessary nighttime awakening  - Enhanced supervision  - Fall precautions  - If febrile, obtain BCx

## 2018-10-18 NOTE — DISCHARGE NOTE ADULT - NS AS DC FOLLOWUP STROKE INST
Smoking Cessation/Influenza vaccination ([facility] Pub Date: August 7, 2015)/Heart Failure Heart Failure/Influenza vaccination ([facility] Pub Date: August 7, 2015)

## 2018-10-18 NOTE — PROGRESS NOTE ADULT - ASSESSMENT
Pt is a 79yo M with PMH afib on eliquis, COPD, BRIEN, dementia, CHF, GERD, HLD, pulmonary fibrosis, recurrent UTIs, recent admission for UTI then went to [place] rehab, returning from home with increased confusion, hallucinations as well as some episodic slurred speech. CT head no acute changes. Labs without any significant elevation in white count and UA negative, afebrile. He had denied pain, headaches. He reportedly had MRI brain at [facility] 4 weeks ago which did not demonstrate any acute findings per wife.     A/P: Continue to suspect metabolic delirium superimposed on baseline dementia but labs so far unimpressive apart from mildly dry Bun/Cr ratio, unlikely stroke though had endorsed slurred speech so MRI ordered and subsequently canceled.     Plan  1. Admitted to medicine  2. No evidence primary acute neuro process.  MRI ordered for confirmation of no acute process, though patient likely did not tolerate. Would not sedate for low yield test.  3. Frequent reorientation, avoid delirium provoking medications  4. Continue eliquis, neurochecks, statin  5. Enhanced supervision, Fall precautions  6. Optimize hydration  7. Ultimately if goes home just needs enhanced supervision, HHA etc  8. Noted to be hypoglycemic on recent BMP, likely contributing transiently to confusion/AMS.       Plan pending discussion with attending Dr. Gonzales this afternoon.    Asael Smith  Medicine PGY-3, neurology elective  Pager [phone] Pt is a 77yo M with PMH afib on eliquis, COPD, BRIEN, dementia, CHF, GERD, HLD, pulmonary fibrosis, recurrent UTIs, recent admission for UTI then went to [place] rehab, returning from home with increased confusion, hallucinations as well as some episodic slurred speech. CT head no acute changes. Labs without any significant elevation in white count and UA negative, afebrile. He had denied pain, headaches. He reportedly had MRI brain at [facility] 4 weeks ago which did not demonstrate any acute findings per wife.     A/P: Continue to suspect metabolic delirium superimposed on baseline dementia but labs so far unimpressive apart from mildly dry BUN/Cr ratio, elevated procalcitonin and ESR, mild leukocytosis - unlikely stroke though had endorsed slurred speech so MRI ordered and subsequently canceled.     Plan  1. Admitted to medicine  2. No evidence primary acute neuro process.  MRI ordered for confirmation of no acute process, though patient likely did not tolerate. Would not sedate for low yield test.  3. Frequent reorientation, avoid delirium provoking medications  4. Continue eliquis, neurochecks, statin  5. Enhanced supervision, Fall precautions  6. Optimize hydration  7. Ultimately if goes home just needs enhanced supervision, HHA etc  8. Noted to be hypoglycemic on recent BMP, likely contributing transiently to confusion/AMS.       Plan pending discussion with attending Dr. Gonzales this afternoon.    Asael Smith  Medicine PGY-3, neurology elective  Pager [phone] Pt is a 77yo M with PMH afib on eliquis, COPD, BRIEN, dementia, CHF, GERD, HLD, pulmonary fibrosis, recurrent UTIs, recent admission for UTI then went to [place] rehab, returning from home with increased confusion, hallucinations as well as some episodic slurred speech. CT head no acute changes. Labs without any significant elevation in white count and UA negative, afebrile. He had denied pain, headaches. He reportedly had MRI brain at [facility] 4 weeks ago which did not demonstrate any acute findings per wife.     A/P: Continue to suspect metabolic delirium superimposed on baseline dementia but labs so far unimpressive apart from mildly dry BUN/Cr ratio, elevated procalcitonin and ESR, mild leukocytosis - unlikely stroke though had endorsed slurred speech so MRI ordered and subsequently canceled.    Plan  1. Admitted to medicine  2. No evidence primary acute neuro process.  MRI ordered for confirmation of no acute process, though patient likely did not tolerate. Would not sedate for low yield test.  3. Frequent reorientation, avoid delirium provoking medications  4. Continue eliquis, neurochecks, statin  5. Enhanced supervision, Fall precautions  6. Optimize hydration  7. Ultimately if goes home just needs enhanced supervision, HHA etc  8. Noted to be hypoglycemic on recent BMP, likely contributing transiently to confusion/AMS.       Plan pending discussion with attending Dr. Gonzales this afternoon.    Asael Smith  Medicine PGY-3, neurology elective  Pager [phone]

## 2018-10-18 NOTE — PROGRESS NOTE ADULT - ATTENDING COMMENTS
as above--resp status stable  chronic sob-UIP, copd, debility, Card dz--  No rx for UIP  COPD--symbicort 2 bid, spiriva, neb rx q 6h  OSAS--cpap o/n is a must  Delirium w/up in progress    Te Prieto MD-Pulmonary   [phone]

## 2018-10-18 NOTE — DISCHARGE NOTE ADULT - MEDICATION SUMMARY - MEDICATIONS TO CHANGE
I will SWITCH the dose or number of times a day I take the medications listed below when I get home from the hospital:    CPAP  -- For night use  40% oxygen  CPAP 10  Nasal tubing only

## 2018-10-18 NOTE — DISCHARGE NOTE ADULT - CARE PROVIDER_API CALL
Te Prieto), Internal Medicine; Pulmonary Disease  [address]  [address]  [place] [address]  Phone: [phone]  Fax: [phone]    Kei Fairchild), Administration  Phone: [phone]  Fax: [phone]

## 2018-10-18 NOTE — PROGRESS NOTE ADULT - SUBJECTIVE AND OBJECTIVE BOX
CHIEF COMPLAINT: f/up-sob, UIP, osas--    Interval Events:    REVIEW OF SYSTEMS:  Constitutional: No fevers or chills. No weight loss. No fatigue or generalized malaise.  Eyes: No itching or discharge from the eyes  ENT: No ear pain. No ear discharge. No nasal congestion. No post nasal drip. No epistaxis. No throat pain. No sore throat. No difficulty swallowing.   CV: No chest pain. No palpitations. No lightheadedness or dizziness.   Resp: No dyspnea at rest. No dyspnea on exertion. No orthopnea. No wheezing. No cough. No stridor. No sputum production. No chest pain with respiration.  GI: No nausea. No vomiting. No diarrhea.  MSK: No joint pain or pain in any extremities  Integumentary: No skin lesions. No pedal edema.  Neurological: No gross motor weakness. No sensory changes.  [ ] All other systems negative  [ ] Unable to assess ROS because ________    OBJECTIVE:  ICU Vital Signs Last 24 Hrs  T(C): 37.1 (17 Oct 2018 18:49), Max: 37.1 (17 Oct 2018 18:49)  T(F): 98.7 (17 Oct 2018 18:49), Max: 98.7 (17 Oct 2018 18:49)  HR: 76 (17 Oct 2018 18:49) (74 - 78)  BP: 136/76 (17 Oct 2018 18:49) (123/70 - 137/73)  BP(mean): --  ABP: --  ABP(mean): --  RR: 18 (17 Oct 2018 18:49) (18 - 18)  SpO2: 96% (17 Oct 2018 18:49) (94% - 97%)        CAPILLARY BLOOD GLUCOSE          PHYSICAL EXAM:  General: Awake, alert, oriented X 3.   HEENT: Atraumatic, normocephalic.                 Mallampatti Grade                 No nasal congestion.                No tonsillar or pharyngeal exudates.  Lymph Nodes: No palpable lymphadenopathy  Neck: No JVD. No carotid bruit.   Respiratory: Normal chest expansion                         Normal percussion                         Normal and equal air entry                         No wheeze, rhonchi or rales.  Cardiovascular: S1 S2 normal. No murmurs, rubs or gallops.   Abdomen: Soft, non-tender, non-distended. No organomegaly.  Extremities: Warm to touch. Peripheral pulse palpable. No pedal edema.   Skin: No rashes or skin lesions  Neurological: Motor and sensory examination equal and normal in all four extremities.  Psychiatry: Appropriate mood and affect.    HOSPITAL MEDICATIONS:  MEDICATIONS  (STANDING):  apixaban 5 milliGRAM(s) Oral every 12 hours  aspirin enteric coated 81 milliGRAM(s) Oral daily  atorvastatin 20 milliGRAM(s) Oral at bedtime  buDESOnide  80 MICROgram(s)/formoterol 4.5 MICROgram(s) Inhaler 2 Puff(s) Inhalation two times a day  cholecalciferol 500 Unit(s) Oral daily  docusate sodium 100 milliGRAM(s) Oral two times a day  finasteride 5 milliGRAM(s) Oral daily  lactulose Syrup 10 Gram(s) Oral at bedtime  polyethylene glycol 3350 17 Gram(s) Oral at bedtime  tamsulosin 0.4 milliGRAM(s) Oral at bedtime  tiotropium 18 MICROgram(s) Capsule 1 Capsule(s) Inhalation daily    MEDICATIONS  (PRN):  ALBUTerol/ipratropium for Nebulization 3 milliLiter(s) Nebulizer every 6 hours PRN Shortness of Breath and/or Wheezing      LABS:                        12.6   9.4   )-----------( 189      ( 17 Oct 2018 11:10 )             37.4     10-17    140  |  102  |  16  ----------------------------<  93  4.5   |  26  |  1.19    Ca    8.6      17 Oct 2018 11:10  Phos  4.1     10-17  Mg     2.0     10-17    TPro  7.3  /  Alb  2.9<L>  /  TBili  0.9  /  DBili  x   /  AST  17  /  ALT  9<L>  /  AlkPhos  81  10-17    PT/INR - ( 16 Oct 2018 18:07 )   PT: 19.9 sec;   INR: 1.80 ratio         PTT - ( 16 Oct 2018 18:07 )  PTT:37.6 sec  Urinalysis Basic - ( 16 Oct 2018 18:07 )    Color: Yellow / Appearance: Clear / S.021 / pH: x  Gluc: x / Ketone: Negative  / Bili: Negative / Urobili: Negative   Blood: x / Protein: Trace / Nitrite: Negative   Leuk Esterase: Negative / RBC: 6 /hpf / WBC 1 /hpf   Sq Epi: x / Non Sq Epi: 0 /hpf / Bacteria: Negative            MICROBIOLOGY:     RADIOLOGY:  [ ] Reviewed and interpreted by me    Point of Care Ultrasound Findings:    PFT:    EKG: CHIEF COMPLAINT: f/up-sob, UIP, osas--sleepy -bipap at bedside    Interval Events: bipap refused, mri unable    REVIEW OF SYSTEMS:  Constitutional: No fevers or chills. No weight loss. No fatigue or generalized malaise.  Eyes: No itching or discharge from the eyes  ENT: No ear pain. No ear discharge. No nasal congestion. No post nasal drip. No epistaxis. No throat pain. No sore throat. No difficulty swallowing.   CV: No chest pain. No palpitations. No lightheadedness or dizziness.   Resp: No dyspnea at rest. No dyspnea on exertion. No orthopnea. No wheezing. No cough. No stridor. No sputum production. No chest pain with respiration.  GI: No nausea. No vomiting. No diarrhea.  MSK: No joint pain or pain in any extremities  Integumentary: No skin lesions. No pedal edema.  Neurological: No gross motor weakness. No sensory changes.  [ ] All other systems negative  [+ ] Unable to assess ROS because poorly arrousable________    OBJECTIVE:  ICU Vital Signs Last 24 Hrs  T(C): 37.1 (17 Oct 2018 18:49), Max: 37.1 (17 Oct 2018 18:49)  T(F): 98.7 (17 Oct 2018 18:49), Max: 98.7 (17 Oct 2018 18:49)  HR: 76 (17 Oct 2018 18:49) (74 - 78)  BP: 136/76 (17 Oct 2018 18:49) (123/70 - 137/73)  BP(mean): --  ABP: --  ABP(mean): --  RR: 18 (17 Oct 2018 18:49) (18 - 18)  SpO2: 96% (17 Oct 2018 18:49) (94% - 97%)        CAPILLARY BLOOD GLUCOSE          PHYSICAL EXAM:  General: Arrousable oriented X 1  HEENT: Atraumatic, normocephalic.                 Mallampatti Grade 3                No nasal congestion.                No tonsillar or pharyngeal exudates.  Lymph Nodes: No palpable lymphadenopathy  Neck: No JVD. No carotid bruit.   Respiratory: abnormal chest expansion                         Normal percussion                         Normal and equal air entry                         No wheeze, rhonchi but +++ rales.  Cardiovascular: S1 S2 normal. No murmurs, rubs or gallops.   Abdomen: Soft, non-tender, non-distended. No organomegaly. NABS  Extremities: Warm to touch. Peripheral pulse palpable. + pedal edema.   Skin: No rashes or skin lesions  Neurological: unable  Psychiatry: unable    HOSPITAL MEDICATIONS:  MEDICATIONS  (STANDING):  apixaban 5 milliGRAM(s) Oral every 12 hours  aspirin enteric coated 81 milliGRAM(s) Oral daily  atorvastatin 20 milliGRAM(s) Oral at bedtime  buDESOnide  80 MICROgram(s)/formoterol 4.5 MICROgram(s) Inhaler 2 Puff(s) Inhalation two times a day  cholecalciferol 500 Unit(s) Oral daily  docusate sodium 100 milliGRAM(s) Oral two times a day  finasteride 5 milliGRAM(s) Oral daily  lactulose Syrup 10 Gram(s) Oral at bedtime  polyethylene glycol 3350 17 Gram(s) Oral at bedtime  tamsulosin 0.4 milliGRAM(s) Oral at bedtime  tiotropium 18 MICROgram(s) Capsule 1 Capsule(s) Inhalation daily    MEDICATIONS  (PRN):  ALBUTerol/ipratropium for Nebulization 3 milliLiter(s) Nebulizer every 6 hours PRN Shortness of Breath and/or Wheezing      LABS:                        12.6   9.4   )-----------( 189      ( 17 Oct 2018 11:10 )             37.4     10-    140  |  102  |  16  ----------------------------<  93  4.5   |  26  |  1.19    Ca    8.6      17 Oct 2018 11:10  Phos  4.1     10-  Mg     2.0     10-    TPro  7.3  /  Alb  2.9<L>  /  TBili  0.9  /  DBili  x   /  AST  17  /  ALT  9<L>  /  AlkPhos  81  10-17    PT/INR - ( 16 Oct 2018 18:07 )   PT: 19.9 sec;   INR: 1.80 ratio         PTT - ( 16 Oct 2018 18:07 )  PTT:37.6 sec  Urinalysis Basic - ( 16 Oct 2018 18:07 )    Color: Yellow / Appearance: Clear / S.021 / pH: x  Gluc: x / Ketone: Negative  / Bili: Negative / Urobili: Negative   Blood: x / Protein: Trace / Nitrite: Negative   Leuk Esterase: Negative / RBC: 6 /hpf / WBC 1 /hpf   Sq Epi: x / Non Sq Epi: 0 /hpf / Bacteria: Negative            MICROBIOLOGY:     RADIOLOGY:  [ ] Reviewed and interpreted by me    Point of Care Ultrasound Findings:    PFT:    EKG:

## 2018-10-18 NOTE — DISCHARGE NOTE ADULT - SECONDARY DIAGNOSIS.
Prostate mass Prolonged Q-T interval on ECG COPD (chronic obstructive pulmonary disease) Obstructive sleep apnea on CPAP Persistent atrial fibrillation

## 2018-10-18 NOTE — PHYSICAL THERAPY INITIAL EVALUATION ADULT - PERTINENT HX OF CURRENT PROBLEM, REHAB EVAL
78M with PMH of Afib on Eliquis, COPD, BRIEN, dementia, HFrEF, GERD, HLD, pulmonary fibrosis, recurrent UTIs, and recent admission 9/12/-9/ for metabolic encephalopathy 2/2 UTI, now presenting with confusion. He was at subacute rehab following discharge and returned home two days ago. Since being home, family has noticed patient is confused and appears to be having hallucinations, pointing to things in front of him that aren't there

## 2018-10-18 NOTE — PROGRESS NOTE ADULT - SUBJECTIVE AND OBJECTIVE BOX
Admitting Diagnosis:  Disorientation (R41.0): DISORIENTATION, UNSPECIFIED    INTERVAL HISTORY:  Patient did not tolerate MRI.    Past Medical History:  COPD (chronic obstructive pulmonary disease) (J44.9)  Bundle branch block, right (I45.10)  Pulmonary fibrosis (J84.10)  Dementia (F03.90)  Atrial fibrillation (I48.91)  GERD (gastroesophageal reflux disease) (K21.9)  Recurrent UTI (N39.0)  Hyperlipidemia (272.4)  Recurrent UTI (urinary tract infection) (599.0)  Obstructive sleep apnea on CPAP (327.23)      Past Surgical History:  H/O small bowel obstruction (Z87.19)  History of appendectomy (Z90.49)      Social History:  No toxic habits    Family History:  FAMILY HISTORY:  Family history of colon cancer (Sibling)  Family history of myocardial infarction (Sibling)      Allergies:  No Known Drug Allergies  zosyn (Drowsiness)      ROS:  Constitutional: Patient offers no complaints of fevers or significant weight loss  Ears, Nose, Mouth and Throat: The patient presents with no abnormalities of the head, ears, eyes, nose or throat  Skin: Patient offers no concerns of new rashes or lesions  Respiratory: The patient presents with no abnormalities of the respiratory tract  Cardiovascular: The patient presents with no cardiac abnormalities  Gastrointestinal: The patient presents with no abnormalities of the GI system  Genitourinary: The patient presents with no dysuria, hematuria or frequent urination  Neurological: See HPI  Endocrine: Patient offers no complaints of excessive thirst, urination, or heat/cold intolerance    Advanced care planning reviewed and noted in the chart.    Medications:  ALBUTerol/ipratropium for Nebulization 3 milliLiter(s) Nebulizer every 6 hours PRN  apixaban 5 milliGRAM(s) Oral every 12 hours  aspirin enteric coated 81 milliGRAM(s) Oral daily  atorvastatin 20 milliGRAM(s) Oral at bedtime  buDESOnide  80 MICROgram(s)/formoterol 4.5 MICROgram(s) Inhaler 2 Puff(s) Inhalation two times a day  cholecalciferol 500 Unit(s) Oral daily  dextrose 5% + sodium chloride 0.45%. 1000 milliLiter(s) IV Continuous <Continuous>  docusate sodium 100 milliGRAM(s) Oral two times a day  finasteride 5 milliGRAM(s) Oral daily  lactulose Syrup 10 Gram(s) Oral at bedtime  polyethylene glycol 3350 17 Gram(s) Oral at bedtime  tamsulosin 0.4 milliGRAM(s) Oral at bedtime  tiotropium 18 MICROgram(s) Capsule 1 Capsule(s) Inhalation daily      Labs:  CBC Full  -  ( 18 Oct 2018 07:48 )  WBC Count : 11.69 K/uL  Hemoglobin : 11.7 g/dL  Hematocrit : 37.2 %  Platelet Count - Automated : 195 K/uL  Mean Cell Volume : 94.7 fl  Mean Cell Hemoglobin : 29.8 pg  Mean Cell Hemoglobin Concentration : 31.5 gm/dL  Auto Neutrophil # : 7.75 K/uL  Auto Lymphocyte # : 2.96 K/uL  Auto Monocyte # : 0.79 K/uL  Auto Eosinophil # : 0.11 K/uL  Auto Basophil # : 0.05 K/uL  Auto Neutrophil % : 66.3 %  Auto Lymphocyte % : 25.3 %  Auto Monocyte % : 6.8 %  Auto Eosinophil % : 0.9 %  Auto Basophil % : 0.4 %    1018    138  |  100  |  20  ----------------------------<  66<L>  4.3   |  21<L>  |  1.19    Ca    8.7      18 Oct 2018 06:27  Phos  3.5     10-18  Mg     1.9     10-18    TPro  7.3  /  Alb  2.9<L>  /  TBili  0.9  /  DBili  x   /  AST  17  /  ALT  9<L>  /  AlkPhos  81  10-17    CAPILLARY BLOOD GLUCOSE      POCT Blood Glucose.: 106 mg/dL (18 Oct 2018 10:20)  POCT Blood Glucose.: 72 mg/dL (18 Oct 2018 07:34)    LIVER FUNCTIONS - ( 17 Oct 2018 11:10 )  Alb: 2.9 g/dL / Pro: 7.3 g/dL / ALK PHOS: 81 U/L / ALT: 9 U/L / AST: 17 U/L / GGT: x           PT/INR - ( 16 Oct 2018 18:07 )   PT: 19.9 sec;   INR: 1.80 ratio         PTT - ( 16 Oct 2018 18:07 )  PTT:37.6 sec  Urinalysis Basic - ( 16 Oct 2018 18:07 )    Color: Yellow / Appearance: Clear / S.021 / pH: x  Gluc: x / Ketone: Negative  / Bili: Negative / Urobili: Negative   Blood: x / Protein: Trace / Nitrite: Negative   Leuk Esterase: Negative / RBC: 6 /hpf / WBC 1 /hpf   Sq Epi: x / Non Sq Epi: 0 /hpf / Bacteria: Negative      Vitals:  Vital Signs Last 24 Hrs  T(C): 37.1 (18 Oct 2018 06:04), Max: 37.1 (17 Oct 2018 18:49)  T(F): 98.7 (18 Oct 2018 06:04), Max: 98.7 (17 Oct 2018 18:49)  HR: 78 (18 Oct 2018 06:04) (74 - 78)  BP: 112/71 (18 Oct 2018 06:04) (112/71 - 137/73)  BP(mean): --  RR: 18 (18 Oct 2018 06:04) (18 - 18)  SpO2: 96% (18 Oct 2018 06:04) (94% - 96%)    NEUROLOGICAL EXAM:    Mental status: Awake, alert, and in no apparent distress. Oriented to person, place and time. Language function is normal. Recent memory, digit span and concentration were normal.     Cranial Nerves: Pupils were equal, round, reactive to light. Extraocular movements were intact. Visual field were full. Fundoscopic exam was deferred. Facial sensation was intact to light touch. There was no facial asymmetry. The palate was upgoing symmetrically and tongue was midline. Hearing acuity was intact to finger rub AU. Shoulder shrug was full bilaterally    Motor exam: Bulk and tone were normal. Strength was 5/5 in all four extremities. Fine finger movements were symmetric and normal. There was no pronator drift    Reflexes: 2+ in the bilateral upper extremities. 2+ in the bilateral lower extremities. Toes were downgoing bilaterally.     Sensation: Intact to light touch, temperature, vibration and proprioception.     Coordination: Finger-nose-finger and heel-to-shin was without dysmetria.     Gait: Narrow base and steady. Romberg was negative. Admitting Diagnosis:  Disorientation (R41.0): DISORIENTATION, UNSPECIFIED    INTERVAL HISTORY:  Patient did not tolerate MRI. Per wife at bedside was complaining of some urinary discomfort yesterday. She reports that his mental status today is similar to when he came home from rehab recently.     Past Medical History:  COPD (chronic obstructive pulmonary disease) (J44.9)  Bundle branch block, right (I45.10)  Pulmonary fibrosis (J84.10)  Dementia (F03.90)  Atrial fibrillation (I48.91)  GERD (gastroesophageal reflux disease) (K21.9)  Recurrent UTI (N39.0)  Hyperlipidemia (272.4)  Recurrent UTI (urinary tract infection) (599.0)  Obstructive sleep apnea on CPAP (327.23)      Past Surgical History:  H/O small bowel obstruction (Z87.19)  History of appendectomy (Z90.49)      Social History:  No toxic habits    Family History:  FAMILY HISTORY:  Family history of colon cancer (Sibling)  Family history of myocardial infarction (Sibling)      Allergies:  No Known Drug Allergies  zosyn (Drowsiness)    ROS:  Unable to provide ROS 2/2 fatigue, minimally verbal    Medications:  ALBUTerol/ipratropium for Nebulization 3 milliLiter(s) Nebulizer every 6 hours PRN  apixaban 5 milliGRAM(s) Oral every 12 hours  aspirin enteric coated 81 milliGRAM(s) Oral daily  atorvastatin 20 milliGRAM(s) Oral at bedtime  buDESOnide  80 MICROgram(s)/formoterol 4.5 MICROgram(s) Inhaler 2 Puff(s) Inhalation two times a day  cholecalciferol 500 Unit(s) Oral daily  dextrose 5% + sodium chloride 0.45%. 1000 milliLiter(s) IV Continuous <Continuous>  docusate sodium 100 milliGRAM(s) Oral two times a day  finasteride 5 milliGRAM(s) Oral daily  lactulose Syrup 10 Gram(s) Oral at bedtime  polyethylene glycol 3350 17 Gram(s) Oral at bedtime  tamsulosin 0.4 milliGRAM(s) Oral at bedtime  tiotropium 18 MICROgram(s) Capsule 1 Capsule(s) Inhalation daily      Labs:  CBC Full  -  ( 18 Oct 2018 07:48 )  WBC Count : 11.69 K/uL  Hemoglobin : 11.7 g/dL  Hematocrit : 37.2 %  Platelet Count - Automated : 195 K/uL  Mean Cell Volume : 94.7 fl  Mean Cell Hemoglobin : 29.8 pg  Mean Cell Hemoglobin Concentration : 31.5 gm/dL  Auto Neutrophil # : 7.75 K/uL  Auto Lymphocyte # : 2.96 K/uL  Auto Monocyte # : 0.79 K/uL  Auto Eosinophil # : 0.11 K/uL  Auto Basophil # : 0.05 K/uL  Auto Neutrophil % : 66.3 %  Auto Lymphocyte % : 25.3 %  Auto Monocyte % : 6.8 %  Auto Eosinophil % : 0.9 %  Auto Basophil % : 0.4 %    10    138  |  100  |  20  ----------------------------<  66<L>  4.3   |  21<L>  |  1.19    Ca    8.7      18 Oct 2018 06:27  Phos  3.5     1018  Mg     1.9     10-18    TPro  7.3  /  Alb  2.9<L>  /  TBili  0.9  /  DBili  x   /  AST  17  /  ALT  9<L>  /  AlkPhos  81  1017    CAPILLARY BLOOD GLUCOSE      POCT Blood Glucose.: 106 mg/dL (18 Oct 2018 10:20)  POCT Blood Glucose.: 72 mg/dL (18 Oct 2018 07:34)    LIVER FUNCTIONS - ( 17 Oct 2018 11:10 )  Alb: 2.9 g/dL / Pro: 7.3 g/dL / ALK PHOS: 81 U/L / ALT: 9 U/L / AST: 17 U/L / GGT: x           PT/INR - ( 16 Oct 2018 18:07 )   PT: 19.9 sec;   INR: 1.80 ratio         PTT - ( 16 Oct 2018 18:07 )  PTT:37.6 sec  Urinalysis Basic - ( 16 Oct 2018 18:07 )    Color: Yellow / Appearance: Clear / S.021 / pH: x  Gluc: x / Ketone: Negative  / Bili: Negative / Urobili: Negative   Blood: x / Protein: Trace / Nitrite: Negative   Leuk Esterase: Negative / RBC: 6 /hpf / WBC 1 /hpf   Sq Epi: x / Non Sq Epi: 0 /hpf / Bacteria: Negative      Vitals:  Vital Signs Last 24 Hrs  T(C): 37.1 (18 Oct 2018 06:04), Max: 37.1 (17 Oct 2018 18:49)  T(F): 98.7 (18 Oct 2018 06:04), Max: 98.7 (17 Oct 2018 18:49)  HR: 78 (18 Oct 2018 06:04) (74 - 78)  BP: 112/71 (18 Oct 2018 06:04) (112/71 - 137/73)  BP(mean): --  RR: 18 (18 Oct 2018 06:04) (18 - 18)  SpO2: 96% (18 Oct 2018 06:04) (94% - 96%)    NEUROLOGICAL EXAM:    Mental status: fatigued, lying in bed. Not answering questions but responding to pain, follows commands for EOM exam.     Cranial Nerves: Pupils were equal, round, reactive to light. Extraocular movements were intact when following commands There was no facial asymmetry. No participating with oropharyngeal exam.     Motor exam: moving all extremities but minimally participatory with strength exam.     Reflexes: 2+ upper extremity, 1+ lower.    Sensation: patient not participating but responds to painful stimuli.     Coordination: not participating with dysmetria exam.    Gait: deferred. Admitting Diagnosis:  Disorientation (R41.0): DISORIENTATION, UNSPECIFIED    INTERVAL HISTORY:  Patient did not tolerate MRI. Per wife at bedside was complaining of some urinary discomfort yesterday. She reports that his mental status today is similar to when he came home from rehab recently. Primary team reports that patient's mirtazapine and escitalopram were held given fatigue. Patient has been off Nuvigil since in [facility].     Past Medical History:  COPD (chronic obstructive pulmonary disease) (J44.9)  Bundle branch block, right (I45.10)  Pulmonary fibrosis (J84.10)  Dementia (F03.90)  Atrial fibrillation (I48.91)  GERD (gastroesophageal reflux disease) (K21.9)  Recurrent UTI (N39.0)  Hyperlipidemia (272.4)  Recurrent UTI (urinary tract infection) (599.0)  Obstructive sleep apnea on CPAP (327.23)      Past Surgical History:  H/O small bowel obstruction (Z87.19)  History of appendectomy (Z90.49)      Social History:  No toxic habits    Family History:  FAMILY HISTORY:  Family history of colon cancer (Sibling)  Family history of myocardial infarction (Sibling)      Allergies:  No Known Drug Allergies  zosyn (Drowsiness)    ROS:  Unable to provide ROS 2/2 fatigue, minimally verbal    Medications:  ALBUTerol/ipratropium for Nebulization 3 milliLiter(s) Nebulizer every 6 hours PRN  apixaban 5 milliGRAM(s) Oral every 12 hours  aspirin enteric coated 81 milliGRAM(s) Oral daily  atorvastatin 20 milliGRAM(s) Oral at bedtime  buDESOnide  80 MICROgram(s)/formoterol 4.5 MICROgram(s) Inhaler 2 Puff(s) Inhalation two times a day  cholecalciferol 500 Unit(s) Oral daily  dextrose 5% + sodium chloride 0.45%. 1000 milliLiter(s) IV Continuous <Continuous>  docusate sodium 100 milliGRAM(s) Oral two times a day  finasteride 5 milliGRAM(s) Oral daily  lactulose Syrup 10 Gram(s) Oral at bedtime  polyethylene glycol 3350 17 Gram(s) Oral at bedtime  tamsulosin 0.4 milliGRAM(s) Oral at bedtime  tiotropium 18 MICROgram(s) Capsule 1 Capsule(s) Inhalation daily      Labs:  CBC Full  -  ( 18 Oct 2018 07:48 )  WBC Count : 11.69 K/uL  Hemoglobin : 11.7 g/dL  Hematocrit : 37.2 %  Platelet Count - Automated : 195 K/uL  Mean Cell Volume : 94.7 fl  Mean Cell Hemoglobin : 29.8 pg  Mean Cell Hemoglobin Concentration : 31.5 gm/dL  Auto Neutrophil # : 7.75 K/uL  Auto Lymphocyte # : 2.96 K/uL  Auto Monocyte # : 0.79 K/uL  Auto Eosinophil # : 0.11 K/uL  Auto Basophil # : 0.05 K/uL  Auto Neutrophil % : 66.3 %  Auto Lymphocyte % : 25.3 %  Auto Monocyte % : 6.8 %  Auto Eosinophil % : 0.9 %  Auto Basophil % : 0.4 %    10    138  |  100  |  20  ----------------------------<  66<L>  4.3   |  21<L>  |  1.19    Ca    8.7      18 Oct 2018 06:27  Phos  3.5     10-18  Mg     1.9     10-18    TPro  7.3  /  Alb  2.9<L>  /  TBili  0.9  /  DBili  x   /  AST  17  /  ALT  9<L>  /  AlkPhos  81  10-17    CAPILLARY BLOOD GLUCOSE      POCT Blood Glucose.: 106 mg/dL (18 Oct 2018 10:20)  POCT Blood Glucose.: 72 mg/dL (18 Oct 2018 07:34)    LIVER FUNCTIONS - ( 17 Oct 2018 11:10 )  Alb: 2.9 g/dL / Pro: 7.3 g/dL / ALK PHOS: 81 U/L / ALT: 9 U/L / AST: 17 U/L / GGT: x           PT/INR - ( 16 Oct 2018 18:07 )   PT: 19.9 sec;   INR: 1.80 ratio         PTT - ( 16 Oct 2018 18:07 )  PTT:37.6 sec  Urinalysis Basic - ( 16 Oct 2018 18:07 )    Color: Yellow / Appearance: Clear / S.021 / pH: x  Gluc: x / Ketone: Negative  / Bili: Negative / Urobili: Negative   Blood: x / Protein: Trace / Nitrite: Negative   Leuk Esterase: Negative / RBC: 6 /hpf / WBC 1 /hpf   Sq Epi: x / Non Sq Epi: 0 /hpf / Bacteria: Negative      Vitals:  Vital Signs Last 24 Hrs  T(C): 37.1 (18 Oct 2018 06:04), Max: 37.1 (17 Oct 2018 18:49)  T(F): 98.7 (18 Oct 2018 06:04), Max: 98.7 (17 Oct 2018 18:49)  HR: 78 (18 Oct 2018 06:04) (74 - 78)  BP: 112/71 (18 Oct 2018 06:04) (112/71 - 137/73)  BP(mean): --  RR: 18 (18 Oct 2018 06:04) (18 - 18)  SpO2: 96% (18 Oct 2018 06:04) (94% - 96%)    NEUROLOGICAL EXAM:    Mental status: fatigued, lying in bed. Not answering questions but responding to pain, follows commands for EOM exam.     Cranial Nerves: Pupils were equal, round, reactive to light. Extraocular movements were intact when following commands There was no facial asymmetry. No participating with oropharyngeal exam.     Motor exam: moving all extremities but minimally participatory with strength exam.     Reflexes: 2+ upper extremity, 1+ lower.    Sensation: patient not participating but responds to painful stimuli.     Coordination: not participating with dysmetria exam.    Gait: deferred.

## 2018-10-18 NOTE — DISCHARGE NOTE ADULT - MEDICATION SUMMARY - MEDICATIONS TO STOP TAKING
I will STOP taking the medications listed below when I get home from the hospital:    Livalo 2 mg oral tablet  -- 1 tab(s) by mouth once a day    MiraLax oral powder for reconstitution  -- 17 gram(s) by mouth once a day

## 2018-10-18 NOTE — DISCHARGE NOTE ADULT - CARE PROVIDERS DIRECT ADDRESSES
[email or web address]

## 2018-10-18 NOTE — PHYSICAL THERAPY INITIAL EVALUATION ADULT - ADDITIONAL COMMENTS
As per wife, pt lives in a an apartment w/ 1 flight of steps to enter. PTA pt was ambulating w/ RW short household distances however required assist w/ ADLs.

## 2018-10-18 NOTE — PROGRESS NOTE ADULT - SUBJECTIVE AND OBJECTIVE BOX
Patient is a 78y old  Male who presents with a chief complaint of confusion (18 Oct 2018 10:58)      INTERVAL HPI/OVERNIGHT EVENTS:    Pt tired, answering questions minimally, choosing not to interact. Was oriented to self and place. Denied chest pain, abdominal pain, shortness of breath.        T(C): 37.1 (10-18-18 @ 06:04), Max: 37.1 (10-17-18 @ 18:49)  HR: 78 (10-18-18 @ 06:04) (74 - 78)  BP: 112/71 (10-18-18 @ 06:04) (112/71 - 137/73)  RR: 18 (10-18-18 @ 06:04) (18 - 18)  SpO2: 96% (10-18-18 @ 06:04) (94% - 96%)  Wt(kg): --Vital Signs Last 24 Hrs  T(C): 37.1 (18 Oct 2018 06:04), Max: 37.1 (17 Oct 2018 18:49)  T(F): 98.7 (18 Oct 2018 06:04), Max: 98.7 (17 Oct 2018 18:49)  HR: 78 (18 Oct 2018 06:04) (74 - 78)  BP: 112/71 (18 Oct 2018 06:04) (112/71 - 137/73)  BP(mean): --  RR: 18 (18 Oct 2018 06:04) (18 - 18)  SpO2: 96% (18 Oct 2018 06:04) (94% - 96%)    PHYSICAL EXAM:  GENERAL: NAD, sleeping in bed  HEAD:  Atraumatic, Normocephalic  EYES: EOMI, PERRLA, conjunctiva and sclera clear  ENMT: Moist mucous membranes, Good dentition, No lesions  NECK: Supple, No JVD  NERVOUS SYSTEM:  Alert & Oriented X3, no focal neurologic deficits  CHEST/LUNG: irregularly irregular; No rales, rhonchi, wheezing, or rubs  HEART: Regular rate and rhythm; No murmurs, rubs, or gallops  ABDOMEN: Soft, Nontender, Nondistended; Bowel sounds present  EXTREMITIES:  2+ Peripheral Pulses, No clubbing, cyanosis, or edema  LYMPH: No lymphadenopathy noted  SKIN: +dry skin on LLE    Consultant(s) Notes Reviewed:  [x ] YES  [ ] NO  Care Discussed with Consultants/Other Providers [ x] YES  [ ] NO    LABS:                        11.7   11.69 )-----------( 195      ( 18 Oct 2018 07:48 )             37.2     10    138  |  100  |  20  ----------------------------<  66<L>  4.3   |  21<L>  |  1.19    Ca    8.7      18 Oct 2018 06:27  Phos  3.5     10  Mg     1.9     10-18    TPro  7.3  /  Alb  2.9<L>  /  TBili  0.9  /  DBili  x   /  AST  17  /  ALT  9<L>  /  AlkPhos  81  10    PT/INR - ( 16 Oct 2018 18:07 )   PT: 19.9 sec;   INR: 1.80 ratio         PTT - ( 16 Oct 2018 18:07 )  PTT:37.6 sec  Urinalysis Basic - ( 16 Oct 2018 18:07 )    Color: Yellow / Appearance: Clear / S.021 / pH: x  Gluc: x / Ketone: Negative  / Bili: Negative / Urobili: Negative   Blood: x / Protein: Trace / Nitrite: Negative   Leuk Esterase: Negative / RBC: 6 /hpf / WBC 1 /hpf   Sq Epi: x / Non Sq Epi: 0 /hpf / Bacteria: Negative      CAPILLARY BLOOD GLUCOSE      POCT Blood Glucose.: 106 mg/dL (18 Oct 2018 10:20)  POCT Blood Glucose.: 72 mg/dL (18 Oct 2018 07:34)        Urinalysis Basic - ( 16 Oct 2018 18:07 )    Color: Yellow / Appearance: Clear / S.021 / pH: x  Gluc: x / Ketone: Negative  / Bili: Negative / Urobili: Negative   Blood: x / Protein: Trace / Nitrite: Negative   Leuk Esterase: Negative / RBC: 6 /hpf / WBC 1 /hpf   Sq Epi: x / Non Sq Epi: 0 /hpf / Bacteria: Negative        RADIOLOGY & ADDITIONAL TESTS:    Imaging Personally Reviewed:  [X] YES  [ ] NO

## 2018-10-18 NOTE — PHYSICAL THERAPY INITIAL EVALUATION ADULT - PRECAUTIONS/LIMITATIONS, REHAB EVAL
He has otherwise been asymptomatic with no recent fever, chills, headache, abdominal pain, n/v, diarrhea, rash, dysuria or urinary frequency. Pt wife states that the patient, during his stay at subacute rehab after his hospitalization in September, had a waxing and waning of his alertness. He would sleep for two days, all day long and then wake up for a day with occasional hallucinations. When he arrived home, he continued to have hallucinations which were worsening; he was fearful of people invading his home and of gunshots. The day of his hospitalization he was yelling at people who weren't actually in his house to leave his home. He was also not eating much or sleeping since returning home. Ct head did not show any acute changes. CT abdomen and pelvis w/ IV contrast 10.18.18 @ 20:36 He has otherwise been asymptomatic with no recent fever, chills, headache, abdominal pain, n/v, diarrhea, rash, dysuria or urinary frequency. Pt wife states that the patient, during his stay at subacute rehab after his hospitalization in September, had a waxing and waning of his alertness. He would sleep for two days, all day long and then wake up for a day with occasional hallucinations. When he arrived home, he continued to have hallucinations which were worsening; he was fearful of people invading his home and of gunshots. The day of his hospitalization he was yelling at people who weren't actually in his house to leave his home. He was also not eating much or sleeping since returning home. Ct head did not show any acute changes. CT abdomen and pelvis w/ IV contrast IMPRESSION: Findings highly suspicious for metastatic prostate cancer, involving posterior bladder wall and the anterior wall of the rectum with a metastatic lesion to the liver. New bilateral hydronephrosis to the level of pelvic mass

## 2018-10-18 NOTE — DISCHARGE NOTE ADULT - HOSPITAL COURSE
78M with PMH of Afib on Eliquis, COPD, BRIEN, dementia, HFrEF, GERD, HLD, pulmonary fibrosis, recurrent UTIs, and recent admission 9/12/-9/ for metabolic encephalopathy 2/2 UTI, presenting with confusion. He was at subacute rehab following discharge and returned home two days before admission. Since being home, family has noticed patient is confused and appears to be having hallucinations, pointing to things in front of him that aren't there which is different from his baseline. Patient had a workup for various causes of metabolic encephalopathy: he received a CT head negative for acute changes, had a UA on the day of admission unconcerning for infection, and urine culture drawn the day of admission was negative for infection. Pt also had a CXR which showed no consolidations and an EKG with a prolonged QTc (502). His psychoactive medications were held (NuVigil, Remeron, and escitalopram). During the first two days of hospitalization, patient was extremely lethargic. He was also complaining of pain in his abdomen and burning urine which prompted a CT A/P on which was found a prostate mass with fat stranding, contiguous with the rectal wall and potential lesion in the liver. Urine cultures were sent again and the patient was started on ceftriaxone 1g daily for presumed infection. 78M with PMH of Afib on Eliquis, COPD, BRIEN, dementia, HFrEF, GERD, HLD, pulmonary fibrosis, recurrent UTIs, and recent admission 9/12/-9/ for metabolic encephalopathy 2/2 UTI, presenting with confusion. He was at subacute rehab following discharge and returned home two days before admission. Since being home, family has noticed patient is confused and appears to be having hallucinations, pointing to things in front of him that aren't there which is different from his baseline. Patient had a workup for various causes of metabolic encephalopathy: he received a CT head negative for acute changes, had a UA on the day of admission unconcerning for infection, and urine culture drawn the day of admission was negative for infection. Pt also had a CXR which showed no consolidations and an EKG with a prolonged QTc (502). His psychoactive medications were held (NuVigil, Remeron, and escitalopram) intially. During the first two days of hospitalization, patient was extremely lethargic. He was also complaining of pain in his abdomen and burning urine which prompted a CT A/P on which was found a prostate mass with fat stranding, contiguous with the rectal wall and potential lesion in the liver. Patient completed 7 day course of CTX for complicated cystitis with improvement of his mental status. He was discharged with a shah catheter in place at Urology's recommendation, he will follow-up as an outpatient for further work-up of prostate mass/retention.

## 2018-10-18 NOTE — PROGRESS NOTE ADULT - PROBLEM SELECTOR PLAN 3
symbicort 80 2 bid  spiriva 1 puff q day   NO steroids  pulmonary toilet-incentive spirometry, Chest Pt-acapella/chest vest-up to 5 times per day.    maintain oxygen levels above 90%-supplemental oxygen via nasal canula-humidified    All nebulized therapy is to be administered via hand held nebulizer-(patient must gargle and spit with water after use)

## 2018-10-18 NOTE — DISCHARGE NOTE ADULT - CARE PLAN
Principal Discharge DX:	Metabolic encephalopathy  Goal:	Resolution  Assessment and plan of treatment:	You were having hallucinations and confusion which is why you came to the hospital. We looked for potential causes of the confusion and found that you likely had a urinary tract infection causing the confusion and so we started you on antibiotics.  Secondary Diagnosis:	Prostate mass  Goal:	Resolution of infection; further investigation of mass outpatient  Assessment and plan of treatment:	You were found to have findings on CT scan of your abdomen suggestive of infection which could lead to increased confusion. We gave you antibiotics to help with potential infection. We also found that you have potential prostate cancer that may have spread to your liver; we would recommend following up with a urologist outpatient for biopsy.  Secondary Diagnosis:	Prolonged Q-T interval on ECG  Goal:	Hold QT-prolonging medications until repeat EKG  Assessment and plan of treatment:	QT is a part of an EKG that can be longer in certain people during various conditions (abnormalities of electrolytes or with exposure to certain medications). This means that you should avoid medications that prolong your QT.  Secondary Diagnosis:	COPD (chronic obstructive pulmonary disease)  Goal:	Manage symptoms  Assessment and plan of treatment:	Continue with nebulizers as needed for shortness of breath  Secondary Diagnosis:	Obstructive sleep apnea on CPAP  Goal:	Manage symptoms  Assessment and plan of treatment:	Continue with CPAP  Secondary Diagnosis:	Persistent atrial fibrillation  Goal:	Anticoagulation  Assessment and plan of treatment:	Continue with eliquis as prescribed Principal Discharge DX:	Metabolic encephalopathy  Goal:	Resolution  Assessment and plan of treatment:	You were having hallucinations and confusion which is why you came to the hospital. We looked for potential causes of the confusion and found that you likely had a urinary tract infection causing the confusion and so we started you on antibiotics.  Secondary Diagnosis:	Prostate mass  Goal:	Resolution of infection; further investigation of mass outpatient  Assessment and plan of treatment:	You were found to have findings on CT scan of your abdomen suggestive of infection which could lead to increased confusion. We gave you antibiotics to help with potential infection. We also found that you have potential prostate cancer that may have spread to your liver; we would recommend following up with a urologist outpatient for further workup.  Secondary Diagnosis:	Prolonged Q-T interval on ECG  Goal:	Hold QT-prolonging medications until repeat EKG  Assessment and plan of treatment:	QT is a part of an EKG that can be longer in certain people during various conditions (abnormalities of electrolytes or with exposure to certain medications). This means that you should avoid medications that prolong your QT.  Secondary Diagnosis:	COPD (chronic obstructive pulmonary disease)  Goal:	Manage symptoms  Assessment and plan of treatment:	Continue with nebulizers as needed for shortness of breath  Secondary Diagnosis:	Obstructive sleep apnea on CPAP  Goal:	Manage symptoms  Assessment and plan of treatment:	Continue with CPAP  Secondary Diagnosis:	Persistent atrial fibrillation  Goal:	Anticoagulation  Assessment and plan of treatment:	Continue with eliquis as prescribed

## 2018-10-18 NOTE — DISCHARGE NOTE ADULT - PATIENT PORTAL LINK FT
You can access the [facility] Patient Portal, offered by [facility], by registering with the following website: [email or web address]

## 2018-10-19 DIAGNOSIS — N42.9 DISORDER OF PROSTATE, UNSPECIFIED: ICD-10-CM

## 2018-10-19 LAB
ANION GAP SERPL CALC-SCNC: 10 MMOL/L — SIGNIFICANT CHANGE UP (ref 5–17)
BASOPHILS # BLD AUTO: 0.04 K/UL — SIGNIFICANT CHANGE UP (ref 0–0.2)
BASOPHILS NFR BLD AUTO: 0.4 % — SIGNIFICANT CHANGE UP (ref 0–2)
BUN SERPL-MCNC: 18 MG/DL — SIGNIFICANT CHANGE UP (ref 7–23)
CALCIUM SERPL-MCNC: 8.3 MG/DL — LOW (ref 8.4–10.5)
CHLORIDE SERPL-SCNC: 102 MMOL/L — SIGNIFICANT CHANGE UP (ref 96–108)
CO2 SERPL-SCNC: 24 MMOL/L — SIGNIFICANT CHANGE UP (ref 22–31)
CREAT SERPL-MCNC: 1.08 MG/DL — SIGNIFICANT CHANGE UP (ref 0.5–1.3)
EOSINOPHIL # BLD AUTO: 0.17 K/UL — SIGNIFICANT CHANGE UP (ref 0–0.5)
EOSINOPHIL NFR BLD AUTO: 1.6 % — SIGNIFICANT CHANGE UP (ref 0–6)
GLUCOSE BLDC GLUCOMTR-MCNC: 121 MG/DL — HIGH (ref 70–99)
GLUCOSE BLDC GLUCOMTR-MCNC: 136 MG/DL — HIGH (ref 70–99)
GLUCOSE SERPL-MCNC: 115 MG/DL — HIGH (ref 70–99)
HCT VFR BLD CALC: 34.5 % — LOW (ref 39–50)
HGB BLD-MCNC: 11.3 G/DL — LOW (ref 13–17)
IMM GRANULOCYTES NFR BLD AUTO: 0.3 % — SIGNIFICANT CHANGE UP (ref 0–1.5)
LACTATE SERPL-SCNC: 1 MMOL/L — SIGNIFICANT CHANGE UP (ref 0.7–2)
LYMPHOCYTES # BLD AUTO: 28.4 % — SIGNIFICANT CHANGE UP (ref 13–44)
LYMPHOCYTES # BLD AUTO: 3.04 K/UL — SIGNIFICANT CHANGE UP (ref 1–3.3)
MCHC RBC-ENTMCNC: 31 PG — SIGNIFICANT CHANGE UP (ref 27–34)
MCHC RBC-ENTMCNC: 32.8 GM/DL — SIGNIFICANT CHANGE UP (ref 32–36)
MCV RBC AUTO: 94.8 FL — SIGNIFICANT CHANGE UP (ref 80–100)
MONOCYTES # BLD AUTO: 0.92 K/UL — HIGH (ref 0–0.9)
MONOCYTES NFR BLD AUTO: 8.6 % — SIGNIFICANT CHANGE UP (ref 2–14)
NEUTROPHILS # BLD AUTO: 6.5 K/UL — SIGNIFICANT CHANGE UP (ref 1.8–7.4)
NEUTROPHILS NFR BLD AUTO: 60.7 % — SIGNIFICANT CHANGE UP (ref 43–77)
PLATELET # BLD AUTO: 183 K/UL — SIGNIFICANT CHANGE UP (ref 150–400)
POTASSIUM SERPL-MCNC: 3.6 MMOL/L — SIGNIFICANT CHANGE UP (ref 3.5–5.3)
POTASSIUM SERPL-SCNC: 3.6 MMOL/L — SIGNIFICANT CHANGE UP (ref 3.5–5.3)
RBC # BLD: 3.64 M/UL — LOW (ref 4.2–5.8)
RBC # FLD: 14.1 % — SIGNIFICANT CHANGE UP (ref 10.3–14.5)
SODIUM SERPL-SCNC: 136 MMOL/L — SIGNIFICANT CHANGE UP (ref 135–145)
WBC # BLD: 10.7 K/UL — HIGH (ref 3.8–10.5)
WBC # FLD AUTO: 10.7 K/UL — HIGH (ref 3.8–10.5)

## 2018-10-19 PROCEDURE — 99233 SBSQ HOSP IP/OBS HIGH 50: CPT | Mod: GC

## 2018-10-19 PROCEDURE — 99232 SBSQ HOSP IP/OBS MODERATE 35: CPT

## 2018-10-19 RX ORDER — CEFTRIAXONE 500 MG/1
INJECTION, POWDER, FOR SOLUTION INTRAMUSCULAR; INTRAVENOUS
Qty: 0 | Refills: 0 | Status: DISCONTINUED | OUTPATIENT
Start: 2018-10-19 | End: 2018-10-25

## 2018-10-19 RX ORDER — CEFTRIAXONE 500 MG/1
1 INJECTION, POWDER, FOR SOLUTION INTRAMUSCULAR; INTRAVENOUS EVERY 24 HOURS
Qty: 0 | Refills: 0 | Status: DISCONTINUED | OUTPATIENT
Start: 2018-10-20 | End: 2018-10-25

## 2018-10-19 RX ORDER — CEFTRIAXONE 500 MG/1
1 INJECTION, POWDER, FOR SOLUTION INTRAMUSCULAR; INTRAVENOUS ONCE
Qty: 0 | Refills: 0 | Status: COMPLETED | OUTPATIENT
Start: 2018-10-19 | End: 2018-10-19

## 2018-10-19 RX ORDER — LIDOCAINE 4 G/100G
1 CREAM TOPICAL ONCE
Qty: 0 | Refills: 0 | Status: DISCONTINUED | OUTPATIENT
Start: 2018-10-19 | End: 2018-10-25

## 2018-10-19 RX ORDER — ACETAMINOPHEN 500 MG
650 TABLET ORAL ONCE
Qty: 0 | Refills: 0 | Status: COMPLETED | OUTPATIENT
Start: 2018-10-19 | End: 2018-10-19

## 2018-10-19 RX ADMIN — TIOTROPIUM BROMIDE 1 CAPSULE(S): 18 CAPSULE ORAL; RESPIRATORY (INHALATION) at 11:20

## 2018-10-19 RX ADMIN — LACTULOSE 10 GRAM(S): 10 SOLUTION ORAL at 21:08

## 2018-10-19 RX ADMIN — Medication 650 MILLIGRAM(S): at 21:07

## 2018-10-19 RX ADMIN — SODIUM CHLORIDE 75 MILLILITER(S): 9 INJECTION, SOLUTION INTRAVENOUS at 06:29

## 2018-10-19 RX ADMIN — Medication 500 UNIT(S): at 11:20

## 2018-10-19 RX ADMIN — TAMSULOSIN HYDROCHLORIDE 0.4 MILLIGRAM(S): 0.4 CAPSULE ORAL at 21:07

## 2018-10-19 RX ADMIN — Medication 650 MILLIGRAM(S): at 21:35

## 2018-10-19 RX ADMIN — CEFTRIAXONE 100 GRAM(S): 500 INJECTION, POWDER, FOR SOLUTION INTRAMUSCULAR; INTRAVENOUS at 11:20

## 2018-10-19 RX ADMIN — Medication 100 MILLIGRAM(S): at 17:25

## 2018-10-19 RX ADMIN — Medication 100 MILLIGRAM(S): at 06:29

## 2018-10-19 RX ADMIN — BUDESONIDE AND FORMOTEROL FUMARATE DIHYDRATE 2 PUFF(S): 160; 4.5 AEROSOL RESPIRATORY (INHALATION) at 17:25

## 2018-10-19 RX ADMIN — Medication 3 MILLILITER(S): at 15:34

## 2018-10-19 RX ADMIN — Medication 81 MILLIGRAM(S): at 11:20

## 2018-10-19 RX ADMIN — ATORVASTATIN CALCIUM 20 MILLIGRAM(S): 80 TABLET, FILM COATED ORAL at 21:08

## 2018-10-19 RX ADMIN — BUDESONIDE AND FORMOTEROL FUMARATE DIHYDRATE 2 PUFF(S): 160; 4.5 AEROSOL RESPIRATORY (INHALATION) at 06:29

## 2018-10-19 RX ADMIN — POLYETHYLENE GLYCOL 3350 17 GRAM(S): 17 POWDER, FOR SOLUTION ORAL at 21:09

## 2018-10-19 RX ADMIN — SODIUM CHLORIDE 75 MILLILITER(S): 9 INJECTION, SOLUTION INTRAVENOUS at 15:34

## 2018-10-19 RX ADMIN — APIXABAN 5 MILLIGRAM(S): 2.5 TABLET, FILM COATED ORAL at 17:25

## 2018-10-19 RX ADMIN — APIXABAN 5 MILLIGRAM(S): 2.5 TABLET, FILM COATED ORAL at 06:29

## 2018-10-19 RX ADMIN — FINASTERIDE 5 MILLIGRAM(S): 5 TABLET, FILM COATED ORAL at 11:20

## 2018-10-19 NOTE — PROGRESS NOTE ADULT - SUBJECTIVE AND OBJECTIVE BOX
CHIEF COMPLAINT: f/up sob, uip, osas, rads--no signif complaints    Interval Events: refused cpap, CT a/p-hydronephrosis    REVIEW OF SYSTEMS:  Constitutional: No fevers or chills. No weight loss. No fatigue or generalized malaise.  Eyes: No itching or discharge from the eyes  ENT: No ear pain. No ear discharge. No nasal congestion. No post nasal drip. No epistaxis. No throat pain. No sore throat. No difficulty swallowing.   CV: No chest pain. No palpitations. No lightheadedness or dizziness.   Resp: No dyspnea at rest. No dyspnea on exertion. No orthopnea. No wheezing. No cough. No stridor. No sputum production. No chest pain with respiration.  GI: No nausea. No vomiting. No diarrhea.  MSK: No joint pain or pain in any extremities  Integumentary: No skin lesions. No pedal edema.  Neurological: No gross motor weakness. No sensory changes.  [ ] All other systems negative  [+ ] Unable to assess ROS because dementia________    OBJECTIVE:  ICU Vital Signs Last 24 Hrs  T(C): 36.8 (18 Oct 2018 20:58), Max: 37.8 (18 Oct 2018 12:58)  T(F): 98.2 (18 Oct 2018 20:58), Max: 100 (18 Oct 2018 12:58)  HR: 94 (18 Oct 2018 23:19) (69 - 97)  BP: 122/71 (18 Oct 2018 20:58) (102/58 - 122/71)  BP(mean): --  ABP: --  ABP(mean): --  RR: 18 (18 Oct 2018 20:58) (18 - 18)  SpO2: 94% (18 Oct 2018 23:19) (94% - 96%)        10-17 @ 07:01  -  10-18 @ 07:00  --------------------------------------------------------  IN: 120 mL / OUT: 0 mL / NET: 120 mL    10-18 @ 07:01  -  10-19 @ 05:18  --------------------------------------------------------  IN: 750 mL / OUT: 0 mL / NET: 750 mL      CAPILLARY BLOOD GLUCOSE      POCT Blood Glucose.: 94 mg/dL (18 Oct 2018 21:27)      PHYSICAL EXAM:  General: Awake, alert, oriented X 2   HEENT: Atraumatic, normocephalic.                 Mallampatti Grade 3                No nasal congestion.                No tonsillar or pharyngeal exudates.  Lymph Nodes: No palpable lymphadenopathy  Neck: No JVD. No carotid bruit.   Respiratory: Normal chest expansion                         Normal percussion                         Normal and equal air entry                         No wheeze, rhonchi but ++rales.  Cardiovascular: S1 S2 normal. + murmurs, rubs or gallops.   Abdomen: Soft, non-tender, non-distended. No organomegaly. NABS  Extremities: Warm to touch. Peripheral pulse palpable. No pedal edema.   Skin: No rashes or skin lesions  Neurological: Motor and sensory examination equal and normal in all four extremities.  Psychiatry: Appropriate mood and affect.    HOSPITAL MEDICATIONS:  MEDICATIONS  (STANDING):  apixaban 5 milliGRAM(s) Oral every 12 hours  aspirin enteric coated 81 milliGRAM(s) Oral daily  atorvastatin 20 milliGRAM(s) Oral at bedtime  buDESOnide  80 MICROgram(s)/formoterol 4.5 MICROgram(s) Inhaler 2 Puff(s) Inhalation two times a day  cholecalciferol 500 Unit(s) Oral daily  dextrose 5% + sodium chloride 0.45%. 1000 milliLiter(s) (75 mL/Hr) IV Continuous <Continuous>  docusate sodium 100 milliGRAM(s) Oral two times a day  finasteride 5 milliGRAM(s) Oral daily  lactulose Syrup 10 Gram(s) Oral at bedtime  polyethylene glycol 3350 17 Gram(s) Oral at bedtime  tamsulosin 0.4 milliGRAM(s) Oral at bedtime  tiotropium 18 MICROgram(s) Capsule 1 Capsule(s) Inhalation daily    MEDICATIONS  (PRN):  ALBUTerol/ipratropium for Nebulization 3 milliLiter(s) Nebulizer every 6 hours PRN Shortness of Breath and/or Wheezing      LABS:                        11.7   11.69 )-----------( 195      ( 18 Oct 2018 07:48 )             37.2     10-18    138  |  100  |  20  ----------------------------<  66<L>  4.3   |  21<L>  |  1.19    Ca    8.7      18 Oct 2018 06:27  Phos  3.5     10-18  Mg     1.9     10-18    TPro  7.3  /  Alb  2.9<L>  /  TBili  0.9  /  DBili  x   /  AST  17  /  ALT  9<L>  /  AlkPhos  81  10-17        Arterial Blood Gas:  10-18 @ 18:21  7.50/34/79/27/97/4.1  ABG lactate: --        MICROBIOLOGY: < from: CT Abdomen and Pelvis w/ IV Cont (10.18.18 @ 20:36) >  NTERPRETATION:  Mild/moderate bilateral hydroureteronephrosis with   diffuse hypoenhancement of the left kidney, new since 5/14/2018. Findings   are concerning for developing bilateral renal obstruction.    Irregular prostate mass which invades into the anterior perirectal space   and anterior rectal wall. The prostate is inseparable from the posterior   wall the bladder. The bladder wall is diffusely thickened and irregular   with extensive surrounding fat stranding. Correlate with urinalysis for   possible superimposed infection.    Pulmonary fibrosis with honeycombing. Cardiomegaly. Cholelithiasis.        < end of copied text >      RADIOLOGY:    [ ] Reviewed and interpreted by me    Point of Care Ultrasound Findings:    PFT:    EKG:

## 2018-10-19 NOTE — CONSULT NOTE ADULT - ASSESSMENT
77 yo male with  bl hydroureteronephrosis which was also present in Sept. Unclear if had resolved with past shah.    ck bladder scan to ensure complete emptying   creat stable at baseline   no urgent urological intervention at this time 79 yo male with  bl hydroureteronephrosis which was also present in Sept. Unclear if had resolved with past Davidson  Concern for overflow voiding in setting of an enlarged prostate, with random bladder scan of 400cc.  Pt refusing Davidson.      ck bladder scan to ensure complete emptying; recommend 3-4 bladder scans, preferably timed after incontinent voids  creat stable at baseline   If primary team is considering Davidson vs straight cath, would strongly advise placement of indwelling catheter  daily CBC/BMP to watch for rising WBC and SCr  Would NOT advise PSA as this time  Will need outpatient f/u regarding concern for BPH vs prostate cancer  no urgent urological intervention at this time     Discussed with Dr. Fairchild

## 2018-10-19 NOTE — PROGRESS NOTE ADULT - PROBLEM SELECTOR PLAN 3
Stable. Continue Eliquis 5mg BID. CT from 10/18 shows rregular prostate mass which invades into the anterior perirectal space and anterior rectal wall. The prostate is inseparable from the posterior wall the bladder.   -Has history of previous UTIs. Concerning for possibly malignancy.  -Bladder scan completed showing 331cc urine retained. Davidson placed today. Monitor output. With bladder fat stranding, concerning for active UTI. CT from 10/18 shows irregular prostate mass which invades into the anterior perirectal space and anterior rectal wall. The prostate is inseparable from the posterior wall the bladder.   -Has history of previous UTIs. Also Concerning for possibly malignancy.  -Bladder scan completed showing 331cc urine retained. Davidson placed today. Monitor output.  -Covering UTI with ceftriaxone.

## 2018-10-19 NOTE — PROGRESS NOTE ADULT - ATTENDING COMMENTS
as above--resp status stable  chronic sob-UIP, copd, debility, Card dz--  No rx for UIP  COPD--symbicort 2 bid, spiriva, neb rx q 6h  OSAS--cpap o/n is a must  Delirium w/up in progress-CT a/p hydronephrosis---Urology consult  Te Prieto MD-Pulmonary   [phone]

## 2018-10-19 NOTE — CONSULT NOTE ADULT - SUBJECTIVE AND OBJECTIVE BOX
78yMale who is a patient of Dr. Siria Barraza and was last seen at [facility] Sept 2018 for bl hydronephrosis and urinary retention. Has since undergone TOV and rehabilitation. Here with worsening confusion and found with bl hydronephrosis. Pt seen and examined and answering simple questions. Denies flank pain or sensation of urinary retention. Has had BM and states feels his bladder is emptying completely. Unable to add additional info.     No record of renal imagining post shah insertion to determine if hydro had resolved.     PAST MEDICAL & SURGICAL HISTORY:  COPD (chronic obstructive pulmonary disease)  Bundle branch block, right  Pulmonary fibrosis  Dementia  Atrial fibrillation  GERD (gastroesophageal reflux disease)  Recurrent UTI  Hyperlipidemia  Obstructive sleep apnea on CPAP  H/O small bowel obstruction  History of appendectomy      MEDICATIONS  (STANDING):  apixaban 5 milliGRAM(s) Oral every 12 hours  aspirin enteric coated 81 milliGRAM(s) Oral daily  atorvastatin 20 milliGRAM(s) Oral at bedtime  buDESOnide  80 MICROgram(s)/formoterol 4.5 MICROgram(s) Inhaler 2 Puff(s) Inhalation two times a day  cholecalciferol 500 Unit(s) Oral daily  dextrose 5% + sodium chloride 0.45%. 1000 milliLiter(s) (75 mL/Hr) IV Continuous <Continuous>  docusate sodium 100 milliGRAM(s) Oral two times a day  finasteride 5 milliGRAM(s) Oral daily  lactulose Syrup 10 Gram(s) Oral at bedtime  polyethylene glycol 3350 17 Gram(s) Oral at bedtime  tamsulosin 0.4 milliGRAM(s) Oral at bedtime  tiotropium 18 MICROgram(s) Capsule 1 Capsule(s) Inhalation daily    MEDICATIONS  (PRN):  ALBUTerol/ipratropium for Nebulization 3 milliLiter(s) Nebulizer every 6 hours PRN Shortness of Breath and/or Wheezing      FAMILY HISTORY:  Family history of colon cancer (Sibling)  Family history of myocardial infarction (Sibling)      Allergies    No Known Drug Allergies  zosyn (Drowsiness)    Intolerances        SOCIAL HISTORY:   retired no tob/etoh     REVIEW OF SYSTEMS: Otherwise negative as stated in HPI    Physical Exam  Vital signs  T(C): 36.7 (10-19-18 @ 05:34), Max: 37.8 (10-18-18 @ 12:58)  HR: 77 (10-19-18 @ 05:34)  BP: 114/74 (10-19-18 @ 05:34)  SpO2: 95% (10-19-18 @ 05:34)  Wt(kg): --    Output    UOP NONE PRESENT     Gen:  AWAKE ALERT NAD AXOX3    Pulm:  NO RESP DISTRESS  	  CV:  S1S2    GI:  SOFT FLAT  NT/ND  NO CVAT BL  NONPALP BLADDER     :   BURIED PENIS BL TESTIS DESC NT                            	      LABS:                            11.7   11.69 )-----------( 195      ( 18 Oct 2018 07:48 )             37.2       10-18    138  |  100  |  20  ----------------------------<  66<L>  4.3   |  21<L>  |  1.19    Ca    8.7      18 Oct 2018 06:27  Phos  3.5     10-18  Mg     1.9     10-18    TPro  7.3  /  Alb  2.9<L>  /  TBili  0.9  /  DBili  x   /  AST  17  /  ALT  9<L>  /  AlkPhos  81  10-17      Culture - Urine (10.16.18 @ 21:22)    Specimen Source: .Urine Catheterized    Culture Results:   No growth    Urinalysis + Microscopic Examination (10.16.18 @ 18:07)    Color: Yellow    Blood, Urine: Negative    Glucose Qualitative, Urine: Negative    Bilirubin: Negative    Ketone - Urine: Negative    Nitrite: Negative    Leukocyte Esterase Concentration: Negative    Protein, Urine: Trace    Specific Gravity: 1.021    Urobilinogen: Negative    Urine Appearance: Clear    pH Urine: 6.0    Red Blood Cell - Urine: 6 /hpf    White Blood Cell - Urine: 1 /hpf    Epithelial Cells: 0 /hpf    Hyaline Casts: 0 /lpf    Bacteria: Negative          RADIOLOGY:  < from: CT Abdomen and Pelvis w/ IV Cont (10.18.18 @ 20:36) >  ******PRELIMINARY REPORT******              INTERPRETATION:  Mild/moderate bilateral hydroureteronephrosis with   diffuse hypoenhancement of the left kidney, new since 5/14/2018. Findings   are concerning for developing bilateral renal obstruction.    Irregular prostate mass which invades into the anterior perirectal space   and anterior rectal wall. The prostate is inseparable from the posterior   wall the bladder. The bladder wall is diffusely thickened and irregular   with extensive surrounding fat stranding. Correlate with urinalysis for   possible superimposed infection.    Pulmonary fibrosis with honeycombing. Cardiomegaly. Cholelithiasis.    < end of copied text > 78yMale who is a patient of Dr. Siria Barraza and was last seen at [facility] Sept 2018 for bl hydronephrosis and urinary retention. Has since undergone TOV and rehabilitation. Here with worsening confusion and found with bl hydronephrosis. Pt seen and examined and answering simple questions. Denies flank pain or sensation of urinary retention. Has had BM and states feels his bladder is emptying completely. Unable to add additional info.     No record of renal imagining post shah insertion to determine if hydro had resolved.     PAST MEDICAL & SURGICAL HISTORY:  COPD (chronic obstructive pulmonary disease)  Bundle branch block, right  Pulmonary fibrosis  Dementia  Atrial fibrillation  GERD (gastroesophageal reflux disease)  Recurrent UTI  Hyperlipidemia  Obstructive sleep apnea on CPAP  H/O small bowel obstruction  History of appendectomy      MEDICATIONS  (STANDING):  apixaban 5 milliGRAM(s) Oral every 12 hours  aspirin enteric coated 81 milliGRAM(s) Oral daily  atorvastatin 20 milliGRAM(s) Oral at bedtime  buDESOnide  80 MICROgram(s)/formoterol 4.5 MICROgram(s) Inhaler 2 Puff(s) Inhalation two times a day  cholecalciferol 500 Unit(s) Oral daily  dextrose 5% + sodium chloride 0.45%. 1000 milliLiter(s) (75 mL/Hr) IV Continuous <Continuous>  docusate sodium 100 milliGRAM(s) Oral two times a day  finasteride 5 milliGRAM(s) Oral daily  lactulose Syrup 10 Gram(s) Oral at bedtime  polyethylene glycol 3350 17 Gram(s) Oral at bedtime  tamsulosin 0.4 milliGRAM(s) Oral at bedtime  tiotropium 18 MICROgram(s) Capsule 1 Capsule(s) Inhalation daily    MEDICATIONS  (PRN):  ALBUTerol/ipratropium for Nebulization 3 milliLiter(s) Nebulizer every 6 hours PRN Shortness of Breath and/or Wheezing      FAMILY HISTORY:  Family history of colon cancer (Sibling)  Family history of myocardial infarction (Sibling)      Allergies    No Known Drug Allergies  zosyn (Drowsiness)    Intolerances        SOCIAL HISTORY:   retired no tob/etoh     REVIEW OF SYSTEMS: Otherwise negative as stated in HPI    Physical Exam  Vital signs  T(C): 36.7 (10-19-18 @ 05:34), Max: 37.8 (10-18-18 @ 12:58)  HR: 77 (10-19-18 @ 05:34)  BP: 114/74 (10-19-18 @ 05:34)  SpO2: 95% (10-19-18 @ 05:34)  Wt(kg): --    Output    UOP NONE PRESENT     Gen:  AWAKE ALERT NAD AXOX3    Pulm:  NO RESP DISTRESS  	  CV:  S1S2    GI:  SOFT FLAT, reportedly tender to palpation, non-distended though exam limited  NO CVAT BL  NONPALP BLADDER     :   BURIED PENIS, pr reports pain with manipulation of penis, no paraphimosis, BL TESTIS DESC NT       	      LABS:                            11.7   11.69 )-----------( 195      ( 18 Oct 2018 07:48 )             37.2       10-18    138  |  100  |  20  ----------------------------<  66<L>  4.3   |  21<L>  |  1.19    Ca    8.7      18 Oct 2018 06:27  Phos  3.5     10-18  Mg     1.9     10-18    TPro  7.3  /  Alb  2.9<L>  /  TBili  0.9  /  DBili  x   /  AST  17  /  ALT  9<L>  /  AlkPhos  81  10-17      Culture - Urine (10.16.18 @ 21:22)    Specimen Source: .Urine Catheterized    Culture Results:   No growth    Urinalysis + Microscopic Examination (10.16.18 @ 18:07)    Color: Yellow    Blood, Urine: Negative    Glucose Qualitative, Urine: Negative    Bilirubin: Negative    Ketone - Urine: Negative    Nitrite: Negative    Leukocyte Esterase Concentration: Negative    Protein, Urine: Trace    Specific Gravity: 1.021    Urobilinogen: Negative    Urine Appearance: Clear    pH Urine: 6.0    Red Blood Cell - Urine: 6 /hpf    White Blood Cell - Urine: 1 /hpf    Epithelial Cells: 0 /hpf    Hyaline Casts: 0 /lpf    Bacteria: Negative          RADIOLOGY:  < from: CT Abdomen and Pelvis w/ IV Cont (10.18.18 @ 20:36) >  ******PRELIMINARY REPORT******        INTERPRETATION:  Mild/moderate bilateral hydroureteronephrosis with diffuse hypo-enhancement of the left kidney, new since 5/14/2018. Findings are concerning for developing bilateral renal obstruction.    Irregular prostate mass which invades into the anterior perirectal space and anterior rectal wall. The prostate is inseparable from the posterior wall the bladder. The bladder wall is diffusely thickened and irregular with extensive surrounding fat stranding. Correlate with urinalysis for possible superimposed infection.    Pulmonary fibrosis with honeycombing. Cardiomegaly. Cholelithiasis.

## 2018-10-19 NOTE — PROGRESS NOTE ADULT - SUBJECTIVE AND OBJECTIVE BOX
Patient is a 78y old  Male who presents with a chief complaint of confusion (19 Oct 2018 05:17)      SUBJECTIVE/OVERNIGHT EVENTS     No acute events overnight. Pt is A&Ox2 this AM. Denies nausea, vomiting, constipation, chest pain.     MEDICATIONS  (STANDING):  apixaban 5 milliGRAM(s) Oral every 12 hours  aspirin enteric coated 81 milliGRAM(s) Oral daily  atorvastatin 20 milliGRAM(s) Oral at bedtime  buDESOnide  80 MICROgram(s)/formoterol 4.5 MICROgram(s) Inhaler 2 Puff(s) Inhalation two times a day  cholecalciferol 500 Unit(s) Oral daily  dextrose 5% + sodium chloride 0.45%. 1000 milliLiter(s) (75 mL/Hr) IV Continuous <Continuous>  docusate sodium 100 milliGRAM(s) Oral two times a day  finasteride 5 milliGRAM(s) Oral daily  lactulose Syrup 10 Gram(s) Oral at bedtime  polyethylene glycol 3350 17 Gram(s) Oral at bedtime  tamsulosin 0.4 milliGRAM(s) Oral at bedtime  tiotropium 18 MICROgram(s) Capsule 1 Capsule(s) Inhalation daily    MEDICATIONS  (PRN):  ALBUTerol/ipratropium for Nebulization 3 milliLiter(s) Nebulizer every 6 hours PRN Shortness of Breath and/or Wheezing    CAPILLARY BLOOD GLUCOSE      POCT Blood Glucose.: 94 mg/dL (18 Oct 2018 21:27)  POCT Blood Glucose.: 90 mg/dL (18 Oct 2018 16:52)  POCT Blood Glucose.: 85 mg/dL (18 Oct 2018 16:51)  POCT Blood Glucose.: 90 mg/dL (18 Oct 2018 12:23)  POCT Blood Glucose.: 106 mg/dL (18 Oct 2018 10:20)    I&O's Summary    18 Oct 2018 07:01  -  19 Oct 2018 07:00  --------------------------------------------------------  IN: 1600 mL / OUT: 0 mL / NET: 1600 mL          Vital Signs Last 24 Hrs  T(C): 36.7 (19 Oct 2018 05:34), Max: 37.8 (18 Oct 2018 12:58)  T(F): 98.1 (19 Oct 2018 05:34), Max: 100 (18 Oct 2018 12:58)  HR: 95 (19 Oct 2018 06:42) (69 - 97)  BP: 114/74 (19 Oct 2018 05:34) (102/58 - 122/71)  BP(mean): --  RR: 18 (19 Oct 2018 05:34) (18 - 18)  SpO2: 94% (19 Oct 2018 06:42) (94% - 95%)    PHYSICAL EXAM:  GENERAL: NAD, sleeping in bed  HEAD:  Atraumatic, Normocephalic  EYES: EOMI, PERRLA, conjunctiva and sclera clear  ENMT: Moist mucous membranes, Good dentition, No lesions  NECK: Supple, No JVD  NERVOUS SYSTEM:  Alert & Oriented X3, no focal neurologic deficits  CHEST/LUNG: No rales, rhonchi, wheezing, or rubs  HEART: irregular; No murmurs, rubs, or gallops  ABDOMEN: Soft, Nontender, Nondistended; Bowel sounds present  EXTREMITIES:  2+ Peripheral Pulses, No clubbing, cyanosis, or edema  LYMPH: No lymphadenopathy noted  SKIN: +dry skin on LLE      LABS                        11.7   11.69 )-----------( 195      ( 18 Oct 2018 07:48 )             37.2                         12.6   9.4   )-----------( 189      ( 17 Oct 2018 11:10 )             37.4     10-19    136  |  102  |  18  ----------------------------<  115<H>  3.6   |  24  |  1.08  10-18    138  |  100  |  20  ----------------------------<  66<L>  4.3   |  21<L>  |  1.19    Ca    8.3<L>      19 Oct 2018 05:56  Ca    8.7      18 Oct 2018 06:27  Phos  3.5     10-18  Mg     1.9     10-18    TPro  7.3  /  Alb  2.9<L>  /  TBili  0.9  /  DBili  x   /  AST  17  /  ALT  9<L>  /  AlkPhos  81  10-17              ( 18 Oct 2018 18:21 ) pH: 7.50  /  pCO2: 34    /  pO2: 79    / HCO3: 27    / Base Excess: 4.1   /  SaO2: 97                    RADIOLOGY & ADDITIONAL TESTS:    Imaging Personally Reviewed:    Consultant(s) Notes Reviewed:      Care Discussed with Consultants/Other Providers: Patient is a 78y old  Male who presents with a chief complaint of confusion (19 Oct 2018 05:17)      SUBJECTIVE/OVERNIGHT EVENTS     No acute events overnight. Pt is A&Ox2 this AM. Denies nausea, vomiting, constipation, chest pain. Of note, took pt's weight this AM and bed weight is 187lb. Before being hospitalized in September, pt's wife stated he used to weigh 220lb.    MEDICATIONS  (STANDING):  apixaban 5 milliGRAM(s) Oral every 12 hours  aspirin enteric coated 81 milliGRAM(s) Oral daily  atorvastatin 20 milliGRAM(s) Oral at bedtime  buDESOnide  80 MICROgram(s)/formoterol 4.5 MICROgram(s) Inhaler 2 Puff(s) Inhalation two times a day  cholecalciferol 500 Unit(s) Oral daily  dextrose 5% + sodium chloride 0.45%. 1000 milliLiter(s) (75 mL/Hr) IV Continuous <Continuous>  docusate sodium 100 milliGRAM(s) Oral two times a day  finasteride 5 milliGRAM(s) Oral daily  lactulose Syrup 10 Gram(s) Oral at bedtime  polyethylene glycol 3350 17 Gram(s) Oral at bedtime  tamsulosin 0.4 milliGRAM(s) Oral at bedtime  tiotropium 18 MICROgram(s) Capsule 1 Capsule(s) Inhalation daily    MEDICATIONS  (PRN):  ALBUTerol/ipratropium for Nebulization 3 milliLiter(s) Nebulizer every 6 hours PRN Shortness of Breath and/or Wheezing    CAPILLARY BLOOD GLUCOSE      POCT Blood Glucose.: 94 mg/dL (18 Oct 2018 21:27)  POCT Blood Glucose.: 90 mg/dL (18 Oct 2018 16:52)  POCT Blood Glucose.: 85 mg/dL (18 Oct 2018 16:51)  POCT Blood Glucose.: 90 mg/dL (18 Oct 2018 12:23)  POCT Blood Glucose.: 106 mg/dL (18 Oct 2018 10:20)    I&O's Summary    18 Oct 2018 07:01  -  19 Oct 2018 07:00  --------------------------------------------------------  IN: 1600 mL / OUT: 0 mL / NET: 1600 mL          Vital Signs Last 24 Hrs  T(C): 36.7 (19 Oct 2018 05:34), Max: 37.8 (18 Oct 2018 12:58)  T(F): 98.1 (19 Oct 2018 05:34), Max: 100 (18 Oct 2018 12:58)  HR: 95 (19 Oct 2018 06:42) (69 - 97)  BP: 114/74 (19 Oct 2018 05:34) (102/58 - 122/71)  BP(mean): --  RR: 18 (19 Oct 2018 05:34) (18 - 18)  SpO2: 94% (19 Oct 2018 06:42) (94% - 95%)    PHYSICAL EXAM:  GENERAL: NAD, sleeping in bed  HEAD:  Atraumatic, Normocephalic  EYES: EOMI, PERRLA, conjunctiva and sclera clear  ENMT: Moist mucous membranes, Good dentition, No lesions  NECK: Supple, No JVD  NERVOUS SYSTEM:  Alert & Oriented X3, no focal neurologic deficits  CHEST/LUNG: No rales, rhonchi, wheezing, or rubs  HEART: irregular; No murmurs, rubs, or gallops  ABDOMEN: Soft, Nontender, Nondistended; Bowel sounds present  EXTREMITIES:  2+ Peripheral Pulses, No clubbing, cyanosis, or edema  LYMPH: No lymphadenopathy noted  SKIN: +dry skin on LLE      LABS                        11.7   11.69 )-----------( 195      ( 18 Oct 2018 07:48 )             37.2                         12.6   9.4   )-----------( 189      ( 17 Oct 2018 11:10 )             37.4     10-19    136  |  102  |  18  ----------------------------<  115<H>  3.6   |  24  |  1.08  10-18    138  |  100  |  20  ----------------------------<  66<L>  4.3   |  21<L>  |  1.19    Ca    8.3<L>      19 Oct 2018 05:56  Ca    8.7      18 Oct 2018 06:27  Phos  3.5     10-18  Mg     1.9     10-18    TPro  7.3  /  Alb  2.9<L>  /  TBili  0.9  /  DBili  x   /  AST  17  /  ALT  9<L>  /  AlkPhos  81  10-17              ( 18 Oct 2018 18:21 ) pH: 7.50  /  pCO2: 34    /  pO2: 79    / HCO3: 27    / Base Excess: 4.1   /  SaO2: 97                    RADIOLOGY & ADDITIONAL TESTS:    Imaging Personally Reviewed:    Consultant(s) Notes Reviewed:      Care Discussed with Consultants/Other Providers:

## 2018-10-19 NOTE — PROGRESS NOTE ADULT - PROBLEM SELECTOR PLAN 4
Found to have prolonged QTc of 506 ms on today's ECG.   - Avoid QT-prolonging agents  - Repeat ECG, especially if considering antipsychotics when discharged Stable. Continue Eliquis 5mg BID.

## 2018-10-19 NOTE — PROGRESS NOTE ADULT - ASSESSMENT
Pt is a 79yo M with PMH afib on eliquis, COPD, BRIEN, dementia, CHF, GERD, HLD, pulmonary fibrosis, recurrent UTIs, recent admission for UTI then went to [facility], returning from home with increased confusion, hallucinations as well as some episodic slurred speech. CT head no acute changes. Labs without any significant elevation in white count and UA negative, afebrile. He had denied pain, headaches. He reportedly had MRI brain at [facility] 4 weeks ago which did not demonstrate any acute findings per wife.     A/P: Continue to suspect metabolic delirium superimposed on baseline dementia but labs so far unimpressive apart from mildly dry BUN/Cr ratio, elevated procalcitonin and ESR, mild leukocytosis - unlikely stroke though had endorsed slurred speech so MRI ordered and subsequently canceled.    Plan  1. Admitted to medicine  2. No evidence primary acute neuro process.  MRI ordered for confirmation of no acute process, though patient likely did not tolerate. Would not sedate for low yield test.  3. Frequent reorientation, avoid delirium provoking medications  4. Continue eliquis, neurochecks, statin  5. Enhanced supervision, Fall precautions  6. Optimize hydration  7. Ultimately if goes home just needs enhanced supervision, HHA etc  8. Noted to be hypoglycemic on recent BMP, likely contributing transiently to confusion/AMS.   9.  Pt unlikely to become clear and coherent while in hospital with underlying dementia/delirium.  He is poorly cooperative.  Would proceed with dc planning, either back to rehab vs home with increased services

## 2018-10-19 NOTE — PROGRESS NOTE ADULT - ASSESSMENT
78 M with HLD, HFrEF, A fib, dementia, GERD, COPD, pulmonary fibrosis, BRIEN on CPAP, recurrent UTIs, recent hospital admission in 9/18 for UTI, returned ome from rehab 2 days ago, now brought in by family for confusion.    1. COPD/ Pulm fibrosis:  (seen on CT a/p)  - Currently stable resp status  - Oxygenating well on RA, no wheezing on exam   - CXR with stable basilar fibriotic changes , no infiltrate   - Would cont Duonebs Q6H  -  Symbicort BID and Spiriva daily   - Continue Singulair 10 mg daily   - At present no indication for systemic steroids or antibiotics from pulm standpoint   - Aspiration precautions  - Chest PT/ pulm toilet   - Keep O2 sat > 88 %     2. BRIEN:  - Pt on CPAP of 7 cm H20 at home  - Would cont nocturnal CPAP at those settings   - Cont home dose of Nuvigil     3. AMS/ Delirium   - CT head negative. FUP MRI  - Frequent reorientation     4. Gen:  - DVT Px: on Eliquis

## 2018-10-19 NOTE — PROGRESS NOTE ADULT - PROBLEM SELECTOR PLAN 1
Unclear etiology at this time; does not have evidence to suggest acute infection as patient is afebrile with normal UA, normal CXR and no localizing exam findings. Isolated leukocytosis may be related to dehydration. ESR, CRP and procalcitonin were elevated. CTH without acute pathology. Confusion and hallucinations may also be in setting of delirium as patient recently returned home from rehab vs. progression of his dementia.   - neurology consulted, recommending MRI to look for acute changes, though predicts low yield. Believes likely delirium in the context of his dementia and returning home. Recommends going home with additional support  - Hypoglycemic this morning, gave dextrose IVP and started D5+1/2NS with decreased PO intake  - pt with abdominal tenderness on palpation. CT a/p: bilateral hydroureteronephrosis with diffuse hypoenhancement of the left kidney, new since 5/14/2018. Findings are concerning for developing bilateral renal obstruction. Irregular prostate mass.  - Consider restarting NuVigil if not improvement over course of day  - Other psychotropics held (mirtazipine and escitalopram).   - Urine culture negative  - TSH, B12, folate wnl. Negative syphilis screen  - Frequent reorientation, reduce unnecessary nighttime awakening  - Enhanced supervision  - Fall precautions  - If febrile, obtain BCx

## 2018-10-19 NOTE — PROGRESS NOTE ADULT - SUBJECTIVE AND OBJECTIVE BOX
SUBJECTIVE: pt wants to sleep and does not want to be bothered.  states he's in hospital    Medications:  MEDICATIONS  (STANDING):  apixaban 5 milliGRAM(s) Oral every 12 hours  aspirin enteric coated 81 milliGRAM(s) Oral daily  atorvastatin 20 milliGRAM(s) Oral at bedtime  buDESOnide  80 MICROgram(s)/formoterol 4.5 MICROgram(s) Inhaler 2 Puff(s) Inhalation two times a day  cholecalciferol 500 Unit(s) Oral daily  dextrose 5% + sodium chloride 0.45%. 1000 milliLiter(s) (75 mL/Hr) IV Continuous <Continuous>  docusate sodium 100 milliGRAM(s) Oral two times a day  finasteride 5 milliGRAM(s) Oral daily  lactulose Syrup 10 Gram(s) Oral at bedtime  polyethylene glycol 3350 17 Gram(s) Oral at bedtime  tamsulosin 0.4 milliGRAM(s) Oral at bedtime  tiotropium 18 MICROgram(s) Capsule 1 Capsule(s) Inhalation daily    MEDICATIONS  (PRN):  ALBUTerol/ipratropium for Nebulization 3 milliLiter(s) Nebulizer every 6 hours PRN Shortness of Breath and/or Wheezing      Labs:  CBC Full  -  ( 19 Oct 2018 07:57 )  WBC Count : 10.70 K/uL  Hemoglobin : 11.3 g/dL  Hematocrit : 34.5 %  Platelet Count - Automated : 183 K/uL  Mean Cell Volume : 94.8 fl  Mean Cell Hemoglobin : 31.0 pg  Mean Cell Hemoglobin Concentration : 32.8 gm/dL  Auto Neutrophil # : 6.50 K/uL  Auto Lymphocyte # : 3.04 K/uL  Auto Monocyte # : 0.92 K/uL  Auto Eosinophil # : 0.17 K/uL  Auto Basophil # : 0.04 K/uL  Auto Neutrophil % : 60.7 %  Auto Lymphocyte % : 28.4 %  Auto Monocyte % : 8.6 %  Auto Eosinophil % : 1.6 %  Auto Basophil % : 0.4 %    10-19    136  |  102  |  18  ----------------------------<  115<H>  3.6   |  24  |  1.08    Ca    8.3<L>      19 Oct 2018 05:56  Phos  3.5     10-18  Mg     1.9     10-18    TPro  7.3  /  Alb  2.9<L>  /  TBili  0.9  /  DBili  x   /  AST  17  /  ALT  9<L>  /  AlkPhos  81  10-17    CAPILLARY BLOOD GLUCOSE      POCT Blood Glucose.: 121 mg/dL (19 Oct 2018 08:07)  POCT Blood Glucose.: 94 mg/dL (18 Oct 2018 21:27)  POCT Blood Glucose.: 90 mg/dL (18 Oct 2018 16:52)  POCT Blood Glucose.: 85 mg/dL (18 Oct 2018 16:51)  POCT Blood Glucose.: 90 mg/dL (18 Oct 2018 12:23)  POCT Blood Glucose.: 106 mg/dL (18 Oct 2018 10:20)    LIVER FUNCTIONS - ( 17 Oct 2018 11:10 )  Alb: 2.9 g/dL / Pro: 7.3 g/dL / ALK PHOS: 81 U/L / ALT: 9 U/L / AST: 17 U/L / GGT: x                 Vitals:  Vital Signs Last 24 Hrs  T(C): 36.7 (19 Oct 2018 05:34), Max: 37.8 (18 Oct 2018 12:58)  T(F): 98.1 (19 Oct 2018 05:34), Max: 100 (18 Oct 2018 12:58)  HR: 95 (19 Oct 2018 06:42) (69 - 97)  BP: 114/74 (19 Oct 2018 05:34) (102/58 - 122/71)  BP(mean): --  RR: 18 (19 Oct 2018 05:34) (18 - 18)  SpO2: 94% (19 Oct 2018 06:42) (94% - 95%)      NEUROLOGICAL EXAM:    Mental status: fatigued, lying in bed. irritable.  O to [facility], 2001, [place],  follows commands for EOM exam.     Cranial Nerves: Pupils were equal, round, reactive to light. Extraocular movements were intact when following commands There was no facial asymmetry. No participating with oropharyngeal exam.     Motor exam: moving all extremities but minimally participatory with strength exam.     Reflexes: 2+ upper extremity, 1+ lower.    Sensation: patient not participating but responds to painful stimuli.     Coordination: not participating with dysmetria exam.    Gait: deferred.

## 2018-10-20 LAB
ALBUMIN SERPL ELPH-MCNC: 2.3 G/DL — LOW (ref 3.3–5)
ALP SERPL-CCNC: 62 U/L — SIGNIFICANT CHANGE UP (ref 40–120)
ALT FLD-CCNC: 6 U/L — LOW (ref 10–45)
ANION GAP SERPL CALC-SCNC: 13 MMOL/L — SIGNIFICANT CHANGE UP (ref 5–17)
AST SERPL-CCNC: 14 U/L — SIGNIFICANT CHANGE UP (ref 10–40)
BASOPHILS # BLD AUTO: 0.03 K/UL — SIGNIFICANT CHANGE UP (ref 0–0.2)
BASOPHILS NFR BLD AUTO: 0.3 % — SIGNIFICANT CHANGE UP (ref 0–2)
BILIRUB SERPL-MCNC: 0.6 MG/DL — SIGNIFICANT CHANGE UP (ref 0.2–1.2)
BUN SERPL-MCNC: 13 MG/DL — SIGNIFICANT CHANGE UP (ref 7–23)
CALCIUM SERPL-MCNC: 7.8 MG/DL — LOW (ref 8.4–10.5)
CHLORIDE SERPL-SCNC: 102 MMOL/L — SIGNIFICANT CHANGE UP (ref 96–108)
CO2 SERPL-SCNC: 23 MMOL/L — SIGNIFICANT CHANGE UP (ref 22–31)
CREAT SERPL-MCNC: 0.99 MG/DL — SIGNIFICANT CHANGE UP (ref 0.5–1.3)
EOSINOPHIL # BLD AUTO: 0.38 K/UL — SIGNIFICANT CHANGE UP (ref 0–0.5)
EOSINOPHIL NFR BLD AUTO: 4.3 % — SIGNIFICANT CHANGE UP (ref 0–6)
GLUCOSE BLDC GLUCOMTR-MCNC: 117 MG/DL — HIGH (ref 70–99)
GLUCOSE BLDC GLUCOMTR-MCNC: 122 MG/DL — HIGH (ref 70–99)
GLUCOSE BLDC GLUCOMTR-MCNC: 127 MG/DL — HIGH (ref 70–99)
GLUCOSE BLDC GLUCOMTR-MCNC: 130 MG/DL — HIGH (ref 70–99)
GLUCOSE SERPL-MCNC: 119 MG/DL — HIGH (ref 70–99)
HCT VFR BLD CALC: 32.5 % — LOW (ref 39–50)
HGB BLD-MCNC: 10.4 G/DL — LOW (ref 13–17)
IMM GRANULOCYTES NFR BLD AUTO: 0.3 % — SIGNIFICANT CHANGE UP (ref 0–1.5)
LYMPHOCYTES # BLD AUTO: 2.81 K/UL — SIGNIFICANT CHANGE UP (ref 1–3.3)
LYMPHOCYTES # BLD AUTO: 31.8 % — SIGNIFICANT CHANGE UP (ref 13–44)
MAGNESIUM SERPL-MCNC: 1.8 MG/DL — SIGNIFICANT CHANGE UP (ref 1.6–2.6)
MCHC RBC-ENTMCNC: 29.5 PG — SIGNIFICANT CHANGE UP (ref 27–34)
MCHC RBC-ENTMCNC: 32 GM/DL — SIGNIFICANT CHANGE UP (ref 32–36)
MCV RBC AUTO: 92.1 FL — SIGNIFICANT CHANGE UP (ref 80–100)
MONOCYTES # BLD AUTO: 0.79 K/UL — SIGNIFICANT CHANGE UP (ref 0–0.9)
MONOCYTES NFR BLD AUTO: 8.9 % — SIGNIFICANT CHANGE UP (ref 2–14)
NEUTROPHILS # BLD AUTO: 4.79 K/UL — SIGNIFICANT CHANGE UP (ref 1.8–7.4)
NEUTROPHILS NFR BLD AUTO: 54.4 % — SIGNIFICANT CHANGE UP (ref 43–77)
PHOSPHATE SERPL-MCNC: 3.5 MG/DL — SIGNIFICANT CHANGE UP (ref 2.5–4.5)
PLATELET # BLD AUTO: 165 K/UL — SIGNIFICANT CHANGE UP (ref 150–400)
POTASSIUM SERPL-MCNC: 3.3 MMOL/L — LOW (ref 3.5–5.3)
POTASSIUM SERPL-SCNC: 3.3 MMOL/L — LOW (ref 3.5–5.3)
PROT SERPL-MCNC: 6 G/DL — SIGNIFICANT CHANGE UP (ref 6–8.3)
RBC # BLD: 3.53 M/UL — LOW (ref 4.2–5.8)
RBC # FLD: 14.3 % — SIGNIFICANT CHANGE UP (ref 10.3–14.5)
SODIUM SERPL-SCNC: 138 MMOL/L — SIGNIFICANT CHANGE UP (ref 135–145)
WBC # BLD: 8.83 K/UL — SIGNIFICANT CHANGE UP (ref 3.8–10.5)
WBC # FLD AUTO: 8.83 K/UL — SIGNIFICANT CHANGE UP (ref 3.8–10.5)

## 2018-10-20 PROCEDURE — 99233 SBSQ HOSP IP/OBS HIGH 50: CPT | Mod: GC

## 2018-10-20 PROCEDURE — 99232 SBSQ HOSP IP/OBS MODERATE 35: CPT | Mod: GC

## 2018-10-20 RX ORDER — POTASSIUM CHLORIDE 20 MEQ
20 PACKET (EA) ORAL
Qty: 0 | Refills: 0 | Status: COMPLETED | OUTPATIENT
Start: 2018-10-20 | End: 2018-10-20

## 2018-10-20 RX ADMIN — TAMSULOSIN HYDROCHLORIDE 0.4 MILLIGRAM(S): 0.4 CAPSULE ORAL at 21:00

## 2018-10-20 RX ADMIN — Medication 20 MILLIEQUIVALENT(S): at 12:23

## 2018-10-20 RX ADMIN — ATORVASTATIN CALCIUM 20 MILLIGRAM(S): 80 TABLET, FILM COATED ORAL at 20:59

## 2018-10-20 RX ADMIN — Medication 100 MILLIGRAM(S): at 17:18

## 2018-10-20 RX ADMIN — FINASTERIDE 5 MILLIGRAM(S): 5 TABLET, FILM COATED ORAL at 12:23

## 2018-10-20 RX ADMIN — Medication 81 MILLIGRAM(S): at 12:23

## 2018-10-20 RX ADMIN — CEFTRIAXONE 100 GRAM(S): 500 INJECTION, POWDER, FOR SOLUTION INTRAMUSCULAR; INTRAVENOUS at 12:23

## 2018-10-20 RX ADMIN — APIXABAN 5 MILLIGRAM(S): 2.5 TABLET, FILM COATED ORAL at 17:18

## 2018-10-20 RX ADMIN — SODIUM CHLORIDE 75 MILLILITER(S): 9 INJECTION, SOLUTION INTRAVENOUS at 05:06

## 2018-10-20 RX ADMIN — Medication 100 MILLIGRAM(S): at 05:06

## 2018-10-20 RX ADMIN — APIXABAN 5 MILLIGRAM(S): 2.5 TABLET, FILM COATED ORAL at 05:05

## 2018-10-20 RX ADMIN — TIOTROPIUM BROMIDE 1 CAPSULE(S): 18 CAPSULE ORAL; RESPIRATORY (INHALATION) at 12:23

## 2018-10-20 RX ADMIN — BUDESONIDE AND FORMOTEROL FUMARATE DIHYDRATE 2 PUFF(S): 160; 4.5 AEROSOL RESPIRATORY (INHALATION) at 05:06

## 2018-10-20 RX ADMIN — BUDESONIDE AND FORMOTEROL FUMARATE DIHYDRATE 2 PUFF(S): 160; 4.5 AEROSOL RESPIRATORY (INHALATION) at 17:18

## 2018-10-20 RX ADMIN — Medication 500 UNIT(S): at 12:23

## 2018-10-20 RX ADMIN — LACTULOSE 10 GRAM(S): 10 SOLUTION ORAL at 21:00

## 2018-10-20 RX ADMIN — POLYETHYLENE GLYCOL 3350 17 GRAM(S): 17 POWDER, FOR SOLUTION ORAL at 21:03

## 2018-10-20 NOTE — PROGRESS NOTE ADULT - SUBJECTIVE AND OBJECTIVE BOX
CHIEF COMPLAINT:  Pt's only complaint is discomfort at shah site  No resp complaints    Interval Events:  prostate mass on CT, under eval    REVIEW OF SYSTEMS:  Constitutional: No fevers or chills. No weight loss. No fatigue or generalised malaise.  Eyes: No itching or discharge from the eyes  ENT: No ear pain. No ear discharge. No nasal congestion. No post nasal drip. No epistaxis. No throat pain. No sore throat. No difficulty swallowing.   CV: No chest pain. No palpitations. No lightheadedness or dizziness.   Resp: No dyspnea at rest. No dyspnea on exertion. No orthopnea. No wheezing. No cough. No stridor. No sputum production. No chest pain with respiration.  GI: No nausea. No vomiting. No diarrhea.  MSK: No joint pain or pain in any extremities  Integumentary: No skin lesions. No pedal edema.  Neurological: No gross motor weakness. No sensory changes.   - as above  [x ] All other systems negative  [ ] Unable to assess ROS because ________    OBJECTIVE:  ICU Vital Signs Last 24 Hrs  T(C): 36.6 (20 Oct 2018 04:33), Max: 36.8 (19 Oct 2018 21:35)  T(F): 97.8 (20 Oct 2018 04:33), Max: 98.2 (19 Oct 2018 21:35)  HR: 76 (20 Oct 2018 08:56) (53 - 81)  BP: 107/70 (20 Oct 2018 04:33) (105/61 - 107/70)  BP(mean): --  ABP: --  ABP(mean): --  RR: 18 (20 Oct 2018 04:33) (18 - 18)  SpO2: 98% (20 Oct 2018 08:56) (97% - 99%)        10-19 @ 07:01  -  10-20 @ 07:00  --------------------------------------------------------  IN: 815 mL / OUT: 700 mL / NET: 115 mL    10-20 @ 07:01  -  10-20 @ 10:44  --------------------------------------------------------  IN: 0 mL / OUT: 0 mL / NET: 0 mL      CAPILLARY BLOOD GLUCOSE      POCT Blood Glucose.: 127 mg/dL (20 Oct 2018 08:05)      PHYSICAL EXAM:  General: Awake, alert, oriented X person, place  HEENT: Atraumatic, normocephalic.                 M                No nasal congestion.                No tonsillar or pharyngeal exudates.  Lymph Nodes: No palpable lymphadenopathy  Neck: No JVD. No carotid bruit.   Respiratory: Normal chest expansion                         Normal percussion                         Normal and equal air entry                         No wheeze, rhonchi or rales.  Cardiovascular: S1 S2 normal. No murmurs, rubs or gallops.   Abdomen: Soft, non-tender, non-distended. No organomegaly.  Extremities: Warm to touch. Peripheral pulse palpable. No pedal edema.   Skin: No rashes or skin lesions  Neurological: Motor and sensory examination equal and normal in all four extremities.  Psychiatry:answers simple questions [facility] MEDICATIONS:  MEDICATIONS  (STANDING):  apixaban 5 milliGRAM(s) Oral every 12 hours  aspirin enteric coated 81 milliGRAM(s) Oral daily  atorvastatin 20 milliGRAM(s) Oral at bedtime  buDESOnide  80 MICROgram(s)/formoterol 4.5 MICROgram(s) Inhaler 2 Puff(s) Inhalation two times a day  cefTRIAXone   IVPB      cefTRIAXone   IVPB 1 Gram(s) IV Intermittent every 24 hours  cholecalciferol 500 Unit(s) Oral daily  dextrose 5% + sodium chloride 0.45%. 1000 milliLiter(s) (75 mL/Hr) IV Continuous <Continuous>  docusate sodium 100 milliGRAM(s) Oral two times a day  finasteride 5 milliGRAM(s) Oral daily  lactulose Syrup 10 Gram(s) Oral at bedtime  lidocaine   Patch 1 Patch Transdermal once  polyethylene glycol 3350 17 Gram(s) Oral at bedtime  potassium chloride    Tablet ER 20 milliEquivalent(s) Oral every 2 hours  tamsulosin 0.4 milliGRAM(s) Oral at bedtime  tiotropium 18 MICROgram(s) Capsule 1 Capsule(s) Inhalation daily    MEDICATIONS  (PRN):  ALBUTerol/ipratropium for Nebulization 3 milliLiter(s) Nebulizer every 6 hours PRN Shortness of Breath and/or Wheezing      LABS:                        10.4   8.83  )-----------( 165      ( 20 Oct 2018 08:18 )             32.5     10-20    138  |  102  |  13  ----------------------------<  119<H>  3.3<L>   |  23  |  0.99    Ca    7.8<L>      20 Oct 2018 06:55  Phos  3.5     10-20  Mg     1.8     10-20    TPro  6.0  /  Alb  2.3<L>  /  TBili  0.6  /  DBili  x   /  AST  14  /  ALT  6<L>  /  AlkPhos  62  10-20        Arterial Blood Gas:  10-18 @ 18:21  7.50/34/79/27/97/4.1  ABG lactate: --        MICROBIOLOGY:     RADIOLOGY:  [ ] Reviewed and interpreted by me    Point of Care Ultrasound Findings:    PFT:    EKG:

## 2018-10-20 NOTE — PROGRESS NOTE ADULT - SUBJECTIVE AND OBJECTIVE BOX
Patient is a 78y old  Male who presents with a chief complaint of confusion (19 Oct 2018 10:01)      INTERVAL HPI/OVERNIGHT EVENTS:    No acute events overnight. Pt reports sleeping well through the night though still reports that he is not eating much. Pt not complaining of chest pain, shortness of breath, nausea. Complains of right lower extremity pain and abdominal fullness. Feels that he is more awake today.        T(C): 36.6 (10-20-18 @ 04:33), Max: 36.8 (10-19-18 @ 21:35)  HR: 63 (10-20-18 @ 04:33) (53 - 81)  BP: 107/70 (10-20-18 @ 04:33) (105/61 - 107/70)  RR: 18 (10-20-18 @ 04:33) (18 - 18)  SpO2: 99% (10-20-18 @ 04:33) (97% - 99%)  Wt(kg): --Vital Signs Last 24 Hrs  T(C): 36.6 (20 Oct 2018 04:33), Max: 36.8 (19 Oct 2018 21:35)  T(F): 97.8 (20 Oct 2018 04:33), Max: 98.2 (19 Oct 2018 21:35)  HR: 63 (20 Oct 2018 04:33) (53 - 81)  BP: 107/70 (20 Oct 2018 04:33) (105/61 - 107/70)  BP(mean): --  RR: 18 (20 Oct 2018 04:33) (18 - 18)  SpO2: 99% (20 Oct 2018 04:33) (97% - 99%)    PHYSICAL EXAM:  GENERAL: NAD, well-groomed, well-developed  HEAD:  Atraumatic, Normocephalic  EYES: EOMI, conjunctiva and sclera clear  ENMT: Moist mucous membranes, Good dentition, No lesions  NECK: Supple, No JVD  NERVOUS SYSTEM:  Alert & Oriented X2 (does not know year), no focal neurologic deficits  CHEST/LUNG: Clear to percussion bilaterally; No rales, rhonchi, wheezing, or rubs  HEART: irregularly irregular; No murmurs, rubs, or gallops  ABDOMEN: Soft, nondistended; suprapubic tenderness; Bowel sounds present  EXTREMITIES:  2+ Peripheral Pulses, No clubbing, cyanosis, or edema; right calf pain  LYMPH: No lymphadenopathy noted  SKIN: No rashes or lesions    Consultant(s) Notes Reviewed:  [x ] YES  [ ] NO  Care Discussed with Consultants/Other Providers [ x] YES  [ ] NO    LABS:                        11.3   10.70 )-----------( 183      ( 19 Oct 2018 07:57 )             34.5     10-20    138  |  102  |  13  ----------------------------<  119<H>  3.3<L>   |  23  |  0.99    Ca    7.8<L>      20 Oct 2018 06:55  Phos  3.5     10-20  Mg     1.8     10-20    TPro  6.0  /  Alb  2.3<L>  /  TBili  0.6  /  DBili  x   /  AST  14  /  ALT  6<L>  /  AlkPhos  62  10-20        CAPILLARY BLOOD GLUCOSE      POCT Blood Glucose.: 127 mg/dL (20 Oct 2018 08:05)  POCT Blood Glucose.: 136 mg/dL (19 Oct 2018 16:39)      ABG - ( 18 Oct 2018 18:21 )  pH, Arterial: 7.50  pH, Blood: x     /  pCO2: 34    /  pO2: 79    / HCO3: 27    / Base Excess: 4.1   /  SaO2: 97                    RADIOLOGY & ADDITIONAL TESTS:    CT abdomen and pelvis w/ IV contrast 10.18.18 @ 20:36    IMPRESSION:  Findings highly suspicious for metastatic prostate cancer, involving   posterior bladder wall and the anterior wall of the rectum with a   metastatic lesion to the liver. New bilateral hydronephrosis to the level   of pelvic mass.  Findings also suggestive of cystitis.    Imaging Personally Reviewed:  [X] YES  [ ] NO Patient is a 78y old  Male who presents with a chief complaint of confusion (19 Oct 2018 10:01)    INTERVAL HPI/OVERNIGHT EVENTS:  No acute events overnight. Pt reports sleeping well through the night though still reports that he is not eating much. Pt not complaining of chest pain, shortness of breath, nausea. Complains of right lower extremity pain and abdominal fullness. Feels that he is more awake today.    PHYSICAL EXAM:  T(F): 97.8 (20 Oct 2018 04:33), Max: 98.2 (19 Oct 2018 21:35)  HR: 63 (20 Oct 2018 04:33) (53 - 81)  BP: 107/70 (20 Oct 2018 04:33) (105/61 - 107/70)  RR: 18 (20 Oct 2018 04:33) (18 - 18)  SpO2: 99% (20 Oct 2018 04:33) (97% - 99%)    GENERAL: NAD, well-groomed, well-developed  HEAD:  Atraumatic, Normocephalic  EYES: EOMI, conjunctiva and sclera clear  ENMT: Moist mucous membranes, Good dentition, No lesions  NECK: Supple, No JVD  NERVOUS SYSTEM:  Alert & Oriented X2 (does not know year), no focal neurologic deficits  CHEST/LUNG: Clear to percussion bilaterally; No rales, rhonchi, wheezing, or rubs  HEART: irregularly irregular; No murmurs, rubs, or gallops  ABDOMEN: Soft, nondistended; suprapubic tenderness; Bowel sounds present  EXTREMITIES:  2+ Peripheral Pulses, No clubbing, cyanosis, or edema; right calf pain  LYMPH: No lymphadenopathy noted  SKIN: No rashes or lesions    LABS:  CAPILLARY BLOOD GLUCOSE  POCT  Blood Glucose (10.20.18 @ 11:58)    POCT Blood Glucose.: 117 mg/dL  POCT Blood Glucose.: 127 mg/dL (20 Oct 2018 08:05)  POCT Blood Glucose.: 136 mg/dL (19 Oct 2018 16:39)                        10.4   8.83  )-----------( 165      ( 20 Oct 2018 08:18 )             32.5   10-20  138  |  102  |  13  ----------------------------<  119<H>  3.3<L>   |  23  |  0.99    Ca    7.8<L>      20 Oct 2018 06:55  Phos  3.5     10-20  Mg     1.8     10-20    TPro  6.0  /  Alb  2.3<L>  /  TBili  0.6  /  DBili  x   /  AST  14  /  ALT  6<L>  /  AlkPhos  62  10-20    RADIOLOGY & ADDITIONAL TESTS personally reviewed:  CT abdomen and pelvis w/ IV contrast 10.18.18 @ 20:36  IMPRESSION:  Findings highly suspicious for metastatic prostate cancer, involving posterior bladder wall and the anterior wall of the rectum with a metastatic lesion to the liver. New bilateral hydronephrosis to the level of pelvic mass.  Findings also suggestive of cystitis.    MEDICATIONS  (STANDING):  apixaban 5 milliGRAM(s) Oral every 12 hours  aspirin enteric coated 81 milliGRAM(s) Oral daily  atorvastatin 20 milliGRAM(s) Oral at bedtime  buDESOnide  80 MICROgram(s)/formoterol 4.5 MICROgram(s) Inhaler 2 Puff(s) Inhalation two times a day  cefTRIAXone   IVPB      cefTRIAXone   IVPB 1 Gram(s) IV Intermittent every 24 hours  cholecalciferol 500 Unit(s) Oral daily  dextrose 5% + sodium chloride 0.45%. 1000 milliLiter(s) (75 mL/Hr) IV Continuous <Continuous>  docusate sodium 100 milliGRAM(s) Oral two times a day  finasteride 5 milliGRAM(s) Oral daily  lactulose Syrup 10 Gram(s) Oral at bedtime  lidocaine   Patch 1 Patch Transdermal once  polyethylene glycol 3350 17 Gram(s) Oral at bedtime  tamsulosin 0.4 milliGRAM(s) Oral at bedtime  tiotropium 18 MICROgram(s) Capsule 1 Capsule(s) Inhalation daily    MEDICATIONS  (PRN):  ALBUTerol/ipratropium for Nebulization 3 milliLiter(s) Nebulizer every 6 hours PRN Shortness of Breath and/or Wheezing Patient is a 78y old  Male who presents with a chief complaint of confusion (19 Oct 2018 10:01)    INTERVAL HPI/OVERNIGHT EVENTS:  No acute events overnight. Pt reports sleeping well through the night though still reports that he is not eating much. Pt not complaining of chest pain, shortness of breath, nausea. Complains of right lower extremity pain and abdominal fullness. Feels that he is more awake today.    PHYSICAL EXAM:  T(F): 97.8 (20 Oct 2018 04:33), Max: 98.2 (19 Oct 2018 21:35)  HR: 63 (20 Oct 2018 04:33) (53 - 81)  BP: 107/70 (20 Oct 2018 04:33) (105/61 - 107/70)  RR: 18 (20 Oct 2018 04:33) (18 - 18)  SpO2: 99% (20 Oct 2018 04:33) (97% - 99%)    GENERAL: NAD, well-groomed, well-developed  HEAD:  Atraumatic, Normocephalic  EYES: EOMI, conjunctiva and sclera clear  ENMT: Moist mucous membranes, Good dentition, No lesions  NECK: Supple, No JVD  NERVOUS SYSTEM:  Alert & Oriented X2 (does not know year), no focal neurologic deficits  CHEST/LUNG: Clear to percussion bilaterally; No rales, rhonchi, wheezing, or rubs  HEART: irregularly irregular; No murmurs, rubs, or gallops  ABDOMEN: Soft, nondistended; suprapubic tenderness; Bowel sounds present  EXTREMITIES:  2+ Peripheral Pulses, No clubbing, cyanosis, or edema; right calf pain  LYMPH: No lymphadenopathy noted  SKIN: No rashes or lesions    LABS:  CAPILLARY BLOOD GLUCOSE  POCT  Blood Glucose (10.20.18 @ 11:58)    POCT Blood Glucose.: 117 mg/dL  POCT Blood Glucose.: 127 mg/dL (20 Oct 2018 08:05)  POCT Blood Glucose.: 136 mg/dL (19 Oct 2018 16:39)                        10.4   8.83  )-----------( 165      ( 20 Oct 2018 08:18 )             32.5   10-20  138  |  102  |  13  ----------------------------<  119<H>  3.3<L>   |  23  |  0.99    Ca    7.8<L>      20 Oct 2018 06:55  Phos  3.5     10-20  Mg     1.8     10-20    TPro  6.0  /  Alb  2.3<L>  /  TBili  0.6  /  DBili  x   /  AST  14  /  ALT  6<L>  /  AlkPhos  62  10-20    RADIOLOGY & ADDITIONAL TESTS personally reviewed:  CT abdomen and pelvis w/ IV contrast 10.18.18 @ 20:36  IMPRESSION: Findings highly suspicious for metastatic prostate cancer, involving posterior bladder wall and the anterior wall of the rectum with a metastatic lesion to the liver. New bilateral hydronephrosis to the level of pelvic mass.  Findings also suggestive of cystitis.    Consultants' Notes Reviewed: Pulmonology    MEDICATIONS  (STANDING):  apixaban 5 milliGRAM(s) Oral every 12 hours  aspirin enteric coated 81 milliGRAM(s) Oral daily  atorvastatin 20 milliGRAM(s) Oral at bedtime  buDESOnide  80 MICROgram(s)/formoterol 4.5 MICROgram(s) Inhaler 2 Puff(s) Inhalation two times a day  cefTRIAXone   IVPB      cefTRIAXone   IVPB 1 Gram(s) IV Intermittent every 24 hours  cholecalciferol 500 Unit(s) Oral daily  dextrose 5% + sodium chloride 0.45%. 1000 milliLiter(s) (75 mL/Hr) IV Continuous <Continuous>  docusate sodium 100 milliGRAM(s) Oral two times a day  finasteride 5 milliGRAM(s) Oral daily  lactulose Syrup 10 Gram(s) Oral at bedtime  lidocaine   Patch 1 Patch Transdermal once  polyethylene glycol 3350 17 Gram(s) Oral at bedtime  tamsulosin 0.4 milliGRAM(s) Oral at bedtime  tiotropium 18 MICROgram(s) Capsule 1 Capsule(s) Inhalation daily    MEDICATIONS  (PRN):  ALBUTerol/ipratropium for Nebulization 3 milliLiter(s) Nebulizer every 6 hours PRN Shortness of Breath and/or Wheezing

## 2018-10-20 NOTE — PROGRESS NOTE ADULT - PROBLEM SELECTOR PLAN 1
Unclear etiology at this time; may be due to infection given prostatic mass and bladder fat stranding. Has elevated white count and inflammatory markers (ESR, CRP, procalcitonin). CTH without acute pathology. TSH, B12, folate wnl. Negative syphilis screen. AMS may be due to delirium 2/2 infection vs setting change vs progression of dementia.   - holding psychotropics  - f/u repeat urine culture. 10/18 blood culture NGTD. Currently on CTX.  - Frequent reorientation, reduce unnecessary nighttime awakening  - Enhanced supervision  - Fall precautions

## 2018-10-20 NOTE — PROGRESS NOTE ADULT - PROBLEM SELECTOR PLAN 2
With bladder fat stranding, concerning for active UTI. CT from 10/18 shows irregular prostate mass which invades into the anterior perirectal space and anterior rectal wall. The prostate is inseparable from the posterior wall the bladder. Also has lesion in liver concerning for metastasis.    -Has history of previous UTIs. Also Concerning for possibly malignancy.  -Bladder scan completed showing 331cc urine retained. Davidson placed. Monitor output.

## 2018-10-20 NOTE — PROGRESS NOTE ADULT - ASSESSMENT
78M with PMH of Afib on Eliquis, COPD, BRIEN, dementia, HFrEF, GERD, HLD, pulmonary fibrosis, recurrent UTIs, and recent admission 9/12/-9/ for metabolic encephalopathy 2/2 UTI, now presenting with altered mental status in the context of returning home from subacute rehab. Found to have prostate mass on CT A/P with findings concerning for infection and metastatic prostate cancer. 78yoM w/ PMHx significant for Afib on Eliquis, COPD, BRIEN, dementia, HFrEF, GERD, HLD, pulmonary fibrosis, recurrent UTIs, and recent admission 9/12/-9/ for metabolic encephalopathy 2/2 UTI, now presenting with altered mental status in the context of returning home from subacute rehab. Found to have prostate mass on CT A/P with findings concerning for infection and metastatic prostate cancer.

## 2018-10-20 NOTE — PROGRESS NOTE ADULT - PROBLEM SELECTOR PLAN 3
Found to have prolonged QTc of 506 ms on today's ECG.   - Avoid QT-prolonging agents  - Repeat ECG, especially if considering antipsychotics when discharged Found to have prolonged QTc of 506 ms on today's ECG.   - Avoid QT-prolonging agents  - Repeat ECG, to consider reinstating home antipsychotics held since admission

## 2018-10-20 NOTE — PROGRESS NOTE ADULT - ASSESSMENT
h/o COPD, ILD  respiratory status stable  99%RA  breathing comfortably  cont home meds  nocturnal cpap

## 2018-10-21 DIAGNOSIS — N30.90 CYSTITIS, UNSPECIFIED WITHOUT HEMATURIA: ICD-10-CM

## 2018-10-21 DIAGNOSIS — R33.9 RETENTION OF URINE, UNSPECIFIED: ICD-10-CM

## 2018-10-21 LAB
GLUCOSE BLDC GLUCOMTR-MCNC: 113 MG/DL — HIGH (ref 70–99)
GLUCOSE BLDC GLUCOMTR-MCNC: 119 MG/DL — HIGH (ref 70–99)
GLUCOSE BLDC GLUCOMTR-MCNC: 132 MG/DL — HIGH (ref 70–99)
GLUCOSE BLDC GLUCOMTR-MCNC: 135 MG/DL — HIGH (ref 70–99)

## 2018-10-21 PROCEDURE — 99233 SBSQ HOSP IP/OBS HIGH 50: CPT | Mod: GC

## 2018-10-21 PROCEDURE — 93010 ELECTROCARDIOGRAM REPORT: CPT

## 2018-10-21 RX ADMIN — APIXABAN 5 MILLIGRAM(S): 2.5 TABLET, FILM COATED ORAL at 04:51

## 2018-10-21 RX ADMIN — Medication 100 MILLIGRAM(S): at 04:51

## 2018-10-21 RX ADMIN — APIXABAN 5 MILLIGRAM(S): 2.5 TABLET, FILM COATED ORAL at 17:11

## 2018-10-21 RX ADMIN — Medication 500 UNIT(S): at 12:16

## 2018-10-21 RX ADMIN — TIOTROPIUM BROMIDE 1 CAPSULE(S): 18 CAPSULE ORAL; RESPIRATORY (INHALATION) at 12:16

## 2018-10-21 RX ADMIN — BUDESONIDE AND FORMOTEROL FUMARATE DIHYDRATE 2 PUFF(S): 160; 4.5 AEROSOL RESPIRATORY (INHALATION) at 04:51

## 2018-10-21 RX ADMIN — FINASTERIDE 5 MILLIGRAM(S): 5 TABLET, FILM COATED ORAL at 12:16

## 2018-10-21 RX ADMIN — Medication 81 MILLIGRAM(S): at 12:16

## 2018-10-21 RX ADMIN — SODIUM CHLORIDE 75 MILLILITER(S): 9 INJECTION, SOLUTION INTRAVENOUS at 04:52

## 2018-10-21 RX ADMIN — ATORVASTATIN CALCIUM 20 MILLIGRAM(S): 80 TABLET, FILM COATED ORAL at 22:00

## 2018-10-21 RX ADMIN — POLYETHYLENE GLYCOL 3350 17 GRAM(S): 17 POWDER, FOR SOLUTION ORAL at 22:00

## 2018-10-21 RX ADMIN — LACTULOSE 10 GRAM(S): 10 SOLUTION ORAL at 21:59

## 2018-10-21 RX ADMIN — CEFTRIAXONE 100 GRAM(S): 500 INJECTION, POWDER, FOR SOLUTION INTRAMUSCULAR; INTRAVENOUS at 12:16

## 2018-10-21 RX ADMIN — TAMSULOSIN HYDROCHLORIDE 0.4 MILLIGRAM(S): 0.4 CAPSULE ORAL at 22:00

## 2018-10-21 RX ADMIN — Medication 100 MILLIGRAM(S): at 17:11

## 2018-10-21 RX ADMIN — BUDESONIDE AND FORMOTEROL FUMARATE DIHYDRATE 2 PUFF(S): 160; 4.5 AEROSOL RESPIRATORY (INHALATION) at 17:11

## 2018-10-21 NOTE — PROGRESS NOTE ADULT - PROBLEM SELECTOR PLAN 4
Improving, QTc today = 456  - Avoid QT-prolonging agents  - Repeat ECG, to consider reinstating home antipsychotics held since admission

## 2018-10-21 NOTE — PROGRESS NOTE ADULT - PROBLEM SELECTOR PLAN 1
Overall improved, likely multifactorial but primarily metabolic due to cystitis/urinary retention +/- paraneoplastic from potential Prostate CA +/- delirium due frequent setting changes  -treatment of Cystitis below  -holding psychotropics, will restart Escitalopram and Remeron as tolerated

## 2018-10-21 NOTE — PROGRESS NOTE ADULT - PROBLEM SELECTOR PLAN 9
-DVT PPX: Already therapeutically anticoagulated with Elquis  -DIET: DASH  -DISPO: PT Eval pending medical work-up  -CODE STATUS: Full Code

## 2018-10-21 NOTE — PROGRESS NOTE ADULT - SUBJECTIVE AND OBJECTIVE BOX
Jonathan Wilson, PGY-3  Internal Medicine Resident  Pager: [phone]  After 7PM on Weekdays/After 12PM on Weekends: [phone]    Patient is a 78y old  Male who presents with a chief complaint of confusion (20 Oct 2018 10:44)      SUBJECTIVE / OVERNIGHT EVENTS: No acute events overnight. patient without complaint, feels significantly better. He is cooperative and interactive    REVIEW OF SYSTEMS:  CONSTITUTIONAL: No weakness, fevers or chills  EYES/ENT: No visual changes;  No vertigo or throat pain   NECK: No pain or stiffness  RESPIRATORY: No cough, wheezing, hemoptysis; No shortness of breath  CARDIOVASCULAR: No chest pain or palpitations  GASTROINTESTINAL: No abdominal pain, nausea, vomiting, or diarrhea. No melena or hematochezia.  GENITOURINARY: No dysuria, frequency or hematuria  NEUROLOGICAL: No numbness or weakness  SKIN: No itching, burning, rashes, or lesions   All other review of systems is negative unless indicated above.    MEDICATIONS  (STANDING):  apixaban 5 milliGRAM(s) Oral every 12 hours  aspirin enteric coated 81 milliGRAM(s) Oral daily  atorvastatin 20 milliGRAM(s) Oral at bedtime  buDESOnide  80 MICROgram(s)/formoterol 4.5 MICROgram(s) Inhaler 2 Puff(s) Inhalation two times a day  cefTRIAXone   IVPB      cefTRIAXone   IVPB 1 Gram(s) IV Intermittent every 24 hours  cholecalciferol 500 Unit(s) Oral daily  docusate sodium 100 milliGRAM(s) Oral two times a day  finasteride 5 milliGRAM(s) Oral daily  lactulose Syrup 10 Gram(s) Oral at bedtime  lidocaine   Patch 1 Patch Transdermal once  polyethylene glycol 3350 17 Gram(s) Oral at bedtime  tamsulosin 0.4 milliGRAM(s) Oral at bedtime  tiotropium 18 MICROgram(s) Capsule 1 Capsule(s) Inhalation daily    MEDICATIONS  (PRN):  ALBUTerol/ipratropium for Nebulization 3 milliLiter(s) Nebulizer every 6 hours PRN Shortness of Breath and/or Wheezing      T(C): 36.7 (10-21-18 @ 04:23), Max: 36.9 (10-20-18 @ 11:36)  HR: 66 (10-21-18 @ 05:01) (61 - 82)  BP: 110/69 (10-21-18 @ 04:23) (98/60 - 110/69)  RR: 18 (10-21-18 @ 04:23) (18 - 18)  SpO2: 97% (10-21-18 @ 05:01) (95% - 100%)    CAPILLARY BLOOD GLUCOSE      POCT Blood Glucose.: 135 mg/dL (21 Oct 2018 07:41)  POCT Blood Glucose.: 130 mg/dL (20 Oct 2018 21:34)  POCT Blood Glucose.: 122 mg/dL (20 Oct 2018 16:53)  POCT Blood Glucose.: 117 mg/dL (20 Oct 2018 11:58)  POCT Blood Glucose.: 127 mg/dL (20 Oct 2018 08:05)    I&O's Summary    20 Oct 2018 07:01  -  21 Oct 2018 07:00  --------------------------------------------------------  IN: 120 mL / OUT: 2000 mL / NET: -1880 mL        GENERAL: No acute distress, well-developed  HEAD:  Atraumatic, Normocephalic  ENT: +CPAP. EOMI, PERRLA, No JVD, moist mucosa  CHEST/LUNG: Clear to auscultation bilaterally  BACK: No spinal tenderness  HEART: Regular rate and rhythm; No murmurs, rubs, or gallops  ABDOMEN: Soft, Nontender, Nondistended; Bowel sounds present, +Davidson  EXTREMITIES:  No clubbing, cyanosis, or edema  PSYCH: Nl behavior, nl affect  NEUROLOGY: AAOx2, non-focal, cranial nerves intact  SKIN: Normal color, No rashes or lesions    LABS:                        10.4   8.83  )-----------( 165      ( 20 Oct 2018 08:18 )             32.5     10-20    138  |  102  |  13  ----------------------------<  119<H>  3.3<L>   |  23  |  0.99    Ca    7.8<L>      20 Oct 2018 06:55  Phos  3.5     10-20  Mg     1.8     10-20    TPro  6.0  /  Alb  2.3<L>  /  TBili  0.6  /  DBili  x   /  AST  14  /  ALT  6<L>  /  AlkPhos  62  10-20            RADIOLOGY & ADDITIONAL TESTS:  Imaging Personally Reviewed:  Consultant(s) Notes Reviewed: Pulm  Care Discussed with Consultants/Other Providers: Jonathan Wilson, PGY-3  Internal Medicine Resident  Pager: [phone]  After 7PM on Weekdays/After 12PM on Weekends: [phone]    Patient is a 78y old  Male who presents with a chief complaint of confusion (20 Oct 2018 10:44)    SUBJECTIVE / OVERNIGHT EVENTS: No acute events overnight. patient without complaint, feels significantly better. He is cooperative and interactive    REVIEW OF SYSTEMS:  CONSTITUTIONAL: No weakness, fevers or chills  EYES/ENT: No visual changes;  No vertigo or throat pain   NECK: No pain or stiffness  RESPIRATORY: No cough, wheezing, hemoptysis; No shortness of breath  CARDIOVASCULAR: No chest pain or palpitations  GASTROINTESTINAL: No abdominal pain, nausea, vomiting, or diarrhea. No melena or hematochezia.  GENITOURINARY: No dysuria, frequency or hematuria  NEUROLOGICAL: No numbness or weakness  SKIN: No itching, burning, rashes, or lesions   All other review of systems is negative unless indicated above.    T(C): 36.7 (10-21-18 @ 04:23), Max: 36.9 (10-20-18 @ 11:36)  HR: 66 (10-21-18 @ 05:01) (61 - 82)  BP: 110/69 (10-21-18 @ 04:23) (98/60 - 110/69)  RR: 18 (10-21-18 @ 04:23) (18 - 18)  SpO2: 97% (10-21-18 @ 05:01) (95% - 100%)    I&O's Summary  20 Oct 2018 07:01  -  21 Oct 2018 07:00  --------------------------------------------------------  IN: 120 mL / OUT: 2000 mL / NET: -1880 mL    GENERAL: No acute distress, well-developed  HEAD:  Atraumatic, Normocephalic  ENT: +CPAP. EOMI, PERRLA, No JVD, moist mucosa  CHEST/LUNG: Clear to auscultation bilaterally  BACK: No spinal tenderness  HEART: Regular rate and rhythm; No murmurs, rubs, or gallops  ABDOMEN: Soft, Nontender, Nondistended; Bowel sounds present, +Davidson  EXTREMITIES:  No clubbing, cyanosis, or edema  PSYCH: Nl behavior, nl affect  NEUROLOGY: AAOx2, non-focal, cranial nerves intact  SKIN: Normal color, No rashes or lesions    LABS:  CAPILLARY BLOOD GLUCOSE  POCT Blood Glucose.: 135 mg/dL (21 Oct 2018 07:41)  POCT Blood Glucose.: 130 mg/dL (20 Oct 2018 21:34)  POCT Blood Glucose.: 122 mg/dL (20 Oct 2018 16:53)  POCT Blood Glucose.: 117 mg/dL (20 Oct 2018 11:58)  POCT Blood Glucose.: 127 mg/dL (20 Oct 2018 08:05)             MEDICATIONS  (STANDING):  apixaban 5 milliGRAM(s) Oral every 12 hours  aspirin enteric coated 81 milliGRAM(s) Oral daily  atorvastatin 20 milliGRAM(s) Oral at bedtime  buDESOnide  80 MICROgram(s)/formoterol 4.5 MICROgram(s) Inhaler 2 Puff(s) Inhalation two times a day  cefTRIAXone   IVPB      cefTRIAXone   IVPB 1 Gram(s) IV Intermittent every 24 hours  cholecalciferol 500 Unit(s) Oral daily  docusate sodium 100 milliGRAM(s) Oral two times a day  finasteride 5 milliGRAM(s) Oral daily  lactulose Syrup 10 Gram(s) Oral at bedtime  lidocaine   Patch 1 Patch Transdermal once  polyethylene glycol 3350 17 Gram(s) Oral at bedtime  tamsulosin 0.4 milliGRAM(s) Oral at bedtime  tiotropium 18 MICROgram(s) Capsule 1 Capsule(s) Inhalation daily    MEDICATIONS  (PRN):  ALBUTerol/ipratropium for Nebulization 3 milliLiter(s) Nebulizer every 6 hours PRN Shortness of Breath and/or Wheezing

## 2018-10-21 NOTE — PROGRESS NOTE ADULT - PROBLEM SELECTOR PLAN 2
-in the setting of Urinary retention, UCx >100K Proteus  -c/w CTX 1g daily (Day 3), f/u Sensitivities  -no h/o MDRO and clinically improving, no indication to escalate Abx

## 2018-10-21 NOTE — PROGRESS NOTE ADULT - PROBLEM SELECTOR PLAN 3
-concern for metastatic Prostate CA vs BPH given CT findings  -anticipate d/c with Davidson  -appreciate  recs, outpatient workup -concern for metastatic Prostate CA vs BPH given CT findings  -anticipate D/C with Davidson  -appreciate  recs, outpatient workup

## 2018-10-21 NOTE — PROGRESS NOTE ADULT - ASSESSMENT
77yo M w/ PMH of Dementia, AFib (on Eliquis), COPD/Pulmonary Fibrosis, BRIEN (on CPAP), recurrent UTIs with recent admission 9/2018 p/w AMS found to have metabolic encephalopathy due to cystitis as well as CT findings concerning for metastatic prostate cancer. 78y M w/ PMHx significant for Dementia, AFib (on Eliquis), COPD/Pulmonary Fibrosis, BRIEN (on CPAP), recurrent UTIs with recent admission 9/2018 p/w AMS found to have metabolic encephalopathy due to cystitis as well as CT findings concerning for metastatic prostate cancer.

## 2018-10-22 LAB
-  AMIKACIN: SIGNIFICANT CHANGE UP
-  AMOXICILLIN/CLAVULANIC ACID: SIGNIFICANT CHANGE UP
-  AMPICILLIN/SULBACTAM: SIGNIFICANT CHANGE UP
-  AMPICILLIN: SIGNIFICANT CHANGE UP
-  AZTREONAM: SIGNIFICANT CHANGE UP
-  CEFAZOLIN: SIGNIFICANT CHANGE UP
-  CEFEPIME: SIGNIFICANT CHANGE UP
-  CEFOXITIN: SIGNIFICANT CHANGE UP
-  CEFTRIAXONE: SIGNIFICANT CHANGE UP
-  CIPROFLOXACIN: SIGNIFICANT CHANGE UP
-  ERTAPENEM: SIGNIFICANT CHANGE UP
-  GENTAMICIN: SIGNIFICANT CHANGE UP
-  LEVOFLOXACIN: SIGNIFICANT CHANGE UP
-  MEROPENEM: SIGNIFICANT CHANGE UP
-  NITROFURANTOIN: SIGNIFICANT CHANGE UP
-  PIPERACILLIN/TAZOBACTAM: SIGNIFICANT CHANGE UP
-  TOBRAMYCIN: SIGNIFICANT CHANGE UP
-  TRIMETHOPRIM/SULFAMETHOXAZOLE: SIGNIFICANT CHANGE UP
ANION GAP SERPL CALC-SCNC: 13 MMOL/L — SIGNIFICANT CHANGE UP (ref 5–17)
BUN SERPL-MCNC: 9 MG/DL — SIGNIFICANT CHANGE UP (ref 7–23)
CALCIUM SERPL-MCNC: 8.9 MG/DL — SIGNIFICANT CHANGE UP (ref 8.4–10.5)
CHLORIDE SERPL-SCNC: 100 MMOL/L — SIGNIFICANT CHANGE UP (ref 96–108)
CO2 SERPL-SCNC: 22 MMOL/L — SIGNIFICANT CHANGE UP (ref 22–31)
CREAT SERPL-MCNC: 0.81 MG/DL — SIGNIFICANT CHANGE UP (ref 0.5–1.3)
CULTURE RESULTS: SIGNIFICANT CHANGE UP
GLUCOSE BLDC GLUCOMTR-MCNC: 104 MG/DL — HIGH (ref 70–99)
GLUCOSE BLDC GLUCOMTR-MCNC: 121 MG/DL — HIGH (ref 70–99)
GLUCOSE BLDC GLUCOMTR-MCNC: 210 MG/DL — HIGH (ref 70–99)
GLUCOSE BLDC GLUCOMTR-MCNC: 92 MG/DL — SIGNIFICANT CHANGE UP (ref 70–99)
GLUCOSE SERPL-MCNC: 105 MG/DL — HIGH (ref 70–99)
HCT VFR BLD CALC: 36.9 % — LOW (ref 39–50)
HGB BLD-MCNC: 12.4 G/DL — LOW (ref 13–17)
MCHC RBC-ENTMCNC: 31.1 PG — SIGNIFICANT CHANGE UP (ref 27–34)
MCHC RBC-ENTMCNC: 33.7 GM/DL — SIGNIFICANT CHANGE UP (ref 32–36)
MCV RBC AUTO: 92.3 FL — SIGNIFICANT CHANGE UP (ref 80–100)
METHOD TYPE: SIGNIFICANT CHANGE UP
ORGANISM # SPEC MICROSCOPIC CNT: SIGNIFICANT CHANGE UP
ORGANISM # SPEC MICROSCOPIC CNT: SIGNIFICANT CHANGE UP
PLATELET # BLD AUTO: 200 K/UL — SIGNIFICANT CHANGE UP (ref 150–400)
POTASSIUM SERPL-MCNC: 4.6 MMOL/L — SIGNIFICANT CHANGE UP (ref 3.5–5.3)
POTASSIUM SERPL-SCNC: 4.6 MMOL/L — SIGNIFICANT CHANGE UP (ref 3.5–5.3)
RBC # BLD: 3.99 M/UL — LOW (ref 4.2–5.8)
RBC # FLD: 13 % — SIGNIFICANT CHANGE UP (ref 10.3–14.5)
SODIUM SERPL-SCNC: 135 MMOL/L — SIGNIFICANT CHANGE UP (ref 135–145)
SPECIMEN SOURCE: SIGNIFICANT CHANGE UP
WBC # BLD: 12.7 K/UL — HIGH (ref 3.8–10.5)
WBC # FLD AUTO: 12.7 K/UL — HIGH (ref 3.8–10.5)

## 2018-10-22 PROCEDURE — 99232 SBSQ HOSP IP/OBS MODERATE 35: CPT | Mod: GC

## 2018-10-22 PROCEDURE — 99232 SBSQ HOSP IP/OBS MODERATE 35: CPT

## 2018-10-22 RX ORDER — LANOLIN ALCOHOL/MO/W.PET/CERES
3 CREAM (GRAM) TOPICAL AT BEDTIME
Qty: 0 | Refills: 0 | Status: DISCONTINUED | OUTPATIENT
Start: 2018-10-22 | End: 2018-10-25

## 2018-10-22 RX ORDER — SALIVA SUBSTITUTE COMB NO.11 351 MG
5 POWDER IN PACKET (EA) MUCOUS MEMBRANE
Qty: 0 | Refills: 0 | Status: DISCONTINUED | OUTPATIENT
Start: 2018-10-22 | End: 2018-10-25

## 2018-10-22 RX ADMIN — APIXABAN 5 MILLIGRAM(S): 2.5 TABLET, FILM COATED ORAL at 05:52

## 2018-10-22 RX ADMIN — BUDESONIDE AND FORMOTEROL FUMARATE DIHYDRATE 2 PUFF(S): 160; 4.5 AEROSOL RESPIRATORY (INHALATION) at 17:49

## 2018-10-22 RX ADMIN — Medication 100 MILLIGRAM(S): at 05:52

## 2018-10-22 RX ADMIN — APIXABAN 5 MILLIGRAM(S): 2.5 TABLET, FILM COATED ORAL at 17:49

## 2018-10-22 RX ADMIN — Medication 81 MILLIGRAM(S): at 12:09

## 2018-10-22 RX ADMIN — Medication 5 MILLILITER(S): at 17:49

## 2018-10-22 RX ADMIN — Medication 500 UNIT(S): at 12:09

## 2018-10-22 RX ADMIN — Medication 5 MILLILITER(S): at 12:10

## 2018-10-22 RX ADMIN — BUDESONIDE AND FORMOTEROL FUMARATE DIHYDRATE 2 PUFF(S): 160; 4.5 AEROSOL RESPIRATORY (INHALATION) at 05:52

## 2018-10-22 RX ADMIN — Medication 100 MILLIGRAM(S): at 17:49

## 2018-10-22 RX ADMIN — FINASTERIDE 5 MILLIGRAM(S): 5 TABLET, FILM COATED ORAL at 12:09

## 2018-10-22 RX ADMIN — TIOTROPIUM BROMIDE 1 CAPSULE(S): 18 CAPSULE ORAL; RESPIRATORY (INHALATION) at 12:09

## 2018-10-22 RX ADMIN — CEFTRIAXONE 100 GRAM(S): 500 INJECTION, POWDER, FOR SOLUTION INTRAMUSCULAR; INTRAVENOUS at 12:09

## 2018-10-22 NOTE — DIETITIAN INITIAL EVALUATION ADULT. - PERTINENT LABORATORY DATA
Na 135 [135 - 145], K+ 4.6 [3.5 - 5.3], BUN 9 [7 - 23], Cr 0.81 [0.50 - 1.30],  [70 - 99], Ca 8.9 [8.4 - 10.5], HbA1c 5.8%

## 2018-10-22 NOTE — PROGRESS NOTE ADULT - PROBLEM SELECTOR PLAN 3
-concern for metastatic Prostate CA vs BPH given CT findings  -anticipate D/C with Davidson  -appreciate  recs, outpatient workup -concern for metastatic Prostate CA vs BPH given CT findings  -anticipate D/C with Davidson in place with  outpatient f/u  -appreciate  recs, outpatient workup

## 2018-10-22 NOTE — DIETITIAN INITIAL EVALUATION ADULT. - OTHER INFO
Pt seen for LOS initial assessment. Wife reports pt's UBW used to be 220 pounds years ago, has been gradually losing wt over years to current 195 pounds. Wife attributes the wt loss to pt's altered mental status affecting po intakes, and 'we just don't eat as much as we used to' at home. Pt with fluctuating appetite/po intakes in house, noted 10-80% po intakes documented per flowsheet. Observed 90% po intake of breakfast at time of visit. Pt also enjoys drinking Glucerna supplement 2 cans a day. No chewing/swallowing difficulty noted. Pt with fecal incontinence, on bowel regimen for constipation, +BM 2 days ago per wife.

## 2018-10-22 NOTE — PROGRESS NOTE ADULT - ATTENDING COMMENTS
as above--resp status stable-MS imroved  chronic sob-UIP, copd, debility, Card dz--  No rx for UIP  COPD--symbicort 2 bid, spiriva, neb rx q 6h  OSAS--cpap o/n is a must  Delirium resolved-CT a/p hydronephrosis---Urology consulted; biotene for dry mouth  Te Prieto MD-Pulmonary   [phone]

## 2018-10-22 NOTE — DIETITIAN INITIAL EVALUATION ADULT. - PERTINENT MEDS FT
MEDICATIONS  (STANDING):    Biotene Dry Mouth Oral Rinse 5 milliLiter(s) Swish and Spit five times a day  cholecalciferol 500 Unit(s) Oral daily  lactulose Syrup 10 Gram(s) Oral at bedtime  polyethylene glycol 3350 17 Gram(s) Oral at bedtime

## 2018-10-22 NOTE — PROGRESS NOTE ADULT - PROBLEM SELECTOR PLAN 9
-DVT PPX: Already therapeutically anticoagulated with Elquis  -DIET: DASH  -DISPO: PT Eval pending medical work-up  -CODE STATUS: Full Code -DVT PPX: Already therapeutically anticoagulated with Elquis  -DIET: DASH  -DISPO: GINNY pending placement  -CODE STATUS: Full Code

## 2018-10-22 NOTE — PROGRESS NOTE ADULT - PROBLEM SELECTOR PLAN 1
Overall improved, likely multifactorial but primarily metabolic due to cystitis/urinary retention +/- paraneoplastic from potential Prostate CA +/- delirium due frequent setting changes  -treatment of Cystitis below  -holding psychotropics, will restart Escitalopram and Remeron as tolerated Overall improved, likely multifactorial but primarily metabolic due to cystitis/urinary retention +/- paraneoplastic from potential Prostate CA +/- delirium due frequent setting changes  -treatment of Cystitis below  -holding psychotropics, will restart Escitalopram and Remeron as tolerated  -neurology recommendations appreciated; f/u outpatient

## 2018-10-22 NOTE — PROGRESS NOTE ADULT - PROBLEM SELECTOR PLAN 4
Improving, QTc today = 456  - Avoid QT-prolonging agents  - Repeat ECG, to consider reinstating home antipsychotics held since admission Improving, QTc yesterday = 456  - Avoid QT-prolonging agents  - Repeat ECG, to consider reinstating home antipsychotics held since admission

## 2018-10-22 NOTE — PROGRESS NOTE ADULT - ASSESSMENT
Pt is a 77yo M with PMH afib on eliquis, COPD, BRIEN, dementia, CHF, GERD, HLD, pulmonary fibrosis, recurrent UTIs, recent admission for UTI then went to [facility], returning from home with increased confusion, hallucinations as well as some episodic slurred speech. CT head no acute changes. Labs without any significant elevation in white count and UA negative, afebrile. He had denied pain, headaches. He reportedly had MRI brain at [facility] 4 weeks ago which did not demonstrate any acute findings per wife.     A/P: Continue to suspect metabolic delirium superimposed on baseline dementia but labs so far unimpressive apart from mildly dry BUN/Cr ratio, elevated procalcitonin and ESR, mild leukocytosis - unlikely stroke though had endorsed slurred speech so MRI ordered and subsequently canceled.    Plan  1. Admitted to medicine  2. No evidence primary acute neuro process.  MRI ordered for confirmation of no acute process, though patient likely did not tolerate. Would not sedate for low yield test.  3. Frequent reorientation, avoid delirium provoking medications  4. Continue eliquis, neurochecks, statin  5. Enhanced supervision, Fall precautions  6. Optimize hydration  7. Ultimately if goes home just needs enhanced supervision, HHA etc  8. proteus on urine culture.  u/a appears nl.  initial culture was neg.  of unclear signficance but being treated  9. d/c planning

## 2018-10-22 NOTE — PROGRESS NOTE ADULT - SUBJECTIVE AND OBJECTIVE BOX
SUBJECTIVE: has been confused    Medications:  MEDICATIONS  (STANDING):  apixaban 5 milliGRAM(s) Oral every 12 hours  aspirin enteric coated 81 milliGRAM(s) Oral daily  atorvastatin 20 milliGRAM(s) Oral at bedtime  buDESOnide  80 MICROgram(s)/formoterol 4.5 MICROgram(s) Inhaler 2 Puff(s) Inhalation two times a day  cholecalciferol 500 Unit(s) Oral daily  dextrose 5% + sodium chloride 0.45%. 1000 milliLiter(s) (75 mL/Hr) IV Continuous <Continuous>  docusate sodium 100 milliGRAM(s) Oral two times a day  finasteride 5 milliGRAM(s) Oral daily  lactulose Syrup 10 Gram(s) Oral at bedtime  polyethylene glycol 3350 17 Gram(s) Oral at bedtime  tamsulosin 0.4 milliGRAM(s) Oral at bedtime  tiotropium 18 MICROgram(s) Capsule 1 Capsule(s) Inhalation daily    MEDICATIONS  (PRN):  ALBUTerol/ipratropium for Nebulization 3 milliLiter(s) Nebulizer every 6 hours PRN Shortness of Breath and/or Wheezing      Labs:  CBC Full  -  ( 19 Oct 2018 07:57 )  WBC Count : 10.70 K/uL  Hemoglobin : 11.3 g/dL  Hematocrit : 34.5 %  Platelet Count - Automated : 183 K/uL  Mean Cell Volume : 94.8 fl  Mean Cell Hemoglobin : 31.0 pg  Mean Cell Hemoglobin Concentration : 32.8 gm/dL  Auto Neutrophil # : 6.50 K/uL  Auto Lymphocyte # : 3.04 K/uL  Auto Monocyte # : 0.92 K/uL  Auto Eosinophil # : 0.17 K/uL  Auto Basophil # : 0.04 K/uL  Auto Neutrophil % : 60.7 %  Auto Lymphocyte % : 28.4 %  Auto Monocyte % : 8.6 %  Auto Eosinophil % : 1.6 %  Auto Basophil % : 0.4 %    10-19    136  |  102  |  18  ----------------------------<  115<H>  3.6   |  24  |  1.08    Ca    8.3<L>      19 Oct 2018 05:56  Phos  3.5     10-18  Mg     1.9     10-18    TPro  7.3  /  Alb  2.9<L>  /  TBili  0.9  /  DBili  x   /  AST  17  /  ALT  9<L>  /  AlkPhos  81  10-17    CAPILLARY BLOOD GLUCOSE      POCT Blood Glucose.: 121 mg/dL (19 Oct 2018 08:07)  POCT Blood Glucose.: 94 mg/dL (18 Oct 2018 21:27)  POCT Blood Glucose.: 90 mg/dL (18 Oct 2018 16:52)  POCT Blood Glucose.: 85 mg/dL (18 Oct 2018 16:51)  POCT Blood Glucose.: 90 mg/dL (18 Oct 2018 12:23)  POCT Blood Glucose.: 106 mg/dL (18 Oct 2018 10:20)    LIVER FUNCTIONS - ( 17 Oct 2018 11:10 )  Alb: 2.9 g/dL / Pro: 7.3 g/dL / ALK PHOS: 81 U/L / ALT: 9 U/L / AST: 17 U/L / GGT: x                 Vitals:  Vital Signs Last 24 Hrs  T(C): 36.7 (19 Oct 2018 05:34), Max: 37.8 (18 Oct 2018 12:58)  T(F): 98.1 (19 Oct 2018 05:34), Max: 100 (18 Oct 2018 12:58)  HR: 95 (19 Oct 2018 06:42) (69 - 97)  BP: 114/74 (19 Oct 2018 05:34) (102/58 - 122/71)  BP(mean): --  RR: 18 (19 Oct 2018 05:34) (18 - 18)  SpO2: 94% (19 Oct 2018 06:42) (94% - 95%)      NEUROLOGICAL EXAM:    Mental status: fatigued, lying in bed. irritable.  O to [facility], [place],  follows commands for EOM exam.     Cranial Nerves: Pupils were equal, round, reactive to light. Extraocular movements were intact when following commands There was no facial asymmetry. No participating with oropharyngeal exam.     Motor exam: moving all extremities but minimally participatory with strength exam.     Reflexes: 2+ upper extremity, 1+ lower.    Sensation: patient not participating but responds to painful stimuli.     Coordination: not participating with dysmetria exam.    Gait: deferred.

## 2018-10-22 NOTE — PROGRESS NOTE ADULT - ASSESSMENT
78y M w/ PMHx significant for Dementia, AFib (on Eliquis), COPD/Pulmonary Fibrosis, BRIEN (on CPAP), recurrent UTIs with recent admission 9/2018 p/w AMS found to have metabolic encephalopathy due to cystitis as well as CT findings concerning for metastatic prostate cancer. 78y M w/ PMHx significant for Dementia, AFib (on Eliquis), COPD/Pulmonary Fibrosis, BRIEN (on CPAP), recurrent UTIs with recent admission 9/2018 p/w AMS found to have metabolic encephalopathy due to cystitis as well as CT findings concerning for metastatic prostate cancer. Dispo planning initiated.

## 2018-10-22 NOTE — DIETITIAN INITIAL EVALUATION ADULT. - ORAL INTAKE PTA
good/Wife prepares meals for pt at home, states pt had good appetite and po intakes PTA. Pt's favorite meal of the day is breakfast- has large breakfast and small lunch /dinner with snacks in between

## 2018-10-22 NOTE — DIETITIAN INITIAL EVALUATION ADULT. - NS AS NUTRI INTERV MEDICAL AND FOOD SUPPLEMENTS
Per wife, pt enjoys drinking Glucerna at least 2 cans a day (no history of DM). Recommend to continue with current supplementation.

## 2018-10-22 NOTE — PROGRESS NOTE ADULT - SUBJECTIVE AND OBJECTIVE BOX
CHIEF COMPLAINT:f/up for resp insufficiency, UIP, osas, rads--more awake and alert-dry mouth    Interval Events: none    REVIEW OF SYSTEMS:  Constitutional: No fevers or chills. No weight loss. + fatigue or generalized malaise.  Eyes: No itching or discharge from the eyes  ENT: No ear pain. No ear discharge. No nasal congestion. No post nasal drip. No epistaxis. No throat pain. No sore throat. No difficulty swallowing.   CV: No chest pain. No palpitations. No lightheadedness or dizziness.   Resp: No dyspnea at rest. + dyspnea on exertion. No orthopnea. No wheezing. No cough. No stridor. No sputum production. No chest pain with respiration.  GI: No nausea. No vomiting. No diarrhea.  MSK: No joint pain or pain in any extremities  Integumentary: No skin lesions. + pedal edema.  Neurological: + gross motor weakness. No sensory changes.  [+ ] All other systems negative  [ ] Unable to assess ROS because ________    OBJECTIVE:  ICU Vital Signs Last 24 Hrs  T(C): 37.1 (22 Oct 2018 03:38), Max: 37.1 (22 Oct 2018 03:38)  T(F): 98.7 (22 Oct 2018 03:38), Max: 98.7 (22 Oct 2018 03:38)  HR: 65 (22 Oct 2018 04:26) (60 - 79)  BP: 118/81 (22 Oct 2018 03:38) (115/78 - 134/83)  BP(mean): --  ABP: --  ABP(mean): --  RR: 18 (22 Oct 2018 03:38) (18 - 18)  SpO2: 96% (22 Oct 2018 04:26) (93% - 98%)        10-20 @ 07:01  -  10-21 @ 07:00  --------------------------------------------------------  IN: 120 mL / OUT: 2000 mL / NET: -1880 mL    10-21 @ 07:01  -  10-22 @ 05:20  --------------------------------------------------------  IN: 600 mL / OUT: 2200 mL / NET: -1600 mL      CAPILLARY BLOOD GLUCOSE      POCT Blood Glucose.: 113 mg/dL (21 Oct 2018 21:52)      PHYSICAL EXAM:  General: Awake, alert, oriented X 3.   HEENT: Atraumatic, normocephalic.                 Mallampatti Grade 3                No nasal congestion.                No tonsillar or pharyngeal exudates.  Lymph Nodes: No palpable lymphadenopathy  Neck: No JVD. No carotid bruit.   Respiratory: Normal chest expansion                         Normal percussion                         Normal and equal air entry                         No wheeze, rhonchi but ++ bibasilar rales.  Cardiovascular: S1 S2 normal. No murmurs, rubs or gallops.   Abdomen: Soft, non-tender, non-distended. No organomegaly.  Extremities: Warm to touch. Peripheral pulse palpable. + pedal edema.   Skin: No rashes or skin lesions  Neurological: Motor and sensory examination equal and abnormal in all four extremities.  Psychiatry: Appropriate mood and affect.    HOSPITAL MEDICATIONS:  MEDICATIONS  (STANDING):  apixaban 5 milliGRAM(s) Oral every 12 hours  aspirin enteric coated 81 milliGRAM(s) Oral daily  atorvastatin 20 milliGRAM(s) Oral at bedtime  buDESOnide  80 MICROgram(s)/formoterol 4.5 MICROgram(s) Inhaler 2 Puff(s) Inhalation two times a day  cefTRIAXone   IVPB      cefTRIAXone   IVPB 1 Gram(s) IV Intermittent every 24 hours  cholecalciferol 500 Unit(s) Oral daily  docusate sodium 100 milliGRAM(s) Oral two times a day  finasteride 5 milliGRAM(s) Oral daily  lactulose Syrup 10 Gram(s) Oral at bedtime  lidocaine   Patch 1 Patch Transdermal once  polyethylene glycol 3350 17 Gram(s) Oral at bedtime  tamsulosin 0.4 milliGRAM(s) Oral at bedtime  tiotropium 18 MICROgram(s) Capsule 1 Capsule(s) Inhalation daily    MEDICATIONS  (PRN):  ALBUTerol/ipratropium for Nebulization 3 milliLiter(s) Nebulizer every 6 hours PRN Shortness of Breath and/or Wheezing      LABS:                        10.4   8.83  )-----------( 165      ( 20 Oct 2018 08:18 )             32.5     10-20    138  |  102  |  13  ----------------------------<  119<H>  3.3<L>   |  23  |  0.99    Ca    7.8<L>      20 Oct 2018 06:55  Phos  3.5     10-20  Mg     1.8     10-20    TPro  6.0  /  Alb  2.3<L>  /  TBili  0.6  /  DBili  x   /  AST  14  /  ALT  6<L>  /  AlkPhos  62  10-20              MICROBIOLOGY:     RADIOLOGY:  [ ] Reviewed and interpreted by me    Point of Care Ultrasound Findings:    PFT:    EKG:

## 2018-10-22 NOTE — DIETITIAN INITIAL EVALUATION ADULT. - ENERGY NEEDS
Height: 70 inches, Weight: 195 pounds  BMI: 28 kg/m2 IBW: 166 pounds (+/-10%), %IBW: 117%  Pertinent Info: Per chart, 78 y/o male with PMH of Afib on Eliquis, COPD, dementia, CHF, pulmonary fibrosis, admitted with increased confusion and hallucination 2/2 metabolic encephalopathy, cystitis and concern for metastatic prostate cancer. No edema, no pressure ulcers noted at this time.

## 2018-10-22 NOTE — PROGRESS NOTE ADULT - PROBLEM SELECTOR PLAN 2
-in the setting of Urinary retention, UCx >100K Proteus  -c/w CTX 1g daily (Day 3), f/u Sensitivities  -no h/o MDRO and clinically improving, no indication to escalate Abx -in the setting of Urinary retention, UCx >100K Proteus  -c/w CTX 1g daily (Day 4), f/u Sensitivities. plan for 7 days total  -no h/o MDRO and clinically improving, no indication to escalate Abx -in the setting of Urinary retention, UCx >100K Proteus  -c/w CTX 1g daily (Day 4), f/u Sensitivities. plan for 7 days total and transition to PO on discharge  -no h/o MDRO and clinically improving, no indication to escalate Abx

## 2018-10-22 NOTE — DIETITIAN INITIAL EVALUATION ADULT. - PT NOT SOURCE
confused/Per chart pt with dementia admitted with increased confusion and hallucination, pleasantly confused per chart. Wife provided subjective information.

## 2018-10-22 NOTE — DIETITIAN INITIAL EVALUATION ADULT. - PROBLEM SELECTOR PLAN 1
Unclear etiology at this time; does not have evidence to suggest acute infection as patient is afebrile with normal UA, normal CXR and no localizing exam findings. Isolated leukocytosis may be related to dehydration however, will check ESR, CRP, procalcitonin to rule out infectious causes. CTH without acute pathology. Confusion and hallucinations may also be in setting of delirium as patient recently returned home from rehab vs. progression of his dementia.   - Neurology consulted, recommending brain MRI to further evaluate for pathology  - F/u urine cultures  - Check vitamin B12, folate, TSH, syphilis screen  - Check ESR, CRP, procalcitonin  - Given AST 2x ALT, will check alcohol level  - Check urine tox  - Frequent reorientation, reduce unnecessary nighttime awakening  - Enhanced supervision  - Fall precautions  - Hold escitalopram for now  - If febrile, obtain BCx

## 2018-10-22 NOTE — PROGRESS NOTE ADULT - SUBJECTIVE AND OBJECTIVE BOX
Jonathan Wilson, PGY-3  Internal Medicine Resident  Pager: [phone]  After 7PM on Weekdays/After 12PM on Weekends: [phone]    Patient is a 78y old  Male who presents with a chief complaint of confusion (22 Oct 2018 10:17)      SUBJECTIVE / OVERNIGHT EVENTS: no acute events overnight. confused this AM but cooperative and pleasant.    REVIEW OF SYSTEMS:  CONSTITUTIONAL: No weakness, fevers or chills  EYES/ENT: No visual changes;  No vertigo or throat pain   NECK: No pain or stiffness  RESPIRATORY: No cough, wheezing, hemoptysis; No shortness of breath  CARDIOVASCULAR: No chest pain or palpitations  GASTROINTESTINAL: No abdominal pain, nausea, vomiting, or diarrhea. No melena or hematochezia.  GENITOURINARY: No dysuria, frequency or hematuria  NEUROLOGICAL: No numbness or weakness  SKIN: No itching, burning, rashes, or lesions   All other review of systems is negative unless indicated above.    MEDICATIONS  (STANDING):  apixaban 5 milliGRAM(s) Oral every 12 hours  aspirin enteric coated 81 milliGRAM(s) Oral daily  atorvastatin 20 milliGRAM(s) Oral at bedtime  Biotene Dry Mouth Oral Rinse 5 milliLiter(s) Swish and Spit five times a day  buDESOnide  80 MICROgram(s)/formoterol 4.5 MICROgram(s) Inhaler 2 Puff(s) Inhalation two times a day  cefTRIAXone   IVPB      cefTRIAXone   IVPB 1 Gram(s) IV Intermittent every 24 hours  cholecalciferol 500 Unit(s) Oral daily  docusate sodium 100 milliGRAM(s) Oral two times a day  finasteride 5 milliGRAM(s) Oral daily  lactulose Syrup 10 Gram(s) Oral at bedtime  lidocaine   Patch 1 Patch Transdermal once  polyethylene glycol 3350 17 Gram(s) Oral at bedtime  tamsulosin 0.4 milliGRAM(s) Oral at bedtime  tiotropium 18 MICROgram(s) Capsule 1 Capsule(s) Inhalation daily    MEDICATIONS  (PRN):  ALBUTerol/ipratropium for Nebulization 3 milliLiter(s) Nebulizer every 6 hours PRN Shortness of Breath and/or Wheezing      T(C): 37.1 (10-22-18 @ 03:38), Max: 37.1 (10-22-18 @ 03:38)  HR: 65 (10-22-18 @ 04:26) (60 - 79)  BP: 118/81 (10-22-18 @ 03:38) (115/78 - 134/83)  RR: 18 (10-22-18 @ 03:38) (18 - 18)  SpO2: 96% (10-22-18 @ 04:26) (93% - 97%)    CAPILLARY BLOOD GLUCOSE      POCT Blood Glucose.: 121 mg/dL (22 Oct 2018 07:51)  POCT Blood Glucose.: 113 mg/dL (21 Oct 2018 21:52)  POCT Blood Glucose.: 132 mg/dL (21 Oct 2018 16:44)  POCT Blood Glucose.: 119 mg/dL (21 Oct 2018 12:10)    I&O's Summary    21 Oct 2018 07:01  -  22 Oct 2018 07:00  --------------------------------------------------------  IN: 600 mL / OUT: 2450 mL / NET: -1850 mL        GENERAL: No acute distress, well-developed  HEAD:  Atraumatic, Normocephalic  ENT: poor dentition  CHEST/LUNG: Clear to auscultation bilaterally  BACK: No spinal tenderness  HEART: Regular rate and rhythm; No murmurs, rubs, or gallops  ABDOMEN: Soft, Nontender, Nondistended; Bowel sounds present  EXTREMITIES:  No clubbing, cyanosis, or edema  PSYCH: Nl behavior, nl affect  NEUROLOGY: AAOx1, non-focal, cranial nerves intact  SKIN: Normal color, No rashes or lesions    LABS:                        12.4   12.7  )-----------( 200      ( 22 Oct 2018 06:27 )             36.9     10-22    135  |  100  |  9   ----------------------------<  105<H>  4.6   |  22  |  0.81    Ca    8.9      22 Oct 2018 06:27              RADIOLOGY & ADDITIONAL TESTS:  Imaging Personally Reviewed:  Consultant(s) Notes Reviewed: Pulm, Neuro  Care Discussed with Consultants/Other Providers: Jonathan Wilson, PGY-3  Internal Medicine Resident  Pager: [phone]  After 7PM on Weekdays/After 12PM on Weekends: [phone]    Patient is a 78y old  Male who presents with a chief complaint of confusion (22 Oct 2018 10:17)    SUBJECTIVE / OVERNIGHT EVENTS: no acute events overnight. confused this AM but cooperative and pleasant.    REVIEW OF SYSTEMS:  CONSTITUTIONAL: No weakness, fevers or chills  EYES/ENT: No visual changes;  No vertigo or throat pain   NECK: No pain or stiffness  RESPIRATORY: No cough, wheezing, hemoptysis; No shortness of breath  CARDIOVASCULAR: No chest pain or palpitations  GASTROINTESTINAL: No abdominal pain, nausea, vomiting, or diarrhea. No melena or hematochezia.  GENITOURINARY: No dysuria, frequency or hematuria  NEUROLOGICAL: No numbness or weakness  SKIN: No itching, burning, rashes, or lesions   All other review of systems is negative unless indicated above.    T(C): 37.1 (10-22-18 @ 03:38), Max: 37.1 (10-22-18 @ 03:38)  HR: 65 (10-22-18 @ 04:26) (60 - 79)  BP: 118/81 (10-22-18 @ 03:38) (115/78 - 134/83)  RR: 18 (10-22-18 @ 03:38) (18 - 18)  SpO2: 96% (10-22-18 @ 04:26) (93% - 97%)    I&O's Summary  21 Oct 2018 07:01  -  22 Oct 2018 07:00  --------------------------------------------------------  IN: 600 mL / OUT: 2450 mL / NET: -1850 mL    GENERAL: No acute distress, well-developed  HEAD:  Atraumatic, Normocephalic  ENT: poor dentition  CHEST/LUNG: Clear to auscultation bilaterally  BACK: No spinal tenderness  HEART: Regular rate and rhythm; No murmurs, rubs, or gallops  ABDOMEN: Soft, Nontender, Nondistended; Bowel sounds present  EXTREMITIES:  No clubbing, cyanosis, or edema  PSYCH: Nl behavior, nl affect  NEUROLOGY: AAOx1, non-focal, cranial nerves intact  SKIN: Normal color, No rashes or lesions    LABS:  CAPILLARY BLOOD GLUCOSE  POCT Blood Glucose.: 121 mg/dL (22 Oct 2018 07:51)  POCT Blood Glucose.: 113 mg/dL (21 Oct 2018 21:52)  POCT Blood Glucose.: 132 mg/dL (21 Oct 2018 16:44)  POCT Blood Glucose.: 119 mg/dL (21 Oct 2018 12:10)                        12.4   12.7  )-----------( 200      ( 22 Oct 2018 06:27 )             36.9     10-22    135  |  100  |  9   ----------------------------<  105<H>  4.6   |  22  |  0.81    Ca    8.9      22 Oct 2018 06:27    Consultant(s) Notes Reviewed: Pulm, Neuro, Urology    MEDICATIONS  (STANDING):  apixaban 5 milliGRAM(s) Oral every 12 hours  aspirin enteric coated 81 milliGRAM(s) Oral daily  atorvastatin 20 milliGRAM(s) Oral at bedtime  Biotene Dry Mouth Oral Rinse 5 milliLiter(s) Swish and Spit five times a day  buDESOnide  80 MICROgram(s)/formoterol 4.5 MICROgram(s) Inhaler 2 Puff(s) Inhalation two times a day  cefTRIAXone   IVPB      cefTRIAXone   IVPB 1 Gram(s) IV Intermittent every 24 hours  cholecalciferol 500 Unit(s) Oral daily  docusate sodium 100 milliGRAM(s) Oral two times a day  finasteride 5 milliGRAM(s) Oral daily  lactulose Syrup 10 Gram(s) Oral at bedtime  lidocaine   Patch 1 Patch Transdermal once  polyethylene glycol 3350 17 Gram(s) Oral at bedtime  tamsulosin 0.4 milliGRAM(s) Oral at bedtime  tiotropium 18 MICROgram(s) Capsule 1 Capsule(s) Inhalation daily    MEDICATIONS  (PRN):  ALBUTerol/ipratropium for Nebulization 3 milliLiter(s) Nebulizer every 6 hours PRN Shortness of Breath and/or Wheezing

## 2018-10-22 NOTE — DIETITIAN INITIAL EVALUATION ADULT. - PROBLEM SELECTOR PLAN 4
Found to have prolonged QTc of 506 ms on today's ECG.   - Avoid QT-prolonging agents  - Repeat ECG in AM

## 2018-10-23 LAB
AMMONIA BLD-MCNC: 24 UMOL/L — SIGNIFICANT CHANGE UP (ref 11–55)
ANION GAP SERPL CALC-SCNC: 12 MMOL/L — SIGNIFICANT CHANGE UP (ref 5–17)
BASOPHILS # BLD AUTO: 0 K/UL — SIGNIFICANT CHANGE UP (ref 0–0.2)
BASOPHILS NFR BLD AUTO: 0.5 % — SIGNIFICANT CHANGE UP (ref 0–2)
BUN SERPL-MCNC: 12 MG/DL — SIGNIFICANT CHANGE UP (ref 7–23)
CALCIUM SERPL-MCNC: 8.6 MG/DL — SIGNIFICANT CHANGE UP (ref 8.4–10.5)
CHLORIDE SERPL-SCNC: 98 MMOL/L — SIGNIFICANT CHANGE UP (ref 96–108)
CO2 SERPL-SCNC: 25 MMOL/L — SIGNIFICANT CHANGE UP (ref 22–31)
CREAT SERPL-MCNC: 0.87 MG/DL — SIGNIFICANT CHANGE UP (ref 0.5–1.3)
CULTURE RESULTS: SIGNIFICANT CHANGE UP
EOSINOPHIL # BLD AUTO: 0.2 K/UL — SIGNIFICANT CHANGE UP (ref 0–0.5)
EOSINOPHIL NFR BLD AUTO: 2.2 % — SIGNIFICANT CHANGE UP (ref 0–6)
GLUCOSE BLDC GLUCOMTR-MCNC: 104 MG/DL — HIGH (ref 70–99)
GLUCOSE BLDC GLUCOMTR-MCNC: 134 MG/DL — HIGH (ref 70–99)
GLUCOSE BLDC GLUCOMTR-MCNC: 88 MG/DL — SIGNIFICANT CHANGE UP (ref 70–99)
GLUCOSE BLDC GLUCOMTR-MCNC: 91 MG/DL — SIGNIFICANT CHANGE UP (ref 70–99)
GLUCOSE SERPL-MCNC: 126 MG/DL — HIGH (ref 70–99)
HCT VFR BLD CALC: 35.8 % — LOW (ref 39–50)
HGB BLD-MCNC: 11.9 G/DL — LOW (ref 13–17)
LYMPHOCYTES # BLD AUTO: 2.8 K/UL — SIGNIFICANT CHANGE UP (ref 1–3.3)
LYMPHOCYTES # BLD AUTO: 27.2 % — SIGNIFICANT CHANGE UP (ref 13–44)
MCHC RBC-ENTMCNC: 30.7 PG — SIGNIFICANT CHANGE UP (ref 27–34)
MCHC RBC-ENTMCNC: 33.2 GM/DL — SIGNIFICANT CHANGE UP (ref 32–36)
MCV RBC AUTO: 92.3 FL — SIGNIFICANT CHANGE UP (ref 80–100)
MONOCYTES # BLD AUTO: 0.8 K/UL — SIGNIFICANT CHANGE UP (ref 0–0.9)
MONOCYTES NFR BLD AUTO: 7.8 % — SIGNIFICANT CHANGE UP (ref 2–14)
NEUTROPHILS # BLD AUTO: 6.4 K/UL — SIGNIFICANT CHANGE UP (ref 1.8–7.4)
NEUTROPHILS NFR BLD AUTO: 62.4 % — SIGNIFICANT CHANGE UP (ref 43–77)
PLATELET # BLD AUTO: 193 K/UL — SIGNIFICANT CHANGE UP (ref 150–400)
POTASSIUM SERPL-MCNC: 3.8 MMOL/L — SIGNIFICANT CHANGE UP (ref 3.5–5.3)
POTASSIUM SERPL-SCNC: 3.8 MMOL/L — SIGNIFICANT CHANGE UP (ref 3.5–5.3)
RBC # BLD: 3.88 M/UL — LOW (ref 4.2–5.8)
RBC # FLD: 13.2 % — SIGNIFICANT CHANGE UP (ref 10.3–14.5)
SODIUM SERPL-SCNC: 135 MMOL/L — SIGNIFICANT CHANGE UP (ref 135–145)
SPECIMEN SOURCE: SIGNIFICANT CHANGE UP
WBC # BLD: 10.2 K/UL — SIGNIFICANT CHANGE UP (ref 3.8–10.5)
WBC # FLD AUTO: 10.2 K/UL — SIGNIFICANT CHANGE UP (ref 3.8–10.5)

## 2018-10-23 PROCEDURE — 99232 SBSQ HOSP IP/OBS MODERATE 35: CPT

## 2018-10-23 PROCEDURE — 99232 SBSQ HOSP IP/OBS MODERATE 35: CPT | Mod: GC

## 2018-10-23 RX ORDER — QUETIAPINE FUMARATE 200 MG/1
12.5 TABLET, FILM COATED ORAL AT BEDTIME
Qty: 0 | Refills: 0 | Status: DISCONTINUED | OUTPATIENT
Start: 2018-10-23 | End: 2018-10-25

## 2018-10-23 RX ORDER — QUETIAPINE FUMARATE 200 MG/1
12.5 TABLET, FILM COATED ORAL ONCE
Qty: 0 | Refills: 0 | Status: COMPLETED | OUTPATIENT
Start: 2018-10-23 | End: 2018-10-23

## 2018-10-23 RX ADMIN — ATORVASTATIN CALCIUM 20 MILLIGRAM(S): 80 TABLET, FILM COATED ORAL at 01:43

## 2018-10-23 RX ADMIN — TAMSULOSIN HYDROCHLORIDE 0.4 MILLIGRAM(S): 0.4 CAPSULE ORAL at 22:45

## 2018-10-23 RX ADMIN — APIXABAN 5 MILLIGRAM(S): 2.5 TABLET, FILM COATED ORAL at 18:03

## 2018-10-23 RX ADMIN — TIOTROPIUM BROMIDE 1 CAPSULE(S): 18 CAPSULE ORAL; RESPIRATORY (INHALATION) at 14:23

## 2018-10-23 RX ADMIN — APIXABAN 5 MILLIGRAM(S): 2.5 TABLET, FILM COATED ORAL at 08:16

## 2018-10-23 RX ADMIN — CEFTRIAXONE 100 GRAM(S): 500 INJECTION, POWDER, FOR SOLUTION INTRAMUSCULAR; INTRAVENOUS at 10:16

## 2018-10-23 RX ADMIN — Medication 5 MILLILITER(S): at 08:19

## 2018-10-23 RX ADMIN — QUETIAPINE FUMARATE 12.5 MILLIGRAM(S): 200 TABLET, FILM COATED ORAL at 22:47

## 2018-10-23 RX ADMIN — FINASTERIDE 5 MILLIGRAM(S): 5 TABLET, FILM COATED ORAL at 14:23

## 2018-10-23 RX ADMIN — BUDESONIDE AND FORMOTEROL FUMARATE DIHYDRATE 2 PUFF(S): 160; 4.5 AEROSOL RESPIRATORY (INHALATION) at 18:03

## 2018-10-23 RX ADMIN — Medication 100 MILLIGRAM(S): at 18:03

## 2018-10-23 RX ADMIN — POLYETHYLENE GLYCOL 3350 17 GRAM(S): 17 POWDER, FOR SOLUTION ORAL at 22:45

## 2018-10-23 RX ADMIN — TAMSULOSIN HYDROCHLORIDE 0.4 MILLIGRAM(S): 0.4 CAPSULE ORAL at 01:43

## 2018-10-23 RX ADMIN — QUETIAPINE FUMARATE 12.5 MILLIGRAM(S): 200 TABLET, FILM COATED ORAL at 10:59

## 2018-10-23 RX ADMIN — Medication 81 MILLIGRAM(S): at 14:23

## 2018-10-23 RX ADMIN — Medication 3 MILLIGRAM(S): at 01:43

## 2018-10-23 RX ADMIN — LACTULOSE 10 GRAM(S): 10 SOLUTION ORAL at 22:45

## 2018-10-23 RX ADMIN — BUDESONIDE AND FORMOTEROL FUMARATE DIHYDRATE 2 PUFF(S): 160; 4.5 AEROSOL RESPIRATORY (INHALATION) at 08:16

## 2018-10-23 RX ADMIN — Medication 500 UNIT(S): at 14:22

## 2018-10-23 RX ADMIN — ATORVASTATIN CALCIUM 20 MILLIGRAM(S): 80 TABLET, FILM COATED ORAL at 22:45

## 2018-10-23 RX ADMIN — Medication 3 MILLIGRAM(S): at 22:45

## 2018-10-23 NOTE — PROGRESS NOTE ADULT - ASSESSMENT
78y M w/ PMHx significant for Dementia, AFib (on Eliquis), COPD/Pulmonary Fibrosis, BRIEN (on CPAP), recurrent UTIs with recent admission 9/2018 p/w AMS found to have metabolic encephalopathy due to cystitis as well as CT findings concerning for metastatic prostate cancer. Dispo planning initiated.

## 2018-10-23 NOTE — PROGRESS NOTE ADULT - ATTENDING COMMENTS
as above--resp status stable-MS improved  chronic sob-UIP, copd, debility, Card dz--  No rx for UIP  COPD--symbicort 2 bid, spiriva, neb rx q 6h  OSAS--cpap o/n is a must  Delirium resolved-CT a/p hydronephrosis---Urology consulted; biotene for dry mouth; DC planning  Te Prieto MD-Pulmonary   [phone]

## 2018-10-23 NOTE — PROGRESS NOTE ADULT - SUBJECTIVE AND OBJECTIVE BOX
Neurology follow up  SUBJECTIVE:   Remains confused, talking extensively about this time in the [employer] in [place] in the 1940s.  Wife comes to bedside, says very confused still.     Medications:  ALBUTerol/ipratropium for Nebulization 3 milliLiter(s) Nebulizer every 6 hours PRN  apixaban 5 milliGRAM(s) Oral every 12 hours  aspirin enteric coated 81 milliGRAM(s) Oral daily  atorvastatin 20 milliGRAM(s) Oral at bedtime  Biotene Dry Mouth Oral Rinse 5 milliLiter(s) Swish and Spit five times a day  buDESOnide  80 MICROgram(s)/formoterol 4.5 MICROgram(s) Inhaler 2 Puff(s) Inhalation two times a day  cefTRIAXone   IVPB      cefTRIAXone   IVPB 1 Gram(s) IV Intermittent every 24 hours  cholecalciferol 500 Unit(s) Oral daily  docusate sodium 100 milliGRAM(s) Oral two times a day  finasteride 5 milliGRAM(s) Oral daily  lactulose Syrup 10 Gram(s) Oral at bedtime  lidocaine   Patch 1 Patch Transdermal once  melatonin 3 milliGRAM(s) Oral at bedtime  polyethylene glycol 3350 17 Gram(s) Oral at bedtime  QUEtiapine 12.5 milliGRAM(s) Oral once  tamsulosin 0.4 milliGRAM(s) Oral at bedtime  tiotropium 18 MICROgram(s) Capsule 1 Capsule(s) Inhalation daily      Labs:  CBC Full  -  ( 22 Oct 2018 06:27 )  WBC Count : 12.7 K/uL  Hemoglobin : 12.4 g/dL  Hematocrit : 36.9 %  Platelet Count - Automated : 200 K/uL  Mean Cell Volume : 92.3 fl  Mean Cell Hemoglobin : 31.1 pg  Mean Cell Hemoglobin Concentration : 33.7 gm/dL  Auto Neutrophil # : x  Auto Lymphocyte # : x  Auto Monocyte # : x  Auto Eosinophil # : x  Auto Basophil # : x  Auto Neutrophil % : x  Auto Lymphocyte % : x  Auto Monocyte % : x  Auto Eosinophil % : x  Auto Basophil % : x    10-22    135  |  100  |  9   ----------------------------<  105<H>  4.6   |  22  |  0.81    Ca    8.9      22 Oct 2018 06:27    POCT Blood Glucose.: 104 mg/dL (23 Oct 2018 08:16)  POCT Blood Glucose.: 104 mg/dL (22 Oct 2018 22:06)  POCT Blood Glucose.: 92 mg/dL (22 Oct 2018 16:44)  POCT Blood Glucose.: 210 mg/dL (22 Oct 2018 11:57)    NEUROLOGICAL EXAM:    Mental status: fatigued, lying in bed. irritable.  O to hospital, 2001, [place],  follows commands for EOM exam.     Cranial Nerves: Pupils were equal, round, reactive to light. Extraocular movements were intact when following commands There was no facial asymmetry. No participating with oropharyngeal exam.     Motor exam: moving all extremities but minimally participatory with strength exam.     Reflexes: 2+ upper extremity, 1+ lower.    Sensation: patient not participating but responds to painful stimuli.     Coordination: not participating with dysmetria exam.    Gait: deferred.     IMPRESSION:  Findings highly suspicious for metastatic prostate cancer, involving   posterior bladder wall and the anterior wall of the rectum with a   metastatic lesion to the liver. New bilateral hydronephrosis to the level   of pelvic mass.  Findings also suggestive of cystitis.    FLIP ZIMMERMAN M.D., RADIOLOGY RESIDENT  This document has been electronically signed.  VAZQUEZ YOUNG M.D. ATTENDING RADIOLOGIST  This document has been electronically signed. Oct 19 2018  3:00PM      EXAM:  CT BRAIN                            PROCEDURE DATE:  10/16/2018            INTERPRETATION:  INDICATIONS:  Slurred speech. Altered mental status.    TECHNIQUE:  Serial axial images were obtained from the skull base to the   vertex without intravenous contrast.    COMPARISON EXAMINATION: CT head dated 9/18/2018    FINDINGS:    VENTRICLES AND SULCI:  Age-appropriate involutional changes.  INTRA-AXIAL:  Microvascular ischemic changes involving the   periventricular and subcortical white matter, unchanged.  EXTRA-AXIAL:  No mass or collection is seen.  VISUALIZED SINUSES:  Clear.  VISUALIZED MASTOIDS:  Clear.  CALVARIUM:  Normal.  MISCELLANEOUS:  None.    IMPRESSION:  No acute intracranial hemorrhage or mass effect.   Microvascular ischemic changes, unchanged from the prior exam. Brain MRI   can be performed as clinically indicated.    IDALIA PINTO M.D., RADIOLOGY RESIDENT  This document has been electronically signed.  KYLAH MUJICA M.D., ATTENDING RADIOLOGIST  Thisdocument has been electronically signed. Oct 16 2018  7:08PM

## 2018-10-23 NOTE — PROGRESS NOTE ADULT - PROBLEM SELECTOR PLAN 9
-DVT PPX: Already therapeutically anticoagulated with Elquis  -DIET: DASH  -DISPO: GINNY pending placement  -CODE STATUS: Full Code -DVT PPX: Already therapeutically anticoagulated with Elquis  -DIET: DASH  -DISPO: GINNY pending acceptance/placement  -CODE STATUS: Full Code

## 2018-10-23 NOTE — PROGRESS NOTE ADULT - PROBLEM SELECTOR PLAN 3
-concern for metastatic Prostate CA vs BPH given CT findings  -anticipate D/C with [facility] in place with  outpatient f/u  -appreciate  recs, outpatient workup

## 2018-10-23 NOTE — PROGRESS NOTE ADULT - PROBLEM SELECTOR PLAN 1
Overall improved but now again confused, likely multifactorial but primarily metabolic due to cystitis/urinary retention + delirium due frequent setting changes  -treatment of Cystitis below  -will trial Seroquel qhs as sleep aide/agitation control  -neurology recommendations appreciated; f/u outpatient

## 2018-10-23 NOTE — PROGRESS NOTE ADULT - SUBJECTIVE AND OBJECTIVE BOX
CHIEF COMPLAINT: f/up for sob, UIP, rads, osas-feels well--no complaints at present    Interval Events: own cpap o/n    REVIEW OF SYSTEMS:  Constitutional: No fevers or chills. No weight loss. No fatigue or generalized malaise.  Eyes: No itching or discharge from the eyes  ENT: No ear pain. No ear discharge. No nasal congestion. No post nasal drip. No epistaxis. No throat pain. No sore throat. No difficulty swallowing.   CV: No chest pain. No palpitations. No lightheadedness or dizziness.   Resp: No dyspnea at rest. No dyspnea on exertion. No orthopnea. No wheezing. No cough. No stridor. No sputum production. No chest pain with respiration.  GI: No nausea. No vomiting. No diarrhea.  MSK: No joint pain or pain in any extremities  Integumentary: No skin lesions. No pedal edema.  Neurological: No gross motor weakness. No sensory changes.  [+ ] All other systems negative  [ ] Unable to assess ROS because ________    OBJECTIVE:  ICU Vital Signs Last 24 Hrs  T(C): 36.9 (23 Oct 2018 04:35), Max: 36.9 (23 Oct 2018 04:35)  T(F): 98.5 (23 Oct 2018 04:35), Max: 98.5 (23 Oct 2018 04:35)  HR: 78 (23 Oct 2018 04:35) (67 - 78)  BP: 125/86 (23 Oct 2018 04:35) (119/79 - 134/78)  BP(mean): --  ABP: --  ABP(mean): --  RR: 17 (23 Oct 2018 04:35) (17 - 18)  SpO2: 96% (23 Oct 2018 04:35) (96% - 97%)        10-21 @ 07:01  -  10-22 @ 07:00  --------------------------------------------------------  IN: 600 mL / OUT: 2450 mL / NET: -1850 mL    10-22 @ 07:01  -  10-23 @ 05:23  --------------------------------------------------------  IN: 240 mL / OUT: 950 mL / NET: -710 mL      CAPILLARY BLOOD GLUCOSE      POCT Blood Glucose.: 104 mg/dL (22 Oct 2018 22:06)      PHYSICAL EXAM: NAD in bed  General: Awake, alert, oriented X 2.   HEENT: Atraumatic, normocephalic.                 Mallampatti Grade 3                No nasal congestion.                No tonsillar or pharyngeal exudates.  Lymph Nodes: No palpable lymphadenopathy  Neck: No JVD. No carotid bruit.   Respiratory: Normal chest expansion                         Normal percussion                         Normal and equal air entry                         No wheeze, rhonchi + mild bibasilar rales.  Cardiovascular: S1 S2 normal. No murmurs, rubs or gallops.   Abdomen: Soft, non-tender, non-distended. No organomegaly. NABS  Extremities: Warm to touch. Peripheral pulse palpable. + pedal edema.   Skin: No rashes or skin lesions  Neurological: Motor and sensory examination equal and normal in all four extremities.  Psychiatry: Appropriate mood and affect.    HOSPITAL MEDICATIONS:  MEDICATIONS  (STANDING):  apixaban 5 milliGRAM(s) Oral every 12 hours  aspirin enteric coated 81 milliGRAM(s) Oral daily  atorvastatin 20 milliGRAM(s) Oral at bedtime  Biotene Dry Mouth Oral Rinse 5 milliLiter(s) Swish and Spit five times a day  buDESOnide  80 MICROgram(s)/formoterol 4.5 MICROgram(s) Inhaler 2 Puff(s) Inhalation two times a day  cefTRIAXone   IVPB      cefTRIAXone   IVPB 1 Gram(s) IV Intermittent every 24 hours  cholecalciferol 500 Unit(s) Oral daily  docusate sodium 100 milliGRAM(s) Oral two times a day  finasteride 5 milliGRAM(s) Oral daily  lactulose Syrup 10 Gram(s) Oral at bedtime  lidocaine   Patch 1 Patch Transdermal once  melatonin 3 milliGRAM(s) Oral at bedtime  polyethylene glycol 3350 17 Gram(s) Oral at bedtime  tamsulosin 0.4 milliGRAM(s) Oral at bedtime  tiotropium 18 MICROgram(s) Capsule 1 Capsule(s) Inhalation daily    MEDICATIONS  (PRN):  ALBUTerol/ipratropium for Nebulization 3 milliLiter(s) Nebulizer every 6 hours PRN Shortness of Breath and/or Wheezing      LABS:                        12.4   12.7  )-----------( 200      ( 22 Oct 2018 06:27 )             36.9     10-22    135  |  100  |  9   ----------------------------<  105<H>  4.6   |  22  |  0.81    Ca    8.9      22 Oct 2018 06:27                MICROBIOLOGY:     RADIOLOGY:  [ ] Reviewed and interpreted by me    Point of Care Ultrasound Findings:    PFT:    EKG:

## 2018-10-23 NOTE — PROGRESS NOTE ADULT - ASSESSMENT
Pt is a 77yo M with PMH afib on eliquis, COPD, BRIEN, dementia, CHF, GERD, HLD, pulmonary fibrosis, recurrent UTIs, recent admission for UTI then went to [place] rehab, returning from home with increased confusion, hallucinations as well as some episodic slurred speech. CT head no acute changes. Labs without any significant elevation in white count and UA negative, afebrile. He had denied pain, headaches. He reportedly had MRI brain at [facility] 4 weeks ago which did not demonstrate any acute findings per wife.     A/P: Continue to suspect metabolic delirium superimposed on baseline dementia   unlikely stroke though had endorsed slurred speech so MRI ordered and subsequently canceled.  Would not sedate for low yield test  Ucx + on Abx and CT A/P with findings suspicious for metastatic prostate ca with met to liver and bilateral hydronephrosis    Plan  1. Abx per medicine  2.  follow up for suspected met prostate ca  3. Frequent reorientation, avoid delirium provoking medications  4. Continue eliquis, neurochecks, statin  5. Enhanced supervision, Fall precautions  6. Optimize hydration  7. Wife requested ammonia level checked  will follow

## 2018-10-23 NOTE — PROGRESS NOTE ADULT - PROBLEM SELECTOR PLAN 2
-in the setting of Urinary retention, UCx >100K Proteus  -c/w CTX 1g daily (Day 5), f/u Sensitivities. plan for 7 days total and transition to PO on discharge  -no h/o MDRO and clinically improving, no indication to escalate Abx

## 2018-10-23 NOTE — PROGRESS NOTE ADULT - SUBJECTIVE AND OBJECTIVE BOX
Jonathan Wilson, PGY-3  Internal Medicine Resident  Pager: [phone]  After 7PM on Weekdays/After 12PM on Weekends: [phone]    Patient is a 78y old  Male who presents with a chief complaint of confusion (23 Oct 2018 10:57)      SUBJECTIVE / OVERNIGHT EVENTS: agitated overnight, refusing meds for a time. No complaints today, asking to leave. confused, talking about the army. denies hallucinations.    REVIEW OF SYSTEMS:  unable to assess due to encephalopathy     MEDICATIONS  (STANDING):  apixaban 5 milliGRAM(s) Oral every 12 hours  aspirin enteric coated 81 milliGRAM(s) Oral daily  atorvastatin 20 milliGRAM(s) Oral at bedtime  Biotene Dry Mouth Oral Rinse 5 milliLiter(s) Swish and Spit five times a day  buDESOnide  80 MICROgram(s)/formoterol 4.5 MICROgram(s) Inhaler 2 Puff(s) Inhalation two times a day  cefTRIAXone   IVPB      cefTRIAXone   IVPB 1 Gram(s) IV Intermittent every 24 hours  cholecalciferol 500 Unit(s) Oral daily  docusate sodium 100 milliGRAM(s) Oral two times a day  finasteride 5 milliGRAM(s) Oral daily  lactulose Syrup 10 Gram(s) Oral at bedtime  lidocaine   Patch 1 Patch Transdermal once  melatonin 3 milliGRAM(s) Oral at bedtime  polyethylene glycol 3350 17 Gram(s) Oral at bedtime  tamsulosin 0.4 milliGRAM(s) Oral at bedtime  tiotropium 18 MICROgram(s) Capsule 1 Capsule(s) Inhalation daily    MEDICATIONS  (PRN):  ALBUTerol/ipratropium for Nebulization 3 milliLiter(s) Nebulizer every 6 hours PRN Shortness of Breath and/or Wheezing      T(C): 36.6 (10-23-18 @ 11:29), Max: 36.9 (10-23-18 @ 04:35)  HR: 80 (10-23-18 @ 11:29) (64 - 80)  BP: 105/73 (10-23-18 @ 11:29) (105/73 - 134/78)  RR: 17 (10-23-18 @ 11:29) (17 - 18)  SpO2: 96% (10-23-18 @ 11:29) (96% - 97%)    CAPILLARY BLOOD GLUCOSE      POCT Blood Glucose.: 134 mg/dL (23 Oct 2018 11:53)  POCT Blood Glucose.: 104 mg/dL (23 Oct 2018 08:16)  POCT Blood Glucose.: 104 mg/dL (22 Oct 2018 22:06)  POCT Blood Glucose.: 92 mg/dL (22 Oct 2018 16:44)    I&O's Summary    22 Oct 2018 07:01  -  23 Oct 2018 07:00  --------------------------------------------------------  IN: 240 mL / OUT: 1350 mL / NET: -1110 mL        GENERAL: No acute distress, well-developed  HEAD:  Atraumatic, Normocephalic  ENT: poor dentition  CHEST/LUNG: Clear to auscultation bilaterally  BACK: No spinal tenderness  HEART: Regular rate and rhythm; No murmurs, rubs, or gallops  ABDOMEN: Soft, Nontender, Nondistended; Bowel sounds present  EXTREMITIES:  No clubbing, cyanosis, or edema  PSYCH: Nl behavior, nl affect  NEUROLOGY: AAOx1, non-focal, cranial nerves intact  SKIN: Normal color, No rashes or lesions    LABS:                        12.4   12.7  )-----------( 200      ( 22 Oct 2018 06:27 )             36.9     10-22    135  |  100  |  9   ----------------------------<  105<H>  4.6   |  22  |  0.81    Ca    8.9      22 Oct 2018 06:27              RADIOLOGY & ADDITIONAL TESTS:  Imaging Personally Reviewed:  Consultant(s) Notes Reviewed: Pulm, Neuro  Care Discussed with Consultants/Other Providers: Jonathan Wilson, PGY-3  Internal Medicine Resident  Pager: [phone]  After 7PM on Weekdays/After 12PM on Weekends: [phone]    Patient is a 78y old  Male who presents with a chief complaint of confusion (23 Oct 2018 10:57)    SUBJECTIVE / OVERNIGHT EVENTS: agitated overnight, refusing meds for a time. No complaints today, asking to leave. confused, talking about the army. denies hallucinations.    REVIEW OF SYSTEMS:  unable to assess due to encephalopathy     T(C): 36.6 (10-23-18 @ 11:29), Max: 36.9 (10-23-18 @ 04:35)  HR: 80 (10-23-18 @ 11:29) (64 - 80)  BP: 105/73 (10-23-18 @ 11:29) (105/73 - 134/78)  RR: 17 (10-23-18 @ 11:29) (17 - 18)  SpO2: 96% (10-23-18 @ 11:29) (96% - 97%)    I&O's Summary  22 Oct 2018 07:01  -  23 Oct 2018 07:00  --------------------------------------------------------  IN: 240 mL / OUT: 1350 mL / NET: -1110 mL    GENERAL: No acute distress, well-developed  HEAD:  Atraumatic, Normocephalic  ENT: poor dentition  CHEST/LUNG:lear to auscultation bilaterally  BACK: No spinal tenderness  HEART: Regular rate and rhythm; No murmurs, rubs, or gallops  ABDOMEN: Soft, Nontender, Nondistended; Bowel sounds present  EXTREMITIES:  No clubbing, cyanosis, or edema  PSYCH: Nl behavior, nl affect  NEUROLOGY: AAOx1, non-focal, cranial nerves intact  SKIN: Normal color, No rashes or lesions    LABS:  CAPILLARY BLOOD GLUCOSE  POCT Blood Glucose.: 134 mg/dL (23 Oct 2018 11:53)  POCT Blood Glucose.: 104 mg/dL (23 Oct 2018 08:16)  POCT Blood Glucose.: 104 mg/dL (22 Oct 2018 22:06)  POCT Blood Glucose.: 92 mg/dL (22 Oct 2018 16:44)                       11.9   10.2  )-----------( 193      ( 23 Oct 2018 12:43 )             35.8   10-23    135  |  98  |  12  ----------------------------<  126<H>  3.8   |  25  |  0.87    Ca    8.6      23 Oct 2018 12:43    Consultant(s) Notes Reviewed: Pulm, Neuro    MEDICATIONS  (STANDING):  apixaban 5 milliGRAM(s) Oral every 12 hours  aspirin enteric coated 81 milliGRAM(s) Oral daily  atorvastatin 20 milliGRAM(s) Oral at bedtime  Biotene Dry Mouth Oral Rinse 5 milliLiter(s) Swish and Spit five times a day  buDESOnide  80 MICROgram(s)/formoterol 4.5 MICROgram(s) Inhaler 2 Puff(s) Inhalation two times a day  cefTRIAXone   IVPB      cefTRIAXone   IVPB 1 Gram(s) IV Intermittent every 24 hours  cholecalciferol 500 Unit(s) Oral daily  docusate sodium 100 milliGRAM(s) Oral two times a day  finasteride 5 milliGRAM(s) Oral daily  lactulose Syrup 10 Gram(s) Oral at bedtime  lidocaine   Patch 1 Patch Transdermal once  melatonin 3 milliGRAM(s) Oral at bedtime  polyethylene glycol 3350 17 Gram(s) Oral at bedtime  tamsulosin 0.4 milliGRAM(s) Oral at bedtime  tiotropium 18 MICROgram(s) Capsule 1 Capsule(s) Inhalation daily    MEDICATIONS  (PRN):  ALBUTerol/ipratropium for Nebulization 3 milliLiter(s) Nebulizer every 6 hours PRN Shortness of Breath and/or Wheezing

## 2018-10-23 NOTE — PROGRESS NOTE ADULT - PROBLEM SELECTOR PLAN 4
Improving  - Avoid QT-prolonging agents  - Repeat ECG, to consider reinstating home antipsychotics held since admission

## 2018-10-24 PROCEDURE — 99232 SBSQ HOSP IP/OBS MODERATE 35: CPT

## 2018-10-24 PROCEDURE — 99232 SBSQ HOSP IP/OBS MODERATE 35: CPT | Mod: GC

## 2018-10-24 RX ADMIN — Medication 500 UNIT(S): at 11:28

## 2018-10-24 RX ADMIN — Medication 5 MILLILITER(S): at 09:19

## 2018-10-24 RX ADMIN — BUDESONIDE AND FORMOTEROL FUMARATE DIHYDRATE 2 PUFF(S): 160; 4.5 AEROSOL RESPIRATORY (INHALATION) at 18:30

## 2018-10-24 RX ADMIN — TAMSULOSIN HYDROCHLORIDE 0.4 MILLIGRAM(S): 0.4 CAPSULE ORAL at 21:50

## 2018-10-24 RX ADMIN — QUETIAPINE FUMARATE 12.5 MILLIGRAM(S): 200 TABLET, FILM COATED ORAL at 21:50

## 2018-10-24 RX ADMIN — Medication 3 MILLIGRAM(S): at 21:50

## 2018-10-24 RX ADMIN — LACTULOSE 10 GRAM(S): 10 SOLUTION ORAL at 21:49

## 2018-10-24 RX ADMIN — FINASTERIDE 5 MILLIGRAM(S): 5 TABLET, FILM COATED ORAL at 11:28

## 2018-10-24 RX ADMIN — TIOTROPIUM BROMIDE 1 CAPSULE(S): 18 CAPSULE ORAL; RESPIRATORY (INHALATION) at 11:27

## 2018-10-24 RX ADMIN — BUDESONIDE AND FORMOTEROL FUMARATE DIHYDRATE 2 PUFF(S): 160; 4.5 AEROSOL RESPIRATORY (INHALATION) at 05:31

## 2018-10-24 RX ADMIN — Medication 81 MILLIGRAM(S): at 11:29

## 2018-10-24 RX ADMIN — Medication 100 MILLIGRAM(S): at 19:44

## 2018-10-24 RX ADMIN — APIXABAN 5 MILLIGRAM(S): 2.5 TABLET, FILM COATED ORAL at 05:31

## 2018-10-24 RX ADMIN — Medication 100 MILLIGRAM(S): at 05:31

## 2018-10-24 RX ADMIN — ATORVASTATIN CALCIUM 20 MILLIGRAM(S): 80 TABLET, FILM COATED ORAL at 21:50

## 2018-10-24 RX ADMIN — Medication 5 MILLILITER(S): at 21:50

## 2018-10-24 RX ADMIN — APIXABAN 5 MILLIGRAM(S): 2.5 TABLET, FILM COATED ORAL at 19:44

## 2018-10-24 RX ADMIN — Medication 5 MILLILITER(S): at 15:02

## 2018-10-24 RX ADMIN — POLYETHYLENE GLYCOL 3350 17 GRAM(S): 17 POWDER, FOR SOLUTION ORAL at 21:50

## 2018-10-24 RX ADMIN — Medication 5 MILLILITER(S): at 11:29

## 2018-10-24 RX ADMIN — CEFTRIAXONE 100 GRAM(S): 500 INJECTION, POWDER, FOR SOLUTION INTRAMUSCULAR; INTRAVENOUS at 09:29

## 2018-10-24 NOTE — PROGRESS NOTE ADULT - PROBLEM SELECTOR PLAN 1
Overall improved but now again confused, likely multifactorial but primarily metabolic due to cystitis/urinary retention + delirium due frequent setting changes  -treatment of Cystitis below  -c/w Seroquel qhs as sleep aide/agitation control  -neurology recommendations appreciated; f/u outpatient

## 2018-10-24 NOTE — PROGRESS NOTE ADULT - ASSESSMENT
78y M w/ PMHx significant for Dementia, AFib (on Eliquis), COPD/Pulmonary Fibrosis, BRIEN (on CPAP), recurrent UTIs with recent admission 9/2018 p/w AMS found to have metabolic encephalopathy due to cystitis as well as CT findings concerning for metastatic prostate cancer. Dispo planning initiated, pending placement to [facility].

## 2018-10-24 NOTE — PROGRESS NOTE ADULT - SUBJECTIVE AND OBJECTIVE BOX
CHIEF COMPLAINT: f/up for sob, rads, UIP, osas--    Interval Events:    REVIEW OF SYSTEMS:  Constitutional: No fevers or chills. No weight loss. No fatigue or generalized malaise.  Eyes: No itching or discharge from the eyes  ENT: No ear pain. No ear discharge. No nasal congestion. No post nasal drip. No epistaxis. No throat pain. No sore throat. No difficulty swallowing.   CV: No chest pain. No palpitations. No lightheadedness or dizziness.   Resp: No dyspnea at rest. No dyspnea on exertion. No orthopnea. No wheezing. No cough. No stridor. No sputum production. No chest pain with respiration.  GI: No nausea. No vomiting. No diarrhea.  MSK: No joint pain or pain in any extremities  Integumentary: No skin lesions. No pedal edema.  Neurological: No gross motor weakness. No sensory changes.  [ ] All other systems negative  [ ] Unable to assess ROS because ________    OBJECTIVE:  ICU Vital Signs Last 24 Hrs  T(C): 36.4 (24 Oct 2018 04:52), Max: 36.9 (23 Oct 2018 21:54)  T(F): 97.6 (24 Oct 2018 04:52), Max: 98.4 (23 Oct 2018 21:54)  HR: 75 (24 Oct 2018 04:52) (75 - 84)  BP: 125/67 (24 Oct 2018 04:52) (105/73 - 128/79)  BP(mean): --  ABP: --  ABP(mean): --  RR: 18 (24 Oct 2018 04:52) (17 - 18)  SpO2: 96% (24 Oct 2018 04:52) (94% - 96%)        10-22 @ 07:01  -  10-23 @ 07:00  --------------------------------------------------------  IN: 240 mL / OUT: 1350 mL / NET: -1110 mL    10-23 @ 07:01  -  10-24 @ 05:21  --------------------------------------------------------  IN: 50 mL / OUT: 480 mL / NET: -430 mL      CAPILLARY BLOOD GLUCOSE      POCT Blood Glucose.: 91 mg/dL (23 Oct 2018 22:10)      PHYSICAL EXAM:  General: Awake, alert, oriented X 3.   HEENT: Atraumatic, normocephalic.                 Mallampatti Grade                 No nasal congestion.                No tonsillar or pharyngeal exudates.  Lymph Nodes: No palpable lymphadenopathy  Neck: No JVD. No carotid bruit.   Respiratory: Normal chest expansion                         Normal percussion                         Normal and equal air entry                         No wheeze, rhonchi or rales.  Cardiovascular: S1 S2 normal. No murmurs, rubs or gallops.   Abdomen: Soft, non-tender, non-distended. No organomegaly.  Extremities: Warm to touch. Peripheral pulse palpable. No pedal edema.   Skin: No rashes or skin lesions  Neurological: Motor and sensory examination equal and normal in all four extremities.  Psychiatry: Appropriate mood and affect.    HOSPITAL MEDICATIONS:  MEDICATIONS  (STANDING):  apixaban 5 milliGRAM(s) Oral every 12 hours  aspirin enteric coated 81 milliGRAM(s) Oral daily  atorvastatin 20 milliGRAM(s) Oral at bedtime  Biotene Dry Mouth Oral Rinse 5 milliLiter(s) Swish and Spit five times a day  buDESOnide  80 MICROgram(s)/formoterol 4.5 MICROgram(s) Inhaler 2 Puff(s) Inhalation two times a day  cefTRIAXone   IVPB      cefTRIAXone   IVPB 1 Gram(s) IV Intermittent every 24 hours  cholecalciferol 500 Unit(s) Oral daily  docusate sodium 100 milliGRAM(s) Oral two times a day  finasteride 5 milliGRAM(s) Oral daily  lactulose Syrup 10 Gram(s) Oral at bedtime  lidocaine   Patch 1 Patch Transdermal once  melatonin 3 milliGRAM(s) Oral at bedtime  polyethylene glycol 3350 17 Gram(s) Oral at bedtime  QUEtiapine 12.5 milliGRAM(s) Oral at bedtime  tamsulosin 0.4 milliGRAM(s) Oral at bedtime  tiotropium 18 MICROgram(s) Capsule 1 Capsule(s) Inhalation daily    MEDICATIONS  (PRN):  ALBUTerol/ipratropium for Nebulization 3 milliLiter(s) Nebulizer every 6 hours PRN Shortness of Breath and/or Wheezing      LABS:                        11.9   10.2  )-----------( 193      ( 23 Oct 2018 12:43 )             35.8     10-23    135  |  98  |  12  ----------------------------<  126<H>  3.8   |  25  |  0.87    Ca    8.6      23 Oct 2018 12:43                MICROBIOLOGY:     RADIOLOGY:  [ ] Reviewed and interpreted by me    Point of Care Ultrasound Findings:    PFT:    EKG: CHIEF COMPLAINT: f/up for sob, rads, UIP, osas--exhausted but arrousable--good spirits    Interval Events: none-neuro f/up-confusion    REVIEW OF SYSTEMS:  Constitutional: No fevers or chills. No weight loss. + fatigue or generalized malaise.  Eyes: No itching or discharge from the eyes  ENT: No ear pain. No ear discharge. No nasal congestion. No post nasal drip. No epistaxis. No throat pain. No sore throat. No difficulty swallowing.   CV: No chest pain. No palpitations. No lightheadedness or dizziness.   Resp: No dyspnea at rest. No dyspnea on exertion. No orthopnea. No wheezing. No cough. No stridor. No sputum production. No chest pain with respiration.  GI: No nausea. No vomiting. No diarrhea.  MSK: No joint pain or pain in any extremities  Integumentary: No skin lesions. No pedal edema.  Neurological: No gross motor weakness. No sensory changes.  [+ ] All other systems negative  [ ] Unable to assess ROS because ________    OBJECTIVE:  ICU Vital Signs Last 24 Hrs  T(C): 36.4 (24 Oct 2018 04:52), Max: 36.9 (23 Oct 2018 21:54)  T(F): 97.6 (24 Oct 2018 04:52), Max: 98.4 (23 Oct 2018 21:54)  HR: 75 (24 Oct 2018 04:52) (75 - 84)  BP: 125/67 (24 Oct 2018 04:52) (105/73 - 128/79)  BP(mean): --  ABP: --  ABP(mean): --  RR: 18 (24 Oct 2018 04:52) (17 - 18)  SpO2: 96% (24 Oct 2018 04:52) (94% - 96%)        10-22 @ 07:01  -  10-23 @ 07:00  --------------------------------------------------------  IN: 240 mL / OUT: 1350 mL / NET: -1110 mL    10-23 @ 07:01  -  10-24 @ 05:21  --------------------------------------------------------  IN: 50 mL / OUT: 480 mL / NET: -430 mL      CAPILLARY BLOOD GLUCOSE      POCT Blood Glucose.: 91 mg/dL (23 Oct 2018 22:10)      PHYSICAL EXAM: on cpap  General: Awake, alert, oriented X 2.   HEENT: Atraumatic, normocephalic.                 Mallampatti Grade                 No nasal congestion.                No tonsillar or pharyngeal exudates.  Lymph Nodes: No palpable lymphadenopathy  Neck: No JVD. No carotid bruit.   Respiratory: Normal chest expansion                         Normal percussion                         Normal and equal air entry                         No wheeze, rhonchi but + bibasilar rales.  Cardiovascular: S1 S2 normal. No murmurs, rubs or gallops.   Abdomen: Soft, non-tender, non-distended. No organomegaly. NABS  Extremities: Warm to touch. Peripheral pulse palpable. No pedal edema.   Skin: No rashes or skin lesions  Neurological: Motor and sensory examination equal and normal in all four extremities.  Psychiatry: Appropriate mood and affect.    HOSPITAL MEDICATIONS:  MEDICATIONS  (STANDING):  apixaban 5 milliGRAM(s) Oral every 12 hours  aspirin enteric coated 81 milliGRAM(s) Oral daily  atorvastatin 20 milliGRAM(s) Oral at bedtime  Biotene Dry Mouth Oral Rinse 5 milliLiter(s) Swish and Spit five times a day  buDESOnide  80 MICROgram(s)/formoterol 4.5 MICROgram(s) Inhaler 2 Puff(s) Inhalation two times a day  cefTRIAXone   IVPB      cefTRIAXone   IVPB 1 Gram(s) IV Intermittent every 24 hours  cholecalciferol 500 Unit(s) Oral daily  docusate sodium 100 milliGRAM(s) Oral two times a day  finasteride 5 milliGRAM(s) Oral daily  lactulose Syrup 10 Gram(s) Oral at bedtime  lidocaine   Patch 1 Patch Transdermal once  melatonin 3 milliGRAM(s) Oral at bedtime  polyethylene glycol 3350 17 Gram(s) Oral at bedtime  QUEtiapine 12.5 milliGRAM(s) Oral at bedtime  tamsulosin 0.4 milliGRAM(s) Oral at bedtime  tiotropium 18 MICROgram(s) Capsule 1 Capsule(s) Inhalation daily    MEDICATIONS  (PRN):  ALBUTerol/ipratropium for Nebulization 3 milliLiter(s) Nebulizer every 6 hours PRN Shortness of Breath and/or Wheezing      LABS:                        11.9   10.2  )-----------( 193      ( 23 Oct 2018 12:43 )             35.8     10-23    135  |  98  |  12  ----------------------------<  126<H>  3.8   |  25  |  0.87    Ca    8.6      23 Oct 2018 12:43                MICROBIOLOGY:     RADIOLOGY:  [ ] Reviewed and interpreted by me    Point of Care Ultrasound Findings:    PFT:    EKG:

## 2018-10-24 NOTE — PROGRESS NOTE ADULT - ATTENDING COMMENTS
as above--resp status stable-MS improved  chronic sob-UIP, copd, debility, Card dz--  No rx for UIP  COPD--symbicort 2 bid, spiriva, neb rx q 6h  OSAS--cpap o/n is a must  Delirium resolved-CT a/p hydronephrosis---Urology consulted; biotene for dry mouth; DC planning  Te Prieto MD-Pulmonary   [phone] as above--resp status stable-MS improved  chronic sob-UIP, copd, debility, Card dz--  No rx for UIP  COPD--symbicort 2 bid, spiriva, neb rx q 6h  OSAS--cpap o/n is a must  Delirium continues as per Neuro; CT a/p hydronephrosis---Urology consulted; biotene for dry mouth; ID-ceftriaxone-?duration   DC planning  Te Prieto MD-Pulmonary   [phone]

## 2018-10-24 NOTE — PROGRESS NOTE ADULT - PROBLEM SELECTOR PLAN 9
-DVT PPX: Already therapeutically anticoagulated with Elquis  -DIET: DASH  -DISPO: GINNY pending acceptance/placement  -CODE STATUS: Full Code

## 2018-10-24 NOTE — PROGRESS NOTE ADULT - SUBJECTIVE AND OBJECTIVE BOX
Jonathan Wilson, PGY-3  Internal Medicine Resident  Pager: [phone]  After 7PM on Weekdays/After 12PM on Weekends: [phone]    Patient is a 78y old  Male who presents with a chief complaint of confusion (24 Oct 2018 05:20)      SUBJECTIVE / OVERNIGHT EVENTS:    REVIEW OF SYSTEMS:  CONSTITUTIONAL: No weakness, fevers or chills  EYES/ENT: No visual changes;  No vertigo or throat pain   NECK: No pain or stiffness  RESPIRATORY: No cough, wheezing, hemoptysis; No shortness of breath  CARDIOVASCULAR: No chest pain or palpitations  GASTROINTESTINAL: No abdominal pain, nausea, vomiting, or diarrhea. No melena or hematochezia.  GENITOURINARY: No dysuria, frequency or hematuria  NEUROLOGICAL: No numbness or weakness  SKIN: No itching, burning, rashes, or lesions   All other review of systems is negative unless indicated above.    MEDICATIONS  (STANDING):  apixaban 5 milliGRAM(s) Oral every 12 hours  aspirin enteric coated 81 milliGRAM(s) Oral daily  atorvastatin 20 milliGRAM(s) Oral at bedtime  Biotene Dry Mouth Oral Rinse 5 milliLiter(s) Swish and Spit five times a day  buDESOnide  80 MICROgram(s)/formoterol 4.5 MICROgram(s) Inhaler 2 Puff(s) Inhalation two times a day  cefTRIAXone   IVPB      cefTRIAXone   IVPB 1 Gram(s) IV Intermittent every 24 hours  cholecalciferol 500 Unit(s) Oral daily  docusate sodium 100 milliGRAM(s) Oral two times a day  finasteride 5 milliGRAM(s) Oral daily  lactulose Syrup 10 Gram(s) Oral at bedtime  lidocaine   Patch 1 Patch Transdermal once  melatonin 3 milliGRAM(s) Oral at bedtime  polyethylene glycol 3350 17 Gram(s) Oral at bedtime  QUEtiapine 12.5 milliGRAM(s) Oral at bedtime  tamsulosin 0.4 milliGRAM(s) Oral at bedtime  tiotropium 18 MICROgram(s) Capsule 1 Capsule(s) Inhalation daily    MEDICATIONS  (PRN):  ALBUTerol/ipratropium for Nebulization 3 milliLiter(s) Nebulizer every 6 hours PRN Shortness of Breath and/or Wheezing      T(C): 36.4 (10-24-18 @ 04:52), Max: 36.9 (10-23-18 @ 21:54)  HR: 70 (10-24-18 @ 08:58) (70 - 84)  BP: 125/67 (10-24-18 @ 04:52) (105/73 - 128/79)  RR: 18 (10-24-18 @ 04:52) (17 - 18)  SpO2: 98% (10-24-18 @ 08:58) (94% - 98%)    CAPILLARY BLOOD GLUCOSE      POCT Blood Glucose.: 91 mg/dL (23 Oct 2018 22:10)  POCT Blood Glucose.: 88 mg/dL (23 Oct 2018 16:51)  POCT Blood Glucose.: 134 mg/dL (23 Oct 2018 11:53)    I&O's Summary    23 Oct 2018 07:01  -  24 Oct 2018 07:00  --------------------------------------------------------  IN: 100 mL / OUT: 780 mL / NET: -680 mL    24 Oct 2018 07:01  -  24 Oct 2018 09:37  --------------------------------------------------------  IN: 50 mL / OUT: 0 mL / NET: 50 mL        GENERAL: No acute distress, well-developed  HEAD:  Atraumatic, Normocephalic  ENT: EOMI, PERRLA, No JVD, moist mucosa  CHEST/LUNG: Clear to auscultation bilaterally  BACK: No spinal tenderness  HEART: Regular rate and rhythm; No murmurs, rubs, or gallops  ABDOMEN: Soft, Nontender, Nondistended; Bowel sounds present  EXTREMITIES:  No clubbing, cyanosis, or edema  PSYCH: Nl behavior, nl affect  NEUROLOGY: AAOx3, non-focal, cranial nerves intact  SKIN: Normal color, No rashes or lesions    LABS:                        11.9   10.2  )-----------( 193      ( 23 Oct 2018 12:43 )             35.8     10-23    135  |  98  |  12  ----------------------------<  126<H>  3.8   |  25  |  0.87    Ca    8.6      23 Oct 2018 12:43              RADIOLOGY & ADDITIONAL TESTS:  Imaging Personally Reviewed:  Consultant(s) Notes Reviewed:    Care Discussed with Consultants/Other Providers: Jonathan Wilson, PGY-3  Internal Medicine Resident  Pager: [phone]  After 7PM on Weekdays/After 12PM on Weekends: [phone]    Patient is a 78y old  Male who presents with a chief complaint of confusion (24 Oct 2018 05:20)      SUBJECTIVE / OVERNIGHT EVENTS: no acute events overnight. This AM, patient is arousible but wants to stay asleep. denies complaints     REVIEW OF SYSTEMS:  CONSTITUTIONAL: No weakness, fevers or chills  EYES/ENT: No visual changes;  No vertigo or throat pain   NECK: No pain or stiffness  RESPIRATORY: No cough, wheezing, hemoptysis; No shortness of breath  CARDIOVASCULAR: No chest pain or palpitations  GASTROINTESTINAL: No abdominal pain, nausea, vomiting, or diarrhea. No melena or hematochezia.  GENITOURINARY: No dysuria, frequency or hematuria  NEUROLOGICAL: No numbness or weakness  SKIN: No itching, burning, rashes, or lesions   All other review of systems is negative unless indicated above.    MEDICATIONS  (STANDING):  apixaban 5 milliGRAM(s) Oral every 12 hours  aspirin enteric coated 81 milliGRAM(s) Oral daily  atorvastatin 20 milliGRAM(s) Oral at bedtime  Biotene Dry Mouth Oral Rinse 5 milliLiter(s) Swish and Spit five times a day  buDESOnide  80 MICROgram(s)/formoterol 4.5 MICROgram(s) Inhaler 2 Puff(s) Inhalation two times a day  cefTRIAXone   IVPB      cefTRIAXone   IVPB 1 Gram(s) IV Intermittent every 24 hours  cholecalciferol 500 Unit(s) Oral daily  docusate sodium 100 milliGRAM(s) Oral two times a day  finasteride 5 milliGRAM(s) Oral daily  lactulose Syrup 10 Gram(s) Oral at bedtime  lidocaine   Patch 1 Patch Transdermal once  melatonin 3 milliGRAM(s) Oral at bedtime  polyethylene glycol 3350 17 Gram(s) Oral at bedtime  QUEtiapine 12.5 milliGRAM(s) Oral at bedtime  tamsulosin 0.4 milliGRAM(s) Oral at bedtime  tiotropium 18 MICROgram(s) Capsule 1 Capsule(s) Inhalation daily    MEDICATIONS  (PRN):  ALBUTerol/ipratropium for Nebulization 3 milliLiter(s) Nebulizer every 6 hours PRN Shortness of Breath and/or Wheezing      T(C): 36.4 (10-24-18 @ 04:52), Max: 36.9 (10-23-18 @ 21:54)  HR: 70 (10-24-18 @ 08:58) (70 - 84)  BP: 125/67 (10-24-18 @ 04:52) (105/73 - 128/79)  RR: 18 (10-24-18 @ 04:52) (17 - 18)  SpO2: 98% (10-24-18 @ 08:58) (94% - 98%)    CAPILLARY BLOOD GLUCOSE      POCT Blood Glucose.: 91 mg/dL (23 Oct 2018 22:10)  POCT Blood Glucose.: 88 mg/dL (23 Oct 2018 16:51)  POCT Blood Glucose.: 134 mg/dL (23 Oct 2018 11:53)    I&O's Summary    23 Oct 2018 07:01  -  24 Oct 2018 07:00  --------------------------------------------------------  IN: 100 mL / OUT: 780 mL / NET: -680 mL    24 Oct 2018 07:01  -  24 Oct 2018 09:37  --------------------------------------------------------  IN: 50 mL / OUT: 0 mL / NET: 50 mL        GENERAL: No acute distress, well-developed  HEAD:  Atraumatic, Normocephalic  ENT: poor dentition  CHEST/LUNG: Clear to auscultation bilaterally  BACK: No spinal tenderness  HEART: Regular rate and rhythm; No murmurs, rubs, or gallops  ABDOMEN: Soft, Nontender, Nondistended; Bowel sounds present  EXTREMITIES:  No clubbing, cyanosis, or edema  PSYCH: Nl behavior, nl affect  NEUROLOGY: AAOx1, non-focal, cranial nerves intact  SKIN: Normal color, No rashes or lesions    LABS:                        11.9   10.2  )-----------( 193      ( 23 Oct 2018 12:43 )             35.8     10-23    135  |  98  |  12  ----------------------------<  126<H>  3.8   |  25  |  0.87    Ca    8.6      23 Oct 2018 12:43              RADIOLOGY & ADDITIONAL TESTS:  Imaging Personally Reviewed:  Consultant(s) Notes Reviewed:    Care Discussed with Consultants/Other Providers: Jonathan Wilson, PGY-3  Internal Medicine Resident  Pager: [phone]  After 7PM on Weekdays/After 12PM on Weekends: [phone]    Patient is a 78y old  Male who presents with a chief complaint of confusion (24 Oct 2018 05:20)    SUBJECTIVE / OVERNIGHT EVENTS: no acute events overnight. This AM, patient is arousible but wants to stay asleep. denies complaints     T(C): 36.4 (10-24-18 @ 04:52), Max: 36.9 (10-23-18 @ 21:54)  HR: 70 (10-24-18 @ 08:58) (70 - 84)  BP: 125/67 (10-24-18 @ 04:52) (105/73 - 128/79)  RR: 18 (10-24-18 @ 04:52) (17 - 18)  SpO2: 98% (10-24-18 @ 08:58) (94% - 98%)    I&O's Summary  23 Oct 2018 07:01  -  24 Oct 2018 07:00  --------------------------------------------------------  IN: 100 mL / OUT: 780 mL / NET: -680 mL    24 Oct 2018 07:01  -  24 Oct 2018 09:37  --------------------------------------------------------  IN: 50 mL / OUT: 0 mL / NET: 50 mL    GENERAL: No acute distress, well-developed  HEAD:  Atraumatic, Normocephalic  ENT: poor dentition  CHEST/LUNG: Clear to auscultation bilaterally  BACK: No spinal tenderness  HEART: Regular rate and rhythm; No murmurs, rubs, or gallops  ABDOMEN: Soft, Nontender, Nondistended; Bowel sounds present  EXTREMITIES:  No clubbing, cyanosis, or edema  PSYCH: Nl behavior, nl affect  NEUROLOGY: AAOx1, non-focal, cranial nerves intact  SKIN: Normal color, No rashes or lesions    LABS:  CAPILLARY BLOOD GLUCOSE  POCT Blood Glucose.: 91 mg/dL (23 Oct 2018 22:10)  POCT Blood Glucose.: 88 mg/dL (23 Oct 2018 16:51)  POCT Blood Glucose.: 134 mg/dL (23 Oct 2018 11:53)    MEDICATIONS  (STANDING):  apixaban 5 milliGRAM(s) Oral every 12 hours  aspirin enteric coated 81 milliGRAM(s) Oral daily  atorvastatin 20 milliGRAM(s) Oral at bedtime  Biotene Dry Mouth Oral Rinse 5 milliLiter(s) Swish and Spit five times a day  buDESOnide  80 MICROgram(s)/formoterol 4.5 MICROgram(s) Inhaler 2 Puff(s) Inhalation two times a day  cefTRIAXone   IVPB      cefTRIAXone   IVPB 1 Gram(s) IV Intermittent every 24 hours  cholecalciferol 500 Unit(s) Oral daily  docusate sodium 100 milliGRAM(s) Oral two times a day  finasteride 5 milliGRAM(s) Oral daily  lactulose Syrup 10 Gram(s) Oral at bedtime  lidocaine   Patch 1 Patch Transdermal once  melatonin 3 milliGRAM(s) Oral at bedtime  polyethylene glycol 3350 17 Gram(s) Oral at bedtime  QUEtiapine 12.5 milliGRAM(s) Oral at bedtime  tamsulosin 0.4 milliGRAM(s) Oral at bedtime  tiotropium 18 MICROgram(s) Capsule 1 Capsule(s) Inhalation daily    MEDICATIONS  (PRN):  ALBUTerol/ipratropium for Nebulization 3 milliLiter(s) Nebulizer every 6 hours PRN Shortness of Breath and/or Wheezing

## 2018-10-24 NOTE — PROGRESS NOTE ADULT - PROBLEM SELECTOR PLAN 2
-in the setting of Urinary retention, UCx >100K Proteus  -c/w CTX 1g daily (Day 6). plan for 7 days total

## 2018-10-25 VITALS
TEMPERATURE: 96 F | OXYGEN SATURATION: 95 % | SYSTOLIC BLOOD PRESSURE: 125 MMHG | RESPIRATION RATE: 18 BRPM | HEART RATE: 87 BPM | DIASTOLIC BLOOD PRESSURE: 75 MMHG

## 2018-10-25 DIAGNOSIS — E16.2 HYPOGLYCEMIA, UNSPECIFIED: ICD-10-CM

## 2018-10-25 LAB
GLUCOSE BLDC GLUCOMTR-MCNC: 81 MG/DL — SIGNIFICANT CHANGE UP (ref 70–99)
GLUCOSE BLDC GLUCOMTR-MCNC: 84 MG/DL — SIGNIFICANT CHANGE UP (ref 70–99)
GLUCOSE BLDC GLUCOMTR-MCNC: 89 MG/DL — SIGNIFICANT CHANGE UP (ref 70–99)

## 2018-10-25 PROCEDURE — 99232 SBSQ HOSP IP/OBS MODERATE 35: CPT

## 2018-10-25 PROCEDURE — 99239 HOSP IP/OBS DSCHRG MGMT >30: CPT

## 2018-10-25 RX ORDER — MODAFINIL 200 MG/1
1 TABLET ORAL
Qty: 0 | Refills: 0 | COMMUNITY
Start: 2018-10-25

## 2018-10-25 RX ORDER — BUDESONIDE AND FORMOTEROL FUMARATE DIHYDRATE 160; 4.5 UG/1; UG/1
2 AEROSOL RESPIRATORY (INHALATION)
Qty: 0 | Refills: 0 | COMMUNITY
Start: 2018-10-25

## 2018-10-25 RX ORDER — MODAFINIL 200 MG/1
200 TABLET ORAL EVERY 24 HOURS
Qty: 0 | Refills: 0 | Status: DISCONTINUED | OUTPATIENT
Start: 2018-10-25 | End: 2018-10-25

## 2018-10-25 RX ORDER — MODAFINIL 200 MG/1
0.5 TABLET ORAL
Qty: 0 | Refills: 0 | COMMUNITY
Start: 2018-10-25

## 2018-10-25 RX ORDER — TIOTROPIUM BROMIDE 18 UG/1
1 CAPSULE ORAL; RESPIRATORY (INHALATION)
Qty: 0 | Refills: 0 | COMMUNITY
Start: 2018-10-25

## 2018-10-25 RX ADMIN — FINASTERIDE 5 MILLIGRAM(S): 5 TABLET, FILM COATED ORAL at 12:29

## 2018-10-25 RX ADMIN — Medication 500 UNIT(S): at 12:29

## 2018-10-25 RX ADMIN — MODAFINIL 200 MILLIGRAM(S): 200 TABLET ORAL at 10:23

## 2018-10-25 RX ADMIN — CEFTRIAXONE 100 GRAM(S): 500 INJECTION, POWDER, FOR SOLUTION INTRAMUSCULAR; INTRAVENOUS at 09:46

## 2018-10-25 RX ADMIN — Medication 3 MILLILITER(S): at 15:43

## 2018-10-25 RX ADMIN — TIOTROPIUM BROMIDE 1 CAPSULE(S): 18 CAPSULE ORAL; RESPIRATORY (INHALATION) at 12:31

## 2018-10-25 RX ADMIN — Medication 5 MILLILITER(S): at 12:29

## 2018-10-25 RX ADMIN — Medication 81 MILLIGRAM(S): at 12:29

## 2018-10-25 RX ADMIN — BUDESONIDE AND FORMOTEROL FUMARATE DIHYDRATE 2 PUFF(S): 160; 4.5 AEROSOL RESPIRATORY (INHALATION) at 05:10

## 2018-10-25 RX ADMIN — Medication 100 MILLIGRAM(S): at 05:10

## 2018-10-25 RX ADMIN — APIXABAN 5 MILLIGRAM(S): 2.5 TABLET, FILM COATED ORAL at 05:10

## 2018-10-25 RX ADMIN — Medication 5 MILLILITER(S): at 09:18

## 2018-10-25 NOTE — PROGRESS NOTE ADULT - PROBLEM SELECTOR PROBLEM 1
Metabolic encephalopathy
SOB (shortness of breath)
Metabolic encephalopathy
Metabolic encephalopathy
SOB (shortness of breath)
SOB (shortness of breath)

## 2018-10-25 NOTE — PROGRESS NOTE ADULT - ATTENDING COMMENTS
Pending placement to [facility], which patient and family are amenable to. Total discharge planning time spent thus far = 50minutes.

## 2018-10-25 NOTE — PROGRESS NOTE ADULT - PROBLEM SELECTOR PROBLEM 9
Obstructive sleep apnea on CPAP
Prophylactic measure
Medication management

## 2018-10-25 NOTE — PROGRESS NOTE ADULT - REASON FOR ADMISSION
confusion

## 2018-10-25 NOTE — PROGRESS NOTE ADULT - ASSESSMENT
78y M w/ PMHx significant for Dementia, AFib (on Eliquis), COPD/Pulmonary Fibrosis, BRIEN (on CPAP), recurrent UTIs with recent admission 9/2018 p/w AMS found to have metabolic encephalopathy due to cystitis as well as CT findings concerning for metastatic prostate cancer. Dispo planning initiated, pending placement to [facility]. 78yoM w/ PMHx significant for Dementia, AFib (on Eliquis), COPD/Pulmonary Fibrosis, BRIEN (on CPAP), recurrent UTIs with recent admission 9/2018 p/w AMS found to have metabolic encephalopathy due to cystitis as well as CT findings concerning for metastatic prostate cancer. Dispo planning initiated, pending placement to [facility].

## 2018-10-25 NOTE — PROGRESS NOTE ADULT - PROBLEM SELECTOR PROBLEM 2
Cystitis
Dementia
Prostate mass
Pulmonary fibrosis
Cystitis
Cystitis
Pulmonary fibrosis
Pulmonary fibrosis

## 2018-10-25 NOTE — PROGRESS NOTE ADULT - PROBLEM SELECTOR PLAN 1
Overall improved but now again confused, likely multifactorial but primarily metabolic due to cystitis/urinary retention + delirium due frequent setting changes  -will restart home Modafinil 200mg daily  -treatment of Cystitis below  -c/w Seroquel qhs as sleep aide/agitation control  -neurology recommendations appreciated; f/u outpatient

## 2018-10-25 NOTE — PROGRESS NOTE ADULT - PROBLEM SELECTOR PLAN 2
-in the setting of Urinary retention, UCx >100K Proteus  -c/w CTX 1g daily (Day 7), final day of Abx

## 2018-10-25 NOTE — PROGRESS NOTE ADULT - PROVIDER SPECIALTY LIST ADULT
Internal Medicine
Neurology
Pulmonology
Internal Medicine

## 2018-10-25 NOTE — PROGRESS NOTE ADULT - PROBLEM SELECTOR PLAN 10
-DVT PPX: Already therapeutically anticoagulated with Elquis  -DIET: DASH  -DISPO: GINNY placement, likely today  -CODE STATUS: Full Code

## 2018-10-25 NOTE — PROGRESS NOTE ADULT - SUBJECTIVE AND OBJECTIVE BOX
Jonathan Wilson, PGY-3  Internal Medicine Resident  Pager: [phone]  After 7PM on Weekdays/After 12PM on Weekends: [phone]    Patient is a 78y old  Male who presents with a chief complaint of confusion (25 Oct 2018 05:29)      SUBJECTIVE / OVERNIGHT EVENTS: No acute events overnight. Patient using CPAP while sleeping. This AM he says he wants to sleep but has no complaints.     REVIEW OF SYSTEMS:  Unable to assess due to lack of patient participation.     MEDICATIONS  (STANDING):  apixaban 5 milliGRAM(s) Oral every 12 hours  aspirin enteric coated 81 milliGRAM(s) Oral daily  atorvastatin 20 milliGRAM(s) Oral at bedtime  Biotene Dry Mouth Oral Rinse 5 milliLiter(s) Swish and Spit five times a day  buDESOnide  80 MICROgram(s)/formoterol 4.5 MICROgram(s) Inhaler 2 Puff(s) Inhalation two times a day  cefTRIAXone   IVPB      cefTRIAXone   IVPB 1 Gram(s) IV Intermittent every 24 hours  cholecalciferol 500 Unit(s) Oral daily  docusate sodium 100 milliGRAM(s) Oral two times a day  finasteride 5 milliGRAM(s) Oral daily  lactulose Syrup 10 Gram(s) Oral at bedtime  lidocaine   Patch 1 Patch Transdermal once  melatonin 3 milliGRAM(s) Oral at bedtime  modafinil 200 milliGRAM(s) Oral every 24 hours  polyethylene glycol 3350 17 Gram(s) Oral at bedtime  QUEtiapine 12.5 milliGRAM(s) Oral at bedtime  tamsulosin 0.4 milliGRAM(s) Oral at bedtime  tiotropium 18 MICROgram(s) Capsule 1 Capsule(s) Inhalation daily    MEDICATIONS  (PRN):  ALBUTerol/ipratropium for Nebulization 3 milliLiter(s) Nebulizer every 6 hours PRN Shortness of Breath and/or Wheezing      T(C): 36.8 (10-25-18 @ 04:29), Max: 36.8 (10-25-18 @ 04:29)  HR: 86 (10-25-18 @ 08:48) (63 - 95)  BP: 110/74 (10-25-18 @ 04:29) (103/71 - 147/81)  RR: 18 (10-25-18 @ 04:29) (18 - 18)  SpO2: 95% (10-25-18 @ 08:48) (95% - 99%)    CAPILLARY BLOOD GLUCOSE      POCT Blood Glucose.: 81 mg/dL (25 Oct 2018 07:50)  POCT Blood Glucose.: 84 mg/dL (25 Oct 2018 00:35)    I&O's Summary    24 Oct 2018 07:01  -  25 Oct 2018 07:00  --------------------------------------------------------  IN: 150 mL / OUT: 550 mL / NET: -400 mL        GENERAL: No acute distress, well-developed  HEAD:  Atraumatic, Normocephalic  ENT: poor dentition  CHEST/LUNG: Clear to auscultation bilaterally  BACK: No spinal tenderness  HEART: Regular rate and rhythm; No murmurs, rubs, or gallops  ABDOMEN: Soft, Nontender, Nondistended; Bowel sounds present  EXTREMITIES:  No clubbing, cyanosis, or edema  PSYCH: Nl behavior, nl affect  NEUROLOGY: AAOx1, non-focal, cranial nerves intact  SKIN: Normal color, No rashes or lesions    LABS:                        11.9   10.2  )-----------( 193      ( 23 Oct 2018 12:43 )             35.8     10-23    135  |  98  |  12  ----------------------------<  126<H>  3.8   |  25  |  0.87    Ca    8.6      23 Oct 2018 12:43              RADIOLOGY & ADDITIONAL TESTS:  Imaging Personally Reviewed:  Consultant(s) Notes Reviewed: Pulm  Care Discussed with Consultants/Other Providers: Jonathan Wilson, PGY-3  Internal Medicine Resident  Pager: [phone]  After 7PM on Weekdays/After 12PM on Weekends: [phone]    Patient is a 78y old  Male who presents with a chief complaint of confusion (25 Oct 2018 05:29)    SUBJECTIVE / OVERNIGHT EVENTS: No acute events overnight. Patient using CPAP while sleeping. This AM he says he wants to sleep but has no complaints.     REVIEW OF SYSTEMS:  Unable to assess due to lack of patient participation.     T(C): 36.8 (10-25-18 @ 04:29), Max: 36.8 (10-25-18 @ 04:29)  HR: 86 (10-25-18 @ 08:48) (63 - 95)  BP: 110/74 (10-25-18 @ 04:29) (103/71 - 147/81)  RR: 18 (10-25-18 @ 04:29) (18 - 18)  SpO2: 95% (10-25-18 @ 08:48) (95% - 99%)    CAPILLARY BLOOD GLUCOSE  POCT Blood Glucose.: 81 mg/dL (25 Oct 2018 07:50)  POCT Blood Glucose.: 84 mg/dL (25 Oct 2018 00:35)    I&O's Summary  24 Oct 2018 07:01  -  25 Oct 2018 07:00  --------------------------------------------------------  IN: 150 mL / OUT: 550 mL / NET: -400 mL    GENERAL: No acute distress, well-developed  HEAD:  Atraumatic, Normocephalic  ENT: poor dentition  CHEST/LUNG: Clear to auscultation bilaterally  BACK: No spinal tenderness  HEART: Regular rate and rhythm; No murmurs, rubs, or gallops  ABDOMEN: Soft, Nontender, Nondistended; Bowel sounds present  EXTREMITIES:  No clubbing, cyanosis, or edema  PSYCH: Nl behavior, nl affect  NEUROLOGY: AAOx1, non-focal, cranial nerves intact  SKIN: Normal color, No rashes or lesions    MEDICATIONS  (STANDING):  apixaban 5 milliGRAM(s) Oral every 12 hours  aspirin enteric coated 81 milliGRAM(s) Oral daily  atorvastatin 20 milliGRAM(s) Oral at bedtime  Biotene Dry Mouth Oral Rinse 5 milliLiter(s) Swish and Spit five times a day  buDESOnide  80 MICROgram(s)/formoterol 4.5 MICROgram(s) Inhaler 2 Puff(s) Inhalation two times a day  cefTRIAXone   IVPB      cefTRIAXone   IVPB 1 Gram(s) IV Intermittent every 24 hours  cholecalciferol 500 Unit(s) Oral daily  docusate sodium 100 milliGRAM(s) Oral two times a day  finasteride 5 milliGRAM(s) Oral daily  lactulose Syrup 10 Gram(s) Oral at bedtime  lidocaine   Patch 1 Patch Transdermal once  melatonin 3 milliGRAM(s) Oral at bedtime  modafinil 200 milliGRAM(s) Oral every 24 hours  polyethylene glycol 3350 17 Gram(s) Oral at bedtime  QUEtiapine 12.5 milliGRAM(s) Oral at bedtime  tamsulosin 0.4 milliGRAM(s) Oral at bedtime  tiotropium 18 MICROgram(s) Capsule 1 Capsule(s) Inhalation daily    MEDICATIONS  (PRN):  ALBUTerol/ipratropium for Nebulization 3 milliLiter(s) Nebulizer every 6 hours PRN Shortness of Breath and/or Wheezing

## 2018-10-25 NOTE — PROGRESS NOTE ADULT - ATTENDING COMMENTS
as above--resp status stable-MS improved  chronic sob-UIP, copd, debility, Card dz--  No rx for UIP  COPD--symbicort 2 bid, spiriva, neb rx q 6h  OSAS--cpap o/n is a must  Delirium continues as per Neuro; CT a/p hydronephrosis---Urology consulted; biotene for dry mouth; ID-ceftriaxone-D7 of 7   DC planning  Te Prieto MD-Pulmonary   [phone]

## 2018-11-08 ENCOUNTER — APPOINTMENT (OUTPATIENT)
Dept: PULMONOLOGY | Facility: CLINIC | Age: 78
End: 2018-11-08

## 2018-11-11 PROCEDURE — 86140 C-REACTIVE PROTEIN: CPT

## 2018-11-11 PROCEDURE — 82962 GLUCOSE BLOOD TEST: CPT

## 2018-11-11 PROCEDURE — 85014 HEMATOCRIT: CPT

## 2018-11-11 PROCEDURE — 99285 EMERGENCY DEPT VISIT HI MDM: CPT | Mod: 25

## 2018-11-11 PROCEDURE — 80053 COMPREHEN METABOLIC PANEL: CPT

## 2018-11-11 PROCEDURE — 83605 ASSAY OF LACTIC ACID: CPT

## 2018-11-11 PROCEDURE — 71045 X-RAY EXAM CHEST 1 VIEW: CPT

## 2018-11-11 PROCEDURE — 82803 BLOOD GASES ANY COMBINATION: CPT

## 2018-11-11 PROCEDURE — 82140 ASSAY OF AMMONIA: CPT

## 2018-11-11 PROCEDURE — 80307 DRUG TEST PRSMV CHEM ANLYZR: CPT

## 2018-11-11 PROCEDURE — 94660 CPAP INITIATION&MGMT: CPT

## 2018-11-11 PROCEDURE — 86780 TREPONEMA PALLIDUM: CPT

## 2018-11-11 PROCEDURE — 82947 ASSAY GLUCOSE BLOOD QUANT: CPT

## 2018-11-11 PROCEDURE — 85652 RBC SED RATE AUTOMATED: CPT

## 2018-11-11 PROCEDURE — 82746 ASSAY OF FOLIC ACID SERUM: CPT

## 2018-11-11 PROCEDURE — 70450 CT HEAD/BRAIN W/O DYE: CPT

## 2018-11-11 PROCEDURE — 87186 SC STD MICRODIL/AGAR DIL: CPT

## 2018-11-11 PROCEDURE — 93005 ELECTROCARDIOGRAM TRACING: CPT

## 2018-11-11 PROCEDURE — 81001 URINALYSIS AUTO W/SCOPE: CPT

## 2018-11-11 PROCEDURE — 87633 RESP VIRUS 12-25 TARGETS: CPT

## 2018-11-11 PROCEDURE — 84100 ASSAY OF PHOSPHORUS: CPT

## 2018-11-11 PROCEDURE — 84132 ASSAY OF SERUM POTASSIUM: CPT

## 2018-11-11 PROCEDURE — 84295 ASSAY OF SERUM SODIUM: CPT

## 2018-11-11 PROCEDURE — 84145 PROCALCITONIN (PCT): CPT

## 2018-11-11 PROCEDURE — 80048 BASIC METABOLIC PNL TOTAL CA: CPT

## 2018-11-11 PROCEDURE — 87798 DETECT AGENT NOS DNA AMP: CPT

## 2018-11-11 PROCEDURE — 87486 CHLMYD PNEUM DNA AMP PROBE: CPT

## 2018-11-11 PROCEDURE — 84443 ASSAY THYROID STIM HORMONE: CPT

## 2018-11-11 PROCEDURE — 85027 COMPLETE CBC AUTOMATED: CPT

## 2018-11-11 PROCEDURE — 94640 AIRWAY INHALATION TREATMENT: CPT

## 2018-11-11 PROCEDURE — 82435 ASSAY OF BLOOD CHLORIDE: CPT

## 2018-11-11 PROCEDURE — 85610 PROTHROMBIN TIME: CPT

## 2018-11-11 PROCEDURE — 87040 BLOOD CULTURE FOR BACTERIA: CPT

## 2018-11-11 PROCEDURE — 85730 THROMBOPLASTIN TIME PARTIAL: CPT

## 2018-11-11 PROCEDURE — 87581 M.PNEUMON DNA AMP PROBE: CPT

## 2018-11-11 PROCEDURE — 83735 ASSAY OF MAGNESIUM: CPT

## 2018-11-11 PROCEDURE — 51701 INSERT BLADDER CATHETER: CPT

## 2018-11-11 PROCEDURE — 87086 URINE CULTURE/COLONY COUNT: CPT

## 2018-11-11 PROCEDURE — 74177 CT ABD & PELVIS W/CONTRAST: CPT

## 2018-11-11 PROCEDURE — 82607 VITAMIN B-12: CPT

## 2018-11-11 PROCEDURE — 97162 PT EVAL MOD COMPLEX 30 MIN: CPT

## 2018-11-11 PROCEDURE — 82330 ASSAY OF CALCIUM: CPT

## 2018-11-26 ENCOUNTER — OUTPATIENT (OUTPATIENT)
Dept: OUTPATIENT SERVICES | Facility: HOSPITAL | Age: 78
LOS: 1 days | End: 2018-11-26
Payer: MEDICARE

## 2018-11-26 ENCOUNTER — RESULT REVIEW (OUTPATIENT)
Age: 78
End: 2018-11-26

## 2018-11-26 DIAGNOSIS — Z90.49 ACQUIRED ABSENCE OF OTHER SPECIFIED PARTS OF DIGESTIVE TRACT: Chronic | ICD-10-CM

## 2018-11-26 DIAGNOSIS — Z87.19 PERSONAL HISTORY OF OTHER DISEASES OF THE DIGESTIVE SYSTEM: Chronic | ICD-10-CM

## 2018-11-26 DIAGNOSIS — K76.9 LIVER DISEASE, UNSPECIFIED: ICD-10-CM

## 2018-11-26 PROCEDURE — 88307 TISSUE EXAM BY PATHOLOGIST: CPT | Mod: 26

## 2018-11-26 PROCEDURE — 77012 CT SCAN FOR NEEDLE BIOPSY: CPT | Mod: 26

## 2018-11-26 PROCEDURE — 88313 SPECIAL STAINS GROUP 2: CPT | Mod: 26

## 2018-11-26 PROCEDURE — 47000 NEEDLE BIOPSY OF LIVER PERQ: CPT

## 2018-12-03 ENCOUNTER — INPATIENT (INPATIENT)
Facility: HOSPITAL | Age: 78
LOS: 9 days | Discharge: NURSING, REHAB OR LONG-TERM CARE | End: 2018-12-13
Attending: HOSPITALIST | Admitting: HOSPITALIST
Payer: MEDICARE

## 2018-12-03 VITALS
DIASTOLIC BLOOD PRESSURE: 72 MMHG | TEMPERATURE: 98 F | RESPIRATION RATE: 18 BRPM | OXYGEN SATURATION: 97 % | HEART RATE: 92 BPM | SYSTOLIC BLOOD PRESSURE: 154 MMHG

## 2018-12-03 DIAGNOSIS — E87.0 HYPEROSMOLALITY AND HYPERNATREMIA: ICD-10-CM

## 2018-12-03 DIAGNOSIS — Z87.19 PERSONAL HISTORY OF OTHER DISEASES OF THE DIGESTIVE SYSTEM: Chronic | ICD-10-CM

## 2018-12-03 DIAGNOSIS — Z90.49 ACQUIRED ABSENCE OF OTHER SPECIFIED PARTS OF DIGESTIVE TRACT: Chronic | ICD-10-CM

## 2018-12-03 LAB
ALBUMIN SERPL ELPH-MCNC: 2.8 G/DL — LOW (ref 3.3–5)
ALP SERPL-CCNC: 116 U/L — SIGNIFICANT CHANGE UP (ref 40–120)
ALT FLD-CCNC: 13 U/L — SIGNIFICANT CHANGE UP (ref 4–41)
APPEARANCE UR: SIGNIFICANT CHANGE UP
AST SERPL-CCNC: 20 U/L — SIGNIFICANT CHANGE UP (ref 4–40)
BACTERIA # UR AUTO: SIGNIFICANT CHANGE UP
BASE EXCESS BLDV CALC-SCNC: -1.6 MMOL/L — SIGNIFICANT CHANGE UP
BASOPHILS # BLD AUTO: 0.09 K/UL — SIGNIFICANT CHANGE UP (ref 0–0.2)
BASOPHILS NFR BLD AUTO: 0.9 % — SIGNIFICANT CHANGE UP (ref 0–2)
BILIRUB SERPL-MCNC: 0.7 MG/DL — SIGNIFICANT CHANGE UP (ref 0.2–1.2)
BILIRUB UR-MCNC: NEGATIVE — SIGNIFICANT CHANGE UP
BLOOD GAS VENOUS - CREATININE: 5.07 MG/DL — HIGH (ref 0.5–1.3)
BLOOD UR QL VISUAL: HIGH
BUN SERPL-MCNC: 60 MG/DL — HIGH (ref 7–23)
BUN SERPL-MCNC: 60 MG/DL — HIGH (ref 7–23)
CALCIUM SERPL-MCNC: 7.8 MG/DL — LOW (ref 8.4–10.5)
CALCIUM SERPL-MCNC: 8.5 MG/DL — SIGNIFICANT CHANGE UP (ref 8.4–10.5)
CHLORIDE BLDV-SCNC: > 110 MMOL/L — HIGH (ref 96–108)
CHLORIDE SERPL-SCNC: 116 MMOL/L — HIGH (ref 98–107)
CHLORIDE SERPL-SCNC: 120 MMOL/L — HIGH (ref 98–107)
CHLORIDE UR-SCNC: 27 MMOL/L — SIGNIFICANT CHANGE UP
CO2 SERPL-SCNC: 17 MMOL/L — LOW (ref 22–31)
CO2 SERPL-SCNC: 21 MMOL/L — LOW (ref 22–31)
COLOR SPEC: YELLOW — SIGNIFICANT CHANGE UP
CREAT SERPL-MCNC: 5 MG/DL — HIGH (ref 0.5–1.3)
CREAT SERPL-MCNC: 5.03 MG/DL — HIGH (ref 0.5–1.3)
EOSINOPHIL # BLD AUTO: 0.21 K/UL — SIGNIFICANT CHANGE UP (ref 0–0.5)
EOSINOPHIL NFR BLD AUTO: 2 % — SIGNIFICANT CHANGE UP (ref 0–6)
GAS PNL BLDV: 151 MMOL/L — HIGH (ref 136–146)
GLUCOSE BLDV-MCNC: 89 — SIGNIFICANT CHANGE UP (ref 70–99)
GLUCOSE SERPL-MCNC: 69 MG/DL — LOW (ref 70–99)
GLUCOSE SERPL-MCNC: 85 MG/DL — SIGNIFICANT CHANGE UP (ref 70–99)
GLUCOSE UR-MCNC: NEGATIVE — SIGNIFICANT CHANGE UP
HCO3 BLDV-SCNC: 21 MMOL/L — SIGNIFICANT CHANGE UP (ref 20–27)
HCT VFR BLD CALC: 44.2 % — SIGNIFICANT CHANGE UP (ref 39–50)
HCT VFR BLDV CALC: 43.4 % — SIGNIFICANT CHANGE UP (ref 39–51)
HGB BLD-MCNC: 13.6 G/DL — SIGNIFICANT CHANGE UP (ref 13–17)
HGB BLDV-MCNC: 14.2 G/DL — SIGNIFICANT CHANGE UP (ref 13–17)
HYALINE CASTS # UR AUTO: SIGNIFICANT CHANGE UP
IMM GRANULOCYTES # BLD AUTO: 0.12 # — SIGNIFICANT CHANGE UP
IMM GRANULOCYTES NFR BLD AUTO: 1.2 % — SIGNIFICANT CHANGE UP (ref 0–1.5)
KETONES UR-MCNC: NEGATIVE — SIGNIFICANT CHANGE UP
LACTATE BLDV-MCNC: 1.3 MMOL/L — SIGNIFICANT CHANGE UP (ref 0.5–2)
LEUKOCYTE ESTERASE UR-ACNC: SIGNIFICANT CHANGE UP
LYMPHOCYTES # BLD AUTO: 2.31 K/UL — SIGNIFICANT CHANGE UP (ref 1–3.3)
LYMPHOCYTES # BLD AUTO: 22.4 % — SIGNIFICANT CHANGE UP (ref 13–44)
MAGNESIUM SERPL-MCNC: 2.1 MG/DL — SIGNIFICANT CHANGE UP (ref 1.6–2.6)
MCHC RBC-ENTMCNC: 29.8 PG — SIGNIFICANT CHANGE UP (ref 27–34)
MCHC RBC-ENTMCNC: 30.8 % — LOW (ref 32–36)
MCV RBC AUTO: 96.9 FL — SIGNIFICANT CHANGE UP (ref 80–100)
MONOCYTES # BLD AUTO: 0.72 K/UL — SIGNIFICANT CHANGE UP (ref 0–0.9)
MONOCYTES NFR BLD AUTO: 7 % — SIGNIFICANT CHANGE UP (ref 2–14)
NEUTROPHILS # BLD AUTO: 6.87 K/UL — SIGNIFICANT CHANGE UP (ref 1.8–7.4)
NEUTROPHILS NFR BLD AUTO: 66.5 % — SIGNIFICANT CHANGE UP (ref 43–77)
NITRITE UR-MCNC: NEGATIVE — SIGNIFICANT CHANGE UP
NRBC # FLD: 0 — SIGNIFICANT CHANGE UP
OSMOLALITY SERPL: 336 MOSMO/KG — HIGH (ref 275–295)
OSMOLALITY UR: 325 MOSMO/KG — SIGNIFICANT CHANGE UP (ref 50–1200)
PCO2 BLDV: 44 MMHG — SIGNIFICANT CHANGE UP (ref 41–51)
PH BLDV: 7.35 PH — SIGNIFICANT CHANGE UP (ref 7.32–7.43)
PH UR: 5.5 — SIGNIFICANT CHANGE UP (ref 5–8)
PHOSPHATE SERPL-MCNC: 4 MG/DL — SIGNIFICANT CHANGE UP (ref 2.5–4.5)
PLATELET # BLD AUTO: 140 K/UL — LOW (ref 150–400)
PMV BLD: 13.1 FL — HIGH (ref 7–13)
PO2 BLDV: < 24 MMHG — LOW (ref 35–40)
POTASSIUM BLDV-SCNC: 3.7 MMOL/L — SIGNIFICANT CHANGE UP (ref 3.4–4.5)
POTASSIUM SERPL-MCNC: 3.5 MMOL/L — SIGNIFICANT CHANGE UP (ref 3.5–5.3)
POTASSIUM SERPL-MCNC: 3.9 MMOL/L — SIGNIFICANT CHANGE UP (ref 3.5–5.3)
POTASSIUM SERPL-SCNC: 3.5 MMOL/L — SIGNIFICANT CHANGE UP (ref 3.5–5.3)
POTASSIUM SERPL-SCNC: 3.9 MMOL/L — SIGNIFICANT CHANGE UP (ref 3.5–5.3)
POTASSIUM UR-SCNC: 38 MMOL/L — SIGNIFICANT CHANGE UP
PROT SERPL-MCNC: 7.2 G/DL — SIGNIFICANT CHANGE UP (ref 6–8.3)
PROT UR-MCNC: 20 — SIGNIFICANT CHANGE UP
RBC # BLD: 4.56 M/UL — SIGNIFICANT CHANGE UP (ref 4.2–5.8)
RBC # FLD: 15.6 % — HIGH (ref 10.3–14.5)
RBC CASTS # UR COMP ASSIST: SIGNIFICANT CHANGE UP (ref 0–?)
SAO2 % BLDV: 23.9 % — LOW (ref 60–85)
SODIUM SERPL-SCNC: 152 MMOL/L — HIGH (ref 135–145)
SODIUM SERPL-SCNC: 154 MMOL/L — HIGH (ref 135–145)
SODIUM UR-SCNC: 24 MMOL/L — SIGNIFICANT CHANGE UP
SP GR SPEC: 1.01 — SIGNIFICANT CHANGE UP (ref 1–1.04)
SQUAMOUS # UR AUTO: SIGNIFICANT CHANGE UP
UROBILINOGEN FLD QL: NORMAL — SIGNIFICANT CHANGE UP
WBC # BLD: 10.32 K/UL — SIGNIFICANT CHANGE UP (ref 3.8–10.5)
WBC # FLD AUTO: 10.32 K/UL — SIGNIFICANT CHANGE UP (ref 3.8–10.5)
WBC UR QL: HIGH (ref 0–?)
YEAST BUDDING # UR COMP ASSIST: SIGNIFICANT CHANGE UP

## 2018-12-03 PROCEDURE — 71045 X-RAY EXAM CHEST 1 VIEW: CPT | Mod: 26

## 2018-12-03 PROCEDURE — 74176 CT ABD & PELVIS W/O CONTRAST: CPT | Mod: 26

## 2018-12-03 PROCEDURE — 99223 1ST HOSP IP/OBS HIGH 75: CPT

## 2018-12-03 RX ORDER — SODIUM CHLORIDE 9 MG/ML
1000 INJECTION INTRAMUSCULAR; INTRAVENOUS; SUBCUTANEOUS ONCE
Qty: 0 | Refills: 0 | Status: COMPLETED | OUTPATIENT
Start: 2018-12-03 | End: 2018-12-03

## 2018-12-03 RX ADMIN — SODIUM CHLORIDE 1000 MILLILITER(S): 9 INJECTION INTRAMUSCULAR; INTRAVENOUS; SUBCUTANEOUS at 15:32

## 2018-12-03 NOTE — ED ADULT NURSE NOTE - OBJECTIVE STATEMENT
covering primary RN for break pt is in bed A and OX 2 in NAD, family at bedside. as per family " doctor called us and they told us his kidney is not functioning well; lung sounds clear, pt denies SOB, denies pain fever or chills. noted heel protectors in place skin is intact,  pt presents with IV 22G ro right forearm  with ecchymotic area proximal. IV removed. pt presents with large ecchymotic area to left AC. Davidson in place from NH 16 fr no UO  noted, bladder non distended.  IV initiated labs sent pt placed on monitor.

## 2018-12-03 NOTE — ED PROVIDER NOTE - OBJECTIVE STATEMENT
Pt is 79 y/o male with Genaro vicente, with PMhx of COPD, a-fib on Eliquis, dementia, GERD, HLD, pulm fibrosis, recurrent UTI here for eval of mew renal failure. As per Genaro Vicente attending pt was noted to have increase in creatine to above 3 (baseline WNL), also with decreased PO intake of both fluids and solids. Sodium hasn't decreased  despite IV hydration. As per amita [facility]ano[facility] staff mental status unchanged. pt presents with a FC 9(CHRONIC MEEKS SECONDARY To Lt sided hydronephrosis). No recent infections, abd pain, n/v/d, no fever; hx as per family and [facility] staff. Pt is 79 y/o male with Genaro vicente, with PMhx of COPD, a-fib on Eliquis, dementia, GERD, HLD, pulm fibrosis, recurrent UTI here for eval of mew renal failure. As per Genaro Vicente attending pt was noted to have increase in creatine to above 3 (baseline WNL), also with decreased PO intake of both fluids and solids. Sodium hasn't decreased  despite IV hydration. As per amita Zambrano[facility] staff mental status unchanged. pt presents with a FC 9(CHRONIC MEEKS SECONDARY To prostate mass and b/l hydronephrosis as per  recent CT abd/pelvis). No recent infections, abd pain, n/v/d, no fever; hx as per family and Genaro zambranoish staff.

## 2018-12-03 NOTE — H&P ADULT - PROBLEM SELECTOR PLAN 2
-likely stemming from dehydration as well as possible DI given Urine Osm of 325. Continue with IVF, obtain renal input

## 2018-12-03 NOTE — H&P ADULT - NSHPLABSRESULTS_GEN_ALL_CORE
Vital Signs Last 24 Hrs  T(C): 36.7 (03 Dec 2018 23:17), Max: 36.7 (03 Dec 2018 20:21)  T(F): 98 (03 Dec 2018 23:17), Max: 98 (03 Dec 2018 20:21)  HR: 77 (03 Dec 2018 23:17) (77 - 97)  BP: 129/85 (03 Dec 2018 23:17) (104/52 - 154/72)  BP(mean): --  RR: 18 (03 Dec 2018 23:17) (18 - 20)  SpO2: 99% (03 Dec 2018 23:17) (97% - 100%)                            13.6   10.32 )-----------( 140      ( 03 Dec 2018 14:15 )             44.2     12    154<H>  |  120<H>  |  60<H>  ----------------------------<  69<L>  3.5   |  17<L>  |  5.00<H>    Ca    7.8<L>      03 Dec 2018 21:50  Phos  4.0     12-  Mg     2.1     12-    TPro  7.2  /  Alb  2.8<L>  /  TBili  0.7  /  DBili  x   /  AST  20  /  ALT  13  /  AlkPhos  116  12-03    CAPILLARY BLOOD GLUCOSE          Urinalysis Basic - ( 03 Dec 2018 15:30 )    Color: YELLOW / Appearance: Lt TURBID / S.015 / pH: 5.5  Gluc: NEGATIVE / Ketone: NEGATIVE  / Bili: NEGATIVE / Urobili: NORMAL   Blood: MODERATE / Protein: 20 / Nitrite: NEGATIVE   Leuk Esterase: MODERATE / RBC: 3-5 / WBC 6-10   Sq Epi: FEW / Non Sq Epi: x / Bacteria: SMALL

## 2018-12-03 NOTE — ED ADULT NURSE NOTE - NSIMPLEMENTINTERV_GEN_ALL_ED
Implemented All Fall with Harm Risk Interventions:  Tensed to call system. Call bell, personal items and telephone within reach. Instruct patient to call for assistance. Room bathroom lighting operational. Non-slip footwear when patient is off stretcher. Physically safe environment: no spills, clutter or unnecessary equipment. Stretcher in lowest position, wheels locked, appropriate side rails in place. Provide visual cue, wrist band, yellow gown, etc. Monitor gait and stability. Monitor for mental status changes and reorient to person, place, and time. Review medications for side effects contributing to fall risk. Reinforce activity limits and safety measures with patient and family. Provide visual clues: red socks.

## 2018-12-03 NOTE — ED ADULT NURSE REASSESSMENT NOTE - NS ED NURSE REASSESS COMMENT FT1
report recd at shift change from daytime rn, patient resting comfortably in nad vss resps even and unlabored, pt at baseline mental status, will ctm

## 2018-12-03 NOTE — ED PROVIDER NOTE - MEDICAL DECISION MAKING DETAILS
hypernatremia, COLUMBA secondary to dehydration - labs, IV hydration, renal/bladder scan will admit.

## 2018-12-03 NOTE — H&P ADULT - PROBLEM SELECTOR PLAN 1
unable to calculate FeNa as UCr not sent. Will reorder urine labs -likely multifactorial in setting of anorexia and obstructive uropathy. Given hypernatremia, unclear why urine Osm is as low as 325- DI?  -unable to calculate FeNa as UCr not sent. Will reorder urine labs  -continue with IVF, renally dose medications given pt is technically ESRD at this time  -will need to obtain urology and renal input -likely multifactorial in setting of anorexia and obstructive uropathy. Given hypernatremia, unclear why urine Osm is as low as 325- DI?  -unable to calculate FeNa as UCr not sent. Will reorder urine labs  -continue with IVF, renally dose medications given pt is technically ESRD at this time  -will need to obtain urology and renal input  -given anorexia, nutrition eval and swallowing study -likely multifactorial in setting of anorexia and obstructive uropathy. Given hypernatremia, unclear why urine Osm is as low as 325- DI?  -unable to calculate FeNa as UCr not sent. Will reorder urine labs  -continue with IVF, renally dose medications given pt is technically ESRD at this time; will hold aspirin for now  -will need to obtain urology and renal input  -given anorexia, nutrition eval and swallowing study

## 2018-12-03 NOTE — H&P ADULT - HISTORY OF PRESENT ILLNESS
79 yo m with h/o COPD, BRIEN on Bipap, ILD, GERD, Afib on eliquis, dementia (a/o x2, follows commands, mostly lucid) prostate mass and BL hydronephrosis with possible liver mets (liver biopsy taken Nov 26th still pending), recurrent UTIs now with indwelling shah     Sent from [facility] for progressively worsening anorexia in setting of hypernatremia (152) and COLUMBA; SCr 5.03, compared to 0.87 on Oct 23. CT abd with mostly unchanged prostate mass extending into posterior wall and  rectum. Slightly increased BL hydronephrosis. Shah exchanged in ed with 150cc drained, currently 300cc collected. UA not suggestive of UTI, pt hemodynamically stable with normal wbc. Of note, pt reports limited po intake, both solids and fluids   ovel last several weeks because "of funny or sour taste" despite being frequently thirsty. Denies abd pain, constipation, dysphagia or odynophagia 77 yo m with h/o COPD, BRIEN on Bipap, ILD, GERD, Afib on eliquis, dementia (a/o x2, follows commands, mostly lucid) prostate mass and BL hydronephrosis with possible liver mets (liver biopsy taken Nov 26th still pending), recurrent UTIs now with indwelling shah. Wife at bedside     Sent from [facility] for progressively worsening anorexia in setting of hypernatremia (152) and COLUMBA; SCr 5.03, compared to 0.87 on Oct 23. CT abd with mostly unchanged prostate mass extending into posterior wall and  rectum. Slightly increased BL hydronephrosis. Shah exchanged in ed with 150cc drained, currently 300cc collected. UA not suggestive of UTI, pt hemodynamically stable with normal wbc. Of note, pt reports limited po intake to both solids and fluids over last several weeks because "of funny or sour taste" despite being frequently thirsty. Denies abd pain, nausea, vomiting, constipation, dysphagia or odynophagia

## 2018-12-03 NOTE — ED ADULT NURSE REASSESSMENT NOTE - NS ED NURSE REASSESS COMMENT FT1
repeat labs drawn and sent. nad noted, vss stated resps even and unlabored awaiting bed assignment at this time

## 2018-12-03 NOTE — ED PROVIDER NOTE - PHYSICAL EXAMINATION
elderly, frail  FC in situ, no urine in the bag - as per PJ note pt had 89cc in bladder, no FC malfunction   oral mucosa dry   abd tender to palpation to suprapubic area, no rebound or guarding

## 2018-12-03 NOTE — ED ADULT TRIAGE NOTE - CHIEF COMPLAINT QUOTE
pt kanwal from [facility], here for abnormal labs, found to be hypernatremic and in renal failure as per ems. not currently on dialysis. pt A&Ox2-3 (at baseline mental status per ems), has chronic shah, denies any pain, sob, n/v, dizziness, palpitations pt kanwal from [facility], here for abnormal labs, found to be hypernatremic and in renal failure as per ems. not currently on dialysis. pt A&Ox2-3 (at baseline mental status per ems), has chronic shah, denies any pain, sob, n/v, dizziness, palpitations. up-triaged- abnormal ekg

## 2018-12-03 NOTE — ED PROCEDURE NOTE - ATTENDING CONTRIBUTION TO CARE
I was present and supervised the above procedure done by resident.
I was present and supervised the above procedure done by resident.

## 2018-12-03 NOTE — H&P ADULT - NSHPPHYSICALEXAM_GEN_ALL_CORE
PHYSICAL EXAM:      Constitutional: NAD, well-groomed, well-developed  HEENT: EOMI, Normal Hearing, extremely dried oral cavity, poor dentition  Neck: No LAD, No JVD  Back: Normal spine flexure, No CVA tenderness  Respiratory: CTAB  Cardiovascular: S1 and S2, irreg irreg  Gastrointestinal: BS+, soft, NT/ND  Extremities: No peripheral edema  Vascular: 2+ peripheral pulses  Neurological: A/O x 2, no focal deficits  Psychiatric: Normal mood, normal affect  Musculoskeletal: 5/5 strength b/l upper and lower extremities  Skin: No rashes  Urology: shah inserted at urethral meatus. No visible bleeding or trauma

## 2018-12-03 NOTE — H&P ADULT - NSHPREVIEWOFSYSTEMS_GEN_ALL_CORE
Review of Systems:   CONSTITUTIONAL: No fever  EYES: No eye pain, visual disturbances, or discharge  ENMT:  No difficulty hearing, tinnitus, vertigo; No sinus or throat pain  NECK: No pain or stiffness  RESPIRATORY: No cough, wheezing, chills or hemoptysis; No shortness of breath  CARDIOVASCULAR: No chest pain, palpitations, dizziness, or leg swelling  GASTROINTESTINAL: No abdominal or epigastric pain. No nausea, vomiting, or hematemesis; No diarrhea or constipation. No melena or hematochezia.  GENITOURINARY: No dysuria, frequency, hematuria, or incontinence  NEUROLOGICAL: No headaches  SKIN: No itching, burning, rashes, or lesions   MUSCULOSKELETAL: No joint pain or swelling; No muscle, back, or extremity pain

## 2018-12-03 NOTE — ED PROVIDER NOTE - PROGRESS NOTE DETAILS
Елена: POCT US shows  bilateral mod-severe hydronephrosis (?old), shah in place, residual volume 170s. Shah exchanged as was not able to flush. will continue IVFs, pending UA, will d/w renal, admit.

## 2018-12-03 NOTE — ED PROCEDURE NOTE - PROCEDURE ADDITIONAL DETAILS
67633, Ultrasound limited retroperitoneum    Focused ED ultrasund of kidneys and bladder    Indication: COLUMBA  bladder nondistended, Davidson balloon in pace, about 170ml in bladder  bilateral renal ultrasound:  moderate-severe hydronephrosis bilaterally.    impression: bilateral moderate - severe hydronephrosis, Davidson in Pace, residual bladder volume around 170ml    Procedure note and images placed in chart

## 2018-12-03 NOTE — ED ADULT NURSE NOTE - CHIEF COMPLAINT QUOTE
pt kanwal from [facility], here for abnormal labs, found to be hypernatremic and in renal failure as per ems. not currently on dialysis. pt A&Ox2-3 (at baseline mental status per ems), has chronic shah, denies any pain, sob, n/v, dizziness, palpitations. up-triaged- abnormal ekg

## 2018-12-03 NOTE — ED PROVIDER NOTE - ATTENDING CONTRIBUTION TO CARE
Dr. Christiansen: I performed a face to face bedside interview with patient regarding history of present illness, review of symptoms and past medical history. I completed an independent physical exam.  I have discussed patient's plan of care with PA.   I agree with note as stated above, having amended the EMR as needed to reflect my findings.   This includes HISTORY OF PRESENT ILLNESS, HIV, PAST MEDICAL/SURGICAL/FAMILY/SOCIAL HISTORY, ALLERGIES AND HOME MEDICATIONS, REVIEW OF SYSTEMS, PHYSICAL EXAM, and any PROGRESS NOTES during the time I functioned as the attending physician for this patient.    78M with pmh of  COPD, dementia, prostate ca, hx of hydronephrosis, indwelling Davidson, frequent UTIs sent from [place] for COLUMBA (Cr 3.5), hypernatremia to 150s in setting of not taking in PO. Appears to have decreased output from Davidson, no blood.    on exam chronically ill appearing  MM dry, afebrile  abd soft nt/nd  no CVA tenderness  Davidson bag empty    r/o obstructive cause of COLUMBA, likely prerenal/dehydration, r/o electrolyte abnormality. Plan for POCT baldder/ renal us to r/o hydro, urinary retention. r/o underlying infection.  Plan for UA, labs, IVFs, admit.

## 2018-12-03 NOTE — ED CLERICAL - NS ED CLERK NOTE PRE-ARRIVAL INFORMATION; ADDITIONAL PRE-ARRIVAL INFORMATION
pt from [facility] decreased po intake  has been being hydrated at [facility] has hyponatremia and has elevated bun creat has hydro nephrosis  in acute renal failure

## 2018-12-04 DIAGNOSIS — I48.91 UNSPECIFIED ATRIAL FIBRILLATION: ICD-10-CM

## 2018-12-04 DIAGNOSIS — G93.41 METABOLIC ENCEPHALOPATHY: ICD-10-CM

## 2018-12-04 DIAGNOSIS — E87.0 HYPEROSMOLALITY AND HYPERNATREMIA: ICD-10-CM

## 2018-12-04 DIAGNOSIS — J44.9 CHRONIC OBSTRUCTIVE PULMONARY DISEASE, UNSPECIFIED: ICD-10-CM

## 2018-12-04 DIAGNOSIS — N42.9 DISORDER OF PROSTATE, UNSPECIFIED: ICD-10-CM

## 2018-12-04 DIAGNOSIS — F03.90 UNSPECIFIED DEMENTIA WITHOUT BEHAVIORAL DISTURBANCE: ICD-10-CM

## 2018-12-04 DIAGNOSIS — N17.9 ACUTE KIDNEY FAILURE, UNSPECIFIED: ICD-10-CM

## 2018-12-04 LAB
ALBUMIN SERPL ELPH-MCNC: 2.5 G/DL — LOW (ref 3.3–5)
ALP SERPL-CCNC: 110 U/L — SIGNIFICANT CHANGE UP (ref 40–120)
ALT FLD-CCNC: 11 U/L — SIGNIFICANT CHANGE UP (ref 4–41)
APTT BLD: 106.4 SEC — HIGH (ref 27.5–36.3)
APTT BLD: 159 SEC — CRITICAL HIGH (ref 27.5–36.3)
APTT BLD: 35.6 SEC — SIGNIFICANT CHANGE UP (ref 27.5–36.3)
AST SERPL-CCNC: 16 U/L — SIGNIFICANT CHANGE UP (ref 4–40)
BILIRUB SERPL-MCNC: 0.5 MG/DL — SIGNIFICANT CHANGE UP (ref 0.2–1.2)
BUN SERPL-MCNC: 64 MG/DL — HIGH (ref 7–23)
BUN SERPL-MCNC: 68 MG/DL — HIGH (ref 7–23)
CALCIUM SERPL-MCNC: 8.1 MG/DL — LOW (ref 8.4–10.5)
CALCIUM SERPL-MCNC: 8.1 MG/DL — LOW (ref 8.4–10.5)
CHLORIDE SERPL-SCNC: 116 MMOL/L — HIGH (ref 98–107)
CHLORIDE SERPL-SCNC: 118 MMOL/L — HIGH (ref 98–107)
CHLORIDE UR-SCNC: 44 MMOL/L — SIGNIFICANT CHANGE UP
CO2 SERPL-SCNC: 17 MMOL/L — LOW (ref 22–31)
CO2 SERPL-SCNC: 19 MMOL/L — LOW (ref 22–31)
CREAT ?TM UR-MCNC: 103.4 MG/DL — SIGNIFICANT CHANGE UP
CREAT SERPL-MCNC: 5.42 MG/DL — HIGH (ref 0.5–1.3)
CREAT SERPL-MCNC: 5.96 MG/DL — HIGH (ref 0.5–1.3)
GLUCOSE SERPL-MCNC: 68 MG/DL — LOW (ref 70–99)
GLUCOSE SERPL-MCNC: 80 MG/DL — SIGNIFICANT CHANGE UP (ref 70–99)
HCT VFR BLD CALC: 40.9 % — SIGNIFICANT CHANGE UP (ref 39–50)
HGB BLD-MCNC: 12.8 G/DL — LOW (ref 13–17)
INR BLD: 1.27 — HIGH (ref 0.88–1.17)
MAGNESIUM SERPL-MCNC: 2 MG/DL — SIGNIFICANT CHANGE UP (ref 1.6–2.6)
MCHC RBC-ENTMCNC: 30.1 PG — SIGNIFICANT CHANGE UP (ref 27–34)
MCHC RBC-ENTMCNC: 31.3 % — LOW (ref 32–36)
MCV RBC AUTO: 96.2 FL — SIGNIFICANT CHANGE UP (ref 80–100)
NRBC # FLD: 0 — SIGNIFICANT CHANGE UP
OSMOLALITY UR: 330 MOSMO/KG — SIGNIFICANT CHANGE UP (ref 50–1200)
PHOSPHATE SERPL-MCNC: 4.4 MG/DL — SIGNIFICANT CHANGE UP (ref 2.5–4.5)
PLATELET # BLD AUTO: 130 K/UL — LOW (ref 150–400)
PMV BLD: 13.7 FL — HIGH (ref 7–13)
POTASSIUM SERPL-MCNC: 3.4 MMOL/L — LOW (ref 3.5–5.3)
POTASSIUM SERPL-MCNC: 3.4 MMOL/L — LOW (ref 3.5–5.3)
POTASSIUM SERPL-SCNC: 3.4 MMOL/L — LOW (ref 3.5–5.3)
POTASSIUM SERPL-SCNC: 3.4 MMOL/L — LOW (ref 3.5–5.3)
POTASSIUM UR-SCNC: 30.1 MMOL/L — SIGNIFICANT CHANGE UP
PROT SERPL-MCNC: 6.7 G/DL — SIGNIFICANT CHANGE UP (ref 6–8.3)
PROTHROM AB SERPL-ACNC: 14.6 SEC — HIGH (ref 9.8–13.1)
RBC # BLD: 4.25 M/UL — SIGNIFICANT CHANGE UP (ref 4.2–5.8)
RBC # FLD: 15.8 % — HIGH (ref 10.3–14.5)
SODIUM SERPL-SCNC: 151 MMOL/L — HIGH (ref 135–145)
SODIUM SERPL-SCNC: 152 MMOL/L — HIGH (ref 135–145)
SODIUM UR-SCNC: 50 MMOL/L — SIGNIFICANT CHANGE UP
WBC # BLD: 10.76 K/UL — HIGH (ref 3.8–10.5)
WBC # FLD AUTO: 10.76 K/UL — HIGH (ref 3.8–10.5)

## 2018-12-04 PROCEDURE — 99223 1ST HOSP IP/OBS HIGH 75: CPT | Mod: GC

## 2018-12-04 PROCEDURE — 99233 SBSQ HOSP IP/OBS HIGH 50: CPT

## 2018-12-04 RX ORDER — ALBUTEROL 90 UG/1
2.5 AEROSOL, METERED ORAL EVERY 6 HOURS
Qty: 0 | Refills: 0 | Status: DISCONTINUED | OUTPATIENT
Start: 2018-12-04 | End: 2018-12-13

## 2018-12-04 RX ORDER — HEPARIN SODIUM 5000 [USP'U]/ML
3000 INJECTION INTRAVENOUS; SUBCUTANEOUS EVERY 6 HOURS
Qty: 0 | Refills: 0 | Status: DISCONTINUED | OUTPATIENT
Start: 2018-12-04 | End: 2018-12-05

## 2018-12-04 RX ORDER — HEPARIN SODIUM 5000 [USP'U]/ML
6500 INJECTION INTRAVENOUS; SUBCUTANEOUS EVERY 6 HOURS
Qty: 0 | Refills: 0 | Status: DISCONTINUED | OUTPATIENT
Start: 2018-12-04 | End: 2018-12-05

## 2018-12-04 RX ORDER — MODAFINIL 200 MG/1
100 TABLET ORAL DAILY
Qty: 0 | Refills: 0 | Status: DISCONTINUED | OUTPATIENT
Start: 2018-12-04 | End: 2018-12-06

## 2018-12-04 RX ORDER — CHOLECALCIFEROL (VITAMIN D3) 125 MCG
400 CAPSULE ORAL DAILY
Qty: 0 | Refills: 0 | Status: DISCONTINUED | OUTPATIENT
Start: 2018-12-04 | End: 2018-12-13

## 2018-12-04 RX ORDER — LACTULOSE 10 G/15ML
10 SOLUTION ORAL AT BEDTIME
Qty: 0 | Refills: 0 | Status: DISCONTINUED | OUTPATIENT
Start: 2018-12-04 | End: 2018-12-13

## 2018-12-04 RX ORDER — HEPARIN SODIUM 5000 [USP'U]/ML
INJECTION INTRAVENOUS; SUBCUTANEOUS
Qty: 25000 | Refills: 0 | Status: DISCONTINUED | OUTPATIENT
Start: 2018-12-04 | End: 2018-12-05

## 2018-12-04 RX ORDER — MIRTAZAPINE 45 MG/1
7.5 TABLET, ORALLY DISINTEGRATING ORAL AT BEDTIME
Qty: 0 | Refills: 0 | Status: DISCONTINUED | OUTPATIENT
Start: 2018-12-04 | End: 2018-12-13

## 2018-12-04 RX ORDER — SODIUM CHLORIDE 9 MG/ML
1000 INJECTION, SOLUTION INTRAVENOUS
Qty: 0 | Refills: 0 | Status: DISCONTINUED | OUTPATIENT
Start: 2018-12-04 | End: 2018-12-07

## 2018-12-04 RX ORDER — FINASTERIDE 5 MG/1
5 TABLET, FILM COATED ORAL DAILY
Qty: 0 | Refills: 0 | Status: DISCONTINUED | OUTPATIENT
Start: 2018-12-04 | End: 2018-12-13

## 2018-12-04 RX ORDER — BUDESONIDE AND FORMOTEROL FUMARATE DIHYDRATE 160; 4.5 UG/1; UG/1
2 AEROSOL RESPIRATORY (INHALATION)
Qty: 0 | Refills: 0 | Status: DISCONTINUED | OUTPATIENT
Start: 2018-12-04 | End: 2018-12-13

## 2018-12-04 RX ORDER — TIOTROPIUM BROMIDE 18 UG/1
1 CAPSULE ORAL; RESPIRATORY (INHALATION) DAILY
Qty: 0 | Refills: 0 | Status: DISCONTINUED | OUTPATIENT
Start: 2018-12-04 | End: 2018-12-13

## 2018-12-04 RX ORDER — POTASSIUM CHLORIDE 20 MEQ
40 PACKET (EA) ORAL ONCE
Qty: 0 | Refills: 0 | Status: COMPLETED | OUTPATIENT
Start: 2018-12-04 | End: 2018-12-04

## 2018-12-04 RX ORDER — TAMSULOSIN HYDROCHLORIDE 0.4 MG/1
0.4 CAPSULE ORAL AT BEDTIME
Qty: 0 | Refills: 0 | Status: DISCONTINUED | OUTPATIENT
Start: 2018-12-04 | End: 2018-12-13

## 2018-12-04 RX ORDER — ATORVASTATIN CALCIUM 80 MG/1
20 TABLET, FILM COATED ORAL AT BEDTIME
Qty: 0 | Refills: 0 | Status: DISCONTINUED | OUTPATIENT
Start: 2018-12-04 | End: 2018-12-13

## 2018-12-04 RX ORDER — POLYETHYLENE GLYCOL 3350 17 G/17G
17 POWDER, FOR SOLUTION ORAL DAILY
Qty: 0 | Refills: 0 | Status: DISCONTINUED | OUTPATIENT
Start: 2018-12-04 | End: 2018-12-13

## 2018-12-04 RX ORDER — ACETAMINOPHEN 500 MG
650 TABLET ORAL EVERY 8 HOURS
Qty: 0 | Refills: 0 | Status: DISCONTINUED | OUTPATIENT
Start: 2018-12-04 | End: 2018-12-13

## 2018-12-04 RX ORDER — SODIUM CHLORIDE 9 MG/ML
1000 INJECTION, SOLUTION INTRAVENOUS
Qty: 0 | Refills: 0 | Status: COMPLETED | OUTPATIENT
Start: 2018-12-04 | End: 2018-12-04

## 2018-12-04 RX ADMIN — SODIUM CHLORIDE 125 MILLILITER(S): 9 INJECTION, SOLUTION INTRAVENOUS at 12:32

## 2018-12-04 RX ADMIN — MODAFINIL 100 MILLIGRAM(S): 200 TABLET ORAL at 12:31

## 2018-12-04 RX ADMIN — SODIUM CHLORIDE 75 MILLILITER(S): 9 INJECTION, SOLUTION INTRAVENOUS at 20:17

## 2018-12-04 RX ADMIN — Medication 400 UNIT(S): at 12:31

## 2018-12-04 RX ADMIN — HEPARIN SODIUM 1400 UNIT(S)/HR: 5000 INJECTION INTRAVENOUS; SUBCUTANEOUS at 03:03

## 2018-12-04 RX ADMIN — TIOTROPIUM BROMIDE 1 CAPSULE(S): 18 CAPSULE ORAL; RESPIRATORY (INHALATION) at 16:25

## 2018-12-04 RX ADMIN — FINASTERIDE 5 MILLIGRAM(S): 5 TABLET, FILM COATED ORAL at 12:31

## 2018-12-04 RX ADMIN — POLYETHYLENE GLYCOL 3350 17 GRAM(S): 17 POWDER, FOR SOLUTION ORAL at 12:31

## 2018-12-04 RX ADMIN — MIRTAZAPINE 7.5 MILLIGRAM(S): 45 TABLET, ORALLY DISINTEGRATING ORAL at 21:02

## 2018-12-04 RX ADMIN — LACTULOSE 10 GRAM(S): 10 SOLUTION ORAL at 21:02

## 2018-12-04 RX ADMIN — BUDESONIDE AND FORMOTEROL FUMARATE DIHYDRATE 2 PUFF(S): 160; 4.5 AEROSOL RESPIRATORY (INHALATION) at 20:18

## 2018-12-04 RX ADMIN — Medication 40 MILLIEQUIVALENT(S): at 21:30

## 2018-12-04 RX ADMIN — ATORVASTATIN CALCIUM 20 MILLIGRAM(S): 80 TABLET, FILM COATED ORAL at 21:02

## 2018-12-04 RX ADMIN — HEPARIN SODIUM 900 UNIT(S)/HR: 5000 INJECTION INTRAVENOUS; SUBCUTANEOUS at 19:37

## 2018-12-04 RX ADMIN — SODIUM CHLORIDE 125 MILLILITER(S): 9 INJECTION, SOLUTION INTRAVENOUS at 02:59

## 2018-12-04 RX ADMIN — HEPARIN SODIUM 0 UNIT(S)/HR: 5000 INJECTION INTRAVENOUS; SUBCUTANEOUS at 10:32

## 2018-12-04 RX ADMIN — TAMSULOSIN HYDROCHLORIDE 0.4 MILLIGRAM(S): 0.4 CAPSULE ORAL at 21:02

## 2018-12-04 RX ADMIN — HEPARIN SODIUM 1100 UNIT(S)/HR: 5000 INJECTION INTRAVENOUS; SUBCUTANEOUS at 17:50

## 2018-12-04 NOTE — CONSULT NOTE ADULT - ATTENDING COMMENTS
agree with plan - assess PSA and possibly begin treatment this admission depending on suspicion for prostate cancer
Patient seen and examined with Fellow on 12/4/18.

## 2018-12-04 NOTE — PROGRESS NOTE ADULT - PROBLEM SELECTOR PLAN 7
dx 2y ago, wife says more confused in setting acute illness  avoid BZD/Ambien/anticholinergics, day/night cues, supportive care

## 2018-12-04 NOTE — PROGRESS NOTE ADULT - ASSESSMENT
78M COPD, BRIEN on Bipap, ILD/pulm fibrosis, GERD, Afib on eliquis, dementia (a/o x2, follows commands, mostly lucid) prostate mass and BL hydronephrosis with possible liver mets (liver biopsy taken Nov 26th still pending), recurrent UTIs now with indwelling shah a/w COLUMBA, hypernatremia, anorexia, found to have prostate mass with extension into posterior bladder wall and possibly rectum with mod B hydroureteronephrosis

## 2018-12-04 NOTE — SWALLOW BEDSIDE ASSESSMENT ADULT - COMMENTS
Patient is a 78 year old male with PMHx of COPD, BRIEN on Bipap, ILD, GERD, Afib on eliquis, dementia (a/o x2, follows commands, mostly lucid) prostate mass and BL hydronephrosis with possible liver mets (liver biopsy taken Nov 26th still pending), recurrent UTIs now with indwelling shah. Sent from [facility] for progressively worsening anorexia in setting of hypernatremia. CT abd with mostly unchanged prostate mass extending into posterior wall and  rectum. Slightly increased BL hydronephrosis. Shah exchanged in ed with 150cc drained, currently 300cc collected. UA not suggestive of UTI, pt hemodynamically stable with normal wbc. Of note, pt reports limited po intake to both solids and fluids over last several weeks because "of funny or sour taste" despite being frequently thirsty. Denies abd pain, nausea, vomiting, constipation, dysphagia or odynophagia.    Patient was received awake with confusion. Wife present at bedside. Unable to recommend a diet at the time of this assessment as patient refused most PO presentations due to c/o nausea. Discussed with primary RN and ADS.

## 2018-12-04 NOTE — PROGRESS NOTE ADULT - PROBLEM SELECTOR PLAN 1
likely d/t obstruction from prostate mass, appreciate renal and uro f/u --> team d/w IR for B NT tomorrow --> watch for postobstructive diuresis after  - f/u helen

## 2018-12-04 NOTE — PROGRESS NOTE ADULT - PROBLEM SELECTOR PLAN 6
dx 2y ago, wife says more confused in setting acute illness  avoid BZD/Ambien/anticholinergics, day/night cues, supportive care more confused in setting acute illness  - treating underlying cause  avoid BZD/Ambien/anticholinergics, day/night cues, supportive care

## 2018-12-04 NOTE — CONSULT NOTE ADULT - ASSESSMENT
78 year old male with h/o prostate cancer with liver mets found to have new onset COLUMBA due to obstructive uropathy.

## 2018-12-04 NOTE — CONSULT NOTE ADULT - ASSESSMENT
Assessment: MR HARPER is a 78 year old M who presented from rehab with anorexia, hypernatremia and COLUMBA found to have b/l hydronephrosis and prostate mass involving bladder/rectum.      Plan:  - Given COLUMBA and b/l obstruction, needs emergent decompression.  Recommend IR for B/L nephrostomy tubes.   - Trend Cr  - Monitor for postobstructive diuresis, q8h BMP, adequate hydration; drink to thirst  - Obtain outside urologist (Dr. Drake) for previous records/imaging/labs  - Check PSA  - c/w shah for bladder decompression  - No urologic surgery intervention at this time Assessment: MR HARPER is a 78 year old M who presented from rehab with anorexia, hypernatremia and COLUMBA found to have b/l hydronephrosis and prostate mass involving bladder/rectum.      Plan:  - Given COLUMBA and b/l obstruction, needs emergent decompression.  Recommend IR for B/L nephrostomy tubes.   - Trend Cr  - Monitor for postobstructive diuresis, q8h BMP, adequate hydration; drink to thirst  - Obtain outside urologist (Dr. Drake) for previous records/imaging/labs  - Check PSA  - c/w shah for bladder decompression  - No urologic surgery intervention at this time; Recommend outpatient followup for likely prostate cancer workup and counseling Assessment: MR HARPER is a 78 year old M who presented from rehab with anorexia, hypernatremia and COLUMBA found to have b/l hydronephrosis and prostate mass involving bladder/rectum.      Plan:  - Given COLUMBA and b/l obstruction, needs emergent decompression.  Recommend IR for B/L nephrostomy tubes.   - Trend Cr  - Monitor for postobstructive diuresis, q8h BMP, adequate hydration; drink to thirst  - Obtain outside urologist (Dr. Drake) for previous records/imaging/labs  - Check PSA  - c/w shah for bladder decompression  - No urologic surgery intervention at this time; Recommend outpatient followup for likely prostate cancer workup and counseling     Discussed with Dr. Fairchild

## 2018-12-04 NOTE — CONSULT NOTE ADULT - SUBJECTIVE AND OBJECTIVE BOX
[facility] Division of Kidney Diseases & Hypertension  INITIAL CONSULT NOTE  [phone]--------------------------------------------------------------------------------    HPI: 78 year old male with h/o dementia, AMATT briggs was sent from [facility] to [facility] due to abnormal labs. Labs done in ER shows elevated creatinine and sodium and hence nephrology was consulted. On review of previous labs in [place], Scr. has been WNL with last Scr. prior to admission being 0.87 on 10/23/18. However labs done in ER showed elevated Scr. of 5 on 12/3/18 which worsened to 5.63 on 12/4/18. 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 Currently patient denies c/o SOB, CP, abdominal pain, diarrhea, or vomiting.         PAST HISTORY  --------------------------------------------------------------------------------  PAST MEDICAL & SURGICAL HISTORY:  COPD (chronic obstructive pulmonary disease)  Bundle branch block, right  Pulmonary fibrosis  Dementia  Atrial fibrillation  GERD (gastroesophageal reflux disease)  Recurrent UTI  Hyperlipidemia  Obstructive sleep apnea on CPAP  H/O small bowel obstruction  History of appendectomy    FAMILY HISTORY:  Family history of colon cancer (Sibling)  Family history of myocardial infarction (Sibling)    PAST SOCIAL HISTORY:    ALLERGIES & MEDICATIONS  --------------------------------------------------------------------------------  Allergies    No Known Drug Allergies  zosyn (Drowsiness)    Intolerances      Standing Inpatient Medications  atorvastatin 20 milliGRAM(s) Oral at bedtime  buDESOnide  80 MICROgram(s)/formoterol 4.5 MICROgram(s) Inhaler 2 Puff(s) Inhalation two times a day  cholecalciferol 400 Unit(s) Oral daily  finasteride 5 milliGRAM(s) Oral daily  heparin  Infusion.  Unit(s)/Hr IV Continuous <Continuous>  lactulose Syrup 10 Gram(s) Oral at bedtime  mirtazapine 7.5 milliGRAM(s) Oral at bedtime  modafinil 100 milliGRAM(s) Oral daily  polyethylene glycol 3350 17 Gram(s) Oral daily  tamsulosin 0.4 milliGRAM(s) Oral at bedtime  tiotropium 18 MICROgram(s) Capsule 1 Capsule(s) Inhalation daily    PRN Inpatient Medications  acetaminophen   Tablet .. 650 milliGRAM(s) Oral every 8 hours PRN  ALBUTerol    0.083% 2.5 milliGRAM(s) Nebulizer every 6 hours PRN  heparin  Injectable 6500 Unit(s) IV Push every 6 hours PRN  heparin  Injectable 3000 Unit(s) IV Push every 6 hours PRN      REVIEW OF SYSTEMS  --------------------------------------------------------------------------------  Gen: No  fevers/chills, weakness  Skin: No rashes  Head/Eyes/Ears/Mouth: No headache;  Respiratory: No dyspnea  CV: No chest pain,   GI: No abdominal pain, diarrhea, nausea, vomiting  : shah +  MSK: No joint pain/swelling;   Neuro: No dizziness/lightheadedness    All other systems were reviewed and are negative, except as noted.    VITALS/PHYSICAL EXAM  --------------------------------------------------------------------------------  T(C): 36.4 (12-04-18 @ 14:39), Max: 37 (12-04-18 @ 00:56)  HR: 113 (12-04-18 @ 14:39) (74 - 113)  BP: 134/85 (12-04-18 @ 14:39) (116/85 - 134/85)  RR: 18 (12-04-18 @ 14:39) (17 - 18)  SpO2: 98% (12-04-18 @ 14:39) (84% - 100%)  Wt(kg): --    Weight (kg): 79 (12-04-18 @ 00:20)      12-03-18 @ 07:01  -  12-04-18 @ 07:00  --------------------------------------------------------  IN: 1112 mL / OUT: 500 mL / NET: 612 mL      Physical Exam:  	Gen: Elderly male, NAD  	HEENT: no JVD  	Pulm: CTA B/L  	CV:  S1S2  	Abd: +BS, soft,               : shah catheter present; draining urine.   	Ext: No B/L Lower ext edema  	Neuro: No focal deficits  	Skin: Warm, without rashes    LABS/STUDIES  --------------------------------------------------------------------------------              12.8   10.76 >-----------<  130      [12-04-18 @ 09:35]              40.9     152  |  118  |  64  ----------------------------<  80      [12-04-18 @ 09:35]  3.4   |  19  |  5.42        Ca     8.1     [12-04-18 @ 09:35]      Mg     2.0     [12-04-18 @ 09:35]      Phos  4.4     [12-04-18 @ 09:35]    TPro  6.7  /  Alb  2.5  /  TBili  0.5  /  DBili  x   /  AST  16  /  ALT  11  /  AlkPhos  110  [12-04-18 @ 09:35]    PT/INR: PT 14.6 , INR 1.27       [12-04-18 @ 01:27]  PTT: 159.0      [12-04-18 @ 09:35]    Serum Osmolality 336      [12-03-18 @ 14:10]    Creatinine Trend:  SCr 5.42 [12-04 @ 09:35]  SCr 5.00 [12-03 @ 21:50]  SCr 5.03 [12-03 @ 14:10]    Urinalysis - [12-03-18 @ 15:30]      Color YELLOW / Appearance Lt TURBID / SG 1.015 / pH 5.5      Gluc NEGATIVE / Ketone NEGATIVE  / Bili NEGATIVE / Urobili NORMAL       Blood MODERATE / Protein 20 / Leuk Est MODERATE / Nitrite NEGATIVE      RBC 3-5 / WBC 6-10 / Hyaline 1+ / Gran  / Sq Epi FEW / Non Sq Epi  / Bacteria SMALL    Urine Creatinine 103.40      [12-04-18 @ 01:54]  Urine Sodium 50      [12-04-18 @ 01:54]  Urine Potassium 30.1      [12-04-18 @ 01:54]  Urine Chloride 44      [12-04-18 @ 01:54]  Urine Osmolality 330      [12-04-18 @ 01:54]

## 2018-12-04 NOTE — CONSULT NOTE ADULT - PROBLEM SELECTOR RECOMMENDATION 9
Patient with COLUMBA in setting of obstructive uropathy and decreased PO intake. On review of previous labs, Sc.r has been WNL. However labs done in ER showed Sc.r of 5 which has worsened to 5.63 on 12/4/18. CT abdomen and pelvis done on 12/3 shows bilateral hydronephrosis which has slightly increased compared to previous CT scan. Patient with COLUMBA from obstructive uropathy?  recommendations noted. Patient to get PCN tube placement by IR. Recommend to continue with IV hydration. Monitor BMP daily. Avoid NSAID and other nephrotoxins.

## 2018-12-04 NOTE — CONSULT NOTE ADULT - PROBLEM SELECTOR RECOMMENDATION 2
Patient with hypernatremia in setting of decreased free water intake. Latest serum sodium noted to be elevated at 152. Recommend to start on IV D5W at 75 cc/hr. Monitor serum sodium.

## 2018-12-04 NOTE — PROGRESS NOTE ADULT - PROBLEM SELECTOR PLAN 5
more confused in setting acute illness  - treating underlying cause  avoid BZD/Ambien/anticholinergics, day/night cues, supportive care -DC Eliquis, IV heparin given current COLUMBA/ESRD

## 2018-12-04 NOTE — CHART NOTE - NSCHARTNOTEFT_GEN_A_CORE
PRE-INTERVENTIONAL RADIOLOGY PROCEDURE NOTE    Patient Age: 78  Patient Gender: M  Procedure (including site / side if known): Bilateral neprostomy tubes  Diagnosis / Indication: COLUMBA found to have b/l hydronephrosis and prostate mass involving bladder/rectum.    Interventional Radiology Attending Physician: Joo  Ordering Attending Physician: Isidro  Pertinent medical history: COPD, BRIEN on Bipap, ILD, GERD, Afib on eliquis, dementia (a/o x2, follows commands, mostly lucid) prostate mass and BL hydronephrosis with possible liver mets (liver biopsy taken Nov 26th still pending), recurrent UTIs now with indwelling shah.  Pertinent labs:                       12.8   10.76 )-----------( 130      ( 04 Dec 2018 09:35 )             40.9   12-04    152<H>  |  118<H>  |  64<H>  ----------------------------<  80  3.4<L>   |  19<L>  |  5.42<H>    Ca    8.1<L>      04 Dec 2018 09:35  Phos  4.4     12-04  Mg     2.0     12-04    TPro  6.7  /  Alb  2.5<L>  /  TBili  0.5  /  DBili  x   /  AST  16  /  ALT  11  /  AlkPhos  110  12-04  PT/INR - ( 04 Dec 2018 01:27 )   PT: 14.6 SEC;   INR: 1.27          PTT - ( 04 Dec 2018 09:35 )  PTT:159.0 SEC  Patient and Family aware? Yes    Angeli Wagner [phone]

## 2018-12-04 NOTE — PROVIDER CONTACT NOTE (CRITICAL VALUE NOTIFICATION) - BACKGROUND
Pt admitted for COLUMBA and hypernatremia. Pt has hx of Afib. on Eliquis at home. Currently eliquis d/c and pt srarted on heprin drip.

## 2018-12-04 NOTE — CONSULT NOTE ADULT - SUBJECTIVE AND OBJECTIVE BOX
UROLOGY CONSULT NOTE*    History of Present illness:  77 yo m with h/o COPD, BRIEN on Bipap, ILD, GERD, Afib on eliquis, dementia (a/o x2, follows commands, mostly lucid) prostate mass and BL hydronephrosis with possible liver mets (liver biopsy taken Nov 26th still pending), recurrent UTIs now with indwelling shah.    Sent from [facility] for progressively worsening anorexia in setting of hypernatremia (152) and COLUMBA; SCr 5.03, compared to 0.87 on Oct 23. CT abd with mostly unchanged prostate mass extending into posterior wall and  rectum. Slightly increased BL hydronephrosis. Shah exchanged in ed with 150cc drained, currently 300cc collected. UA not suggestive of UTI, pt hemodynamically stable with normal wbc. Of note, pt reports limited po intake to both solids and fluids over last several weeks because "of funny or sour taste" despite being frequently thirsty. Denies abd pain, nausea, vomiting, constipation, dysphagia or odynophagia.      Patient previously saw Dr. Car many years ago, does not know reason or if any intervention scheduled/performed.  He states that he previously was able to urinate on his own; however, endorsed frequency, hesitancy and straining.  He now has a shah, draining clear yellow urine.  He states he has some penile irritation, mainly due to catheter position.      Review of Systems:  Review of Systems: Review of Systems:   CONSTITUTIONAL: No fever  EYES: No eye pain, visual disturbances, or discharge  ENMT:  No difficulty hearing, tinnitus, vertigo; No sinus or throat pain  NECK: No pain or stiffness  RESPIRATORY: No cough, wheezing, chills or hemoptysis; No shortness of breath  CARDIOVASCULAR: No chest pain, palpitations, dizziness, or leg swelling  GASTROINTESTINAL: No abdominal or epigastric pain. No nausea, vomiting, or hematemesis; No diarrhea or constipation. No melena or hematochezia.  GENITOURINARY: No dysuria, frequency, hematuria, or incontinence  NEUROLOGICAL: No headaches  SKIN: No itching, burning, rashes, or lesions  MUSCULOSKELETAL: No joint pain or swelling; No muscle, back, or extremity pain	      Allergies and Intolerances:        Allergies:  	zosyn: Miscellaneous, Drowsiness, zosyn  	No Known Drug Allergies:     Home Medications:   * Patient Currently Takes Medications as of 03-Dec-2018 19:47 documented in Structured Notes  · 	modafinil 200 mg oral tablet: 1 tab(s) orally every 24 hours  · 	tiotropium 18 mcg inhalation capsule: 1 cap(s) inhaled once a day  · 	budesonide-formoterol 80 mcg-4.5 mcg/inh inhalation aerosol: 2 puff(s) inhaled 2 times a day  · 	CPAP: Patient's Own Machine  	To Be Used qhs and PRN while sleeping  	Settings: 7  · 	lactulose 10 g/15 mL oral syrup: 15 milliliter(s) orally once a day (at bedtime)   · 	aspirin 81 mg oral delayed release tablet: 1 tab(s) orally once a day  · 	escitalopram 10 mg oral tablet: 1 tab(s) orally once a day  · 	apixaban 5 mg oral tablet: 1 tab(s) orally every 12 hours  · 	tamsulosin 0.4 mg oral capsule: 1 cap(s) orally once a day (at bedtime)  · 	finasteride 5 mg oral tablet: 1 tab(s) orally once a day  · 	Vitamin D3 1000 intl units oral capsule: 0.5 cap(s) orally once a day  · 	Vitamin B12 Methylcobalamin 5000 mcg sublingual tablet: 0.5 tab(s) sublingual once a day  · 	MiraLax oral powder for reconstitution: 17 gram(s) orally once a day  · 	levalbuterol 0.63 mg/3 mL inhalation solution: 3 milliliter(s) inhaled 3 times a day, As Needed  · 	Remeron 15 mg oral tablet: 0.5 tab(s) orally once a day (at bedtime)  · 	atorvastatin 20 mg oral tablet: 1 tab(s) orally once a day    Patient History:    Past Medical History:  Atrial fibrillation    Bundle branch block, right    COPD (chronic obstructive pulmonary disease)    Dementia    GERD (gastroesophageal reflux disease)    Hyperlipidemia    Obstructive sleep apnea on CPAP    Pulmonary fibrosis    Recurrent UTI.     Past Surgical History:  H/O small bowel obstruction    History of appendectomy.     Family History:  Sibling  Still living? Unknown  Family history of myocardial infarction, Age at diagnosis: Age Unknown  Family history of colon cancer, Age at diagnosis: Age Unknown.    Vital Signs Last 24 Hrs  T(C): 36.9 (04 Dec 2018 06:54), Max: 37 (04 Dec 2018 00:56)  T(F): 98.4 (04 Dec 2018 06:54), Max: 98.6 (04 Dec 2018 00:56)  HR: 75 (04 Dec 2018 11:40) (74 - 97)  BP: 130/76 (04 Dec 2018 07:03) (104/52 - 154/72)  BP(mean): --  RR: 17 (04 Dec 2018 07:03) (17 - 20)  SpO2: 96% (04 Dec 2018 11:40) (84% - 100%)    12-03-18 @ 07:01  -  12-04-18 @ 07:00  --------------------------------------------------------  IN: 1112 mL / OUT: 500 mL / NET: 612 mL      MEDICATIONS  (STANDING):  atorvastatin 20 milliGRAM(s) Oral at bedtime  buDESOnide  80 MICROgram(s)/formoterol 4.5 MICROgram(s) Inhaler 2 Puff(s) Inhalation two times a day  cholecalciferol 400 Unit(s) Oral daily  finasteride 5 milliGRAM(s) Oral daily  heparin  Infusion.  Unit(s)/Hr (14 mL/Hr) IV Continuous <Continuous>  lactulose Syrup 10 Gram(s) Oral at bedtime  mirtazapine 7.5 milliGRAM(s) Oral at bedtime  modafinil 100 milliGRAM(s) Oral daily  polyethylene glycol 3350 17 Gram(s) Oral daily  sodium chloride 0.45%. 1000 milliLiter(s) (125 mL/Hr) IV Continuous <Continuous>  tamsulosin 0.4 milliGRAM(s) Oral at bedtime  tiotropium 18 MICROgram(s) Capsule 1 Capsule(s) Inhalation daily    MEDICATIONS  (PRN):  acetaminophen   Tablet .. 650 milliGRAM(s) Oral every 8 hours PRN Mild Pain (1 - 3), Moderate Pain (4 - 6)  ALBUTerol    0.083% 2.5 milliGRAM(s) Nebulizer every 6 hours PRN Shortness of Breath and/or Wheezing  heparin  Injectable 6500 Unit(s) IV Push every 6 hours PRN For aPTT less than 40  heparin  Injectable 3000 Unit(s) IV Push every 6 hours PRN For aPTT between 40 - 57    Physical Exam:  Gen: No acute distress, alert/oriented  Pulm: nonlabored breathing  CV: normal rate  Abd: S/NT/ND  : Shah draining clear yellow urine to gravity.  Discharge noticed around meatus.  No CVAT.  Nonpalpable bladder.     Labs:  CBC (12-04 @ 09:35)                              12.8<L>                         10.76<H>  )----------------(  130<L>     --    % Neutrophils, --    % Lymphocytes, ANC: --                                  40.9                CBC (12-03 @ 14:15)                              13.6                           10.32   )----------------(  140<L>     66.5  % Neutrophils, 22.4  % Lymphocytes, ANC: 6.87                                44.2                  BMP (12-04 @ 09:35)             152<H>  |  118<H>  |  64<H> 		Ca++ --      Ca 8.1<L>             ---------------------------------( 80    		Mg 2.0                3.4<L>  |  19<L>   |  5.42<H>			Ph 4.4     BMP (12-03 @ 21:50)             154<H>  |  120<H>  |  60<H> 		Ca++ --      Ca 7.8<L>             ---------------------------------( 69<L> 		Mg --                 3.5     |  17<L>   |  5.00<H>			Ph --        LFTs (12-04 @ 09:35)      TPro 6.7 / Alb 2.5<L> / TBili 0.5 / DBili -- / AST 16 / ALT 11 / AlkPhos 110  LFTs (12-03 @ 14:10)      TPro 7.2 / Alb 2.8<L> / TBili 0.7 / DBili -- / AST 20 / ALT 13 / AlkPhos 116    Coags (12-04 @ 09:35)  aPTT 159.0<HH> / INR -- / PT --  Coags (12-04 @ 01:27)  aPTT 35.6 / INR 1.27<H> / PT 14.6<H>    ABG (12-03 @ 14:15)      /  /  /  /  / %     Lactate:   1.3    VBG (12-03 @ 14:15)     7.35 / 44 / < 24<L> / 21 / -1.6 / 23.9<L>%      Rads:    EXAM:  CT ABDOMEN AND PELVIS        PROCEDURE DATE:  Dec  3 2018         INTERPRETATION:  CLINICAL INFORMATION: 78-year-old male with metastatic   prostate cancer now presenting with confusion and hematuria    COMPARISON: CT abdomen pelvis dated 10/18/2018    PROCEDURE:   CT of the Abdomen and Pelvis was performed without intravenous contrast.   Intravenous contrast: None.  Oral contrast: None.  Sagittal and coronal reformats were performed.    FINDINGS:    LOWER CHEST: Bibasilar bronchiectasis, peripheral reticular opacities and   honeycombing compatible with fibrosis. Cardiomegaly. Calcifications of   the coronary arteries.    LIVER: A 1.7 cm ill-defined hypodense segment IVb lesion is better   delineated on prior contrast-enhanced CT on 10/18/2018. There is new   focus of air within the lesion (series 2, image 28), likely related to   recent biopsy.  BILE DUCTS: Normal caliber.  GALLBLADDER: Cholelithiasis without evidence of acute cholecystitis.  SPLEEN: Within normal limits.  PANCREAS: Atrophic.  ADRENALS: Within normal limits.  KIDNEYS/URETERS: Bilateral moderate hydronephrosis to the level of the   prostate/pelvic mass, slightly increased from 10/18/2018. A 4 mm   hyperdense left renal interpole lesion is too small to characterize,   however may represent a tiny hemorrhagic cyst (series 2, image 47).    BLADDER: Collapsed around Shah catheter, with mild surrounding fat   stranding. Thickened bladder wall, unchanged. High density material   within the urinary bladder, likely small amount of hemorrhage. Foci of   air, likely from recent instrumentation.  REPRODUCTIVE ORGANS: Enlarged and heterogeneous prostate gland with   extensive soft tissue nodularity extending anterior into the pelvis and   involving the posteriorbladder wall and abutting the rectum.     BOWEL: Along diverticulosis. No bowel obstruction. Status post   appendectomy.  PERITONEUM: No ascites. Multiple small peritoneal nodules, which have   increased in number from 10/18/2018 and likely representmetastatic   disease.  VESSELS:  Atherosclerotic disease of the abdominal aorta and its pelvic   branches.  RETROPERITONEUM: No enlarged retroperitoneal lymph nodes.  ABDOMINAL WALL: Small fat-containing inguinal hernias..  BONES: Multilevel spinal degenerative changes.     IMPRESSION:   Heterogeneous prostate mass again noted with extension into the posterior   bladder wall and possibly the rectum. Associated bilateral moderate   hydroureteronephrosis is slightly increased.    Small focus of hemorrhage within the urinary bladder, new from 10/18/2018.    Previously described liver lesion is poorly defined, with associated   droplet of gas, likely from recent biopsy.    Fibrotic changes again noted at the lung bases. UROLOGY CONSULT NOTE*    History of Present illness:  79 yo m with h/o COPD, BRIEN on Bipap, ILD, GERD, Afib on eliquis, dementia (a/o x2, follows commands, mostly lucid) prostate mass and BL hydronephrosis with possible liver mets (liver biopsy taken Nov 26th still pending), recurrent UTIs now with indwelling shah.    Sent from [facility] for progressively worsening anorexia in setting of hypernatremia (152) and COLUMBA; SCr 5.03, compared to 0.87 on Oct 23. CT abd with mostly unchanged prostate mass extending into posterior wall and  rectum. Slightly increased BL hydronephrosis. Shah exchanged in ed with 150cc drained, currently 300cc collected. UA not suggestive of UTI, pt hemodynamically stable with normal wbc. Of note, pt reports limited po intake to both solids and fluids over last several weeks because "of funny or sour taste" despite being frequently thirsty. Denies abd pain, nausea, vomiting, constipation, dysphagia or odynophagia.      Patient previously saw Dr. Car many years ago, does not know reason or if any intervention scheduled/performed.  He states that he previously was able to urinate on his own; however, endorsed frequency, hesitancy and straining.  He now has a shah, draining clear yellow urine.  He states he has some penile irritation, mainly due to catheter position.      Review of Systems:  Review of Systems: Review of Systems:   CONSTITUTIONAL: No fever  EYES: No eye pain, visual disturbances, or discharge  ENMT:  No difficulty hearing, tinnitus, vertigo; No sinus or throat pain  NECK: No pain or stiffness  RESPIRATORY: No cough, wheezing, chills or hemoptysis; No shortness of breath  CARDIOVASCULAR: No chest pain, palpitations, dizziness, or leg swelling  GASTROINTESTINAL: No abdominal or epigastric pain. No nausea, vomiting, or hematemesis; No diarrhea or constipation. No melena or hematochezia.  GENITOURINARY: No dysuria, frequency, hematuria, or incontinence  NEUROLOGICAL: No headaches  SKIN: No itching, burning, rashes, or lesions  MUSCULOSKELETAL: No joint pain or swelling; No muscle, back, or extremity pain	      Allergies and Intolerances:        Allergies:  	zosyn: Miscellaneous, Drowsiness, zosyn  	No Known Drug Allergies:     Home Medications:   * Patient Currently Takes Medications as of 03-Dec-2018 19:47 documented in Structured Notes  · 	modafinil 200 mg oral tablet: 1 tab(s) orally every 24 hours  · 	tiotropium 18 mcg inhalation capsule: 1 cap(s) inhaled once a day  · 	budesonide-formoterol 80 mcg-4.5 mcg/inh inhalation aerosol: 2 puff(s) inhaled 2 times a day  · 	CPAP: Patient's Own Machine  	To Be Used qhs and PRN while sleeping  	Settings: 7  · 	lactulose 10 g/15 mL oral syrup: 15 milliliter(s) orally once a day (at bedtime)   · 	aspirin 81 mg oral delayed release tablet: 1 tab(s) orally once a day  · 	escitalopram 10 mg oral tablet: 1 tab(s) orally once a day  · 	apixaban 5 mg oral tablet: 1 tab(s) orally every 12 hours  · 	tamsulosin 0.4 mg oral capsule: 1 cap(s) orally once a day (at bedtime)  · 	finasteride 5 mg oral tablet: 1 tab(s) orally once a day  · 	Vitamin D3 1000 intl units oral capsule: 0.5 cap(s) orally once a day  · 	Vitamin B12 Methylcobalamin 5000 mcg sublingual tablet: 0.5 tab(s) sublingual once a day  · 	MiraLax oral powder for reconstitution: 17 gram(s) orally once a day  · 	levalbuterol 0.63 mg/3 mL inhalation solution: 3 milliliter(s) inhaled 3 times a day, As Needed  · 	Remeron 15 mg oral tablet: 0.5 tab(s) orally once a day (at bedtime)  · 	atorvastatin 20 mg oral tablet: 1 tab(s) orally once a day    Patient History:    Past Medical History:  Atrial fibrillation    Bundle branch block, right    COPD (chronic obstructive pulmonary disease)    Dementia    GERD (gastroesophageal reflux disease)    Hyperlipidemia    Obstructive sleep apnea on CPAP    Pulmonary fibrosis    Recurrent UTI.     Past Surgical History:  H/O small bowel obstruction    History of appendectomy.     Family History:  Sibling  Still living? Unknown  Family history of myocardial infarction, Age at diagnosis: Age Unknown  Family history of colon cancer, Age at diagnosis: Age Unknown.    Vital Signs Last 24 Hrs  T(C): 36.9 (04 Dec 2018 06:54), Max: 37 (04 Dec 2018 00:56)  T(F): 98.4 (04 Dec 2018 06:54), Max: 98.6 (04 Dec 2018 00:56)  HR: 75 (04 Dec 2018 11:40) (74 - 97)  BP: 130/76 (04 Dec 2018 07:03) (104/52 - 154/72)  BP(mean): --  RR: 17 (04 Dec 2018 07:03) (17 - 20)  SpO2: 96% (04 Dec 2018 11:40) (84% - 100%)    12-03-18 @ 07:01  -  12-04-18 @ 07:00  --------------------------------------------------------  IN: 1112 mL / OUT: 500 mL / NET: 612 mL      MEDICATIONS  (STANDING):  atorvastatin 20 milliGRAM(s) Oral at bedtime  buDESOnide  80 MICROgram(s)/formoterol 4.5 MICROgram(s) Inhaler 2 Puff(s) Inhalation two times a day  cholecalciferol 400 Unit(s) Oral daily  finasteride 5 milliGRAM(s) Oral daily  heparin  Infusion.  Unit(s)/Hr (14 mL/Hr) IV Continuous <Continuous>  lactulose Syrup 10 Gram(s) Oral at bedtime  mirtazapine 7.5 milliGRAM(s) Oral at bedtime  modafinil 100 milliGRAM(s) Oral daily  polyethylene glycol 3350 17 Gram(s) Oral daily  sodium chloride 0.45%. 1000 milliLiter(s) (125 mL/Hr) IV Continuous <Continuous>  tamsulosin 0.4 milliGRAM(s) Oral at bedtime  tiotropium 18 MICROgram(s) Capsule 1 Capsule(s) Inhalation daily    MEDICATIONS  (PRN):  acetaminophen   Tablet .. 650 milliGRAM(s) Oral every 8 hours PRN Mild Pain (1 - 3), Moderate Pain (4 - 6)  ALBUTerol    0.083% 2.5 milliGRAM(s) Nebulizer every 6 hours PRN Shortness of Breath and/or Wheezing  heparin  Injectable 6500 Unit(s) IV Push every 6 hours PRN For aPTT less than 40  heparin  Injectable 3000 Unit(s) IV Push every 6 hours PRN For aPTT between 40 - 57    Physical Exam:  Gen: No acute distress, alert/oriented  Pulm: nonlabored breathing  CV: normal rate  Abd: S/NT/ND  : Shah draining clear yellow urine to gravity.  DELILAH concerning for prostate malignancy with multiple nodular, solid irregularities.  Discharge noticed around meatus.  No CVAT.  Nonpalpable bladder.     Labs:  CBC (12-04 @ 09:35)                              12.8<L>                         10.76<H>  )----------------(  130<L>     --    % Neutrophils, --    % Lymphocytes, ANC: --                                  40.9                CBC (12-03 @ 14:15)                              13.6                           10.32   )----------------(  140<L>     66.5  % Neutrophils, 22.4  % Lymphocytes, ANC: 6.87                                44.2                  BMP (12-04 @ 09:35)             152<H>  |  118<H>  |  64<H> 		Ca++ --      Ca 8.1<L>             ---------------------------------( 80    		Mg 2.0                3.4<L>  |  19<L>   |  5.42<H>			Ph 4.4     BMP (12-03 @ 21:50)             154<H>  |  120<H>  |  60<H> 		Ca++ --      Ca 7.8<L>             ---------------------------------( 69<L> 		Mg --                 3.5     |  17<L>   |  5.00<H>			Ph --        LFTs (12-04 @ 09:35)      TPro 6.7 / Alb 2.5<L> / TBili 0.5 / DBili -- / AST 16 / ALT 11 / AlkPhos 110  LFTs (12-03 @ 14:10)      TPro 7.2 / Alb 2.8<L> / TBili 0.7 / DBili -- / AST 20 / ALT 13 / AlkPhos 116    Coags (12-04 @ 09:35)  aPTT 159.0<HH> / INR -- / PT --  Coags (12-04 @ 01:27)  aPTT 35.6 / INR 1.27<H> / PT 14.6<H>    ABG (12-03 @ 14:15)      /  /  /  /  / %     Lactate:   1.3    VBG (12-03 @ 14:15)     7.35 / 44 / < 24<L> / 21 / -1.6 / 23.9<L>%      Rads:    EXAM:  CT ABDOMEN AND PELVIS        PROCEDURE DATE:  Dec  3 2018         INTERPRETATION:  CLINICAL INFORMATION: 78-year-old male with metastatic   prostate cancer now presenting with confusion and hematuria    COMPARISON: CT abdomen pelvis dated 10/18/2018    PROCEDURE:   CT of the Abdomen and Pelvis was performed without intravenous contrast.   Intravenous contrast: None.  Oral contrast: None.  Sagittal and coronal reformats were performed.    FINDINGS:    LOWER CHEST: Bibasilar bronchiectasis, peripheral reticular opacities and   honeycombing compatible with fibrosis. Cardiomegaly. Calcifications of   the coronary arteries.    LIVER: A 1.7 cm ill-defined hypodense segment IVb lesion is better   delineated on prior contrast-enhanced CT on 10/18/2018. There is new   focus of air within the lesion (series 2, image 28), likely related to   recent biopsy.  BILE DUCTS: Normal caliber.  GALLBLADDER: Cholelithiasis without evidence of acute cholecystitis.  SPLEEN: Within normal limits.  PANCREAS: Atrophic.  ADRENALS: Within normal limits.  KIDNEYS/URETERS: Bilateral moderate hydronephrosis to the level of the   prostate/pelvic mass, slightly increased from 10/18/2018. A 4 mm   hyperdense left renal interpole lesion is too small to characterize,   however may represent a tiny hemorrhagic cyst (series 2, image 47).    BLADDER: Collapsed around Shah catheter, with mild surrounding fat   stranding. Thickened bladder wall, unchanged. High density material   within the urinary bladder, likely small amount of hemorrhage. Foci of   air, likely from recent instrumentation.  REPRODUCTIVE ORGANS: Enlarged and heterogeneous prostate gland with   extensive soft tissue nodularity extending anterior into the pelvis and   involving the posteriorbladder wall and abutting the rectum.     BOWEL: Along diverticulosis. No bowel obstruction. Status post   appendectomy.  PERITONEUM: No ascites. Multiple small peritoneal nodules, which have   increased in number from 10/18/2018 and likely representmetastatic   disease.  VESSELS:  Atherosclerotic disease of the abdominal aorta and its pelvic   branches.  RETROPERITONEUM: No enlarged retroperitoneal lymph nodes.  ABDOMINAL WALL: Small fat-containing inguinal hernias..  BONES: Multilevel spinal degenerative changes.     IMPRESSION:   Heterogeneous prostate mass again noted with extension into the posterior   bladder wall and possibly the rectum. Associated bilateral moderate   hydroureteronephrosis is slightly increased.    Small focus of hemorrhage within the urinary bladder, new from 10/18/2018.    Previously described liver lesion is poorly defined, with associated   droplet of gas, likely from recent biopsy.    Fibrotic changes again noted at the lung bases.

## 2018-12-04 NOTE — PROGRESS NOTE ADULT - SUBJECTIVE AND OBJECTIVE BOX
Patient is a 78y old  Male who presents with a chief complaint of paige, hypernatremia (04 Dec 2018 12:22)    SUBJECTIVE / OVERNIGHT EVENTS:  Pt seen earlier today.  Wearing nasal bipap, comfortable.  Denies pain.  Thought he saw his grandchildren in the room (but they were not visiting).  Breathing ok with machine.    MEDICATIONS  (STANDING):  atorvastatin 20 milliGRAM(s) Oral at bedtime  buDESOnide  80 MICROgram(s)/formoterol 4.5 MICROgram(s) Inhaler 2 Puff(s) Inhalation two times a day  cholecalciferol 400 Unit(s) Oral daily  dextrose 5%. 1000 milliLiter(s) (75 mL/Hr) IV Continuous <Continuous>  finasteride 5 milliGRAM(s) Oral daily  heparin  Infusion.  Unit(s)/Hr (14 mL/Hr) IV Continuous <Continuous>  lactulose Syrup 10 Gram(s) Oral at bedtime  mirtazapine 7.5 milliGRAM(s) Oral at bedtime  modafinil 100 milliGRAM(s) Oral daily  polyethylene glycol 3350 17 Gram(s) Oral daily  tamsulosin 0.4 milliGRAM(s) Oral at bedtime  tiotropium 18 MICROgram(s) Capsule 1 Capsule(s) Inhalation daily    MEDICATIONS  (PRN):  acetaminophen   Tablet .. 650 milliGRAM(s) Oral every 8 hours PRN Mild Pain (1 - 3), Moderate Pain (4 - 6)  ALBUTerol    0.083% 2.5 milliGRAM(s) Nebulizer every 6 hours PRN Shortness of Breath and/or Wheezing  heparin  Injectable 6500 Unit(s) IV Push every 6 hours PRN For aPTT less than 40  heparin  Injectable 3000 Unit(s) IV Push every 6 hours PRN For aPTT between 40 - 57    I&O's Summary    03 Dec 2018 07:01  -  04 Dec 2018 07:00  --------------------------------------------------------  IN: 1112 mL / OUT: 500 mL / NET: 612 mL    Vital Signs Last 24 Hrs  T(C): 36.3 (04 Dec 2018 19:25), Max: 37 (04 Dec 2018 00:56)  T(F): 97.4 (04 Dec 2018 19:25), Max: 98.6 (04 Dec 2018 00:56)  HR: 100 (04 Dec 2018 19:25) (74 - 113)  BP: 130/88 (04 Dec 2018 19:25) (121/77 - 134/85)  RR: 18 (04 Dec 2018 19:25) (17 - 18)  SpO2: 96% (04 Dec 2018 19:25) (84% - 99%)    PHYSICAL EXAM:  GENERAL: elderly WM with nasal bipap on, comfortable  HEAD:  Atraumatic, Normocephalic  EYES: EOMI, PERRLA, conjunctiva and sclera clear  NECK: Supple, No JVD  CHEST/LUNG: Clear to auscultation bilaterally; No wheeze  HEART: Regular rate and rhythm; No murmurs, rubs, or gallops  ABDOMEN: Soft, Nontender, Nondistended; Bowel sounds present  EXTREMITIES:  2+ Peripheral Pulses, No clubbing, cyanosis, or edema  PSYCH: calm, oriented self, hospital  NEUROLOGY: non-focal  SKIN: No rashes or lesions  +shah    LABS:                        12.8   10.76 )-----------( 130      ( 04 Dec 2018 09:35 )             40.9     152<H>  |  118<H>  |  64<H>  ----------------------------<  80  3.4<L>   |  19<L>  |  5.42<H>    Ca    8.1<L>      04 Dec 2018 09:35  Phos  4.4     12-04  Mg     2.0     12-04    TPro  6.7  /  Alb  2.5<L>  /  TBili  0.5  /  DBili  x   /  AST  16  /  ALT  11  /  AlkPhos  110  12-04    PT/INR - ( 04 Dec 2018 01:27 )   PT: 14.6 SEC;   INR: 1.27     PTT - ( 04 Dec 2018 18:09 )  PTT:106.4 SEC    Urinalysis Basic - ( 03 Dec 2018 15:30 )  Color: YELLOW / Appearance: Lt TURBID / S.015 / pH: 5.5  Gluc: NEGATIVE / Ketone: NEGATIVE  / Bili: NEGATIVE / Urobili: NORMAL   Blood: MODERATE / Protein: 20 / Nitrite: NEGATIVE   Leuk Esterase: MODERATE / RBC: 3-5 / WBC 6-10   Sq Epi: FEW / Non Sq Epi: x / Bacteria: SMALL    RADIOLOGY & ADDITIONAL TESTS:    Imaging Personally Reviewed:    Consultant(s) Notes Reviewed:      Care Discussed with Consultants/Other Providers: RN, SW, CM, ADS re overall care; nephro and uro re obstruction Patient is a 78y old  Male who presents with a chief complaint of paige, hypernatremia (04 Dec 2018 12:22)    SUBJECTIVE / OVERNIGHT EVENTS:  Pt seen earlier today.  Wearing nasal bipap, comfortable.  Denies pain.  Thought he saw his grandchildren in the room (but they were not visiting).  Breathing ok with machine.    MEDICATIONS  (STANDING):  atorvastatin 20 milliGRAM(s) Oral at bedtime  buDESOnide  80 MICROgram(s)/formoterol 4.5 MICROgram(s) Inhaler 2 Puff(s) Inhalation two times a day  cholecalciferol 400 Unit(s) Oral daily  dextrose 5%. 1000 milliLiter(s) (75 mL/Hr) IV Continuous <Continuous>  finasteride 5 milliGRAM(s) Oral daily  heparin  Infusion.  Unit(s)/Hr (14 mL/Hr) IV Continuous <Continuous>  lactulose Syrup 10 Gram(s) Oral at bedtime  mirtazapine 7.5 milliGRAM(s) Oral at bedtime  modafinil 100 milliGRAM(s) Oral daily  polyethylene glycol 3350 17 Gram(s) Oral daily  tamsulosin 0.4 milliGRAM(s) Oral at bedtime  tiotropium 18 MICROgram(s) Capsule 1 Capsule(s) Inhalation daily    MEDICATIONS  (PRN):  acetaminophen   Tablet .. 650 milliGRAM(s) Oral every 8 hours PRN Mild Pain (1 - 3), Moderate Pain (4 - 6)  ALBUTerol    0.083% 2.5 milliGRAM(s) Nebulizer every 6 hours PRN Shortness of Breath and/or Wheezing  heparin  Injectable 6500 Unit(s) IV Push every 6 hours PRN For aPTT less than 40  heparin  Injectable 3000 Unit(s) IV Push every 6 hours PRN For aPTT between 40 - 57    I&O's Summary    03 Dec 2018 07:01  -  04 Dec 2018 07:00  --------------------------------------------------------  IN: 1112 mL / OUT: 500 mL / NET: 612 mL    Vital Signs Last 24 Hrs  T(C): 36.3 (04 Dec 2018 19:25), Max: 37 (04 Dec 2018 00:56)  T(F): 97.4 (04 Dec 2018 19:25), Max: 98.6 (04 Dec 2018 00:56)  HR: 100 (04 Dec 2018 19:25) (74 - 113)  BP: 130/88 (04 Dec 2018 19:25) (121/77 - 134/85)  RR: 18 (04 Dec 2018 19:25) (17 - 18)  SpO2: 96% (04 Dec 2018 19:25) (84% - 99%)    PHYSICAL EXAM:  GENERAL: elderly WM with nasal bipap on, comfortable  HEAD:  Atraumatic, Normocephalic  EYES: EOMI, PERRLA, conjunctiva and sclera clear  NECK: Supple, No JVD  CHEST/LUNG: Clear to auscultation bilaterally; No wheeze  HEART: Regular rate and rhythm; No murmurs, rubs, or gallops  ABDOMEN: Soft, Nontender, Nondistended; Bowel sounds present  EXTREMITIES:  2+ Peripheral Pulses, No clubbing, cyanosis, or edema  PSYCH: calm, oriented self,   NEUROLOGY: non-focal  SKIN: No rashes or lesions  +shah    LABS:                        12.8   10.76 )-----------( 130      ( 04 Dec 2018 09:35 )             40.9     152<H>  |  118<H>  |  64<H>  ----------------------------<  80  3.4<L>   |  19<L>  |  5.42<H>    Ca    8.1<L>      04 Dec 2018 09:35  Phos  4.4     12-04  Mg     2.0     12-04    TPro  6.7  /  Alb  2.5<L>  /  TBili  0.5  /  DBili  x   /  AST  16  /  ALT  11  /  AlkPhos  110  12-04    PT/INR - ( 04 Dec 2018 01:27 )   PT: 14.6 SEC;   INR: 1.27     PTT - ( 04 Dec 2018 18:09 )  PTT:106.4 SEC    Urinalysis Basic - ( 03 Dec 2018 15:30 )  Color: YELLOW / Appearance: Lt TURBID / S.015 / pH: 5.5  Gluc: NEGATIVE / Ketone: NEGATIVE  / Bili: NEGATIVE / Urobili: NORMAL   Blood: MODERATE / Protein: 20 / Nitrite: NEGATIVE   Leuk Esterase: MODERATE / RBC: 3-5 / WBC 6-10   Sq Epi: FEW / Non Sq Epi: x / Bacteria: SMALL    RADIOLOGY & ADDITIONAL TESTS:    Imaging Personally Reviewed:    Consultant(s) Notes Reviewed:      Care Discussed with Consultants/Other Providers: RN, SW, CM, ADS re overall care; nephro and uro re obstruction

## 2018-12-04 NOTE — SWALLOW BEDSIDE ASSESSMENT ADULT - SWALLOW EVAL: DIAGNOSIS
With maximal clinician coaxing, patient accepted minimal amount of soft solid, puree and thin liquid (x 1 trial each) for which he demonstrated slow mastication, delayed bolus collection, delayed anterior-posterior transfer vs a possible delay in pharyngeal trigger, and palpable hyolaryngeal elevation. No overt, clinical s/s of laryngeal penetration/aspiration noted. Additional PO trials presented, however patient refused due to c/o nausea. Given this significantly limited assessment, unable to make appropriate diet recommendations at this time.

## 2018-12-05 LAB
APTT BLD: 73.6 SEC — HIGH (ref 27.5–36.3)
APTT BLD: 88.1 SEC — HIGH (ref 27.5–36.3)
BACTERIA UR CULT: SIGNIFICANT CHANGE UP
BASOPHILS # BLD AUTO: 0.12 K/UL — SIGNIFICANT CHANGE UP (ref 0–0.2)
BASOPHILS NFR BLD AUTO: 1 % — SIGNIFICANT CHANGE UP (ref 0–2)
BUN SERPL-MCNC: 67 MG/DL — HIGH (ref 7–23)
CALCIUM SERPL-MCNC: 8.1 MG/DL — LOW (ref 8.4–10.5)
CHLORIDE SERPL-SCNC: 116 MMOL/L — HIGH (ref 98–107)
CO2 SERPL-SCNC: 14 MMOL/L — LOW (ref 22–31)
CREAT SERPL-MCNC: 6.23 MG/DL — HIGH (ref 0.5–1.3)
EOSINOPHIL # BLD AUTO: 0.3 K/UL — SIGNIFICANT CHANGE UP (ref 0–0.5)
EOSINOPHIL NFR BLD AUTO: 2.6 % — SIGNIFICANT CHANGE UP (ref 0–6)
GLUCOSE SERPL-MCNC: 93 MG/DL — SIGNIFICANT CHANGE UP (ref 70–99)
HCT VFR BLD CALC: 38.1 % — LOW (ref 39–50)
HCT VFR BLD CALC: 38.1 % — LOW (ref 39–50)
HGB BLD-MCNC: 12.2 G/DL — LOW (ref 13–17)
HGB BLD-MCNC: 12.2 G/DL — LOW (ref 13–17)
IMM GRANULOCYTES # BLD AUTO: 0.19 # — SIGNIFICANT CHANGE UP
IMM GRANULOCYTES NFR BLD AUTO: 1.6 % — HIGH (ref 0–1.5)
INR BLD: 1.24 — HIGH (ref 0.88–1.17)
LYMPHOCYTES # BLD AUTO: 2.85 K/UL — SIGNIFICANT CHANGE UP (ref 1–3.3)
LYMPHOCYTES # BLD AUTO: 24.5 % — SIGNIFICANT CHANGE UP (ref 13–44)
MAGNESIUM SERPL-MCNC: 1.8 MG/DL — SIGNIFICANT CHANGE UP (ref 1.6–2.6)
MCHC RBC-ENTMCNC: 30.5 PG — SIGNIFICANT CHANGE UP (ref 27–34)
MCHC RBC-ENTMCNC: 30.5 PG — SIGNIFICANT CHANGE UP (ref 27–34)
MCHC RBC-ENTMCNC: 32 % — SIGNIFICANT CHANGE UP (ref 32–36)
MCHC RBC-ENTMCNC: 32 % — SIGNIFICANT CHANGE UP (ref 32–36)
MCV RBC AUTO: 95.3 FL — SIGNIFICANT CHANGE UP (ref 80–100)
MCV RBC AUTO: 95.3 FL — SIGNIFICANT CHANGE UP (ref 80–100)
MONOCYTES # BLD AUTO: 1.05 K/UL — HIGH (ref 0–0.9)
MONOCYTES NFR BLD AUTO: 9 % — SIGNIFICANT CHANGE UP (ref 2–14)
NEUTROPHILS # BLD AUTO: 7.13 K/UL — SIGNIFICANT CHANGE UP (ref 1.8–7.4)
NEUTROPHILS NFR BLD AUTO: 61.3 % — SIGNIFICANT CHANGE UP (ref 43–77)
NRBC # FLD: 0 — SIGNIFICANT CHANGE UP
NRBC # FLD: 0 — SIGNIFICANT CHANGE UP
PHOSPHATE SERPL-MCNC: 4 MG/DL — SIGNIFICANT CHANGE UP (ref 2.5–4.5)
PLATELET # BLD AUTO: 126 K/UL — LOW (ref 150–400)
PLATELET # BLD AUTO: 126 K/UL — LOW (ref 150–400)
PMV BLD: 13.7 FL — HIGH (ref 7–13)
PMV BLD: 13.7 FL — HIGH (ref 7–13)
POTASSIUM SERPL-MCNC: 4.1 MMOL/L — SIGNIFICANT CHANGE UP (ref 3.5–5.3)
POTASSIUM SERPL-SCNC: 4.1 MMOL/L — SIGNIFICANT CHANGE UP (ref 3.5–5.3)
PROTHROM AB SERPL-ACNC: 14.2 SEC — HIGH (ref 9.8–13.1)
RBC # BLD: 4 M/UL — LOW (ref 4.2–5.8)
RBC # BLD: 4 M/UL — LOW (ref 4.2–5.8)
RBC # FLD: 15.7 % — HIGH (ref 10.3–14.5)
RBC # FLD: 15.7 % — HIGH (ref 10.3–14.5)
SODIUM SERPL-SCNC: 147 MMOL/L — HIGH (ref 135–145)
SPECIMEN SOURCE: SIGNIFICANT CHANGE UP
WBC # BLD: 11.64 K/UL — HIGH (ref 3.8–10.5)
WBC # BLD: 11.64 K/UL — HIGH (ref 3.8–10.5)
WBC # FLD AUTO: 11.64 K/UL — HIGH (ref 3.8–10.5)
WBC # FLD AUTO: 11.64 K/UL — HIGH (ref 3.8–10.5)

## 2018-12-05 PROCEDURE — 99233 SBSQ HOSP IP/OBS HIGH 50: CPT

## 2018-12-05 PROCEDURE — 50432 PLMT NEPHROSTOMY CATHETER: CPT | Mod: 50

## 2018-12-05 PROCEDURE — 99233 SBSQ HOSP IP/OBS HIGH 50: CPT | Mod: GC

## 2018-12-05 RX ORDER — HALOPERIDOL DECANOATE 100 MG/ML
1 INJECTION INTRAMUSCULAR ONCE
Qty: 0 | Refills: 0 | Status: COMPLETED | OUTPATIENT
Start: 2018-12-05 | End: 2018-12-05

## 2018-12-05 RX ADMIN — ATORVASTATIN CALCIUM 20 MILLIGRAM(S): 80 TABLET, FILM COATED ORAL at 23:08

## 2018-12-05 RX ADMIN — HEPARIN SODIUM 900 UNIT(S)/HR: 5000 INJECTION INTRAVENOUS; SUBCUTANEOUS at 01:45

## 2018-12-05 RX ADMIN — Medication 0.25 MILLIGRAM(S): at 02:12

## 2018-12-05 RX ADMIN — HEPARIN SODIUM 900 UNIT(S)/HR: 5000 INJECTION INTRAVENOUS; SUBCUTANEOUS at 09:44

## 2018-12-05 RX ADMIN — SODIUM CHLORIDE 75 MILLILITER(S): 9 INJECTION, SOLUTION INTRAVENOUS at 20:49

## 2018-12-05 RX ADMIN — HALOPERIDOL DECANOATE 1 MILLIGRAM(S): 100 INJECTION INTRAMUSCULAR at 03:44

## 2018-12-05 RX ADMIN — MIRTAZAPINE 7.5 MILLIGRAM(S): 45 TABLET, ORALLY DISINTEGRATING ORAL at 23:08

## 2018-12-05 RX ADMIN — SODIUM CHLORIDE 75 MILLILITER(S): 9 INJECTION, SOLUTION INTRAVENOUS at 08:42

## 2018-12-05 RX ADMIN — BUDESONIDE AND FORMOTEROL FUMARATE DIHYDRATE 2 PUFF(S): 160; 4.5 AEROSOL RESPIRATORY (INHALATION) at 08:41

## 2018-12-05 RX ADMIN — TIOTROPIUM BROMIDE 1 CAPSULE(S): 18 CAPSULE ORAL; RESPIRATORY (INHALATION) at 09:45

## 2018-12-05 RX ADMIN — TAMSULOSIN HYDROCHLORIDE 0.4 MILLIGRAM(S): 0.4 CAPSULE ORAL at 23:08

## 2018-12-05 NOTE — CHART NOTE - NSCHARTNOTEFT_GEN_A_CORE
D/W Ir fellow regarding resuming Heparin gtt- Per IR fellow would hold off on heparin gtt till AM  Will resume heparin gtt in AM

## 2018-12-05 NOTE — PROGRESS NOTE ADULT - PROBLEM SELECTOR PLAN 2
Patient with hypernatremia in setting of decreased free water intake. Latest serum sodium has improved to 147 today. Continue with IV D5W at current rate. Monitor serum sodium level.

## 2018-12-05 NOTE — PROGRESS NOTE ADULT - PROBLEM SELECTOR PLAN 1
Patient with COLUMBA in setting of obstructive uropathy and decreased PO intake. On review of previous labs, Sc.r has been WNL. However labs done in ER showed Sc.r of 5 which has worsened to 5.63 on 12/4/18. CT abdomen and pelvis done on 12/3 shows bilateral hydronephrosis which has slightly increased compared to previous CT scan. Latest Scr. has increased to 6.23 today. Patient with COLUMBA from obstructive uropathy? Recommend urgent placement of PCN tube today. Continue with IV hydration. Monitor BMP daily. Avoid NSAID and other nephrotoxins. Patient with COLUMBA in setting of obstructive uropathy and decreased PO intake. On review of previous labs, Sc.r has been WNL. However labs done in ER showed Sc.r of 5 which has worsened to 5.63 on 12/4/18. CT abdomen and pelvis done on 12/3 shows bilateral hydronephrosis which has slightly increased compared to previous CT scan. Latest Scr. has increased to 6.23 today. Patient with COLUMBA from obstructive uropathy. Recommend urgent placement of PCN tube today. Continue with IV hydration. Monitor BMP daily. Avoid NSAID and other nephrotoxins.

## 2018-12-05 NOTE — CHART NOTE - NSCHARTNOTEFT_GEN_A_CORE
Pt has been on aspirin, last on 12/3.  B NT procedure is urgent to prevent worsening of COLUMBA.  The benefits of the procedure, outweigh the risks of being on aspirin.  We will monitor closely for bleeding.

## 2018-12-05 NOTE — PROGRESS NOTE ADULT - PROBLEM SELECTOR PLAN 2
more confused in setting acute illness  - treating underlying cause  avoid BZD/Ambien/anticholinergics, day/night cues, supportive care

## 2018-12-05 NOTE — PROGRESS NOTE ADULT - PROBLEM SELECTOR PLAN 1
likely d/t obstruction from prostate mass, appreciate renal and uro f/u --> for B NT today--> watch for postobstructive diuresis after  - f/u lindates

## 2018-12-05 NOTE — PROGRESS NOTE ADULT - SUBJECTIVE AND OBJECTIVE BOX
Patient is a 78y old  Male who presents with a chief complaint of paige, hypernatremia (05 Dec 2018 14:26)    SUBJECTIVE / OVERNIGHT EVENTS:  Pt confused, said he had a meeting in [place] for an interview.  Denies pain.      MEDICATIONS  (STANDING):  atorvastatin 20 milliGRAM(s) Oral at bedtime  buDESOnide  80 MICROgram(s)/formoterol 4.5 MICROgram(s) Inhaler 2 Puff(s) Inhalation two times a day  cholecalciferol 400 Unit(s) Oral daily  dextrose 5%. 1000 milliLiter(s) (75 mL/Hr) IV Continuous <Continuous>  finasteride 5 milliGRAM(s) Oral daily  lactulose Syrup 10 Gram(s) Oral at bedtime  mirtazapine 7.5 milliGRAM(s) Oral at bedtime  modafinil 100 milliGRAM(s) Oral daily  polyethylene glycol 3350 17 Gram(s) Oral daily  tamsulosin 0.4 milliGRAM(s) Oral at bedtime  tiotropium 18 MICROgram(s) Capsule 1 Capsule(s) Inhalation daily    MEDICATIONS  (PRN):  acetaminophen   Tablet .. 650 milliGRAM(s) Oral every 8 hours PRN Mild Pain (1 - 3), Moderate Pain (4 - 6)  ALBUTerol    0.083% 2.5 milliGRAM(s) Nebulizer every 6 hours PRN Shortness of Breath and/or Wheezing    I&O's Summary    04 Dec 2018 07:01  -  05 Dec 2018 07:00  --------------------------------------------------------  IN: 1407.5 mL / OUT: 750 mL / NET: 657.5 mL    05 Dec 2018 07:01  -  05 Dec 2018 17:17  --------------------------------------------------------  IN: 618 mL / OUT: 0 mL / NET: 618 mL    Vital Signs Last 24 Hrs  T(C): 36.4 (05 Dec 2018 13:31), Max: 36.6 (05 Dec 2018 05:10)  T(F): 97.5 (05 Dec 2018 13:31), Max: 97.8 (05 Dec 2018 05:10)  HR: 106 (05 Dec 2018 13:31) (82 - 106)  BP: 117/78 (05 Dec 2018 13:31) (117/78 - 134/83)  BP(mean): --  RR: 17 (05 Dec 2018 13:31) (17 - 18)  SpO2: 99% (05 Dec 2018 13:31) (94% - 99%)    PHYSICAL EXAM:  GENERAL: elderly WM with nasal bipap on, comfortable  HEAD:  Atraumatic, Normocephalic  EYES: EOMI, PERRLA, conjunctiva and sclera clear  NECK: Supple, No JVD  CHEST/LUNG: Clear to auscultation bilaterally; No wheeze  HEART: Regular rate and rhythm; No murmurs, rubs, or gallops  ABDOMEN: Soft, Nontender, Nondistended; Bowel sounds present  EXTREMITIES:  2+ Peripheral Pulses, No clubbing, cyanosis, or edema  PSYCH: calm, oriented self, February, [place]  NEUROLOGY: non-focal  SKIN: No rashes or lesions  +shah    LABS:                        12.2   11.64 )-----------( 126      ( 05 Dec 2018 08:35 )             38.1     147<H>  |  116<H>  |  67<H>  ----------------------------<  93  4.1   |  14<L>  |  6.23<H>    Ca    8.1<L>      05 Dec 2018 08:35  Phos  4.0     12-05  Mg     1.8     12-05    TPro  6.7  /  Alb  2.5<L>  /  TBili  0.5  /  DBili  x   /  AST  16  /  ALT  11  /  AlkPhos  110  12-04    PT/INR - ( 05 Dec 2018 08:35 )   PT: 14.2 SEC;   INR: 1.24     PTT - ( 05 Dec 2018 08:35 )  PTT:88.1 SEC    RADIOLOGY & ADDITIONAL TESTS:    Imaging Personally Reviewed:    Consultant(s) Notes Reviewed:      Care Discussed with Consultants/Other Providers: RN, SW, CM, ADS, renal re overall care

## 2018-12-06 LAB
APTT BLD: 35.8 SEC — SIGNIFICANT CHANGE UP (ref 27.5–36.3)
APTT BLD: 38.8 SEC — HIGH (ref 27.5–36.3)
BASOPHILS # BLD AUTO: 0.1 K/UL — SIGNIFICANT CHANGE UP (ref 0–0.2)
BASOPHILS NFR BLD AUTO: 1 % — SIGNIFICANT CHANGE UP (ref 0–2)
BUN SERPL-MCNC: 57 MG/DL — HIGH (ref 7–23)
BUN SERPL-MCNC: 64 MG/DL — HIGH (ref 7–23)
CALCIUM SERPL-MCNC: 8.8 MG/DL — SIGNIFICANT CHANGE UP (ref 8.4–10.5)
CALCIUM SERPL-MCNC: 8.9 MG/DL — SIGNIFICANT CHANGE UP (ref 8.4–10.5)
CHLORIDE SERPL-SCNC: 110 MMOL/L — HIGH (ref 98–107)
CHLORIDE SERPL-SCNC: 114 MMOL/L — HIGH (ref 98–107)
CO2 SERPL-SCNC: 16 MMOL/L — LOW (ref 22–31)
CO2 SERPL-SCNC: 20 MMOL/L — LOW (ref 22–31)
CREAT SERPL-MCNC: 4.66 MG/DL — HIGH (ref 0.5–1.3)
CREAT SERPL-MCNC: 5.64 MG/DL — HIGH (ref 0.5–1.3)
EOSINOPHIL # BLD AUTO: 0.35 K/UL — SIGNIFICANT CHANGE UP (ref 0–0.5)
EOSINOPHIL NFR BLD AUTO: 3.6 % — SIGNIFICANT CHANGE UP (ref 0–6)
GLUCOSE BLDC GLUCOMTR-MCNC: 110 MG/DL — HIGH (ref 70–99)
GLUCOSE SERPL-MCNC: 106 MG/DL — HIGH (ref 70–99)
GLUCOSE SERPL-MCNC: 60 MG/DL — LOW (ref 70–99)
HCT VFR BLD CALC: 39.4 % — SIGNIFICANT CHANGE UP (ref 39–50)
HCT VFR BLD CALC: 42.5 % — SIGNIFICANT CHANGE UP (ref 39–50)
HCT VFR BLD CALC: 42.5 % — SIGNIFICANT CHANGE UP (ref 39–50)
HGB BLD-MCNC: 12.7 G/DL — LOW (ref 13–17)
HGB BLD-MCNC: 13.3 G/DL — SIGNIFICANT CHANGE UP (ref 13–17)
HGB BLD-MCNC: 13.3 G/DL — SIGNIFICANT CHANGE UP (ref 13–17)
IMM GRANULOCYTES # BLD AUTO: 0.14 # — SIGNIFICANT CHANGE UP
IMM GRANULOCYTES NFR BLD AUTO: 1.4 % — SIGNIFICANT CHANGE UP (ref 0–1.5)
LYMPHOCYTES # BLD AUTO: 2.35 K/UL — SIGNIFICANT CHANGE UP (ref 1–3.3)
LYMPHOCYTES # BLD AUTO: 24 % — SIGNIFICANT CHANGE UP (ref 13–44)
MCHC RBC-ENTMCNC: 29.8 PG — SIGNIFICANT CHANGE UP (ref 27–34)
MCHC RBC-ENTMCNC: 29.8 PG — SIGNIFICANT CHANGE UP (ref 27–34)
MCHC RBC-ENTMCNC: 30 PG — SIGNIFICANT CHANGE UP (ref 27–34)
MCHC RBC-ENTMCNC: 31.3 % — LOW (ref 32–36)
MCHC RBC-ENTMCNC: 31.3 % — LOW (ref 32–36)
MCHC RBC-ENTMCNC: 32.2 % — SIGNIFICANT CHANGE UP (ref 32–36)
MCV RBC AUTO: 93.1 FL — SIGNIFICANT CHANGE UP (ref 80–100)
MCV RBC AUTO: 95.3 FL — SIGNIFICANT CHANGE UP (ref 80–100)
MCV RBC AUTO: 95.3 FL — SIGNIFICANT CHANGE UP (ref 80–100)
MONOCYTES # BLD AUTO: 0.72 K/UL — SIGNIFICANT CHANGE UP (ref 0–0.9)
MONOCYTES NFR BLD AUTO: 7.3 % — SIGNIFICANT CHANGE UP (ref 2–14)
NEUTROPHILS # BLD AUTO: 6.14 K/UL — SIGNIFICANT CHANGE UP (ref 1.8–7.4)
NEUTROPHILS NFR BLD AUTO: 62.7 % — SIGNIFICANT CHANGE UP (ref 43–77)
NRBC # FLD: 0 — SIGNIFICANT CHANGE UP
PLATELET # BLD AUTO: 141 K/UL — LOW (ref 150–400)
PLATELET # BLD AUTO: 147 K/UL — LOW (ref 150–400)
PLATELET # BLD AUTO: 147 K/UL — LOW (ref 150–400)
PMV BLD: 13.5 FL — HIGH (ref 7–13)
POTASSIUM SERPL-MCNC: 3.5 MMOL/L — SIGNIFICANT CHANGE UP (ref 3.5–5.3)
POTASSIUM SERPL-MCNC: 4.2 MMOL/L — SIGNIFICANT CHANGE UP (ref 3.5–5.3)
POTASSIUM SERPL-SCNC: 3.5 MMOL/L — SIGNIFICANT CHANGE UP (ref 3.5–5.3)
POTASSIUM SERPL-SCNC: 4.2 MMOL/L — SIGNIFICANT CHANGE UP (ref 3.5–5.3)
RBC # BLD: 4.23 M/UL — SIGNIFICANT CHANGE UP (ref 4.2–5.8)
RBC # BLD: 4.46 M/UL — SIGNIFICANT CHANGE UP (ref 4.2–5.8)
RBC # BLD: 4.46 M/UL — SIGNIFICANT CHANGE UP (ref 4.2–5.8)
RBC # FLD: 15.6 % — HIGH (ref 10.3–14.5)
RBC # FLD: 15.6 % — HIGH (ref 10.3–14.5)
RBC # FLD: 15.9 % — HIGH (ref 10.3–14.5)
SODIUM SERPL-SCNC: 147 MMOL/L — HIGH (ref 135–145)
SODIUM SERPL-SCNC: 148 MMOL/L — HIGH (ref 135–145)
WBC # BLD: 9.71 K/UL — SIGNIFICANT CHANGE UP (ref 3.8–10.5)
WBC # BLD: 9.8 K/UL — SIGNIFICANT CHANGE UP (ref 3.8–10.5)
WBC # BLD: 9.8 K/UL — SIGNIFICANT CHANGE UP (ref 3.8–10.5)
WBC # FLD AUTO: 9.71 K/UL — SIGNIFICANT CHANGE UP (ref 3.8–10.5)
WBC # FLD AUTO: 9.8 K/UL — SIGNIFICANT CHANGE UP (ref 3.8–10.5)
WBC # FLD AUTO: 9.8 K/UL — SIGNIFICANT CHANGE UP (ref 3.8–10.5)

## 2018-12-06 PROCEDURE — 99233 SBSQ HOSP IP/OBS HIGH 50: CPT | Mod: GC

## 2018-12-06 PROCEDURE — 99233 SBSQ HOSP IP/OBS HIGH 50: CPT

## 2018-12-06 RX ORDER — HEPARIN SODIUM 5000 [USP'U]/ML
3000 INJECTION INTRAVENOUS; SUBCUTANEOUS EVERY 6 HOURS
Qty: 0 | Refills: 0 | Status: DISCONTINUED | OUTPATIENT
Start: 2018-12-06 | End: 2018-12-06

## 2018-12-06 RX ORDER — MODAFINIL 200 MG/1
100 TABLET ORAL DAILY
Qty: 0 | Refills: 0 | Status: DISCONTINUED | OUTPATIENT
Start: 2018-12-06 | End: 2018-12-13

## 2018-12-06 RX ORDER — HEPARIN SODIUM 5000 [USP'U]/ML
6500 INJECTION INTRAVENOUS; SUBCUTANEOUS EVERY 6 HOURS
Qty: 0 | Refills: 0 | Status: DISCONTINUED | OUTPATIENT
Start: 2018-12-06 | End: 2018-12-06

## 2018-12-06 RX ORDER — HEPARIN SODIUM 5000 [USP'U]/ML
INJECTION INTRAVENOUS; SUBCUTANEOUS
Qty: 25000 | Refills: 0 | Status: DISCONTINUED | OUTPATIENT
Start: 2018-12-06 | End: 2018-12-06

## 2018-12-06 RX ORDER — HEPARIN SODIUM 5000 [USP'U]/ML
5000 INJECTION INTRAVENOUS; SUBCUTANEOUS EVERY 12 HOURS
Qty: 0 | Refills: 0 | Status: DISCONTINUED | OUTPATIENT
Start: 2018-12-06 | End: 2018-12-07

## 2018-12-06 RX ADMIN — MODAFINIL 100 MILLIGRAM(S): 200 TABLET ORAL at 17:04

## 2018-12-06 RX ADMIN — ATORVASTATIN CALCIUM 20 MILLIGRAM(S): 80 TABLET, FILM COATED ORAL at 21:40

## 2018-12-06 RX ADMIN — TIOTROPIUM BROMIDE 1 CAPSULE(S): 18 CAPSULE ORAL; RESPIRATORY (INHALATION) at 09:52

## 2018-12-06 RX ADMIN — BUDESONIDE AND FORMOTEROL FUMARATE DIHYDRATE 2 PUFF(S): 160; 4.5 AEROSOL RESPIRATORY (INHALATION) at 21:41

## 2018-12-06 RX ADMIN — SODIUM CHLORIDE 75 MILLILITER(S): 9 INJECTION, SOLUTION INTRAVENOUS at 09:53

## 2018-12-06 RX ADMIN — POLYETHYLENE GLYCOL 3350 17 GRAM(S): 17 POWDER, FOR SOLUTION ORAL at 17:04

## 2018-12-06 RX ADMIN — Medication 400 UNIT(S): at 17:04

## 2018-12-06 RX ADMIN — SODIUM CHLORIDE 75 MILLILITER(S): 9 INJECTION, SOLUTION INTRAVENOUS at 21:41

## 2018-12-06 RX ADMIN — BUDESONIDE AND FORMOTEROL FUMARATE DIHYDRATE 2 PUFF(S): 160; 4.5 AEROSOL RESPIRATORY (INHALATION) at 09:52

## 2018-12-06 RX ADMIN — MIRTAZAPINE 7.5 MILLIGRAM(S): 45 TABLET, ORALLY DISINTEGRATING ORAL at 21:40

## 2018-12-06 RX ADMIN — TAMSULOSIN HYDROCHLORIDE 0.4 MILLIGRAM(S): 0.4 CAPSULE ORAL at 21:47

## 2018-12-06 RX ADMIN — FINASTERIDE 5 MILLIGRAM(S): 5 TABLET, FILM COATED ORAL at 17:04

## 2018-12-06 RX ADMIN — HEPARIN SODIUM 5000 UNIT(S): 5000 INJECTION INTRAVENOUS; SUBCUTANEOUS at 21:46

## 2018-12-06 RX ADMIN — LACTULOSE 10 GRAM(S): 10 SOLUTION ORAL at 21:47

## 2018-12-06 RX ADMIN — HEPARIN SODIUM 1400 UNIT(S)/HR: 5000 INJECTION INTRAVENOUS; SUBCUTANEOUS at 09:52

## 2018-12-06 NOTE — PROGRESS NOTE ADULT - ASSESSMENT
Assessment: MR HARPER is a 78 year old M who presented from rehab with anorexia, hypernatremia and COLUMBA found to have b/l hydronephrosis and prostate mass involving bladder/rectum.  Patient underwent bilateral NT placement.    Plan:  - Trend creatinine  - Check PSA  - c/w shah  - No urologic surgery intervention at this time; Recommend outpatient followup for likely prostate cancer workup and counseling with Dr. Drake once stable from discharge from medical perspective.

## 2018-12-06 NOTE — DIETITIAN INITIAL EVALUATION ADULT. - ENERGY NEEDS
Ht: No height in chart- patient appears about 70 inches   Wt: 159.8 pounds BMI: 22.8kg/m2 IBW: 160 pounds (+/-10%) %IBW: 99%    Edema: + 2 edema left + right foot, Nonpitting edema- left/right ankle. Skin: intact, no pressure injuries noted

## 2018-12-06 NOTE — DIETITIAN INITIAL EVALUATION ADULT. - PERTINENT MEDS FT
MEDICATIONS  (STANDING):  atorvastatin 20 milliGRAM(s) Oral at bedtime  buDESOnide  80 MICROgram(s)/formoterol 4.5 MICROgram(s) Inhaler 2 Puff(s) Inhalation two times a day  cholecalciferol 400 Unit(s) Oral daily  dextrose 5%. 1000 milliLiter(s) (75 mL/Hr) IV Continuous <Continuous>  finasteride 5 milliGRAM(s) Oral daily  heparin  Infusion.  Unit(s)/Hr (14 mL/Hr) IV Continuous <Continuous>  lactulose Syrup 10 Gram(s) Oral at bedtime  mirtazapine 7.5 milliGRAM(s) Oral at bedtime  modafinil 100 milliGRAM(s) Oral daily  polyethylene glycol 3350 17 Gram(s) Oral daily  tamsulosin 0.4 milliGRAM(s) Oral at bedtime  tiotropium 18 MICROgram(s) Capsule 1 Capsule(s) Inhalation daily    MEDICATIONS  (PRN):  acetaminophen   Tablet .. 650 milliGRAM(s) Oral every 8 hours PRN Mild Pain (1 - 3), Moderate Pain (4 - 6)  ALBUTerol    0.083% 2.5 milliGRAM(s) Nebulizer every 6 hours PRN Shortness of Breath and/or Wheezing  heparin  Injectable 6500 Unit(s) IV Push every 6 hours PRN For aPTT less than 40  heparin  Injectable 3000 Unit(s) IV Push every 6 hours PRN For aPTT between 40 - 57

## 2018-12-06 NOTE — PROGRESS NOTE ADULT - PROBLEM SELECTOR PLAN 2
Patient with hypernatremia in setting of decreased free water intake. Latest serum sodium remains elevated at 147 today. Continue with IV D5W at current rate. Monitor serum sodium level.

## 2018-12-06 NOTE — PROGRESS NOTE ADULT - PROBLEM SELECTOR PLAN 3
c/w CA but not proven yet, liver bx results pending, f/u uro outpt  f/u PSA c/w CA but not proven yet, liver bx results pending, f/u uro outpt  f/u PSA  liver bx 11/26 steatosis, steatohepatitis, no evidence malignancy

## 2018-12-06 NOTE — PROGRESS NOTE ADULT - SUBJECTIVE AND OBJECTIVE BOX
[facility] Division of Kidney Diseases & Hypertension  FOLLOW UP NOTE  [phone]--------------------------------------------------------------------------------    HPI: 78 year old male with h/o dementia, A.MATT downing was sent from [facility] to [facility] due to abnormal labs. Labs done in ER shows elevated creatinine and sodium and hence nephrology was consulted. On review of previous labs in [place], Scr. has been WNL with last Scr. prior to admission being 0.87 on 10/23/18. However labs done in ER showed elevated Scr. of 5 on 12/3/18 which worsened to 5.63 on 12/4/18. CT abdomen and pelvis showed bilateral hydronephrosis which has slightly increased compared to previous CT scan. Patient had bilateral PCN tube placed on 12/5/18 and Scr. improving.     Patient seen and examined today at bedside. Patient is awake and appears comfortable.     PAST HISTORY  --------------------------------------------------------------------------------  No significant changes to PMH, PSH, FHx, SHx, unless otherwise noted    ALLERGIES & MEDICATIONS  --------------------------------------------------------------------------------  Allergies    No Known Drug Allergies  zosyn (Drowsiness)    Intolerances      Standing Inpatient Medications  atorvastatin 20 milliGRAM(s) Oral at bedtime  buDESOnide  80 MICROgram(s)/formoterol 4.5 MICROgram(s) Inhaler 2 Puff(s) Inhalation two times a day  cholecalciferol 400 Unit(s) Oral daily  dextrose 5%. 1000 milliLiter(s) IV Continuous <Continuous>  finasteride 5 milliGRAM(s) Oral daily  lactulose Syrup 10 Gram(s) Oral at bedtime  mirtazapine 7.5 milliGRAM(s) Oral at bedtime  modafinil 100 milliGRAM(s) Oral daily  polyethylene glycol 3350 17 Gram(s) Oral daily  tamsulosin 0.4 milliGRAM(s) Oral at bedtime  tiotropium 18 MICROgram(s) Capsule 1 Capsule(s) Inhalation daily    PRN Inpatient Medications  acetaminophen   Tablet .. 650 milliGRAM(s) Oral every 8 hours PRN  ALBUTerol    0.083% 2.5 milliGRAM(s) Nebulizer every 6 hours PRN      REVIEW OF SYSTEMS  --------------------------------------------------------------------------------  Unable to obtain.     VITALS/PHYSICAL EXAM  --------------------------------------------------------------------------------  T(C): 36.4 (12-06-18 @ 14:00), Max: 36.8 (12-06-18 @ 10:05)  HR: 91 (12-06-18 @ 14:00) (66 - 99)  BP: 119/86 (12-06-18 @ 14:00) (110/78 - 124/77)  RR: 18 (12-06-18 @ 14:00) (18 - 18)  SpO2: 98% (12-06-18 @ 14:00) (97% - 99%)  Wt(kg): --        12-05-18 @ 07:01  -  12-06-18 @ 07:00  --------------------------------------------------------  IN: 618 mL / OUT: 2230 mL / NET: -1612 mL    12-06-18 @ 07:01  -  12-06-18 @ 17:13  --------------------------------------------------------  IN: 0 mL / OUT: 1950 mL / NET: -1950 mL      Physical Exam:  	  Gen: Elderly male, NAD  	HEENT: no JVD  	Pulm: CTA B/L  	CV:  S1S2  	Abd: +BS, soft,               : shah catheter present; draining urine.   	Ext: No B/L Lower ext edema  	Neuro: Confused.   	Skin: Warm, without rashes      LABS/STUDIES  --------------------------------------------------------------------------------              12.7   9.71  >-----------<  141      [12-06-18 @ 15:53]              39.4     147  |  110  |  64  ----------------------------<  60      [12-06-18 @ 06:30]  3.5   |  20  |  5.64        Ca     8.9     [12-06-18 @ 06:30]      Mg     1.8     [12-05-18 @ 08:35]      Phos  4.0     [12-05-18 @ 08:35]      PT/INR: PT 14.2 , INR 1.24       [12-05-18 @ 08:35]  PTT: 38.8       [12-06-18 @ 15:53]      Creatinine Trend:  SCr 5.64 [12-06 @ 06:30]  SCr 6.23 [12-05 @ 08:35]  SCr 5.96 [12-04 @ 20:10]  SCr 5.42 [12-04 @ 09:35]  SCr 5.00 [12-03 @ 21:50]    Urinalysis - [12-03-18 @ 15:30]      Color YELLOW / Appearance Lt TURBID / SG 1.015 / pH 5.5      Gluc NEGATIVE / Ketone NEGATIVE  / Bili NEGATIVE / Urobili NORMAL       Blood MODERATE / Protein 20 / Leuk Est MODERATE / Nitrite NEGATIVE      RBC 3-5 / WBC 6-10 / Hyaline 1+ / Gran  / Sq Epi FEW / Non Sq Epi  / Bacteria SMALL    Urine Creatinine 103.40      [12-04-18 @ 01:54]  Urine Sodium 50      [12-04-18 @ 01:54]  Urine Potassium 30.1      [12-04-18 @ 01:54]  Urine Chloride 44      [12-04-18 @ 01:54]  Urine Osmolality 330      [12-04-18 @ 01:54]

## 2018-12-06 NOTE — DIETITIAN INITIAL EVALUATION ADULT. - PROBLEM SELECTOR PLAN 1
-likely multifactorial in setting of anorexia and obstructive uropathy. Given hypernatremia, unclear why urine Osm is as low as 325- DI?  -unable to calculate FeNa as UCr not sent. Will reorder urine labs  -continue with IVF, renally dose medications given pt is technically ESRD at this time; will hold aspirin for now  -will need to obtain urology and renal input  -given anorexia, nutrition eval and swallowing study

## 2018-12-06 NOTE — DIETITIAN INITIAL EVALUATION ADULT. - NS AS NUTRI INTERV STRATEGIES
Monitor weights, labs, BM's, skin integrity, p.o. intake. RD to remain available for further nutritional interventions as indicated.

## 2018-12-06 NOTE — DIETITIAN INITIAL EVALUATION ADULT. - PHYSICAL APPEARANCE
Patient appears thin, unable to perform Nutrition focused physical exam- patient sleeping during interview, however patient appears to have severe muscle and fat wasting - Orbital fat pads region, Temples region, Clavicle region.

## 2018-12-06 NOTE — PROGRESS NOTE ADULT - PROBLEM SELECTOR PLAN 1
Patient with COLUMBA in setting of obstructive uropathy and decreased PO intake. On review of previous labs, Sc.r has been WNL. However labs done in ER showed Sc.r of 5 which has worsened to 5.63 on 12/4/18. CT abdomen and pelvis done on 12/3 shows bilateral hydronephrosis which has slightly increased compared to previous CT scan. Patient had bilateral PCN placed by IR on 12/5/18. Latest Scr. has improved to 5.64 today. Patient is non oliguric. Patient with COLUMBA from obstructive uropathy. Continue with IV hydration. Monitor BMP daily. Avoid NSAID and other nephrotoxins.

## 2018-12-06 NOTE — DIETITIAN INITIAL EVALUATION ADULT. - PERTINENT LABORATORY DATA
12-06 Na147 mmol/L<H> Glu 60 mg/dL<L> K+ 3.5 mmol/L Cr  5.64 mg/dL<H> BUN 64 mg/dL<H> 12-05 Phos 4.0 mg/dL 12-04 Alb 2.5 g/dL<L>

## 2018-12-06 NOTE — SWALLOW BEDSIDE ASSESSMENT ADULT - COMMENTS
MD orders received. Chart reviewed. Patient is known to this service from initial bedside swallow evaluation 12/4/18, at which time diet recommendations could not be made given limited assessment (please see chart for full report). Patient received asleep yet arousable to verbal/tactile stimuli this PM. Unable to perform bedside swallow evaluation as patient became agitated and refused all PO trials. As per ADS (Mervat), medical team requesting this service reattempt when patient's wife is present at bedside as he has reportedly been more cooperative with her present; ADS plan to call this department's spectra (#[phone]) to schedule a time to reattempt swallow evaluation.

## 2018-12-06 NOTE — PROGRESS NOTE ADULT - SUBJECTIVE AND OBJECTIVE BOX
Subjective:  Bilateral NT draining. Awaiting PSA. denies pain.     Objectives:  T(C): 36.8 (12-06-18 @ 10:05), Max: 36.8 (12-06-18 @ 10:05)  HR: 76 (12-06-18 @ 11:19) (75 - 98)  BP: 110/78 (12-06-18 @ 10:05) (110/78 - 121/90)  RR: 18 (12-06-18 @ 10:05) (18 - 18)  SpO2: 99% (12-06-18 @ 11:19) (98% - 99%)  Wt(kg): --    12-05 @ 07:01  -  12-06 @ 07:00  --------------------------------------------------------  IN:    dextrose 5%.: 600 mL    heparin  Infusion.: 18 mL  Total IN: 618 mL    OUT:    Nephrostomy Tube: 1830 mL    Nephrostomy Tube: 360 mL    Ureteral Catheter: 40 mL  Total OUT: 2230 mL    Total NET: -1612 mL    Physcial Exam  GENERAL: NAD, well-developed  ABDOMEN: Soft, Nontender, Nondistended;      LABS:                        13.3   9.80  )-----------( 147      ( 06 Dec 2018 06:30 )             42.5     12-06    147<H>  |  110<H>  |  64<H>  ----------------------------<  60<L>  3.5   |  20<L>  |  5.64<H>    Ca    8.9      06 Dec 2018 06:30  Phos  4.0     12-05  Mg     1.8     12-05      CAPILLARY BLOOD GLUCOSE        PT/INR - ( 05 Dec 2018 08:35 )   PT: 14.2 SEC;   INR: 1.24          PTT - ( 06 Dec 2018 06:30 )  PTT:35.8 SEC  CAPILLARY BLOOD GLUCOSE

## 2018-12-06 NOTE — DIETITIAN INITIAL EVALUATION ADULT. - OTHER INFO
Patient seen for nutrition consult for calorie count, however no calorie count in chart. Per chart: Patient is a 78 year old male with history of COLUMBA, hypernatremia and anorexia. Per wife- patient with poor PO intake. RN reported patient consumed 0% of breakfast. Patient denies any nausea/vomiting/diarrhea/constipation or difficulty chewing and swallowing. Patient reports no food allergies or intolerances. Reported about one year ago weight 240 pounds. Weight  trend per flow sheet: 12/6: 159.8 pounds, 12/5: 167.3 pounds, 12/4: 174.3 pounds. ? bed scale accuracy vs. fluid shifts. Patient seen for nutrition consult for calorie count, however no calorie count in chart. Per chart: Patient is a 78 year old male with history of COLUMBA, hypernatremia and anorexia. Per wife- patient with poor PO intake. RN reported patient consumed 0% of breakfast. Patient denies any nausea/vomiting/diarrhea/constipation or difficulty chewing and swallowing. Patient seen by Speech on 12/4- unable to assess at time and deferred diet to MD. Patient reports no food allergies or intolerances. Reported about one year ago weight 240 pounds. Weight  trend per flow sheet: 12/6: 159.8 pounds, 12/5: 167.3 pounds, 12/4: 174.3 pounds. ? bed scale accuracy vs. fluid shifts.

## 2018-12-06 NOTE — PROGRESS NOTE ADULT - SUBJECTIVE AND OBJECTIVE BOX
Patient is a 78y old  Male who presents with a chief complaint of paige, hypernatremia (06 Dec 2018 11:54)    SUBJECTIVE / OVERNIGHT EVENTS:  Pt seen earlier, wife at bedside.  Sleeping most of day, says he's thirsty but did not want to drink.  Denies pain.    MEDICATIONS  (STANDING):  atorvastatin 20 milliGRAM(s) Oral at bedtime  buDESOnide  80 MICROgram(s)/formoterol 4.5 MICROgram(s) Inhaler 2 Puff(s) Inhalation two times a day  cholecalciferol 400 Unit(s) Oral daily  dextrose 5%. 1000 milliLiter(s) (75 mL/Hr) IV Continuous <Continuous>  finasteride 5 milliGRAM(s) Oral daily  lactulose Syrup 10 Gram(s) Oral at bedtime  mirtazapine 7.5 milliGRAM(s) Oral at bedtime  modafinil 100 milliGRAM(s) Oral daily  polyethylene glycol 3350 17 Gram(s) Oral daily  tamsulosin 0.4 milliGRAM(s) Oral at bedtime  tiotropium 18 MICROgram(s) Capsule 1 Capsule(s) Inhalation daily    MEDICATIONS  (PRN):  acetaminophen   Tablet .. 650 milliGRAM(s) Oral every 8 hours PRN Mild Pain (1 - 3), Moderate Pain (4 - 6)  ALBUTerol    0.083% 2.5 milliGRAM(s) Nebulizer every 6 hours PRN Shortness of Breath and/or Wheezing    CAPILLARY BLOOD GLUCOSE  POCT Blood Glucose.: 110 mg/dL (06 Dec 2018 15:48)    I&O's Summary    05 Dec 2018 07:01  -  06 Dec 2018 07:00  --------------------------------------------------------  IN: 618 mL / OUT: 2230 mL / NET: -1612 mL    06 Dec 2018 07:01  -  06 Dec 2018 19:15  --------------------------------------------------------  IN: 0 mL / OUT: 1950 mL / NET: -1950 mL    Vital Signs Last 24 Hrs  T(C): 36.6 (06 Dec 2018 17:49), Max: 36.8 (06 Dec 2018 10:05)  T(F): 97.8 (06 Dec 2018 17:49), Max: 98.3 (06 Dec 2018 10:05)  HR: 88 (06 Dec 2018 17:49) (66 - 99)  BP: 120/82 (06 Dec 2018 17:49) (110/78 - 124/77)  RR: 18 (06 Dec 2018 17:49) (18 - 18)  SpO2: 98% (06 Dec 2018 17:49) (97% - 99%)    PHYSICAL EXAM:  GENERAL: elderly WM lying flat in bed with nasal bipap on, very sleepy  HEAD:  Atraumatic, Normocephalic  EYES: EOMI, PERRLA, conjunctiva and sclera clear  NECK: Supple, No JVD  CHEST/LUNG: Clear to auscultation bilaterally; No wheeze  HEART: Regular rate and rhythm; No murmurs, rubs, or gallops  ABDOMEN: Soft, Nontender, Nondistended; Bowel sounds present  EXTREMITIES:  2+ Peripheral Pulses, No clubbing, cyanosis, or edema  PSYCH: sleepy  NEUROLOGY: non-focal  SKIN: No rashes or lesions  +shah/B NT L punch urine, R NT clear    LABS:                        12.7   9.71  )-----------( 141      ( 06 Dec 2018 15:53 )             39.4     147<H>  |  110<H>  |  64<H>  ----------------------------<  60<L>  3.5   |  20<L>  |  5.64<H>    Ca    8.9      06 Dec 2018 06:30  Phos  4.0     12-05  Mg     1.8     12-05      PT/INR - ( 05 Dec 2018 08:35 )   PT: 14.2 SEC;   INR: 1.24     PTT - ( 06 Dec 2018 15:53 )  PTT:38.8 SEC    RADIOLOGY & ADDITIONAL TESTS:    Imaging Personally Reviewed:    Consultant(s) Notes Reviewed:  renal, urology    Care Discussed with Consultants/Other Providers: RN, SW, CM, ADS re overall care Patient is a 78y old  Male who presents with a chief complaint of paige, hypernatremia (06 Dec 2018 11:54)    SUBJECTIVE / OVERNIGHT EVENTS:  Pt seen earlier, wife at bedside.  Sleeping most of day, says he's thirsty but did not want to drink.  Denies pain.    MEDICATIONS  (STANDING):  atorvastatin 20 milliGRAM(s) Oral at bedtime  buDESOnide  80 MICROgram(s)/formoterol 4.5 MICROgram(s) Inhaler 2 Puff(s) Inhalation two times a day  cholecalciferol 400 Unit(s) Oral daily  dextrose 5%. 1000 milliLiter(s) (75 mL/Hr) IV Continuous <Continuous>  finasteride 5 milliGRAM(s) Oral daily  lactulose Syrup 10 Gram(s) Oral at bedtime  mirtazapine 7.5 milliGRAM(s) Oral at bedtime  modafinil 100 milliGRAM(s) Oral daily  polyethylene glycol 3350 17 Gram(s) Oral daily  tamsulosin 0.4 milliGRAM(s) Oral at bedtime  tiotropium 18 MICROgram(s) Capsule 1 Capsule(s) Inhalation daily    MEDICATIONS  (PRN):  acetaminophen   Tablet .. 650 milliGRAM(s) Oral every 8 hours PRN Mild Pain (1 - 3), Moderate Pain (4 - 6)  ALBUTerol    0.083% 2.5 milliGRAM(s) Nebulizer every 6 hours PRN Shortness of Breath and/or Wheezing    CAPILLARY BLOOD GLUCOSE  POCT Blood Glucose.: 110 mg/dL (06 Dec 2018 15:48)    I&O's Summary    05 Dec 2018 07:01  -  06 Dec 2018 07:00  --------------------------------------------------------  IN: 618 mL / OUT: 2230 mL / NET: -1612 mL    06 Dec 2018 07:01  -  06 Dec 2018 19:15  --------------------------------------------------------  IN: 0 mL / OUT: 1950 mL / NET: -1950 mL    Vital Signs Last 24 Hrs  T(C): 36.6 (06 Dec 2018 17:49), Max: 36.8 (06 Dec 2018 10:05)  T(F): 97.8 (06 Dec 2018 17:49), Max: 98.3 (06 Dec 2018 10:05)  HR: 88 (06 Dec 2018 17:49) (66 - 99)  BP: 120/82 (06 Dec 2018 17:49) (110/78 - 124/77)  RR: 18 (06 Dec 2018 17:49) (18 - 18)  SpO2: 98% (06 Dec 2018 17:49) (97% - 99%)    PHYSICAL EXAM:  GENERAL: elderly WM lying flat in bed with nasal bipap on, very sleepy  HEAD:  Atraumatic, Normocephalic  EYES: EOMI, PERRLA, conjunctiva and sclera clear  NECK: Supple, No JVD  CHEST/LUNG: Clear to auscultation bilaterally; No wheeze  HEART: Regular rate and rhythm; No murmurs, rubs, or gallops  ABDOMEN: Soft, Nontender, Nondistended; Bowel sounds present  EXTREMITIES:  2+ Peripheral Pulses, No clubbing, cyanosis, or edema  PSYCH: sleepy  NEUROLOGY: non-focal  SKIN: No rashes or lesions  +shah/B NT L punch urine, R NT clear    LABS:                        12.7   9.71  )-----------( 141      ( 06 Dec 2018 15:53 )             39.4     147<H>  |  110<H>  |  64<H>  ----------------------------<  60<L>  3.5   |  20<L>  |  5.64<H>    Ca    8.9      06 Dec 2018 06:30  Phos  4.0     12-05  Mg     1.8     12-05      PT/INR - ( 05 Dec 2018 08:35 )   PT: 14.2 SEC;   INR: 1.24     PTT - ( 06 Dec 2018 15:53 )  PTT:38.8 SEC    RADIOLOGY & ADDITIONAL TESTS:    Final Diagnosis  LIVER, CT GUIDED CORE BIOPSY  - Marked macrovesicular steatosis with mild steatohepatitis,    Interpretation: No definite features of a malignancy are  identified in this biopsy, and the sample may not be  representative of the target lesion. The overall findings raise  the possibility of an underlying fatty liver disease.  Distinction between toxic (i.e. due to alcohol or medication),  metabolic (i.e. due to obesity), or other causes of fatty liver  injury cannot be made on histologic examination alone. The  presence of portal inflammation with bile ductular reaction also  suggests the possibility of a secondary etiology of liver  disease, such as chronic viral hepatitis,  immune-mediated causes, biliary, among others. Clinical,  serologic, and radiologic correlation is recommended.    Imaging Personally Reviewed:    Consultant(s) Notes Reviewed:  renal, urology    Care Discussed with Consultants/Other Providers: RN, SW, CM, ADS re overall care

## 2018-12-06 NOTE — PROGRESS NOTE ADULT - PROBLEM SELECTOR PLAN 1
likely d/t obstruction from prostate mass, appreciate renal and uro f/u -->B NT placed 12/5--> watch for postobstructive diuresis after  - f/u lytes  hematuria after B NT - stop heparin gtt (d/w wife) likely d/t obstruction from prostate mass, appreciate renal and uro f/u -->B NT placed 12/5--> watch for postobstructive diuresis after  - f/u lytes  hematuria after B NT - stop heparin gtt (d/w wife)  TOV tonight

## 2018-12-06 NOTE — DIETITIAN INITIAL EVALUATION ADULT. - ORAL INTAKE PTA
Per wife- patient with poor PO intake for about 3 weeks. Reported everything taste sour to the patient

## 2018-12-07 DIAGNOSIS — R13.10 DYSPHAGIA, UNSPECIFIED: ICD-10-CM

## 2018-12-07 LAB
BUN SERPL-MCNC: 45 MG/DL — HIGH (ref 7–23)
BUN SERPL-MCNC: 51 MG/DL — HIGH (ref 7–23)
CALCIUM SERPL-MCNC: 8 MG/DL — LOW (ref 8.4–10.5)
CALCIUM SERPL-MCNC: 8.4 MG/DL — SIGNIFICANT CHANGE UP (ref 8.4–10.5)
CHLORIDE SERPL-SCNC: 110 MMOL/L — HIGH (ref 98–107)
CHLORIDE SERPL-SCNC: 112 MMOL/L — HIGH (ref 98–107)
CO2 SERPL-SCNC: 21 MMOL/L — LOW (ref 22–31)
CO2 SERPL-SCNC: 22 MMOL/L — SIGNIFICANT CHANGE UP (ref 22–31)
CREAT SERPL-MCNC: 3.19 MG/DL — HIGH (ref 0.5–1.3)
CREAT SERPL-MCNC: 3.94 MG/DL — HIGH (ref 0.5–1.3)
GLUCOSE SERPL-MCNC: 120 MG/DL — HIGH (ref 70–99)
GLUCOSE SERPL-MCNC: 132 MG/DL — HIGH (ref 70–99)
HCT VFR BLD CALC: 37.9 % — LOW (ref 39–50)
HGB BLD-MCNC: 12.2 G/DL — LOW (ref 13–17)
MCHC RBC-ENTMCNC: 30 PG — SIGNIFICANT CHANGE UP (ref 27–34)
MCHC RBC-ENTMCNC: 32.2 % — SIGNIFICANT CHANGE UP (ref 32–36)
MCV RBC AUTO: 93.3 FL — SIGNIFICANT CHANGE UP (ref 80–100)
NRBC # FLD: 0 — SIGNIFICANT CHANGE UP
PLATELET # BLD AUTO: 139 K/UL — LOW (ref 150–400)
PMV BLD: 13.7 FL — HIGH (ref 7–13)
POTASSIUM SERPL-MCNC: 3.5 MMOL/L — SIGNIFICANT CHANGE UP (ref 3.5–5.3)
POTASSIUM SERPL-MCNC: 3.6 MMOL/L — SIGNIFICANT CHANGE UP (ref 3.5–5.3)
POTASSIUM SERPL-SCNC: 3.5 MMOL/L — SIGNIFICANT CHANGE UP (ref 3.5–5.3)
POTASSIUM SERPL-SCNC: 3.6 MMOL/L — SIGNIFICANT CHANGE UP (ref 3.5–5.3)
PSA FREE FLD-MCNC: >50 — SIGNIFICANT CHANGE UP
PSA FREE FLD-MCNC: SIGNIFICANT CHANGE UP
PSA FREE MFR FLD: 760.9 — SIGNIFICANT CHANGE UP
RBC # BLD: 4.06 M/UL — LOW (ref 4.2–5.8)
RBC # FLD: 15.9 % — HIGH (ref 10.3–14.5)
SODIUM SERPL-SCNC: 146 MMOL/L — HIGH (ref 135–145)
SODIUM SERPL-SCNC: 149 MMOL/L — HIGH (ref 135–145)
SPECIMEN SOURCE: SIGNIFICANT CHANGE UP
SPECIMEN SOURCE: SIGNIFICANT CHANGE UP
WBC # BLD: 9.39 K/UL — SIGNIFICANT CHANGE UP (ref 3.8–10.5)
WBC # FLD AUTO: 9.39 K/UL — SIGNIFICANT CHANGE UP (ref 3.8–10.5)

## 2018-12-07 PROCEDURE — 99233 SBSQ HOSP IP/OBS HIGH 50: CPT

## 2018-12-07 PROCEDURE — 99232 SBSQ HOSP IP/OBS MODERATE 35: CPT | Mod: GC

## 2018-12-07 RX ORDER — SODIUM CHLORIDE 9 MG/ML
1000 INJECTION, SOLUTION INTRAVENOUS
Qty: 0 | Refills: 0 | Status: DISCONTINUED | OUTPATIENT
Start: 2018-12-07 | End: 2018-12-08

## 2018-12-07 RX ADMIN — HEPARIN SODIUM 5000 UNIT(S): 5000 INJECTION INTRAVENOUS; SUBCUTANEOUS at 04:27

## 2018-12-07 RX ADMIN — SODIUM CHLORIDE 75 MILLILITER(S): 9 INJECTION, SOLUTION INTRAVENOUS at 04:26

## 2018-12-07 RX ADMIN — Medication 400 UNIT(S): at 17:23

## 2018-12-07 RX ADMIN — POLYETHYLENE GLYCOL 3350 17 GRAM(S): 17 POWDER, FOR SOLUTION ORAL at 17:24

## 2018-12-07 RX ADMIN — FINASTERIDE 5 MILLIGRAM(S): 5 TABLET, FILM COATED ORAL at 17:24

## 2018-12-07 RX ADMIN — MODAFINIL 100 MILLIGRAM(S): 200 TABLET ORAL at 17:22

## 2018-12-07 RX ADMIN — SODIUM CHLORIDE 125 MILLILITER(S): 9 INJECTION, SOLUTION INTRAVENOUS at 08:10

## 2018-12-07 NOTE — PROGRESS NOTE ADULT - PROBLEM SELECTOR PROBLEM 7
Dementia without behavioral disturbance, unspecified dementia type Atrial fibrillation, unspecified type

## 2018-12-07 NOTE — PROGRESS NOTE ADULT - ATTENDING COMMENTS
D/w wife at bedside ([phone]) D/w wife at bedside ([phone]).  howie, Hospice referral Monday

## 2018-12-07 NOTE — PROGRESS NOTE ADULT - PROBLEM SELECTOR PLAN 3
c/w CA but not proven yet, liver bx results pending, f/u uro outpt  f/u PSA  liver bx 11/26 steatosis, steatohepatitis, no evidence malignancy PSA very elevated, confirms dx of prostate CA  liver bx 11/26 steatosis, steatohepatitis, no evidence malignancy  - appreciate Dr. Scanlon's input - very debilitated, not a candidate for any Rx such as surgery or chemo.  D/w wife and she is agreeable to Hospice referral (d/w Hospice RN will evaluate pt on Monday).  Wife will d/w her sons re ADs.  Wife also referred to Medicaid office to begin process of applying for Medicaid, she feels she cannot take pt home. appreciate S&S eval, asp prec, dysphagia 2/nec thick

## 2018-12-07 NOTE — SWALLOW BEDSIDE ASSESSMENT ADULT - SWALLOW EVAL: RECOMMENDED FEEDING/EATING TECHNIQUES
crush medication (when feasible)/position upright (90 degrees)/allow for swallow between intakes/small sips/bites/maintain upright posture during/after eating for 30 mins

## 2018-12-07 NOTE — PROGRESS NOTE ADULT - SUBJECTIVE AND OBJECTIVE BOX
Patient is a 78y old  Male who presents with a chief complaint of paige, hypernatremia (06 Dec 2018 19:14)    SUBJECTIVE / OVERNIGHT EVENTS:  Asking for cold water.      MEDICATIONS  (STANDING):  atorvastatin 20 milliGRAM(s) Oral at bedtime  buDESOnide  80 MICROgram(s)/formoterol 4.5 MICROgram(s) Inhaler 2 Puff(s) Inhalation two times a day  cholecalciferol 400 Unit(s) Oral daily  dextrose 5%. 1000 milliLiter(s) (125 mL/Hr) IV Continuous <Continuous>  finasteride 5 milliGRAM(s) Oral daily  heparin  Injectable 5000 Unit(s) SubCutaneous every 12 hours  lactulose Syrup 10 Gram(s) Oral at bedtime  mirtazapine 7.5 milliGRAM(s) Oral at bedtime  modafinil 100 milliGRAM(s) Oral daily  polyethylene glycol 3350 17 Gram(s) Oral daily  tamsulosin 0.4 milliGRAM(s) Oral at bedtime  tiotropium 18 MICROgram(s) Capsule 1 Capsule(s) Inhalation daily    MEDICATIONS  (PRN):  acetaminophen   Tablet .. 650 milliGRAM(s) Oral every 8 hours PRN Mild Pain (1 - 3), Moderate Pain (4 - 6)  ALBUTerol    0.083% 2.5 milliGRAM(s) Nebulizer every 6 hours PRN Shortness of Breath and/or Wheezing    CAPILLARY BLOOD GLUCOSE  POCT Blood Glucose.: 110 mg/dL (06 Dec 2018 15:48)    I&O's Summary    06 Dec 2018 07:01  -  07 Dec 2018 07:00  --------------------------------------------------------  IN: 825 mL / OUT: 2750 mL / NET: -1925 mL    07 Dec 2018 07:01  -  07 Dec 2018 09:44  --------------------------------------------------------  IN: 0 mL / OUT: 350 mL / NET: -350 mL    Vital Signs Last 24 Hrs  T(C): 36.3 (07 Dec 2018 08:20), Max: 36.8 (06 Dec 2018 10:05)  T(F): 97.3 (07 Dec 2018 08:20), Max: 98.3 (06 Dec 2018 10:05)  HR: 100 (07 Dec 2018 08:20) (66 - 100)  BP: 111/69 (07 Dec 2018 08:20) (110/64 - 120/82)  RR: 18 (07 Dec 2018 08:20) (17 - 18)  SpO2: 98% (07 Dec 2018 08:20) (95% - 100%)    PHYSICAL EXAM:  GENERAL: elderly WM lying flat in bed with nasal bipap on, very sleepy  HEAD:  Atraumatic, Normocephalic  EYES: EOMI, PERRLA, conjunctiva and sclera clear  NECK: Supple, No JVD  CHEST/LUNG: Clear to auscultation bilaterally; No wheeze  HEART: Regular rate and rhythm; No murmurs, rubs, or gallops  ABDOMEN: Soft, Nontender, Nondistended; Bowel sounds present  EXTREMITIES:  2+ Peripheral Pulses, No clubbing, cyanosis, or edema  PSYCH: sleepy  NEUROLOGY: non-focal  SKIN: No rashes or lesions  +shah/B NT L punch urine, R NT clear    LABS:                        12.2   9.39  )-----------( 139      ( 07 Dec 2018 05:20 )             37.9     149<H>  |  112<H>  |  51<H>  ----------------------------<  120<H>  3.6   |  21<L>  |  3.94<H>    Ca    8.4      07 Dec 2018 05:20    PTT - ( 06 Dec 2018 15:53 )  PTT:38.8 SEC    RADIOLOGY & ADDITIONAL TESTS:    Imaging Personally Reviewed:    Consultant(s) Notes Reviewed:  renal    Care Discussed with Consultants/Other Providers: RN, SW, CM, ADS re overall care Patient is a 78y old  Male who presents with a chief complaint of paige, hypernatremia (06 Dec 2018 19:14)    SUBJECTIVE / OVERNIGHT EVENTS:  Asking for cold water.  Sleeping most of time.  Denies pain.  Said hello to wife and bedside, then quickly fell back to sleep.    MEDICATIONS  (STANDING):  atorvastatin 20 milliGRAM(s) Oral at bedtime  buDESOnide  80 MICROgram(s)/formoterol 4.5 MICROgram(s) Inhaler 2 Puff(s) Inhalation two times a day  cholecalciferol 400 Unit(s) Oral daily  dextrose 5%. 1000 milliLiter(s) (125 mL/Hr) IV Continuous <Continuous>  finasteride 5 milliGRAM(s) Oral daily  heparin  Injectable 5000 Unit(s) SubCutaneous every 12 hours  lactulose Syrup 10 Gram(s) Oral at bedtime  mirtazapine 7.5 milliGRAM(s) Oral at bedtime  modafinil 100 milliGRAM(s) Oral daily  polyethylene glycol 3350 17 Gram(s) Oral daily  tamsulosin 0.4 milliGRAM(s) Oral at bedtime  tiotropium 18 MICROgram(s) Capsule 1 Capsule(s) Inhalation daily    MEDICATIONS  (PRN):  acetaminophen   Tablet .. 650 milliGRAM(s) Oral every 8 hours PRN Mild Pain (1 - 3), Moderate Pain (4 - 6)  ALBUTerol    0.083% 2.5 milliGRAM(s) Nebulizer every 6 hours PRN Shortness of Breath and/or Wheezing    CAPILLARY BLOOD GLUCOSE  POCT Blood Glucose.: 110 mg/dL (06 Dec 2018 15:48)    I&O's Summary    06 Dec 2018 07:01  -  07 Dec 2018 07:00  --------------------------------------------------------  IN: 825 mL / OUT: 2750 mL / NET: -1925 mL    07 Dec 2018 07:01  -  07 Dec 2018 09:44  --------------------------------------------------------  IN: 0 mL / OUT: 350 mL / NET: -350 mL    Vital Signs Last 24 Hrs  T(C): 36.3 (07 Dec 2018 08:20), Max: 36.8 (06 Dec 2018 10:05)  T(F): 97.3 (07 Dec 2018 08:20), Max: 98.3 (06 Dec 2018 10:05)  HR: 100 (07 Dec 2018 08:20) (66 - 100)  BP: 111/69 (07 Dec 2018 08:20) (110/64 - 120/82)  RR: 18 (07 Dec 2018 08:20) (17 - 18)  SpO2: 98% (07 Dec 2018 08:20) (95% - 100%)    PHYSICAL EXAM:  GENERAL: elderly WM lying flat in bed with nasal bipap on, very sleepy  HEAD:  Atraumatic, Normocephalic  EYES: EOMI, PERRLA, conjunctiva and sclera clear  NECK: Supple, No JVD  CHEST/LUNG: Clear to auscultation bilaterally; No wheeze  HEART: Regular rate and rhythm; No murmurs, rubs, or gallops  ABDOMEN: Soft, Nontender, Nondistended; Bowel sounds present  EXTREMITIES:  2+ Peripheral Pulses, No clubbing, cyanosis, or edema  PSYCH: sleepy  NEUROLOGY: non-focal  SKIN: No rashes or lesions  +shah/B NT L punch urine    LABS:                        12.2   9.39  )-----------( 139      ( 07 Dec 2018 05:20 )             37.9     149<H>  |  112<H>  |  51<H>  ----------------------------<  120<H>  3.6   |  21<L>  |  3.94<H>    Ca    8.4      07 Dec 2018 05:20    PTT - ( 06 Dec 2018 15:53 )  PTT:38.8 SEC    RADIOLOGY & ADDITIONAL TESTS:    PSA Profile - Total and Free (12.04.18 @ 18:09)  Total PSA: 760.90: REF RANGE: 0.00-4.00 ng/mL  Free PSA >50    Imaging Personally Reviewed:    Consultant(s) Notes Reviewed:  renal    Care Discussed with Consultants/Other Providers: RN, SW, CM, ADS re overall care

## 2018-12-07 NOTE — SWALLOW BEDSIDE ASSESSMENT ADULT - DIET PRIOR TO ADMI
regular solids with thin liquids, according to patient's wife
regular solids with thin liquids, as per wife

## 2018-12-07 NOTE — SWALLOW BEDSIDE ASSESSMENT ADULT - SWALLOW EVAL: RECOMMENDED DIET
1) Defer nutrition plan to MD at this time given patient refusal of PO trials 2/2 c/o nausea; 2) As per discussion with ADS, this department to reattempt evaluation as schedule permits
1) Dysphagia 2 (Mechanical Soft) with Nectar-Thick Liquids; 2) Nutrition consult to determine daily nutritional needs given poor PO intake

## 2018-12-07 NOTE — PROGRESS NOTE ADULT - SUBJECTIVE AND OBJECTIVE BOX
[facility] Division of Kidney Diseases & Hypertension  FOLLOW UP NOTE  [phone]--------------------------------------------------------------------------------    HPI: 78 year old male with h/o dementia, A.MATT downing was sent from [facility] to [facility] due to abnormal labs. Labs done in ER shows elevated creatinine and sodium and hence nephrology was consulted. On review of previous labs in [place], Scr. has been WNL with last Scr. prior to admission being 0.87 on 10/23/18. However labs done in ER showed elevated Scr. of 5 on 12/3/18 which worsened to 5.63 on 12/4/18. CT abdomen and pelvis showed bilateral hydronephrosis which has slightly increased compared to previous CT scan. Patient had bilateral PCN tube placed on 12/5/18 and Scr. improving.     Patient seen and examined today at bedside. Patient is awake and appears comfortable.       PAST HISTORY  --------------------------------------------------------------------------------  No significant changes to PMH, PSH, FHx, SHx, unless otherwise noted    ALLERGIES & MEDICATIONS  --------------------------------------------------------------------------------  Allergies    No Known Drug Allergies  zosyn (Drowsiness)    Intolerances      Standing Inpatient Medications  atorvastatin 20 milliGRAM(s) Oral at bedtime  buDESOnide  80 MICROgram(s)/formoterol 4.5 MICROgram(s) Inhaler 2 Puff(s) Inhalation two times a day  cholecalciferol 400 Unit(s) Oral daily  dextrose 5%. 1000 milliLiter(s) IV Continuous <Continuous>  finasteride 5 milliGRAM(s) Oral daily  lactulose Syrup 10 Gram(s) Oral at bedtime  mirtazapine 7.5 milliGRAM(s) Oral at bedtime  modafinil 100 milliGRAM(s) Oral daily  polyethylene glycol 3350 17 Gram(s) Oral daily  tamsulosin 0.4 milliGRAM(s) Oral at bedtime  tiotropium 18 MICROgram(s) Capsule 1 Capsule(s) Inhalation daily    PRN Inpatient Medications  acetaminophen   Tablet .. 650 milliGRAM(s) Oral every 8 hours PRN  ALBUTerol    0.083% 2.5 milliGRAM(s) Nebulizer every 6 hours PRN      REVIEW OF SYSTEMS  --------------------------------------------------------------------------------  Unable to obtain.    VITALS/PHYSICAL EXAM  --------------------------------------------------------------------------------  T(C): 36.6 (12-07-18 @ 12:20), Max: 36.6 (12-07-18 @ 12:20)  HR: 72 (12-07-18 @ 12:20) (66 - 100)  BP: 124/72 (12-07-18 @ 12:20) (110/64 - 124/72)  RR: 18 (12-07-18 @ 12:20) (17 - 18)  SpO2: 97% (12-07-18 @ 12:20) (95% - 100%)  Wt(kg): --        12-06-18 @ 07:01  -  12-07-18 @ 07:00  --------------------------------------------------------  IN: 825 mL / OUT: 2750 mL / NET: -1925 mL    12-07-18 @ 07:01  -  12-07-18 @ 18:10  --------------------------------------------------------  IN: 0 mL / OUT: 650 mL / NET: -650 mL      Physical Exam:  	  Gen: Elderly male, NAD  	HEENT: no JVD  	Pulm: CTA B/L  	CV:  S1S2  	Abd: +BS, soft,               : shah catheter present; B/L PCN tube present    	Ext: No B/L Lower ext edema  	Neuro: Confused.   	Skin: Warm, without rashes      LABS/STUDIES  --------------------------------------------------------------------------------              12.2   9.39  >-----------<  139      [12-07-18 @ 05:20]              37.9     149  |  112  |  51  ----------------------------<  120      [12-07-18 @ 05:20]  3.6   |  21  |  3.94        Ca     8.4     [12-07-18 @ 05:20]        PTT: 38.8       [12-06-18 @ 15:53]      Creatinine Trend:  SCr 3.94 [12-07 @ 05:20]  SCr 4.66 [12-06 @ 18:20]  SCr 5.64 [12-06 @ 06:30]  SCr 6.23 [12-05 @ 08:35]  SCr 5.96 [12-04 @ 20:10]    Urinalysis - [12-03-18 @ 15:30]      Color YELLOW / Appearance Lt TURBID / SG 1.015 / pH 5.5      Gluc NEGATIVE / Ketone NEGATIVE  / Bili NEGATIVE / Urobili NORMAL       Blood MODERATE / Protein 20 / Leuk Est MODERATE / Nitrite NEGATIVE      RBC 3-5 / WBC 6-10 / Hyaline 1+ / Gran  / Sq Epi FEW / Non Sq Epi  / Bacteria SMALL    Urine Creatinine 103.40      [12-04-18 @ 01:54]  Urine Sodium 50      [12-04-18 @ 01:54]  Urine Potassium 30.1      [12-04-18 @ 01:54]  Urine Chloride 44      [12-04-18 @ 01:54]  Urine Osmolality 330      [12-04-18 @ 01:54]

## 2018-12-07 NOTE — PROGRESS NOTE ADULT - PROBLEM SELECTOR PLAN 4
increase D5W to 125, f/u increase D5W to 125, PO fluids as tolerates, f/u more confused in setting acute illness  - treating underlying cause  avoid BZD/Ambien/anticholinergics, day/night cues, supportive care

## 2018-12-07 NOTE — SWALLOW BEDSIDE ASSESSMENT ADULT - COMMENTS
Patient is a 78 year old male with PMHx of COPD, BRIEN on Bipap, ILD, GERD, Afib on eliquis, dementia (a/o x2, follows commands, mostly lucid) prostate mass and BL hydronephrosis with possible liver mets (liver biopsy taken Nov 26th still pending), recurrent UTIs now with indwelling shah. Sent from [facility] for progressively worsening anorexia in setting of hypernatremia. CT abd with mostly unchanged prostate mass extending into posterior wall and  rectum. Slightly increased BL hydronephrosis. Shah exchanged in ed with 150cc drained, currently 300cc collected. UA not suggestive of UTI, pt hemodynamically stable with normal wbc. Of note, pt reports limited po intake to both solids and fluids over last several weeks because "of funny or sour taste" despite being frequently thirsty. Denies abd pain, nausea, vomiting, constipation, dysphagia or odynophagia.    Patient was received asleep yet arousable to verbal/tactile stimuli. Home CPAP machine in place as patient reportedly uses device while sleeping. CPAP removed by patient's wife. Patient immediately became agitated, stating "let me sleep" and initially refusing PO trials. With maximal encouragement from clinician and patient's wife, patient accepted limited PO trials of puree, mechanical soft, nectar-thick and thin liquids (1-2 trials each) which were administered by patient's wife. Recommendations were discussed with patient's wife and NP (Mervat). Patient is a 78 year old male with PMHx of COPD, BRIEN on Bipap, ILD, GERD, Afib on eliquis, dementia (a/o x2, follows commands, mostly lucid) prostate mass and BL hydronephrosis with possible liver mets (liver biopsy taken Nov 26th still pending), recurrent UTIs now with indwelling shah. Sent from [facility] for progressively worsening anorexia in setting of hypernatremia. CT abd with mostly unchanged prostate mass extending into posterior wall and  rectum. Slightly increased BL hydronephrosis. Shah exchanged in ed with 150cc drained, currently 300cc collected. UA not suggestive of UTI, pt hemodynamically stable with normal wbc. Of note, pt reports limited po intake to both solids and fluids over last several weeks because "of funny or sour taste" despite being frequently thirsty. Denies abd pain, nausea, vomiting, constipation, dysphagia or odynophagia.    Patient was received asleep yet arousable to verbal/tactile stimuli this PM. Home CPAP machine in place as patient reportedly uses device while sleeping. CPAP removed by patient's wife. Patient immediately became agitated, stating "let me sleep" and initially refusing PO trials. With maximal encouragement from clinician and patient's wife, patient accepted limited PO trials of puree, mechanical soft, nectar-thick and thin liquids (1-2 trials each) which were administered by patient's wife. Recommendations were discussed with patient's wife and NP (Mervat).

## 2018-12-07 NOTE — PROGRESS NOTE ADULT - PROBLEM SELECTOR PLAN 2
Patient with hypernatremia in setting of decreased free water intake. Latest serum sodium remains elevated at 149 today. Continue with IV D5W at current rate. Monitor serum sodium level.

## 2018-12-07 NOTE — CONSULT NOTE ADULT - ASSESSMENT
78M with multiple and significant medical problems and a severely compromised PS for several months, has a tumor likely from the prostate with bladder and ureteric issues, liver Bx shows steatohepatitis, no malignancy. Discussed the results and issues in great detail with his wife and answered questions. Even if there is a malignancy diagnosed, he will not be a candidate for any Rx such as surgery or chemo. The role of palliative care and comfort care discussed. Told her to disci=uss issue with her son and then make a decisin and let us know,  Will recommend:   - continue Rx as per medicine, renal  - inflatable stockings for DVT prophylaxis at this time  - await family decision on palliative care  - overall prognosis is poor

## 2018-12-07 NOTE — PROGRESS NOTE ADULT - PROBLEM SELECTOR PLAN 7
dx 2y ago, wife says more confused in setting acute illness  avoid BZD/Ambien/anticholinergics, day/night cues, supportive care Eliquis and hep gtt stopped given bleeding from NT and overall poor prognosis, risks of a/c outweigh benefits

## 2018-12-07 NOTE — CONSULT NOTE ADULT - SUBJECTIVE AND OBJECTIVE BOX
Patient is a 78y old  Male who presents with a chief complaint of paige, hypernatremia (06 Dec 2018 19:14), has been debilitated for a few months, was seen in the office a couple of times for slight lymphocytosis, CT abdomen in may, 2018 was unremarkable, here with renal failure and and hydronephrosis, s/p nephrostomy placement, s/p bx of a liver lesion.      PAST MEDICAL & SURGICAL HISTORY:  COPD (chronic obstructive pulmonary disease)  Bundle branch block, right  Pulmonary fibrosis  Dementia  Atrial fibrillation  GERD (gastroesophageal reflux disease)  Recurrent UTI  Hyperlipidemia  Obstructive sleep apnea on CPAP  H/O small bowel obstruction  History of appendectomy      Meds:  acetaminophen   Tablet .. 650 milliGRAM(s) Oral every 8 hours PRN  ALBUTerol    0.083% 2.5 milliGRAM(s) Nebulizer every 6 hours PRN  atorvastatin 20 milliGRAM(s) Oral at bedtime  buDESOnide  80 MICROgram(s)/formoterol 4.5 MICROgram(s) Inhaler 2 Puff(s) Inhalation two times a day  cholecalciferol 400 Unit(s) Oral daily  dextrose 5%. 1000 milliLiter(s) IV Continuous <Continuous>  finasteride 5 milliGRAM(s) Oral daily  heparin  Injectable 5000 Unit(s) SubCutaneous every 12 hours  lactulose Syrup 10 Gram(s) Oral at bedtime  mirtazapine 7.5 milliGRAM(s) Oral at bedtime  modafinil 100 milliGRAM(s) Oral daily  polyethylene glycol 3350 17 Gram(s) Oral daily  tamsulosin 0.4 milliGRAM(s) Oral at bedtime  tiotropium 18 MICROgram(s) Capsule 1 Capsule(s) Inhalation daily      Allergies:  No Known Drug Allergies  zosyn (Drowsiness)      FAMILY HISTORY:  Family history of colon cancer (Sibling)  Family history of myocardial infarction (Sibling)      Vital Signs Last 24 Hrs  T(C): 36.3 (07 Dec 2018 08:20), Max: 36.8 (06 Dec 2018 10:05)  T(F): 97.3 (07 Dec 2018 08:20), Max: 98.3 (06 Dec 2018 10:05)  HR: 100 (07 Dec 2018 08:20) (66 - 100)  BP: 111/69 (07 Dec 2018 08:20) (110/64 - 120/82)  BP(mean): --  RR: 18 (07 Dec 2018 08:20) (17 - 18)  SpO2: 98% (07 Dec 2018 08:20) (95% - 100%)                          12.2   9.39  )-----------( 139      ( 07 Dec 2018 05:20 )             37.9       12-07    149<H>  |  112<H>  |  51<H>  ----------------------------<  120<H>  3.6   |  21<L>  |  3.94<H>    Ca    8.4      07 Dec 2018 05:20        PTT - ( 06 Dec 2018 15:53 )  PTT:38.8 SEC      Path: Cytopathology - Non Gyn Report:   ACCESSION No:  [id number]    SATINDER HARPER                        2        Cytopathology Report            Final Diagnosis  LIVER, CT GUIDED CORE BIOPSY  - Marked macrovesicular steatosis with mild steatohepatitis,  see note    Microscopic description: The liver biopsy is partially fragmented  with cores that show overall intact architecture with portal and  focal periportal fibrosis (confirmed by trichrome stain). There  is a patchy, mild chronic portal inflammation composed of  lymphocytes, plasma cells, and rare eosinophils with focal, mild  interface activity and ductular reaction. Interlobular bile ducts  appear intact. Hepatic arteries, portal veins, and central veins  appear patent. The lobular parenchyma shows moderate to marked  macrovesicular steatosis (involving approximately 70%). Focal,  rare ballooning degeneration and poorly-formed Sarai-Denk  bodies are identified. There is mild lobular inflammation  composed of lymphocytes and scattered neutrophils. Reticulin  stain shows mild regenerative changes. Mild iron deposition is  seen within hepatocytes and Kupffer cells. DPAS stain is negative  for intracytoplasmic hyaline globules. The touch preps show  clusters of reactive hepatocytes.    Interpretation: No definite features of a malignancy are  identified in this biopsy, and the sample may not be  representative of the target lesion. The overall findings raise  the possibility of an underlying fatty liver disease.  Distinction between toxic (i.e. due to alcohol or medication),  metabolic (i.e. due to obesity), or other causes of fatty liver  injury cannot be made on histologic examination alone. The  presence of portal inflammation with bile ductular reaction also  suggests the possibility of a secondary etiology of liver  disease, such as chronic viral hepatitis,  immune-mediated causes, biliary, among others. Clinical,  serologic, and radiologic correlation is recommended.    Screened by: Tracy JACKSON(ASCP)  Verified by: Faviola Box MD  (Electronic Signature)  Reported on: 12/04/18 11:06 EST, [address] [place] [address]  [id number]  Cytology technical processing performed at [address] [place] [address]  _________________________________________________________________                SATINDER HARPER                        2        Cytopathology Report            Specimen(s) Submitted  LIVER, CT GUIDED CORE BIOPSY      Statement of Adequacy  Immediate cytologic study for adequacy of specimen using a Diff-  Quik stain was performed at [facility], [address] [place] [address]. There is  insufficient diagnostic material for adequate evaluation by  MANUEL Mccallum(ASCP). After evaluation of all subsequent  material, the specimen is deemed to be adequate.      Clinical Information  1.5 cm liver lesion. Questionable prostate cancer, prostate mass.      Gross Description  # Passes: [phone]   Collection Date/Time : 11/26/18 1045    Core Biopsy:  Tissue collected:  3 # tan cores /blood clots measuring 1- 0.5 cm in length  (with fragments < 0.3cm)  Submitted: core biopsy in 1 cassette(s) labeled 1B  Entire specimen submitted in the above cassette(s). Specimen  label has been inspected to confirm patient’s name and date of  birth.    10 Touch prep slides ( 5 air dried + 5 fixed)    FNA:  No material submitted for cell block (1A)    Prepared:  10 Touch Prep,  1 core biopsy (2 slides) labeled 1B  12  Total slides (11.26.18 @ 14:39)      CT a/p: IMPRESSION:   Heterogeneous prostate mass again noted with extension into the posterior   bladder wall and possibly the rectum. Associated bilateral moderate   hydroureteronephrosis is slightly increased.    Small focus of hemorrhage within the urinary bladder, new from 10/18/2018.    Previously described liver lesion is poorly defined, with associated   droplet of gas, likely from recent biopsy.    Fibrotic changes again noted at the lung bases.              CARMEN GRAY M.D., RADIOLOGY RESIDENT  This document has been electronically signed.  AMBER HAMMOND M.D., ATTENDING RADIOLOGIST  This document has been electronically signed. Dec  3 2018  7:38PM

## 2018-12-07 NOTE — SWALLOW BEDSIDE ASSESSMENT ADULT - SWALLOW EVAL: DIAGNOSIS
Patient demonstrated oropharyngeal dysphagia for limited trials of puree, mechanical soft, nectar-thick and thin liquids (x1-2 trials each) characterized by inconsistent oral acceptance, reduced labial seal with subsequent anterior loss of thin liquid, delayed bolus collection and delayed anterior-posterior transfer. Pharyngeal stage was characterized by a delayed swallow trigger with reduced hyolaryngeal elevation upon palpation. Immediate throat clear noted post swallow with thin liquid suggestive of laryngeal penetration vs aspiration. No overt, clinical s/s noted with puree, mechanical soft and nectar-thick liquids. Patient demonstrated oropharyngeal dysphagia for limited trials of puree, mechanical soft, nectar-thick and thin liquids (x1-2 trials each) characterized by inconsistent oral acceptance, reduced labial stripping of bolus from utensil, decreased labial seal with subsequent anterior loss of thin liquid (improved oral containment of nectar/puree/mech soft), delayed bolus collection and delayed anterior-posterior transfer. Pharyngeal stage was characterized by a delayed swallow trigger with reduced hyolaryngeal elevation upon palpation. Immediate throat clear noted post swallow with thin liquid suggestive of laryngeal penetration vs aspiration. No overt, clinical s/s noted with puree, mechanical soft and nectar-thick liquids. Unable to assess regular solids 2/2 patient refusal.

## 2018-12-07 NOTE — PROGRESS NOTE ADULT - PROBLEM SELECTOR PLAN 1
Patient with COLUMBA in setting of obstructive uropathy and decreased PO intake. On review of previous labs, Sc.r has been WNL. However labs done in ER showed Sc.r of 5 which has worsened to 5.63 on 12/4/18. CT abdomen and pelvis done on 12/3 shows bilateral hydronephrosis which has slightly increased compared to previous CT scan. Patient had bilateral PCN placed by IR on 12/5/18. Latest Scr. has improved to 3.94 today. Patient is non oliguric. Patient with COLUMBA from obstructive uropathy. Continue with IV hydration. Monitor BMP daily. Avoid NSAID and other nephrotoxins.

## 2018-12-07 NOTE — SWALLOW BEDSIDE ASSESSMENT ADULT - ORAL PREPARATORY PHASE
Inconsistent oral acceptance; Weak labial stripping of bolus from utensil; Reduced labial seal; Delayed bolus collection Inconsistent oral acceptance; Reduced labial stripping of bolus from utensil; Delayed bolus collection Inconsistent oral acceptance; Reduced labial stripping of bolus from utensil; Reduced labial seal; Anterior loss; Delayed bolus collection

## 2018-12-07 NOTE — PROGRESS NOTE ADULT - PROBLEM SELECTOR PLAN 5
c/w bipap anytime sleeping, nebs prn, ipratropium, laba/steriod increase D5W to 125, PO fluids as tolerates, f/u more confused in setting acute illness  - treating underlying cause  avoid BZD/Ambien/anticholinergics, day/night cues, supportive care

## 2018-12-07 NOTE — PROGRESS NOTE ADULT - PROBLEM SELECTOR PLAN 1
likely d/t obstruction from prostate mass, appreciate renal and uro f/u -->B NT placed 12/5--> watch for postobstructive diuresis after  - Na increasing --> inc D5W to 125, encourage PO fluids as pt wakes up  hematuria after B NT - stop heparin gtt (d/w wife)  TOV ongoing d/t obstruction from prostate mass, appreciate renal and uro f/u -->B NT placed 12/5--> watch for postobstructive diuresis after  - Na increasing --> inc D5W to 125, encourage PO fluids as pt wakes up  hematuria after B NT - stop heparin gtt (d/w wife)  - d/c shah, f/u

## 2018-12-07 NOTE — PROGRESS NOTE ADULT - PROBLEM SELECTOR PLAN 2
more confused in setting acute illness  - treating underlying cause  avoid BZD/Ambien/anticholinergics, day/night cues, supportive care PSA very elevated, confirms dx of prostate CA  liver bx 11/26 steatosis, steatohepatitis, no evidence malignancy  - appreciate Dr. Scanlon's input - very debilitated, not a candidate for any Rx such as surgery or chemo.  D/w wife and she is agreeable to Hospice referral (d/w Hospice RN will evaluate pt on Monday).  Wife will d/w her sons re ADs.  Wife also referred to Medicaid office to begin process of applying for Medicaid, she feels she cannot take pt home.

## 2018-12-07 NOTE — PROGRESS NOTE ADULT - PROBLEM SELECTOR PLAN 6
Eliquis held  - stopped hep gtt given hematuria (d/w wife) Eliquis and hep gtt stopped given bleeding from NT and overall poor prognosis, risks of a/c outweigh benefits c/w bipap anytime sleeping, nebs prn, ipratropium, laba/steriod

## 2018-12-07 NOTE — PROGRESS NOTE ADULT - ASSESSMENT
78M COPD, BRIEN on Bipap, ILD/pulm fibrosis, GERD, Afib on eliquis, dementia (a/o x2, follows commands, mostly lucid) prostate mass and BL hydronephrosis with possible liver mets (liver biopsy taken Nov 26th still pending), recurrent UTIs now with indwelling shah a/w COLUMBA, hypernatremia, anorexia, found to have prostate mass with extension into posterior bladder wall and possibly rectum with mod B hydroureteronephrosis 78M 3rd hospitalization since Sept, COPD, BRIEN on Bipap, ILD/pulm fibrosis, GERD, Afib on eliquis, dementia (a/o x2, follows commands, mostly lucid) prostate mass and BL hydronephrosis with possible liver mets (liver biopsy taken Nov 26th still pending), recurrent UTIs now with indwelling shah a/w COLUMBA, hypernatremia, anorexia, found to have prostate mass with extension into posterior bladder wall and possibly rectum with mod B hydroureteronephrosis.  PSA very elevated confirming prostate CA diagnosis

## 2018-12-08 DIAGNOSIS — R16.0 HEPATOMEGALY, NOT ELSEWHERE CLASSIFIED: ICD-10-CM

## 2018-12-08 DIAGNOSIS — E87.6 HYPOKALEMIA: ICD-10-CM

## 2018-12-08 LAB
BACTERIA UR CULT: SIGNIFICANT CHANGE UP
BACTERIA UR CULT: SIGNIFICANT CHANGE UP
BUN SERPL-MCNC: 37 MG/DL — HIGH (ref 7–23)
CALCIUM SERPL-MCNC: 8.2 MG/DL — LOW (ref 8.4–10.5)
CHLORIDE SERPL-SCNC: 108 MMOL/L — HIGH (ref 98–107)
CO2 SERPL-SCNC: 24 MMOL/L — SIGNIFICANT CHANGE UP (ref 22–31)
CREAT SERPL-MCNC: 2.52 MG/DL — HIGH (ref 0.5–1.3)
GLUCOSE SERPL-MCNC: 116 MG/DL — HIGH (ref 70–99)
HCT VFR BLD CALC: 39.7 % — SIGNIFICANT CHANGE UP (ref 39–50)
HGB BLD-MCNC: 12.7 G/DL — LOW (ref 13–17)
MCHC RBC-ENTMCNC: 30.1 PG — SIGNIFICANT CHANGE UP (ref 27–34)
MCHC RBC-ENTMCNC: 32 % — SIGNIFICANT CHANGE UP (ref 32–36)
MCV RBC AUTO: 94.1 FL — SIGNIFICANT CHANGE UP (ref 80–100)
NRBC # FLD: 0.02 — SIGNIFICANT CHANGE UP
PLATELET # BLD AUTO: 134 K/UL — LOW (ref 150–400)
PMV BLD: 13.8 FL — HIGH (ref 7–13)
POTASSIUM SERPL-MCNC: 3 MMOL/L — LOW (ref 3.5–5.3)
POTASSIUM SERPL-SCNC: 3 MMOL/L — LOW (ref 3.5–5.3)
RBC # BLD: 4.22 M/UL — SIGNIFICANT CHANGE UP (ref 4.2–5.8)
RBC # FLD: 15.9 % — HIGH (ref 10.3–14.5)
SODIUM SERPL-SCNC: 144 MMOL/L — SIGNIFICANT CHANGE UP (ref 135–145)
WBC # BLD: 9.33 K/UL — SIGNIFICANT CHANGE UP (ref 3.8–10.5)
WBC # FLD AUTO: 9.33 K/UL — SIGNIFICANT CHANGE UP (ref 3.8–10.5)

## 2018-12-08 PROCEDURE — 99232 SBSQ HOSP IP/OBS MODERATE 35: CPT

## 2018-12-08 RX ORDER — SODIUM CHLORIDE 9 MG/ML
1000 INJECTION, SOLUTION INTRAVENOUS
Qty: 0 | Refills: 0 | Status: DISCONTINUED | OUTPATIENT
Start: 2018-12-08 | End: 2018-12-11

## 2018-12-08 RX ORDER — POTASSIUM CHLORIDE 20 MEQ
10 PACKET (EA) ORAL
Qty: 0 | Refills: 0 | Status: COMPLETED | OUTPATIENT
Start: 2018-12-08 | End: 2018-12-08

## 2018-12-08 RX ADMIN — FINASTERIDE 5 MILLIGRAM(S): 5 TABLET, FILM COATED ORAL at 12:17

## 2018-12-08 RX ADMIN — Medication 100 MILLIEQUIVALENT(S): at 15:23

## 2018-12-08 RX ADMIN — Medication 400 UNIT(S): at 12:17

## 2018-12-08 RX ADMIN — MODAFINIL 100 MILLIGRAM(S): 200 TABLET ORAL at 12:17

## 2018-12-08 RX ADMIN — BUDESONIDE AND FORMOTEROL FUMARATE DIHYDRATE 2 PUFF(S): 160; 4.5 AEROSOL RESPIRATORY (INHALATION) at 11:24

## 2018-12-08 RX ADMIN — LACTULOSE 10 GRAM(S): 10 SOLUTION ORAL at 23:05

## 2018-12-08 RX ADMIN — SODIUM CHLORIDE 125 MILLILITER(S): 9 INJECTION, SOLUTION INTRAVENOUS at 05:10

## 2018-12-08 RX ADMIN — Medication 100 MILLIEQUIVALENT(S): at 17:31

## 2018-12-08 RX ADMIN — SODIUM CHLORIDE 125 MILLILITER(S): 9 INJECTION, SOLUTION INTRAVENOUS at 12:14

## 2018-12-08 RX ADMIN — SODIUM CHLORIDE 75 MILLILITER(S): 9 INJECTION, SOLUTION INTRAVENOUS at 21:31

## 2018-12-08 RX ADMIN — MIRTAZAPINE 7.5 MILLIGRAM(S): 45 TABLET, ORALLY DISINTEGRATING ORAL at 23:05

## 2018-12-08 RX ADMIN — Medication 650 MILLIGRAM(S): at 04:52

## 2018-12-08 RX ADMIN — TAMSULOSIN HYDROCHLORIDE 0.4 MILLIGRAM(S): 0.4 CAPSULE ORAL at 23:04

## 2018-12-08 RX ADMIN — Medication 650 MILLIGRAM(S): at 01:38

## 2018-12-08 RX ADMIN — Medication 100 MILLIEQUIVALENT(S): at 16:28

## 2018-12-08 RX ADMIN — POLYETHYLENE GLYCOL 3350 17 GRAM(S): 17 POWDER, FOR SOLUTION ORAL at 12:13

## 2018-12-08 RX ADMIN — TIOTROPIUM BROMIDE 1 CAPSULE(S): 18 CAPSULE ORAL; RESPIRATORY (INHALATION) at 11:25

## 2018-12-08 NOTE — PROGRESS NOTE ADULT - PROBLEM SELECTOR PLAN 1
d/t obstruction from prostate mass, appreciate renal and uro f/u -->B NT placed 12/5--> monitoring for postobstructive diuresis after  hematuria after B NT - stop heparin gtt (d/w wife)

## 2018-12-08 NOTE — PROGRESS NOTE ADULT - PROBLEM SELECTOR PLAN 2
PSA very elevated, confirms dx of prostate CA  liver bx 11/26 steatosis, steatohepatitis, no evidence malignancy  - appreciate Dr. Scanlon's input - very debilitated, not a candidate for any Rx such as surgery or chemo.  D/w wife and she is agreeable to Hospice referral (d/w Hospice RN will evaluate pt on Monday).  Wife will d/w her sons re ADs.  Wife also referred to Medicaid office to begin process of applying for Medicaid, she feels she cannot take pt home.

## 2018-12-08 NOTE — PROGRESS NOTE ADULT - ASSESSMENT
78M 3rd hospitalization since Sept, COPD, BRIEN on Bipap, ILD/pulm fibrosis, GERD, Afib on eliquis, dementia (a/o x2, follows commands, mostly lucid) prostate mass and BL hydronephrosis with possible liver mets (liver biopsy taken Nov 26th still pending), recurrent UTIs now with indwelling shah a/w COLUMBA, hypernatremia, anorexia, found to have prostate mass with extension into posterior bladder wall and possibly rectum with mod B hydroureteronephrosis.  PSA very elevated confirming prostate CA diagnosis

## 2018-12-08 NOTE — PROGRESS NOTE ADULT - SUBJECTIVE AND OBJECTIVE BOX
Patient is a 78y old  Male who presents with a chief complaint of paige, hypernatremia (07 Dec 2018 18:10)      SUBJECTIVE / OVERNIGHT EVENTS:  Patient seen refusing food, denying any complaints.     MEDICATIONS  (STANDING):  atorvastatin 20 milliGRAM(s) Oral at bedtime  buDESOnide  80 MICROgram(s)/formoterol 4.5 MICROgram(s) Inhaler 2 Puff(s) Inhalation two times a day  cholecalciferol 400 Unit(s) Oral daily  dextrose 5%. 1000 milliLiter(s) (125 mL/Hr) IV Continuous <Continuous>  finasteride 5 milliGRAM(s) Oral daily  lactulose Syrup 10 Gram(s) Oral at bedtime  mirtazapine 7.5 milliGRAM(s) Oral at bedtime  modafinil 100 milliGRAM(s) Oral daily  polyethylene glycol 3350 17 Gram(s) Oral daily  potassium chloride  10 mEq/100 mL IVPB 10 milliEquivalent(s) IV Intermittent every 1 hour  tamsulosin 0.4 milliGRAM(s) Oral at bedtime  tiotropium 18 MICROgram(s) Capsule 1 Capsule(s) Inhalation daily    MEDICATIONS  (PRN):  acetaminophen   Tablet .. 650 milliGRAM(s) Oral every 8 hours PRN Mild Pain (1 - 3), Moderate Pain (4 - 6)  ALBUTerol    0.083% 2.5 milliGRAM(s) Nebulizer every 6 hours PRN Shortness of Breath and/or Wheezing      Vital Signs Last 24 Hrs  T(C): 36.2 (08 Dec 2018 04:53), Max: 36.2 (07 Dec 2018 23:16)  T(F): 97.2 (08 Dec 2018 04:53), Max: 97.2 (08 Dec 2018 04:53)  HR: 89 (08 Dec 2018 10:36) (89 - 94)  BP: 109/74 (08 Dec 2018 04:53) (103/70 - 109/74)  BP(mean): --  RR: 18 (08 Dec 2018 04:53) (18 - 18)  SpO2: 96% (08 Dec 2018 10:36) (93% - 100%)  CAPILLARY BLOOD GLUCOSE        I&O's Summary    07 Dec 2018 07:01  -  08 Dec 2018 07:00  --------------------------------------------------------  IN: 0 mL / OUT: 1630 mL / NET: -1630 mL    08 Dec 2018 07:01  -  08 Dec 2018 14:33  --------------------------------------------------------  IN: 625 mL / OUT: 500 mL / NET: 125 mL        PHYSICAL EXAM:  GENERAL: elderly man laying in bed, nasal bipap on, very sleepy  HEAD:  Atraumatic, Normocephalic  EYES: EOMI, PERRLA, conjunctiva and sclera clear  NECK: Supple, No JVD  CHEST/LUNG: Clear to auscultation bilaterally; No wheeze  HEART: Regular rate and rhythm; No murmurs, rubs, or gallops  ABDOMEN: Soft, Nontender, Nondistended; Bowel sounds present  EXTREMITIES:  2+ Peripheral Pulses, No clubbing, cyanosis, or edema  PSYCH: sleepy  NEUROLOGY: non-focal  SKIN: No rashes or lesions  : B/L Nephrostomy tube draining urine      LABS:                        12.7   9.33  )-----------( 134      ( 08 Dec 2018 05:58 )             39.7     12-08    144  |  108<H>  |  37<H>  ----------------------------<  116<H>  3.0<L>   |  24  |  2.52<H>    Ca    8.2<L>      08 Dec 2018 05:58      PTT - ( 06 Dec 2018 15:53 )  PTT:38.8 SEC          Culture - Urine (collected 05 Dec 2018 18:16)  Source: KIDNEY  Final Report (08 Dec 2018 14:11):    NO ORGANISMS ISOLATED AT 24 HOURS    NO GROWTH - FINAL RESULTS    Culture - Urine (collected 05 Dec 2018 18:14)  Source: KIDNEY  Final Report (08 Dec 2018 14:12):    NO ORGANISMS ISOLATED AT 24 HOURS    NO GROWTH - FINAL RESULTS        RADIOLOGY & ADDITIONAL TESTS:    Imaging Personally Reviewed:  Consultant(s) Notes Reviewed:    Care Discussed with Consultants/Other Providers:

## 2018-12-09 DIAGNOSIS — E83.42 HYPOMAGNESEMIA: ICD-10-CM

## 2018-12-09 LAB
BUN SERPL-MCNC: 22 MG/DL — SIGNIFICANT CHANGE UP (ref 7–23)
BUN SERPL-MCNC: 26 MG/DL — HIGH (ref 7–23)
CALCIUM SERPL-MCNC: 7.9 MG/DL — LOW (ref 8.4–10.5)
CALCIUM SERPL-MCNC: 8.1 MG/DL — LOW (ref 8.4–10.5)
CHLORIDE SERPL-SCNC: 104 MMOL/L — SIGNIFICANT CHANGE UP (ref 98–107)
CHLORIDE SERPL-SCNC: 105 MMOL/L — SIGNIFICANT CHANGE UP (ref 98–107)
CO2 SERPL-SCNC: 23 MMOL/L — SIGNIFICANT CHANGE UP (ref 22–31)
CO2 SERPL-SCNC: 24 MMOL/L — SIGNIFICANT CHANGE UP (ref 22–31)
CREAT SERPL-MCNC: 1.61 MG/DL — HIGH (ref 0.5–1.3)
CREAT SERPL-MCNC: 1.74 MG/DL — HIGH (ref 0.5–1.3)
GLUCOSE SERPL-MCNC: 102 MG/DL — HIGH (ref 70–99)
GLUCOSE SERPL-MCNC: 79 MG/DL — SIGNIFICANT CHANGE UP (ref 70–99)
HCT VFR BLD CALC: 37.7 % — LOW (ref 39–50)
HGB BLD-MCNC: 12.2 G/DL — LOW (ref 13–17)
MAGNESIUM SERPL-MCNC: 1.2 MG/DL — LOW (ref 1.6–2.6)
MAGNESIUM SERPL-MCNC: 2 MG/DL — SIGNIFICANT CHANGE UP (ref 1.6–2.6)
MCHC RBC-ENTMCNC: 29.2 PG — SIGNIFICANT CHANGE UP (ref 27–34)
MCHC RBC-ENTMCNC: 32.4 % — SIGNIFICANT CHANGE UP (ref 32–36)
MCV RBC AUTO: 90.2 FL — SIGNIFICANT CHANGE UP (ref 80–100)
NRBC # FLD: 0 — SIGNIFICANT CHANGE UP
PHOSPHATE SERPL-MCNC: 3 MG/DL — SIGNIFICANT CHANGE UP (ref 2.5–4.5)
PLATELET # BLD AUTO: 151 K/UL — SIGNIFICANT CHANGE UP (ref 150–400)
PMV BLD: 13.3 FL — HIGH (ref 7–13)
POTASSIUM SERPL-MCNC: 3.1 MMOL/L — LOW (ref 3.5–5.3)
POTASSIUM SERPL-MCNC: 3.8 MMOL/L — SIGNIFICANT CHANGE UP (ref 3.5–5.3)
POTASSIUM SERPL-SCNC: 3.1 MMOL/L — LOW (ref 3.5–5.3)
POTASSIUM SERPL-SCNC: 3.8 MMOL/L — SIGNIFICANT CHANGE UP (ref 3.5–5.3)
RBC # BLD: 4.18 M/UL — LOW (ref 4.2–5.8)
RBC # FLD: 15.5 % — HIGH (ref 10.3–14.5)
SODIUM SERPL-SCNC: 141 MMOL/L — SIGNIFICANT CHANGE UP (ref 135–145)
SODIUM SERPL-SCNC: 141 MMOL/L — SIGNIFICANT CHANGE UP (ref 135–145)
WBC # BLD: 9.59 K/UL — SIGNIFICANT CHANGE UP (ref 3.8–10.5)
WBC # FLD AUTO: 9.59 K/UL — SIGNIFICANT CHANGE UP (ref 3.8–10.5)

## 2018-12-09 PROCEDURE — 99232 SBSQ HOSP IP/OBS MODERATE 35: CPT

## 2018-12-09 RX ORDER — POTASSIUM CHLORIDE 20 MEQ
40 PACKET (EA) ORAL ONCE
Qty: 0 | Refills: 0 | Status: DISCONTINUED | OUTPATIENT
Start: 2018-12-09 | End: 2018-12-09

## 2018-12-09 RX ORDER — MAGNESIUM SULFATE 500 MG/ML
2 VIAL (ML) INJECTION ONCE
Qty: 0 | Refills: 0 | Status: COMPLETED | OUTPATIENT
Start: 2018-12-09 | End: 2018-12-09

## 2018-12-09 RX ORDER — POTASSIUM CHLORIDE 20 MEQ
40 PACKET (EA) ORAL EVERY 4 HOURS
Qty: 0 | Refills: 0 | Status: DISCONTINUED | OUTPATIENT
Start: 2018-12-09 | End: 2018-12-09

## 2018-12-09 RX ORDER — POTASSIUM CHLORIDE 20 MEQ
10 PACKET (EA) ORAL
Qty: 0 | Refills: 0 | Status: COMPLETED | OUTPATIENT
Start: 2018-12-09 | End: 2018-12-09

## 2018-12-09 RX ADMIN — TIOTROPIUM BROMIDE 1 CAPSULE(S): 18 CAPSULE ORAL; RESPIRATORY (INHALATION) at 10:07

## 2018-12-09 RX ADMIN — Medication 400 UNIT(S): at 12:03

## 2018-12-09 RX ADMIN — MODAFINIL 100 MILLIGRAM(S): 200 TABLET ORAL at 12:03

## 2018-12-09 RX ADMIN — LACTULOSE 10 GRAM(S): 10 SOLUTION ORAL at 21:33

## 2018-12-09 RX ADMIN — TAMSULOSIN HYDROCHLORIDE 0.4 MILLIGRAM(S): 0.4 CAPSULE ORAL at 21:34

## 2018-12-09 RX ADMIN — Medication 100 MILLIEQUIVALENT(S): at 14:36

## 2018-12-09 RX ADMIN — ATORVASTATIN CALCIUM 20 MILLIGRAM(S): 80 TABLET, FILM COATED ORAL at 21:34

## 2018-12-09 RX ADMIN — MIRTAZAPINE 7.5 MILLIGRAM(S): 45 TABLET, ORALLY DISINTEGRATING ORAL at 21:34

## 2018-12-09 RX ADMIN — Medication 100 MILLIEQUIVALENT(S): at 16:25

## 2018-12-09 RX ADMIN — Medication 100 MILLIEQUIVALENT(S): at 12:49

## 2018-12-09 RX ADMIN — BUDESONIDE AND FORMOTEROL FUMARATE DIHYDRATE 2 PUFF(S): 160; 4.5 AEROSOL RESPIRATORY (INHALATION) at 10:07

## 2018-12-09 RX ADMIN — Medication 50 GRAM(S): at 19:46

## 2018-12-09 RX ADMIN — FINASTERIDE 5 MILLIGRAM(S): 5 TABLET, FILM COATED ORAL at 12:03

## 2018-12-09 RX ADMIN — BUDESONIDE AND FORMOTEROL FUMARATE DIHYDRATE 2 PUFF(S): 160; 4.5 AEROSOL RESPIRATORY (INHALATION) at 21:33

## 2018-12-09 NOTE — PROGRESS NOTE ADULT - ASSESSMENT
78M with multiple and significant medical problems and a severely compromised PS for several months, has a tumor likely from the prostate with bladder and ureteric issues, liver Bx shows steatohepatitis, no malignancy. Discussed the results and issues in great detail with his wife and answered questions. Even if there is a malignancy diagnosed, he will not be a candidate for any Rx such as surgery or chemo. The role of palliative care and comfort care discussed. so=poke to the pt, wife and son, they have decided against resuscitation and want the pt to be in LTC .   Will recommend:   - continue Rx as per medicine, renal  - told the pt's wife to discuss with the  and medical ,team tomorrow regarding her decision  - inflatable stockings for DVT prophylaxis at this time  - await family decision on palliative care  - overall prognosis is guarded to poor

## 2018-12-09 NOTE — PROGRESS NOTE ADULT - SUBJECTIVE AND OBJECTIVE BOX
Pt has been doing better and has no pain, breathing OK, able to talk, wife and son with him. ROS unremarkable except inability to eat.      Meds:  acetaminophen   Tablet .. 650 milliGRAM(s) Oral every 8 hours PRN  ALBUTerol    0.083% 2.5 milliGRAM(s) Nebulizer every 6 hours PRN  atorvastatin 20 milliGRAM(s) Oral at bedtime  buDESOnide  80 MICROgram(s)/formoterol 4.5 MICROgram(s) Inhaler 2 Puff(s) Inhalation two times a day  cholecalciferol 400 Unit(s) Oral daily  dextrose 5%. 1000 milliLiter(s) IV Continuous <Continuous>  finasteride 5 milliGRAM(s) Oral daily  lactulose Syrup 10 Gram(s) Oral at bedtime  magnesium sulfate  IVPB 2 Gram(s) IV Intermittent once  mirtazapine 7.5 milliGRAM(s) Oral at bedtime  modafinil 100 milliGRAM(s) Oral daily  polyethylene glycol 3350 17 Gram(s) Oral daily  potassium chloride  10 mEq/100 mL IVPB 10 milliEquivalent(s) IV Intermittent every 1 hour  tamsulosin 0.4 milliGRAM(s) Oral at bedtime  tiotropium 18 MICROgram(s) Capsule 1 Capsule(s) Inhalation daily      Vital Signs Last 24 Hrs  T(C): 36.3 (09 Dec 2018 11:55), Max: 36.6 (09 Dec 2018 06:01)  T(F): 97.4 (09 Dec 2018 11:55), Max: 97.8 (09 Dec 2018 06:01)  HR: 92 (09 Dec 2018 11:55) (89 - 95)  BP: 98/60 (09 Dec 2018 11:55) (98/60 - 118/85)  BP(mean): --  RR: 17 (09 Dec 2018 11:55) (17 - 18)  SpO2: 100% (09 Dec 2018 11:55) (98% - 100%)                          12.2   9.59  )-----------( 151      ( 09 Dec 2018 05:57 )             37.7       12-09    141  |  104  |  26<H>  ----------------------------<  102<H>  3.1<L>   |  23  |  1.74<H>    Ca    7.9<L>      09 Dec 2018 05:57  Mg     1.2     12-09

## 2018-12-09 NOTE — PROGRESS NOTE ADULT - SUBJECTIVE AND OBJECTIVE BOX
Patient is a 78y old  Male who presents with a chief complaint of paige, hypernatremia (08 Dec 2018 14:32)    SUBJECTIVE / OVERNIGHT EVENTS:  Patient seen still refusing food.     MEDICATIONS  (STANDING):  atorvastatin 20 milliGRAM(s) Oral at bedtime  buDESOnide  80 MICROgram(s)/formoterol 4.5 MICROgram(s) Inhaler 2 Puff(s) Inhalation two times a day  cholecalciferol 400 Unit(s) Oral daily  dextrose 5%. 1000 milliLiter(s) (75 mL/Hr) IV Continuous <Continuous>  finasteride 5 milliGRAM(s) Oral daily  lactulose Syrup 10 Gram(s) Oral at bedtime  magnesium sulfate  IVPB 2 Gram(s) IV Intermittent once  mirtazapine 7.5 milliGRAM(s) Oral at bedtime  modafinil 100 milliGRAM(s) Oral daily  polyethylene glycol 3350 17 Gram(s) Oral daily  potassium chloride  10 mEq/100 mL IVPB 10 milliEquivalent(s) IV Intermittent every 1 hour  tamsulosin 0.4 milliGRAM(s) Oral at bedtime  tiotropium 18 MICROgram(s) Capsule 1 Capsule(s) Inhalation daily    MEDICATIONS  (PRN):  acetaminophen   Tablet .. 650 milliGRAM(s) Oral every 8 hours PRN Mild Pain (1 - 3), Moderate Pain (4 - 6)  ALBUTerol    0.083% 2.5 milliGRAM(s) Nebulizer every 6 hours PRN Shortness of Breath and/or Wheezing      Vital Signs Last 24 Hrs  T(C): 36.3 (09 Dec 2018 11:55), Max: 36.6 (09 Dec 2018 06:01)  T(F): 97.4 (09 Dec 2018 11:55), Max: 97.8 (09 Dec 2018 06:01)  HR: 92 (09 Dec 2018 11:55) (80 - 95)  BP: 98/60 (09 Dec 2018 11:55) (98/60 - 118/85)  BP(mean): --  RR: 17 (09 Dec 2018 11:55) (17 - 18)  SpO2: 100% (09 Dec 2018 11:55) (96% - 100%)  CAPILLARY BLOOD GLUCOSE        I&O's Summary    08 Dec 2018 07:01  -  09 Dec 2018 07:00  --------------------------------------------------------  IN: 1565 mL / OUT: 1500 mL / NET: 65 mL          PHYSICAL EXAM:  GENERAL: elderly man laying in bed, nasal bipap on, very sleepy  HEAD:  Atraumatic, Normocephalic  EYES: EOMI, PERRLA, conjunctiva and sclera clear  NECK: Supple, No JVD  CHEST/LUNG: Clear to auscultation bilaterally; No wheeze  HEART: Regular rate and rhythm; No murmurs, rubs, or gallops  ABDOMEN: Soft, Nontender, Nondistended; Bowel sounds present  EXTREMITIES:  2+ Peripheral Pulses, No clubbing, cyanosis, or edema  PSYCH: sleepy  NEUROLOGY: non-focal  SKIN: No rashes or lesions  : B/L Nephrostomy tube draining urine      LABS:                        12.2   9.59  )-----------( 151      ( 09 Dec 2018 05:57 )             37.7     12-09    141  |  104  |  26<H>  ----------------------------<  102<H>  3.1<L>   |  23  |  1.74<H>    Ca    7.9<L>      09 Dec 2018 05:57  Mg     1.2     12-09                  RADIOLOGY & ADDITIONAL TESTS:    Imaging Personally Reviewed:  Consultant(s) Notes Reviewed:    Care Discussed with Consultants/Other Providers:

## 2018-12-10 LAB
BUN SERPL-MCNC: 20 MG/DL — SIGNIFICANT CHANGE UP (ref 7–23)
CALCIUM SERPL-MCNC: 8.2 MG/DL — LOW (ref 8.4–10.5)
CHLORIDE SERPL-SCNC: 105 MMOL/L — SIGNIFICANT CHANGE UP (ref 98–107)
CO2 SERPL-SCNC: 22 MMOL/L — SIGNIFICANT CHANGE UP (ref 22–31)
CREAT SERPL-MCNC: 1.46 MG/DL — HIGH (ref 0.5–1.3)
GLUCOSE SERPL-MCNC: 88 MG/DL — SIGNIFICANT CHANGE UP (ref 70–99)
HCT VFR BLD CALC: 43.8 % — SIGNIFICANT CHANGE UP (ref 39–50)
HGB BLD-MCNC: 14.1 G/DL — SIGNIFICANT CHANGE UP (ref 13–17)
MCHC RBC-ENTMCNC: 30 PG — SIGNIFICANT CHANGE UP (ref 27–34)
MCHC RBC-ENTMCNC: 32.2 % — SIGNIFICANT CHANGE UP (ref 32–36)
MCV RBC AUTO: 93.2 FL — SIGNIFICANT CHANGE UP (ref 80–100)
NRBC # FLD: 0 — SIGNIFICANT CHANGE UP
PLATELET # BLD AUTO: 164 K/UL — SIGNIFICANT CHANGE UP (ref 150–400)
PMV BLD: 13.7 FL — HIGH (ref 7–13)
POTASSIUM SERPL-MCNC: 3.5 MMOL/L — SIGNIFICANT CHANGE UP (ref 3.5–5.3)
POTASSIUM SERPL-SCNC: 3.5 MMOL/L — SIGNIFICANT CHANGE UP (ref 3.5–5.3)
RBC # BLD: 4.7 M/UL — SIGNIFICANT CHANGE UP (ref 4.2–5.8)
RBC # FLD: 15.7 % — HIGH (ref 10.3–14.5)
SODIUM SERPL-SCNC: 142 MMOL/L — SIGNIFICANT CHANGE UP (ref 135–145)
WBC # BLD: 10.9 K/UL — HIGH (ref 3.8–10.5)
WBC # FLD AUTO: 10.9 K/UL — HIGH (ref 3.8–10.5)

## 2018-12-10 PROCEDURE — 99233 SBSQ HOSP IP/OBS HIGH 50: CPT

## 2018-12-10 PROCEDURE — 99232 SBSQ HOSP IP/OBS MODERATE 35: CPT | Mod: GC

## 2018-12-10 RX ORDER — SALIVA SUBSTITUTE COMB NO.11 351 MG
5 POWDER IN PACKET (EA) MUCOUS MEMBRANE EVERY 4 HOURS
Qty: 0 | Refills: 0 | Status: DISCONTINUED | OUTPATIENT
Start: 2018-12-10 | End: 2018-12-13

## 2018-12-10 RX ADMIN — FINASTERIDE 5 MILLIGRAM(S): 5 TABLET, FILM COATED ORAL at 11:27

## 2018-12-10 RX ADMIN — MODAFINIL 100 MILLIGRAM(S): 200 TABLET ORAL at 11:27

## 2018-12-10 RX ADMIN — BUDESONIDE AND FORMOTEROL FUMARATE DIHYDRATE 2 PUFF(S): 160; 4.5 AEROSOL RESPIRATORY (INHALATION) at 21:12

## 2018-12-10 RX ADMIN — SODIUM CHLORIDE 75 MILLILITER(S): 9 INJECTION, SOLUTION INTRAVENOUS at 00:56

## 2018-12-10 RX ADMIN — Medication 400 UNIT(S): at 11:27

## 2018-12-10 RX ADMIN — Medication 5 MILLILITER(S): at 21:13

## 2018-12-10 RX ADMIN — TAMSULOSIN HYDROCHLORIDE 0.4 MILLIGRAM(S): 0.4 CAPSULE ORAL at 21:12

## 2018-12-10 RX ADMIN — BUDESONIDE AND FORMOTEROL FUMARATE DIHYDRATE 2 PUFF(S): 160; 4.5 AEROSOL RESPIRATORY (INHALATION) at 11:48

## 2018-12-10 RX ADMIN — TIOTROPIUM BROMIDE 1 CAPSULE(S): 18 CAPSULE ORAL; RESPIRATORY (INHALATION) at 11:28

## 2018-12-10 RX ADMIN — ATORVASTATIN CALCIUM 20 MILLIGRAM(S): 80 TABLET, FILM COATED ORAL at 21:12

## 2018-12-10 RX ADMIN — LACTULOSE 10 GRAM(S): 10 SOLUTION ORAL at 21:12

## 2018-12-10 RX ADMIN — MIRTAZAPINE 7.5 MILLIGRAM(S): 45 TABLET, ORALLY DISINTEGRATING ORAL at 21:12

## 2018-12-10 NOTE — GOALS OF CARE CONVERSATION - PERSONAL ADVANCE DIRECTIVE - CONVERSATION DETAILS
[facility]    HCN RN met with pt and spouse. Pt's spouse states she cannot manage pt's care at home any longer. She  requested information regarding Medicaid application for long term care in an SNF. She understands it is a process which can take at least several weeks and wishes to pursue it. Explained to pt's spouse there is not a Medicare benefit for long tern care in an SNF; she clearly understands this.    HCN RN collaborated with Dr Ana Felix re the outcome of this meeting.

## 2018-12-10 NOTE — PROGRESS NOTE ADULT - PROBLEM SELECTOR PLAN 1
Patient with COLUMBA in setting of obstructive uropathy and decreased PO intake. On review of previous labs, Sc.r has been WNL. However labs done in ER showed Sc.r of 5 which has worsened to 5.63 on 12/4/18. CT abdomen and pelvis done on 12/3 shows bilateral hydronephrosis which has slightly increased compared to previous CT scan. Patient had bilateral PCN placed by IR on 12/5/18. Latest Scr. has improved to 1.46 today. Patient is non oliguric. Patient with COLUMBA from obstructive uropathy which is resolving.  Monitor BMP daily. Avoid NSAID and other nephrotoxins. Will sign off for now; please recall if needed.

## 2018-12-10 NOTE — PROGRESS NOTE ADULT - PROBLEM SELECTOR PLAN 2
Patient with hypernatremia in setting of decreased free water intake which has resolved. Monitor serum sodium level.

## 2018-12-10 NOTE — CHART NOTE - NSCHARTNOTEFT_GEN_A_CORE
Met with wife, reviewed overall condition.  Pt clearly defers to his wife for complex medical decisions.  She d/w her sons this weekend.  Have decided DNR/I, comfort measures.  MOLST filled out.  Spent an additional 17 minute in counseling and advance care planning.

## 2018-12-10 NOTE — PROGRESS NOTE ADULT - PROBLEM SELECTOR PLAN 1
d/t obstruction from prostate mass, appreciate renal and uro f/u -->B NT placed 12/5--> monitoring for postobstructive diuresis after  hematuria improved

## 2018-12-10 NOTE — PROGRESS NOTE ADULT - SUBJECTIVE AND OBJECTIVE BOX
Patient is a 78y old  Male who presents with a chief complaint of paige, hypernatremia (10 Dec 2018 14:09)    SUBJECTIVE / OVERNIGHT EVENTS:  More alert today, got up in chair for 1 hour, mouth care done, few sips ginger ale but says things taste bad, poor appetite.  Pt told he has prostate cancer.  Defers complex decision making to his wife.    MEDICATIONS  (STANDING):  atorvastatin 20 milliGRAM(s) Oral at bedtime  Biotene Dry Mouth Oral Rinse 5 milliLiter(s) Swish and Spit every 4 hours  buDESOnide  80 MICROgram(s)/formoterol 4.5 MICROgram(s) Inhaler 2 Puff(s) Inhalation two times a day  cholecalciferol 400 Unit(s) Oral daily  dextrose 5%. 1000 milliLiter(s) (75 mL/Hr) IV Continuous <Continuous>  finasteride 5 milliGRAM(s) Oral daily  lactulose Syrup 10 Gram(s) Oral at bedtime  mirtazapine 7.5 milliGRAM(s) Oral at bedtime  modafinil 100 milliGRAM(s) Oral daily  polyethylene glycol 3350 17 Gram(s) Oral daily  tamsulosin 0.4 milliGRAM(s) Oral at bedtime  tiotropium 18 MICROgram(s) Capsule 1 Capsule(s) Inhalation daily    MEDICATIONS  (PRN):  acetaminophen   Tablet .. 650 milliGRAM(s) Oral every 8 hours PRN Mild Pain (1 - 3), Moderate Pain (4 - 6)  ALBUTerol    0.083% 2.5 milliGRAM(s) Nebulizer every 6 hours PRN Shortness of Breath and/or Wheezing    I&O's Summary    09 Dec 2018 07:01  -  10 Dec 2018 07:00  --------------------------------------------------------  IN: 375 mL / OUT: 1800 mL / NET: -1425 mL    10 Dec 2018 07:01  -  10 Dec 2018 17:59  --------------------------------------------------------  IN: 900 mL / OUT: 500 mL / NET: 400 mL    Vital Signs Last 24 Hrs  T(C): 36.6 (10 Dec 2018 12:29), Max: 36.6 (10 Dec 2018 12:29)  T(F): 97.8 (10 Dec 2018 12:29), Max: 97.8 (10 Dec 2018 12:29)  HR: 70 (10 Dec 2018 12:29) (70 - 83)  BP: 105/74 (10 Dec 2018 12:29) (105/64 - 122/77)  RR: 18 (10 Dec 2018 12:29) (18 - 18)  SpO2: 97% (10 Dec 2018 12:29) (97% - 98%)    PHYSICAL EXAM:  GENERAL: elderly WM sitting up in bed, on nasal bipap  HEAD:  Atraumatic, Normocephalic  EYES: EOMI, PERRLA, conjunctiva and sclera clear  NECK: Supple, No JVD  CHEST/LUNG: Clear to auscultation bilaterally; No wheeze  HEART: Regular rate and rhythm; No murmurs, rubs, or gallops  ABDOMEN: Soft, Nontender, Nondistended; Bowel sounds present  EXTREMITIES:  2+ Peripheral Pulses, No clubbing, cyanosis, or edema  PSYCH: more alert, oriented self, wife  NEUROLOGY: non-focal  SKIN: No rashes or lesions  +shah/B NT light brown urine    LABS:             14.1   10.90 )-----------( 164      ( 10 Dec 2018 05:50 )             43.8     142  |  105  |  20  ----------------------------<  88  3.5   |  22  |  1.46<H>    Ca    8.2<L>      10 Dec 2018 05:50  Phos  3.0     12-09  Mg     2.0     12-09    RADIOLOGY & ADDITIONAL TESTS:    Imaging Personally Reviewed:    Consultant(s) Notes Reviewed:  renal    Care Discussed with Consultants/Other Providers: RN, SW, CM, ADS re overall care, Hospice RN

## 2018-12-10 NOTE — PROGRESS NOTE ADULT - PROBLEM SELECTOR PLAN 2
PSA very elevated, confirms dx of prostate CA  liver bx 11/26 steatosis, steatohepatitis, no evidence malignancy  - appreciate Dr. Scnalon's input - very debilitated, not a candidate for any Rx such as surgery or chemo.  D/w wife and she is agreeable to Hospice referral (d/w Hospice RN will evaluate pt on Monday).  Wife will d/w her sons re ADs.  Wife also referred to Medicaid office to begin process of applying for Medicaid, she feels she cannot take pt home.  - wife d/w Hospice RN today, in process of Medicaid application

## 2018-12-10 NOTE — PROGRESS NOTE ADULT - SUBJECTIVE AND OBJECTIVE BOX
[facility] Division of Kidney Diseases & Hypertension  FOLLOW UP NOTE  [phone]--------------------------------------------------------------------------------    HPI: 78 year old male with h/o dementia, A.MATT downing was sent from [facility] to [facility] due to abnormal labs. Labs done in ER shows elevated creatinine and sodium and hence nephrology was consulted. On review of previous labs in [facility], Scr. has been WNL with last Scr. prior to admission being 0.87 on 10/23/18. However labs done in ER showed elevated Scr. of 5 on 12/3/18 which worsened to 5.63 on 12/4/18. CT abdomen and pelvis showed bilateral hydronephrosis which has slightly increased compared to previous CT scan. Patient had bilateral PCN tube placed on 12/5/18 and Scr. improving.     Patient seen and examined today at bedside. Patient is awake and appears comfortable.       PAST HISTORY  --------------------------------------------------------------------------------  No significant changes to PMH, PSH, FHx, SHx, unless otherwise noted    ALLERGIES & MEDICATIONS  --------------------------------------------------------------------------------  Allergies    No Known Drug Allergies  zosyn (Drowsiness)    Intolerances      Standing Inpatient Medications  atorvastatin 20 milliGRAM(s) Oral at bedtime  Biotene Dry Mouth Oral Rinse 5 milliLiter(s) Swish and Spit every 4 hours  buDESOnide  80 MICROgram(s)/formoterol 4.5 MICROgram(s) Inhaler 2 Puff(s) Inhalation two times a day  cholecalciferol 400 Unit(s) Oral daily  dextrose 5%. 1000 milliLiter(s) IV Continuous <Continuous>  finasteride 5 milliGRAM(s) Oral daily  lactulose Syrup 10 Gram(s) Oral at bedtime  mirtazapine 7.5 milliGRAM(s) Oral at bedtime  modafinil 100 milliGRAM(s) Oral daily  polyethylene glycol 3350 17 Gram(s) Oral daily  tamsulosin 0.4 milliGRAM(s) Oral at bedtime  tiotropium 18 MICROgram(s) Capsule 1 Capsule(s) Inhalation daily    PRN Inpatient Medications  acetaminophen   Tablet .. 650 milliGRAM(s) Oral every 8 hours PRN  ALBUTerol    0.083% 2.5 milliGRAM(s) Nebulizer every 6 hours PRN      REVIEW OF SYSTEMS  --------------------------------------------------------------------------------    Unable to obtain.     VITALS/PHYSICAL EXAM  --------------------------------------------------------------------------------  T(C): 36.6 (12-10-18 @ 12:29), Max: 36.6 (12-10-18 @ 12:29)  HR: 70 (12-10-18 @ 12:29) (70 - 83)  BP: 105/74 (12-10-18 @ 12:29) (105/64 - 122/77)  RR: 18 (12-10-18 @ 12:29) (18 - 18)  SpO2: 97% (12-10-18 @ 12:29) (97% - 98%)  Wt(kg): --        12-09-18 @ 07:01  -  12-10-18 @ 07:00  --------------------------------------------------------  IN: 375 mL / OUT: 1800 mL / NET: -1425 mL    12-10-18 @ 07:01  -  12-10-18 @ 14:09  --------------------------------------------------------  IN: 0 mL / OUT: 200 mL / NET: -200 mL      Physical Exam:  	  Gen: Elderly male, NAD  	HEENT: no JVD  	Pulm: CTA B/L  	CV:  S1S2  	Abd: +BS, soft,               : shah catheter present; B/L PCN tube present    	Ext: No B/L Lower ext edema  	Neuro: Confused.   	Skin: Warm, without rashes    LABS/STUDIES  --------------------------------------------------------------------------------              14.1   10.90 >-----------<  164      [12-10-18 @ 05:50]              43.8     142  |  105  |  20  ----------------------------<  88      [12-10-18 @ 05:50]  3.5   |  22  |  1.46        Ca     8.2     [12-10-18 @ 05:50]      Mg     2.0     [12-09-18 @ 22:33]      Phos  3.0     [12-09-18 @ 22:33]            Creatinine Trend:  SCr 1.46 [12-10 @ 05:50]  SCr 1.61 [12-09 @ 22:33]  SCr 1.74 [12-09 @ 05:57]  SCr 2.52 [12-08 @ 05:58]  SCr 3.19 [12-07 @ 18:10]    Urinalysis - [12-03-18 @ 15:30]      Color YELLOW / Appearance Lt TURBID / SG 1.015 / pH 5.5      Gluc NEGATIVE / Ketone NEGATIVE  / Bili NEGATIVE / Urobili NORMAL       Blood MODERATE / Protein 20 / Leuk Est MODERATE / Nitrite NEGATIVE      RBC 3-5 / WBC 6-10 / Hyaline 1+ / Gran  / Sq Epi FEW / Non Sq Epi  / Bacteria SMALL    Urine Creatinine 103.40      [12-04-18 @ 01:54]  Urine Sodium 50      [12-04-18 @ 01:54]  Urine Potassium 30.1      [12-04-18 @ 01:54]  Urine Chloride 44      [12-04-18 @ 01:54]  Urine Osmolality 330      [12-04-18 @ 01:54] electronic

## 2018-12-11 DIAGNOSIS — F03.91 UNSPECIFIED DEMENTIA WITH BEHAVIORAL DISTURBANCE: ICD-10-CM

## 2018-12-11 LAB
BASOPHILS # BLD AUTO: 0.05 K/UL — SIGNIFICANT CHANGE UP (ref 0–0.2)
BASOPHILS NFR BLD AUTO: 0.5 % — SIGNIFICANT CHANGE UP (ref 0–2)
BUN SERPL-MCNC: 14 MG/DL — SIGNIFICANT CHANGE UP (ref 7–23)
CALCIUM SERPL-MCNC: 7.8 MG/DL — LOW (ref 8.4–10.5)
CHLORIDE SERPL-SCNC: 101 MMOL/L — SIGNIFICANT CHANGE UP (ref 98–107)
CO2 SERPL-SCNC: 25 MMOL/L — SIGNIFICANT CHANGE UP (ref 22–31)
CREAT SERPL-MCNC: 1.23 MG/DL — SIGNIFICANT CHANGE UP (ref 0.5–1.3)
EOSINOPHIL # BLD AUTO: 0.2 K/UL — SIGNIFICANT CHANGE UP (ref 0–0.5)
EOSINOPHIL NFR BLD AUTO: 2.1 % — SIGNIFICANT CHANGE UP (ref 0–6)
GLUCOSE SERPL-MCNC: 100 MG/DL — HIGH (ref 70–99)
HCT VFR BLD CALC: 41.9 % — SIGNIFICANT CHANGE UP (ref 39–50)
HCT VFR BLD CALC: 41.9 % — SIGNIFICANT CHANGE UP (ref 39–50)
HGB BLD-MCNC: 13.4 G/DL — SIGNIFICANT CHANGE UP (ref 13–17)
HGB BLD-MCNC: 13.4 G/DL — SIGNIFICANT CHANGE UP (ref 13–17)
IMM GRANULOCYTES # BLD AUTO: 0.11 # — SIGNIFICANT CHANGE UP
IMM GRANULOCYTES NFR BLD AUTO: 1.2 % — SIGNIFICANT CHANGE UP (ref 0–1.5)
LYMPHOCYTES # BLD AUTO: 2.74 K/UL — SIGNIFICANT CHANGE UP (ref 1–3.3)
LYMPHOCYTES # BLD AUTO: 28.8 % — SIGNIFICANT CHANGE UP (ref 13–44)
MAGNESIUM SERPL-MCNC: 1.6 MG/DL — SIGNIFICANT CHANGE UP (ref 1.6–2.6)
MCHC RBC-ENTMCNC: 30.2 PG — SIGNIFICANT CHANGE UP (ref 27–34)
MCHC RBC-ENTMCNC: 30.2 PG — SIGNIFICANT CHANGE UP (ref 27–34)
MCHC RBC-ENTMCNC: 32 % — SIGNIFICANT CHANGE UP (ref 32–36)
MCHC RBC-ENTMCNC: 32 % — SIGNIFICANT CHANGE UP (ref 32–36)
MCV RBC AUTO: 94.4 FL — SIGNIFICANT CHANGE UP (ref 80–100)
MCV RBC AUTO: 94.4 FL — SIGNIFICANT CHANGE UP (ref 80–100)
MONOCYTES # BLD AUTO: 0.76 K/UL — SIGNIFICANT CHANGE UP (ref 0–0.9)
MONOCYTES NFR BLD AUTO: 8 % — SIGNIFICANT CHANGE UP (ref 2–14)
NEUTROPHILS # BLD AUTO: 5.64 K/UL — SIGNIFICANT CHANGE UP (ref 1.8–7.4)
NEUTROPHILS NFR BLD AUTO: 59.4 % — SIGNIFICANT CHANGE UP (ref 43–77)
NRBC # FLD: 0 — SIGNIFICANT CHANGE UP
NRBC # FLD: 0 — SIGNIFICANT CHANGE UP
PLATELET # BLD AUTO: 116 K/UL — LOW (ref 150–400)
PLATELET # BLD AUTO: 116 K/UL — LOW (ref 150–400)
PMV BLD: 13.6 FL — HIGH (ref 7–13)
PMV BLD: 13.6 FL — HIGH (ref 7–13)
POTASSIUM SERPL-MCNC: 3 MMOL/L — LOW (ref 3.5–5.3)
POTASSIUM SERPL-SCNC: 3 MMOL/L — LOW (ref 3.5–5.3)
RBC # BLD: 4.44 M/UL — SIGNIFICANT CHANGE UP (ref 4.2–5.8)
RBC # BLD: 4.44 M/UL — SIGNIFICANT CHANGE UP (ref 4.2–5.8)
RBC # FLD: 15.6 % — HIGH (ref 10.3–14.5)
RBC # FLD: 15.6 % — HIGH (ref 10.3–14.5)
SODIUM SERPL-SCNC: 138 MMOL/L — SIGNIFICANT CHANGE UP (ref 135–145)
WBC # BLD: 9.5 K/UL — SIGNIFICANT CHANGE UP (ref 3.8–10.5)
WBC # BLD: 9.5 K/UL — SIGNIFICANT CHANGE UP (ref 3.8–10.5)
WBC # FLD AUTO: 9.5 K/UL — SIGNIFICANT CHANGE UP (ref 3.8–10.5)
WBC # FLD AUTO: 9.5 K/UL — SIGNIFICANT CHANGE UP (ref 3.8–10.5)

## 2018-12-11 PROCEDURE — 93010 ELECTROCARDIOGRAM REPORT: CPT

## 2018-12-11 PROCEDURE — 99233 SBSQ HOSP IP/OBS HIGH 50: CPT

## 2018-12-11 RX ORDER — MAGNESIUM SULFATE 500 MG/ML
2 VIAL (ML) INJECTION ONCE
Qty: 0 | Refills: 0 | Status: COMPLETED | OUTPATIENT
Start: 2018-12-11 | End: 2018-12-11

## 2018-12-11 RX ORDER — POTASSIUM CHLORIDE 20 MEQ
40 PACKET (EA) ORAL EVERY 4 HOURS
Qty: 0 | Refills: 0 | Status: DISCONTINUED | OUTPATIENT
Start: 2018-12-11 | End: 2018-12-11

## 2018-12-11 RX ORDER — POTASSIUM CHLORIDE 20 MEQ
10 PACKET (EA) ORAL
Qty: 0 | Refills: 0 | Status: COMPLETED | OUTPATIENT
Start: 2018-12-11 | End: 2018-12-11

## 2018-12-11 RX ORDER — HALOPERIDOL DECANOATE 100 MG/ML
1 INJECTION INTRAMUSCULAR ONCE
Qty: 0 | Refills: 0 | Status: COMPLETED | OUTPATIENT
Start: 2018-12-11 | End: 2018-12-11

## 2018-12-11 RX ORDER — SODIUM CHLORIDE 9 MG/ML
1000 INJECTION, SOLUTION INTRAVENOUS
Qty: 0 | Refills: 0 | Status: DISCONTINUED | OUTPATIENT
Start: 2018-12-11 | End: 2018-12-13

## 2018-12-11 RX ADMIN — Medication 100 MILLIEQUIVALENT(S): at 15:51

## 2018-12-11 RX ADMIN — Medication 100 MILLIEQUIVALENT(S): at 17:51

## 2018-12-11 RX ADMIN — Medication 5 MILLILITER(S): at 05:42

## 2018-12-11 RX ADMIN — MIRTAZAPINE 7.5 MILLIGRAM(S): 45 TABLET, ORALLY DISINTEGRATING ORAL at 21:19

## 2018-12-11 RX ADMIN — Medication 400 UNIT(S): at 10:47

## 2018-12-11 RX ADMIN — Medication 5 MILLILITER(S): at 21:24

## 2018-12-11 RX ADMIN — POLYETHYLENE GLYCOL 3350 17 GRAM(S): 17 POWDER, FOR SOLUTION ORAL at 10:48

## 2018-12-11 RX ADMIN — ATORVASTATIN CALCIUM 20 MILLIGRAM(S): 80 TABLET, FILM COATED ORAL at 21:18

## 2018-12-11 RX ADMIN — Medication 5 MILLILITER(S): at 10:55

## 2018-12-11 RX ADMIN — TIOTROPIUM BROMIDE 1 CAPSULE(S): 18 CAPSULE ORAL; RESPIRATORY (INHALATION) at 10:55

## 2018-12-11 RX ADMIN — Medication 100 MILLIEQUIVALENT(S): at 16:00

## 2018-12-11 RX ADMIN — BUDESONIDE AND FORMOTEROL FUMARATE DIHYDRATE 2 PUFF(S): 160; 4.5 AEROSOL RESPIRATORY (INHALATION) at 10:46

## 2018-12-11 RX ADMIN — Medication 5 MILLILITER(S): at 01:50

## 2018-12-11 RX ADMIN — BUDESONIDE AND FORMOTEROL FUMARATE DIHYDRATE 2 PUFF(S): 160; 4.5 AEROSOL RESPIRATORY (INHALATION) at 20:10

## 2018-12-11 RX ADMIN — HALOPERIDOL DECANOATE 1 MILLIGRAM(S): 100 INJECTION INTRAMUSCULAR at 15:51

## 2018-12-11 RX ADMIN — SODIUM CHLORIDE 75 MILLILITER(S): 9 INJECTION, SOLUTION INTRAVENOUS at 01:12

## 2018-12-11 RX ADMIN — TAMSULOSIN HYDROCHLORIDE 0.4 MILLIGRAM(S): 0.4 CAPSULE ORAL at 21:18

## 2018-12-11 RX ADMIN — LACTULOSE 10 GRAM(S): 10 SOLUTION ORAL at 21:19

## 2018-12-11 RX ADMIN — Medication 50 GRAM(S): at 20:09

## 2018-12-11 RX ADMIN — Medication 40 MILLIEQUIVALENT(S): at 10:47

## 2018-12-11 RX ADMIN — MODAFINIL 100 MILLIGRAM(S): 200 TABLET ORAL at 10:47

## 2018-12-11 RX ADMIN — SODIUM CHLORIDE 75 MILLILITER(S): 9 INJECTION, SOLUTION INTRAVENOUS at 21:18

## 2018-12-11 RX ADMIN — FINASTERIDE 5 MILLIGRAM(S): 5 TABLET, FILM COATED ORAL at 10:47

## 2018-12-11 NOTE — BEHAVIORAL HEALTH ASSESSMENT NOTE - NSBHCONSULTFOLLOWAFTERCARE_PSY_A_CORE FT
JOSE MARIA [facility] [phone] (M-F 9am-7pm)   JOSE MARIA Dobbs -[phone]

## 2018-12-11 NOTE — BEHAVIORAL HEALTH ASSESSMENT NOTE - NSBHCHARTREVIEWLAB_PSY_A_CORE FT
12-11    138  |  101  |  14  ----------------------------<  100<H>  3.0<L>   |  25  |  1.23    Ca    7.8<L>      11 Dec 2018 05:16  Phos  3.0     12-09  Mg     1.6     12-11                          13.4   9.50  )-----------( 116      ( 11 Dec 2018 05:16 )             41.9

## 2018-12-11 NOTE — BEHAVIORAL HEALTH ASSESSMENT NOTE - SUMMARY
Patient is a 78 year old ,  male, currently living in rehab setting with multiple medical hospitalizations for the last 4 months with PMH of Afib COPD, BRIEN (Cpap at night), CHFpEF, GERD, HLD, Pulmonary fibrosis, RBBB, Chronic B/L LE lymphedema, dementia, recurrent UTIs and currently admitted with urinary obstruction and newly diagnosed prostate CA. Patient is confused, most of the history is by wife.  Psychiatry consulted for agitation and dementia.  Patient seen resting in bed after PRN Haldol administered for agitation.  As per staff on the floor patient required constant redirection today, was increasingly agitated and confused. Patient was alert on initial presentation, however soon fell asleep.  Wife good historian.  As per wife, patient has had underlying confusion for approx. 2 years.  The past 4 months or so patient has declined in mental and physical health, has been living in rehabs and requiring multiple medical hospitalizations.  She states he is at baseline alert to person and place, however has no sense of time.  Wife reports patient frequently needs to be redirected as he does not know his physical limitations.  Patient has no formal psychiatric hx, no hx of depression, anxiety, eliceo or psychosis.  no known hx of SA.  Wife denies presence of hallucinations and/or delusions, has never mentioned hearing voices or seeing objects others cant see.  She denies feeling he is paranoid. Patient is with poor sleep and appetite.  Wife unaware when change of Lexapro to Remeron was made but states it was done on one of his hospital visits.    PLAN  - c/w Remeron 7.5mg qhs for mood/sleep/appetite benefits  - for agitation please use..... Patient is a 78 year old ,  male, currently living in rehab setting with multiple medical hospitalizations for the last 4 months with PMH of Afib COPD, BRIEN (Cpap at night), CHFpEF, GERD, HLD, Pulmonary fibrosis, RBBB, Chronic B/L LE lymphedema, dementia, recurrent UTIs and currently admitted with urinary obstruction and newly diagnosed prostate CA. Patient is confused, most of the history is by wife.  Psychiatry consulted for agitation and dementia.  Patient seen resting in bed after PRN Haldol administered for agitation.  As per staff on the floor patient required constant redirection today, was increasingly agitated and confused. Patient was alert on initial presentation, however soon fell asleep.  Wife good historian.  As per wife, patient has had underlying confusion for approx. 2 years.  The past 4 months or so patient has declined in mental and physical health, has been living in rehabs and requiring multiple medical hospitalizations.  She states he is at baseline alert to person and place, however has no sense of time.  Wife reports patient frequently needs to be redirected as he does not know his physical limitations.  Patient has no formal psychiatric hx, no hx of depression, anxiety, eliceo or psychosis.  no known hx of SA.  Wife denies presence of hallucinations and/or delusions, has never mentioned hearing voices or seeing objects others cant see.  She denies feeling he is paranoid. Patient is with poor sleep and appetite.  Wife unaware when change of Lexapro to Remeron was made but states it was done on one of his hospital visits.    PLAN  - c/w Remeron 7.5mg qhs for mood/sleep/appetite benefits  - manual qtc calculation to be preformed/ep/cards consult as needed to determine safety of antipsychotic medication intervention for agitation.  if deemed safe, please use Seroquel 25mg q6hrs for agitation.  for SEVERE agitation can use Haldol 1mg IV/IM q6hrs.  - if cannot use antipsychotic please use Ativan 0.5mg PO/IM/IV ONLY FOR SEVERE Agitation as can cause increased confusion in patients with dementia.

## 2018-12-11 NOTE — BEHAVIORAL HEALTH ASSESSMENT NOTE - NSBHCHARTREVIEWVS_PSY_A_CORE FT
Vital Signs Last 24 Hrs  T(C): 36.2 (11 Dec 2018 13:14), Max: 36.5 (10 Dec 2018 19:42)  T(F): 97.2 (11 Dec 2018 13:14), Max: 97.7 (10 Dec 2018 19:42)  HR: 92 (11 Dec 2018 13:14) (72 - 92)  BP: 110/73 (11 Dec 2018 13:14) (106/72 - 112/78)  BP(mean): --  RR: 18 (11 Dec 2018 13:14) (17 - 18)  SpO2: 98% (11 Dec 2018 13:14) (95% - 100%)

## 2018-12-11 NOTE — BEHAVIORAL HEALTH ASSESSMENT NOTE - NSBHCHARTREVIEWINVESTIGATE_PSY_A_CORE FT
qtc 522  conversation held with BERNARDO Cain - patient with RBB, manual calculation qtc to be performed qtc 522  conversation held with BERNARDO Cain - patient with RBB, manual calculation qtc to be performed or involve cards/ep to determine safety of antipsychotic medication qtc 522  conversation held with BERNARDO Cain - patient with RBB, manual calculation qtc 462 is to be confirmed with EP on 12/12

## 2018-12-11 NOTE — PROGRESS NOTE ADULT - PROBLEM SELECTOR PROBLEM 10
Dementia without behavioral disturbance, unspecified dementia type

## 2018-12-11 NOTE — PROGRESS NOTE ADULT - SUBJECTIVE AND OBJECTIVE BOX
Patient is a 78y old  Male who presents with a chief complaint of paige, hypernatremia (10 Dec 2018 17:59)    SUBJECTIVE / OVERNIGHT EVENTS:  Alert but confused, not eating anything.  Denies pain.    MEDICATIONS  (STANDING):  atorvastatin 20 milliGRAM(s) Oral at bedtime  Biotene Dry Mouth Oral Rinse 5 milliLiter(s) Swish and Spit every 4 hours  buDESOnide  80 MICROgram(s)/formoterol 4.5 MICROgram(s) Inhaler 2 Puff(s) Inhalation two times a day  cholecalciferol 400 Unit(s) Oral daily  finasteride 5 milliGRAM(s) Oral daily  lactated ringers. 1000 milliLiter(s) (75 mL/Hr) IV Continuous <Continuous>  lactulose Syrup 10 Gram(s) Oral at bedtime  magnesium sulfate  IVPB 2 Gram(s) IV Intermittent once  mirtazapine 7.5 milliGRAM(s) Oral at bedtime  modafinil 100 milliGRAM(s) Oral daily  polyethylene glycol 3350 17 Gram(s) Oral daily  potassium chloride  10 mEq/100 mL IVPB 10 milliEquivalent(s) IV Intermittent every 1 hour  tamsulosin 0.4 milliGRAM(s) Oral at bedtime  tiotropium 18 MICROgram(s) Capsule 1 Capsule(s) Inhalation daily    MEDICATIONS  (PRN):  acetaminophen   Tablet .. 650 milliGRAM(s) Oral every 8 hours PRN Mild Pain (1 - 3), Moderate Pain (4 - 6)  ALBUTerol    0.083% 2.5 milliGRAM(s) Nebulizer every 6 hours PRN Shortness of Breath and/or Wheezing    I&O's Summary    10 Dec 2018 07:01  -  11 Dec 2018 07:00  --------------------------------------------------------  IN: 900 mL / OUT: 1100 mL / NET: -200 mL    11 Dec 2018 07:01  -  11 Dec 2018 16:39  --------------------------------------------------------  IN: 0 mL / OUT: 200 mL / NET: -200 mL    Vital Signs Last 24 Hrs  T(C): 36.2 (11 Dec 2018 13:14), Max: 36.5 (10 Dec 2018 19:42)  T(F): 97.2 (11 Dec 2018 13:14), Max: 97.7 (10 Dec 2018 19:42)  HR: 92 (11 Dec 2018 13:14) (72 - 92)  BP: 110/73 (11 Dec 2018 13:14) (106/72 - 112/78)  RR: 18 (11 Dec 2018 13:14) (17 - 18)  SpO2: 98% (11 Dec 2018 13:14) (95% - 100%)    PHYSICAL EXAM:  GENERAL: elderly WM sitting up in bed, off nasal bipap  HEAD:  Atraumatic, Normocephalic  EYES: EOMI, PERRLA, conjunctiva and sclera clear  NECK: Supple, No JVD  CHEST/LUNG: Clear to auscultation bilaterally; No wheeze  HEART: Regular rate and rhythm; No murmurs, rubs, or gallops  ABDOMEN: Soft, Nontender, Nondistended; Bowel sounds present  EXTREMITIES:  2+ Peripheral Pulses, No clubbing, cyanosis, or edema  PSYCH: calm, but confused  NEUROLOGY: non-focal  SKIN: No rashes or lesions  +shah/B NT light brown urine    LABS:             13.4   9.50  )-----------( 116      ( 11 Dec 2018 05:16 )             41.9     138  |  101  |  14  ----------------------------<  100<H>  3.0<L>   |  25  |  1.23    Ca    7.8<L>      11 Dec 2018 05:16  Phos  3.0     12-09  Mg     1.6     12-11    RADIOLOGY & ADDITIONAL TESTS:    Imaging Personally Reviewed:    Consultant(s) Notes Reviewed:      Care Discussed with Consultants/Other Providers: RN, SW, CM, ADS re overall care

## 2018-12-11 NOTE — PROGRESS NOTE ADULT - PROBLEM SELECTOR PLAN 2
PSA very elevated, confirms dx of prostate CA  liver bx 11/26 steatosis, steatohepatitis, no evidence malignancy  - appreciate Dr. Scanlon's input - very debilitated, not a candidate for any Rx such as surgery or chemo.  D/w wife and she is agreeable to Hospice referral (d/w Hospice RN will evaluate pt on Monday).  Wife will d/w her sons re ADs.  Wife also referred to Medicaid office to begin process of applying for Medicaid, she feels she cannot take pt home.  - wife d/w Hospice RN today, in process of Medicaid application d/t obstruction from prostate mass, appreciate renal and uro f/u -->B NT placed 12/5--> monitoring for postobstructive diuresis after  hematuria improved off a/c

## 2018-12-11 NOTE — BEHAVIORAL HEALTH ASSESSMENT NOTE - RISK ASSESSMENT
Protective factors include no suicide attempts, no violence history, medication compliance, no substance abuse, supportive family.  Chronic risk factors include acute illness, confusion    Patient with no hx of suicidal acts. no mention of wanting to harm self or others. Protective factors outweigh risk at this time. Patient will require services to ensure his safety on discharge.  Wife discussing with SW plan for care after DC.

## 2018-12-11 NOTE — BEHAVIORAL HEALTH ASSESSMENT NOTE - OTHER
patient between rehab and hospital stays, planned to return home however now looking for LTC patient lethargic, unable to respond to all questions asked during interview wife denies

## 2018-12-11 NOTE — PROGRESS NOTE ADULT - PROBLEM SELECTOR PLAN 3
appreciate S&S eval, asp prec, dysphagia 2/nec thick PSA very elevated, confirms dx of prostate CA  liver bx 11/26 steatosis, steatohepatitis, no evidence malignancy  - appreciate Dr. Scanlon's input - very debilitated, not a candidate for any Rx such as surgery or chemo.  D/w wife and she is agreeable to Hospice referral (d/w Hospice RN will evaluate pt on Monday).  Wife will d/w her sons re ADs.  Wife also referred to Medicaid office to begin process of applying for Medicaid, she feels she cannot take pt home.  - wife d/w Hospice RN today, in process of Medicaid application

## 2018-12-11 NOTE — BEHAVIORAL HEALTH ASSESSMENT NOTE - HPI (INCLUDE ILLNESS QUALITY, SEVERITY, DURATION, TIMING, CONTEXT, MODIFYING FACTORS, ASSOCIATED SIGNS AND SYMPTOMS)
Patient is a 78 year old ,  male, currently living in rehab setting with multiple medical hospitalizations for the last 4 months with PMH of Afib COPD, BRIEN (Cpap at night), CHFpEF, GERD, HLD, Pulmonary fibrosis, RBBB, Chronic B/L LE lymphedema, dementia, recurrent UTIs and currently admitted with urinary obstruction and newly diagnosed prostate CA. Patient is confused, most of the history is by wife.  Psychiatry consulted for agitation and dementia.  Patient seen resting in bed after PRN Haldol administered for agitation.  As per staff on the floor patient required constant redirection today, was increasingly agitated and confused. Patient was alert on initial presentation, however soon fell asleep.  Wife good historian.  As per wife, patient has had underlying confusion for approx. 2 years.  The past 4 months or so patient has declined in mental and physical health, has been living in rehabs and requiring multiple medical hospitalizations.  She states he is at baseline alert to person and place, however has no sense of time.  Wife reports patient frequently needs to be redirected as he does not know his physical limitations.  Patient has no formal psychiatric hx, no hx of depression, anxiety, eliceo or psychosis.  no known hx of SA.  Wife denies presence of hallucinations and/or delusions, has never mentioned hearing voices or seeing objects others cant see.  She denies feeling he is paranoid. Patient is with poor sleep and appetite.  Wife unaware when change of Lexapro to Remeron was made but states it was done on one of his hospital visits. Patient is a 78 year old ,  male, currently living in rehab setting with multiple medical hospitalizations for the last 4 months with PMH of Afib COPD, BRIEN (Cpap at night), CHFpEF, GERD, HLD, Pulmonary fibrosis, RBBB, Chronic B/L LE lymphedema, dementia, recurrent UTIs and currently admitted with urinary obstruction and newly diagnosed prostate CA. Patient is confused, most of the history is by wife.  Psychiatry consulted for agitation and dementia.  Patient seen resting in bed after PRN Haldol administered for agitation.  As per staff on the floor patient required constant redirection today, was increasingly agitated and confused. Patient was alert on initial presentation, however soon fell asleep.  Wife good historian.  As per wife, patient has had underlying confusion for approx. 2 years.  The past 4 months or so patient has declined in mental and physical health, has been living in rehabs and requiring multiple medical hospitalizations.  She states he is at baseline alert to person and place, however has no sense of time.  Wife reports patient frequently needs to be redirected as he does not know his physical limitations.  Patient has no formal psychiatric hx, no hx of depression, anxiety, eliceo or psychosis.  no known hx of SA.  Wife denies presence of hallucinations and/or delusions, has never mentioned hearing voices or seeing objects others cant see.  She denies feeling he is paranoid. Patient is with poor sleep and appetite.  Wife unaware when change of Lexapro to Remeron was made but states it was done on one of his hospital visits.    Wife educated and agreed to medication intervention. aware of black box warning

## 2018-12-11 NOTE — PROGRESS NOTE ADULT - PROBLEM SELECTOR PLAN 4
Na improved--> decrease D5W to 75cc/hr, encourage PO fluids as pt wakes up  Monitor BMP dx 2y ago, wife says more confused in setting acute illness  avoid BZD/Ambien/anticholinergics, day/night cues, supportive care

## 2018-12-11 NOTE — PROGRESS NOTE ADULT - PROBLEM SELECTOR PLAN 7
more confused in setting acute illness  - treating underlying cause  avoid BZD/Ambien/anticholinergics, day/night cues, supportive care and hypomagnesia - Replete K/Mg, f/u  Monitor BMP

## 2018-12-11 NOTE — PROGRESS NOTE ADULT - PROBLEM SELECTOR PLAN 1
d/t obstruction from prostate mass, appreciate renal and uro f/u -->B NT placed 12/5--> monitoring for postobstructive diuresis after  hematuria improved more confused in setting acute illness, treating underlying cause  avoid BZD/Ambien/anticholinergics, day/night cues, supportive care  - seemed to be getting better, more confused so asking psych input, s/p dose Haldol

## 2018-12-12 LAB
BUN SERPL-MCNC: 12 MG/DL — SIGNIFICANT CHANGE UP (ref 7–23)
CALCIUM SERPL-MCNC: 8.2 MG/DL — LOW (ref 8.4–10.5)
CHLORIDE SERPL-SCNC: 102 MMOL/L — SIGNIFICANT CHANGE UP (ref 98–107)
CO2 SERPL-SCNC: 21 MMOL/L — LOW (ref 22–31)
CREAT SERPL-MCNC: 1.16 MG/DL — SIGNIFICANT CHANGE UP (ref 0.5–1.3)
GLUCOSE SERPL-MCNC: 74 MG/DL — SIGNIFICANT CHANGE UP (ref 70–99)
HCT VFR BLD CALC: 38.8 % — LOW (ref 39–50)
HGB BLD-MCNC: 12.6 G/DL — LOW (ref 13–17)
MAGNESIUM SERPL-MCNC: 2 MG/DL — SIGNIFICANT CHANGE UP (ref 1.6–2.6)
MCHC RBC-ENTMCNC: 30.1 PG — SIGNIFICANT CHANGE UP (ref 27–34)
MCHC RBC-ENTMCNC: 32.5 % — SIGNIFICANT CHANGE UP (ref 32–36)
MCV RBC AUTO: 92.8 FL — SIGNIFICANT CHANGE UP (ref 80–100)
NRBC # FLD: 0 — SIGNIFICANT CHANGE UP
PLATELET # BLD AUTO: 174 K/UL — SIGNIFICANT CHANGE UP (ref 150–400)
PMV BLD: 13 FL — SIGNIFICANT CHANGE UP (ref 7–13)
POTASSIUM SERPL-MCNC: 3.6 MMOL/L — SIGNIFICANT CHANGE UP (ref 3.5–5.3)
POTASSIUM SERPL-SCNC: 3.6 MMOL/L — SIGNIFICANT CHANGE UP (ref 3.5–5.3)
RBC # BLD: 4.18 M/UL — LOW (ref 4.2–5.8)
RBC # FLD: 15.3 % — HIGH (ref 10.3–14.5)
SODIUM SERPL-SCNC: 138 MMOL/L — SIGNIFICANT CHANGE UP (ref 135–145)
WBC # BLD: 11.51 K/UL — HIGH (ref 3.8–10.5)
WBC # FLD AUTO: 11.51 K/UL — HIGH (ref 3.8–10.5)

## 2018-12-12 PROCEDURE — 99223 1ST HOSP IP/OBS HIGH 75: CPT

## 2018-12-12 PROCEDURE — 99233 SBSQ HOSP IP/OBS HIGH 50: CPT

## 2018-12-12 RX ORDER — RISPERIDONE 4 MG/1
0.5 TABLET ORAL EVERY 6 HOURS
Qty: 0 | Refills: 0 | Status: DISCONTINUED | OUTPATIENT
Start: 2018-12-12 | End: 2018-12-13

## 2018-12-12 RX ORDER — RISPERIDONE 4 MG/1
0.25 TABLET ORAL AT BEDTIME
Qty: 0 | Refills: 0 | Status: DISCONTINUED | OUTPATIENT
Start: 2018-12-12 | End: 2018-12-13

## 2018-12-12 RX ADMIN — TAMSULOSIN HYDROCHLORIDE 0.4 MILLIGRAM(S): 0.4 CAPSULE ORAL at 21:47

## 2018-12-12 RX ADMIN — Medication 5 MILLILITER(S): at 05:57

## 2018-12-12 RX ADMIN — ATORVASTATIN CALCIUM 20 MILLIGRAM(S): 80 TABLET, FILM COATED ORAL at 21:47

## 2018-12-12 RX ADMIN — FINASTERIDE 5 MILLIGRAM(S): 5 TABLET, FILM COATED ORAL at 12:19

## 2018-12-12 RX ADMIN — BUDESONIDE AND FORMOTEROL FUMARATE DIHYDRATE 2 PUFF(S): 160; 4.5 AEROSOL RESPIRATORY (INHALATION) at 11:27

## 2018-12-12 RX ADMIN — RISPERIDONE 0.25 MILLIGRAM(S): 4 TABLET ORAL at 21:47

## 2018-12-12 RX ADMIN — LACTULOSE 10 GRAM(S): 10 SOLUTION ORAL at 21:46

## 2018-12-12 RX ADMIN — Medication 400 UNIT(S): at 12:19

## 2018-12-12 RX ADMIN — BUDESONIDE AND FORMOTEROL FUMARATE DIHYDRATE 2 PUFF(S): 160; 4.5 AEROSOL RESPIRATORY (INHALATION) at 21:47

## 2018-12-12 RX ADMIN — Medication 5 MILLILITER(S): at 11:26

## 2018-12-12 RX ADMIN — MODAFINIL 100 MILLIGRAM(S): 200 TABLET ORAL at 12:19

## 2018-12-12 RX ADMIN — TIOTROPIUM BROMIDE 1 CAPSULE(S): 18 CAPSULE ORAL; RESPIRATORY (INHALATION) at 12:16

## 2018-12-12 RX ADMIN — MIRTAZAPINE 7.5 MILLIGRAM(S): 45 TABLET, ORALLY DISINTEGRATING ORAL at 21:47

## 2018-12-12 NOTE — PROGRESS NOTE BEHAVIORAL HEALTH - SUMMARY
Patient is a 78 year old ,  male, currently living in rehab setting with multiple medical hospitalizations for the last 4 months with PMH of Afib COPD, BRIEN (Cpap at night), CHFpEF, GERD, HLD, Pulmonary fibrosis, RBBB, Chronic B/L LE lymphedema, dementia, recurrent UTIs and currently admitted with urinary obstruction and newly diagnosed prostate CA. Patient is confused, most of the history is by wife. Psychiatry consulted for agitation and dementia.    Pt with continued irritability and disorientation consistent with likely delirium superimposed upon his chronic cognitive issues. Pt with prolonged qtc via Bezet's formula, although less than 500 msecs with other corrections.     PLAN  - c/w Remeron 7.5mg qhs for mood/sleep/appetite benefits  - manual qtc calculation to be preformed/ep/cards consult as needed to determine safety of antipsychotic medication intervention for agitation.  if deemed safe, please use Seroquel 25mg q6hrs for agitation.  for SEVERE agitation can use Haldol 1mg IV/IM q6hrs.  - if cannot use antipsychotic please use Ativan 0.5mg PO/IM/IV ONLY FOR SEVERE Agitation as can cause increased confusion in patients with dementia. Patient is a 78 year old ,  male, currently living in rehab setting with multiple medical hospitalizations for the last 4 months with PMH of Afib COPD, BRIEN (Cpap at night), CHFpEF, GERD, HLD, Pulmonary fibrosis, RBBB, Chronic B/L LE lymphedema, dementia, recurrent UTIs and currently admitted with urinary obstruction and newly diagnosed prostate CA. Patient is confused, most of the history is by wife. Psychiatry consulted for agitation and dementia.    Pt with continued irritability and disorientation consistent with likely delirium superimposed upon his chronic cognitive issues. Pt with prolonged qtc via Bezet's formula, although less than 500 msecs with other corrections. Seen by EP cardiology, corrected QT of 462 msecs.    PLAN  - c/w Remeron 7.5mg qhs for mood/sleep/appetite benefits  - manual qtc calculation to be preformed/ep/cards consult as needed to determine safety of antipsychotic medication intervention for agitation.  if deemed safe, please use Seroquel 25mg q6hrs for agitation.  for SEVERE agitation can use Haldol 1mg IV/IM q6hrs.  - if cannot use antipsychotic please use Ativan 0.5mg PO/IM/IV ONLY FOR SEVERE Agitation as can cause increased confusion in patients with dementia. Patient is a 78 year old ,  male, currently living in rehab setting with multiple medical hospitalizations for the last 4 months with PMH of Afib COPD, BRIEN (Cpap at night), CHFpEF, GERD, HLD, Pulmonary fibrosis, RBBB, Chronic B/L LE lymphedema, dementia, recurrent UTIs and currently admitted with urinary obstruction and newly diagnosed prostate CA. Patient is confused, most of the history is by wife. Psychiatry consulted for agitation and dementia.    Pt with continued irritability and disorientation consistent with likely delirium superimposed upon his chronic cognitive issues. Pt with prolonged qtc via Bezet's formula, although less than 500 msecs with other corrections. Seen by EP cardiology, corrected QT of 462 msecs.

## 2018-12-12 NOTE — PROGRESS NOTE ADULT - SUBJECTIVE AND OBJECTIVE BOX
Patient is a 78y old  Male who presents with a chief complaint of paige, hypernatremia (11 Dec 2018 16:39)      SUBJECTIVE / OVERNIGHT EVENTS:  Sleepy today.  Wakes to voice, asks for water.  Knows he's at the hospital, denies pain.  Still poor PO.    MEDICATIONS  (STANDING):  atorvastatin 20 milliGRAM(s) Oral at bedtime  Biotene Dry Mouth Oral Rinse 5 milliLiter(s) Swish and Spit every 4 hours  buDESOnide  80 MICROgram(s)/formoterol 4.5 MICROgram(s) Inhaler 2 Puff(s) Inhalation two times a day  cholecalciferol 400 Unit(s) Oral daily  finasteride 5 milliGRAM(s) Oral daily  lactated ringers. 1000 milliLiter(s) (75 mL/Hr) IV Continuous <Continuous>  lactulose Syrup 10 Gram(s) Oral at bedtime  mirtazapine 7.5 milliGRAM(s) Oral at bedtime  modafinil 100 milliGRAM(s) Oral daily  polyethylene glycol 3350 17 Gram(s) Oral daily  risperiDONE   Tablet 0.25 milliGRAM(s) Oral at bedtime  tamsulosin 0.4 milliGRAM(s) Oral at bedtime  tiotropium 18 MICROgram(s) Capsule 1 Capsule(s) Inhalation daily    MEDICATIONS  (PRN):  acetaminophen   Tablet .. 650 milliGRAM(s) Oral every 8 hours PRN Mild Pain (1 - 3), Moderate Pain (4 - 6)  ALBUTerol    0.083% 2.5 milliGRAM(s) Nebulizer every 6 hours PRN Shortness of Breath and/or Wheezing  risperiDONE  Disintegrating Tablet 0.5 milliGRAM(s) Oral every 6 hours PRN agitation      CAPILLARY BLOOD GLUCOSE          I&O's Summary    11 Dec 2018 07:01  -  12 Dec 2018 07:00  --------------------------------------------------------  IN: 0 mL / OUT: 1100 mL / NET: -1100 mL    12 Dec 2018 07:01  -  12 Dec 2018 16:34  --------------------------------------------------------  IN: 0 mL / OUT: 250 mL / NET: -250 mL        Vital Signs Last 24 Hrs  T(C): 36.3 (12 Dec 2018 13:46), Max: 36.7 (12 Dec 2018 05:56)  T(F): 97.3 (12 Dec 2018 13:46), Max: 98 (12 Dec 2018 05:56)  HR: 82 (12 Dec 2018 13:46) (76 - 98)  BP: 109/67 (12 Dec 2018 13:46) (109/67 - 120/54)  BP(mean): --  RR: 18 (12 Dec 2018 13:46) (18 - 18)  SpO2: 98% (12 Dec 2018 13:46) (94% - 99%)    PHYSICAL EXAM:  GENERAL: elderly WM lying in bed, off nasal bipap  HEAD:  Atraumatic, Normocephalic  EYES: EOMI, PERRLA, conjunctiva and sclera clear  NECK: Supple, No JVD  CHEST/LUNG: Clear to auscultation bilaterally; No wheeze  HEART: Regular rate and rhythm; No murmurs, rubs, or gallops  ABDOMEN: Soft, Nontender, Nondistended; Bowel sounds present  EXTREMITIES:  2+ Peripheral Pulses, No clubbing, cyanosis, or edema  PSYCH: sleepy, oriented hospital, self  NEUROLOGY: non-focal  SKIN: No rashes or lesions  +shah/B NT light brown urine    LABS:                        12.6   11.51 )-----------( 174      ( 12 Dec 2018 05:40 )             38.8     12-12    138  |  102  |  12  ----------------------------<  74  3.6   |  21<L>  |  1.16    Ca    8.2<L>      12 Dec 2018 05:40  Mg     2.0     12-12                RADIOLOGY & ADDITIONAL TESTS:    Imaging Personally Reviewed:    Consultant(s) Notes Reviewed:      Care Discussed with Consultants/Other Providers: RN, SW, CM, ADS re overall care

## 2018-12-12 NOTE — PROGRESS NOTE ADULT - PROBLEM SELECTOR PLAN 3
PSA very elevated, confirms dx of prostate CA  liver bx 11/26 steatosis, steatohepatitis, no evidence malignancy  - appreciate Dr. Scanlon's input - very debilitated, not a candidate for any Rx such as surgery or chemo.  D/w wife and she is agreeable to Hospice referral (d/w Hospice RN will evaluate pt on Monday).  Wife will d/w her sons re ADs.  Wife also referred to Medicaid office to begin process of applying for Medicaid, she feels she cannot take pt home.  - wife d/w Hospice RN, in process of Medicaid application

## 2018-12-12 NOTE — PROGRESS NOTE BEHAVIORAL HEALTH - CASE SUMMARY
78 year old male with history of dementia, complex medical history. Psych c/s for agitation. Patient AOx1 unable to attend to most of cognitive exam. Intermittently agitated and awake, othertimes somnolent and difficult to awaken. Patient may benefit from low dose antipsychotic with less sedation such as risperdal as current presentation is likely delirium superimposed on dementia. Patient may be hospice bound. 78 year old male with history of dementia, complex medical history. Psych c/s for agitation. Patient AOx1 unable to attend to most of cognitive exam. Intermittently agitated and awake, othertimes somnolent and difficult to awaken. Patient may benefit from low dose antipsychotic with less sedation such as risperdal as current presentation is likely delirium superimposed on dementia. Patient may be hospice bound. Corrected QTc <500 per EP/cardio.

## 2018-12-12 NOTE — CHART NOTE - NSCHARTNOTEFT_GEN_A_CORE
Malnutrition follow up       Source: Patient [ ]    Family [ ]     other [x]: RN, EMR.  Patient with dementia- no family at bedside     Current Diet : Dysphagia 2 Mechanical Soft- Nectar Consistency Fluid + Renal + Nepro 2x daily (850 kcals, 38.2g protein).     Reported:  Per RN- Denies any nausea/vomiting/diarrhea/constipation. Patient seen by Speech 12/7- recommend Mechanical Soft- nectar consistency- denies any difficulty chewing and swallowing.   PO intake:  Patient continue to have poor PO intake. Per RN- patient consuming 0% of meals + supplement.       Current Weight: 12/6: 159.8 pounds. No new weight. Please obtain current weight.   Patient with + 1 edema right/left foot + nonpitting edema right/left ankle.     __________________ Pertinent Medications__________________   MEDICATIONS  (STANDING):  atorvastatin 20 milliGRAM(s) Oral at bedtime  Biotene Dry Mouth Oral Rinse 5 milliLiter(s) Swish and Spit every 4 hours  buDESOnide  80 MICROgram(s)/formoterol 4.5 MICROgram(s) Inhaler 2 Puff(s) Inhalation two times a day  cholecalciferol 400 Unit(s) Oral daily  finasteride 5 milliGRAM(s) Oral daily  lactated ringers. 1000 milliLiter(s) (75 mL/Hr) IV Continuous <Continuous>  lactulose Syrup 10 Gram(s) Oral at bedtime  mirtazapine 7.5 milliGRAM(s) Oral at bedtime  modafinil 100 milliGRAM(s) Oral daily  polyethylene glycol 3350 17 Gram(s) Oral daily  risperiDONE   Tablet 0.25 milliGRAM(s) Oral at bedtime  tamsulosin 0.4 milliGRAM(s) Oral at bedtime  tiotropium 18 MICROgram(s) Capsule 1 Capsule(s) Inhalation daily    MEDICATIONS  (PRN):  acetaminophen   Tablet .. 650 milliGRAM(s) Oral every 8 hours PRN Mild Pain (1 - 3), Moderate Pain (4 - 6)  ALBUTerol    0.083% 2.5 milliGRAM(s) Nebulizer every 6 hours PRN Shortness of Breath and/or Wheezing  risperiDONE  Disintegrating Tablet 0.5 milliGRAM(s) Oral every 6 hours PRN agitation      __________________ Pertinent Labs__________________   12-12 Na138 mmol/L Glu 74 mg/dL K+ 3.6 mmol/L Cr  1.16 mg/dL BUN 12 mg/dL 12-09 Phos 3.0 mg/dL        Skin: intact, no pressure injuries noted     Estimated Needs:   [x] no change since previous assessment  [ ] recalculated:       Previous Nutrition Diagnosis:  [x ] Malnutrition - Severe     Nutrition Diagnosis is [x] ongoing  [ ] resolved [ ] not applicable     New Nutrition Diagnosis: [x] not applicable    Nutrition Recommendations:  1. Liberalize diet to Regular- defer consistency/texture to team.   2. Continue with Nepro 2x daily (850 kcals, 38.2g protein) to help with caloric intake  3. If PO continues to remain poor- discuss alternative means of nutrition.   4. Please Encourage po intake, assist with meals and menu selections, provide alternatives PRN.   5. Monitor weights, labs, BM's, skin integrity, p.o. intake.   6. RD to remain available for further nutritional interventions as indicated.-Rosa Chase, RDN, CDN

## 2018-12-12 NOTE — PROGRESS NOTE ADULT - PROBLEM SELECTOR PLAN 1
more confused in setting acute illness, treating underlying cause  avoid BZD/Ambien/anticholinergics, day/night cues, supportive care  - appreciate psych input Remeron 7.5 hs/risperdal .5 hs, Haldol PRN

## 2018-12-12 NOTE — PROGRESS NOTE BEHAVIORAL HEALTH - NSBHFUPINTERVALHXFT_PSY_A_CORE
Mr. Quigley received 1 mg haldol yesterday evening for agitation and per staff, slept through the night. This morning, patient is irritable and uncooperative, refusing to open his eyes at times and requesting that the examiners leave him alone. He states that he does not know the date and believes it is February. Pt knows he is in a hospital, but does not know the name. He refuses to answer questions about sleep, appetite and mood, demanding that he be left alone. Further ROS unable to be obtained.

## 2018-12-12 NOTE — PROGRESS NOTE BEHAVIORAL HEALTH - NSBHCHARTREVIEWVS_PSY_A_CORE FT
Vital Signs Last 24 Hrs  T(C): 36.7 (12 Dec 2018 05:56), Max: 36.7 (12 Dec 2018 05:56)  T(F): 98 (12 Dec 2018 05:56), Max: 98 (12 Dec 2018 05:56)  HR: 98 (12 Dec 2018 08:04) (76 - 98)  BP: 120/54 (12 Dec 2018 05:56) (110/73 - 120/54)  BP(mean): --  RR: 18 (12 Dec 2018 05:56) (18 - 18)  SpO2: 94% (12 Dec 2018 08:04) (94% - 99%)

## 2018-12-12 NOTE — PROGRESS NOTE BEHAVIORAL HEALTH - NSBHCONSULTMEDS_PSY_A_CORE FT
remeron 7.5mg qhs  Refrain from standing antipsychotic use at this time in light of prolonged QTc remeron 7.5mg qhs  risperdal 0.5 mg m-tab qHS

## 2018-12-12 NOTE — PROGRESS NOTE BEHAVIORAL HEALTH - NSBHCHARTREVIEWLAB_PSY_A_CORE FT
12.6   11.51 )-----------( 174      ( 12 Dec 2018 05:40 )             38.8   12-12    138  |  102  |  12  ----------------------------<  74  3.6   |  21<L>  |  1.16    Ca    8.2<L>      12 Dec 2018 05:40  Mg     2.0     12-12

## 2018-12-12 NOTE — PROGRESS NOTE ADULT - PROBLEM SELECTOR PLAN 2
d/t obstruction from prostate mass, appreciate renal and uro f/u -->B NT placed 12/5--> stablilized  hematuria improved off a/c

## 2018-12-13 ENCOUNTER — TRANSCRIPTION ENCOUNTER (OUTPATIENT)
Age: 78
End: 2018-12-13

## 2018-12-13 VITALS
OXYGEN SATURATION: 98 % | SYSTOLIC BLOOD PRESSURE: 121 MMHG | DIASTOLIC BLOOD PRESSURE: 73 MMHG | HEART RATE: 102 BPM | RESPIRATION RATE: 18 BRPM

## 2018-12-13 LAB
HCT VFR BLD CALC: 41.5 % — SIGNIFICANT CHANGE UP (ref 39–50)
HGB BLD-MCNC: 12.9 G/DL — LOW (ref 13–17)
MCHC RBC-ENTMCNC: 29.6 PG — SIGNIFICANT CHANGE UP (ref 27–34)
MCHC RBC-ENTMCNC: 31.1 % — LOW (ref 32–36)
MCV RBC AUTO: 95.2 FL — SIGNIFICANT CHANGE UP (ref 80–100)
NRBC # FLD: 0 — SIGNIFICANT CHANGE UP
PLATELET # BLD AUTO: 179 K/UL — SIGNIFICANT CHANGE UP (ref 150–400)
PMV BLD: 13.2 FL — HIGH (ref 7–13)
RBC # BLD: 4.36 M/UL — SIGNIFICANT CHANGE UP (ref 4.2–5.8)
RBC # FLD: 15.7 % — HIGH (ref 10.3–14.5)
SPECIMEN SOURCE: SIGNIFICANT CHANGE UP
SPECIMEN SOURCE: SIGNIFICANT CHANGE UP
WBC # BLD: 13.58 K/UL — HIGH (ref 3.8–10.5)
WBC # FLD AUTO: 13.58 K/UL — HIGH (ref 3.8–10.5)

## 2018-12-13 PROCEDURE — 99233 SBSQ HOSP IP/OBS HIGH 50: CPT

## 2018-12-13 PROCEDURE — 99239 HOSP IP/OBS DSCHRG MGMT >30: CPT

## 2018-12-13 RX ORDER — MIRTAZAPINE 45 MG/1
0.5 TABLET, ORALLY DISINTEGRATING ORAL
Qty: 0 | Refills: 0 | COMMUNITY

## 2018-12-13 RX ORDER — SALIVA SUBSTITUTE COMB NO.11 351 MG
5 POWDER IN PACKET (EA) MUCOUS MEMBRANE
Qty: 0 | Refills: 0 | COMMUNITY
Start: 2018-12-13

## 2018-12-13 RX ORDER — ATORVASTATIN CALCIUM 80 MG/1
1 TABLET, FILM COATED ORAL
Qty: 0 | Refills: 0 | COMMUNITY

## 2018-12-13 RX ORDER — TIOTROPIUM BROMIDE 18 UG/1
1 CAPSULE ORAL; RESPIRATORY (INHALATION)
Qty: 0 | Refills: 0 | COMMUNITY
Start: 2018-12-13

## 2018-12-13 RX ORDER — CHOLECALCIFEROL (VITAMIN D3) 125 MCG
400 CAPSULE ORAL
Qty: 0 | Refills: 0 | COMMUNITY
Start: 2018-12-13

## 2018-12-13 RX ORDER — TAMSULOSIN HYDROCHLORIDE 0.4 MG/1
1 CAPSULE ORAL
Qty: 0 | Refills: 0 | COMMUNITY
Start: 2018-12-13

## 2018-12-13 RX ORDER — ALBUTEROL 90 UG/1
2.5 AEROSOL, METERED ORAL
Qty: 0 | Refills: 0 | COMMUNITY
Start: 2018-12-13

## 2018-12-13 RX ORDER — CHOLECALCIFEROL (VITAMIN D3) 125 MCG
0.5 CAPSULE ORAL
Qty: 0 | Refills: 0 | COMMUNITY

## 2018-12-13 RX ORDER — POLYETHYLENE GLYCOL 3350 17 G/17G
17 POWDER, FOR SOLUTION ORAL
Qty: 0 | Refills: 0 | COMMUNITY

## 2018-12-13 RX ORDER — FINASTERIDE 5 MG/1
1 TABLET, FILM COATED ORAL
Qty: 0 | Refills: 0 | COMMUNITY
Start: 2018-12-13

## 2018-12-13 RX ORDER — MODAFINIL 200 MG/1
1 TABLET ORAL
Qty: 0 | Refills: 0 | COMMUNITY
Start: 2018-12-13

## 2018-12-13 RX ORDER — MODAFINIL 200 MG/1
100 TABLET ORAL DAILY
Qty: 0 | Refills: 0 | Status: DISCONTINUED | OUTPATIENT
Start: 2018-12-13 | End: 2018-12-13

## 2018-12-13 RX ORDER — BUDESONIDE AND FORMOTEROL FUMARATE DIHYDRATE 160; 4.5 UG/1; UG/1
2 AEROSOL RESPIRATORY (INHALATION)
Qty: 0 | Refills: 0 | COMMUNITY
Start: 2018-12-13

## 2018-12-13 RX ORDER — ACETAMINOPHEN 500 MG
2 TABLET ORAL
Qty: 0 | Refills: 0 | COMMUNITY
Start: 2018-12-13

## 2018-12-13 RX ORDER — MIRTAZAPINE 45 MG/1
1 TABLET, ORALLY DISINTEGRATING ORAL
Qty: 0 | Refills: 0 | COMMUNITY
Start: 2018-12-13

## 2018-12-13 RX ORDER — LACTULOSE 10 G/15ML
15 SOLUTION ORAL
Qty: 0 | Refills: 0 | COMMUNITY
Start: 2018-12-13

## 2018-12-13 RX ORDER — RISPERIDONE 4 MG/1
1 TABLET ORAL
Qty: 0 | Refills: 0 | COMMUNITY
Start: 2018-12-13

## 2018-12-13 RX ORDER — LEVALBUTEROL 1.25 MG/.5ML
3 SOLUTION, CONCENTRATE RESPIRATORY (INHALATION)
Qty: 0 | Refills: 0 | COMMUNITY

## 2018-12-13 RX ORDER — ATORVASTATIN CALCIUM 80 MG/1
1 TABLET, FILM COATED ORAL
Qty: 0 | Refills: 0 | COMMUNITY
Start: 2018-12-13

## 2018-12-13 RX ORDER — POLYETHYLENE GLYCOL 3350 17 G/17G
17 POWDER, FOR SOLUTION ORAL
Qty: 0 | Refills: 0 | COMMUNITY
Start: 2018-12-13

## 2018-12-13 RX ADMIN — POLYETHYLENE GLYCOL 3350 17 GRAM(S): 17 POWDER, FOR SOLUTION ORAL at 14:15

## 2018-12-13 RX ADMIN — MODAFINIL 100 MILLIGRAM(S): 200 TABLET ORAL at 14:15

## 2018-12-13 RX ADMIN — FINASTERIDE 5 MILLIGRAM(S): 5 TABLET, FILM COATED ORAL at 14:15

## 2018-12-13 RX ADMIN — Medication 5 MILLILITER(S): at 14:16

## 2018-12-13 RX ADMIN — Medication 400 UNIT(S): at 14:15

## 2018-12-13 NOTE — PROGRESS NOTE BEHAVIORAL HEALTH - NSBHCONSULTMEDAGITATION_PSY_A_CORE FT
0.5 mg risperdal m-tab q12h prn for agitation, 0.5 mg haldol IM/IV q6h prn for agitation if pt refuses PO, avoid benzos given potential for worsening
0.5 mg haldol PO/IM/IV q6h prn for agitation, avoid benzos given potential for worsening

## 2018-12-13 NOTE — PROGRESS NOTE ADULT - SUBJECTIVE AND OBJECTIVE BOX
Patient is a 78y old  Male who presents with a chief complaint of paige, hypernatremia (12 Dec 2018 16:34)    SUBJECTIVE / OVERNIGHT EVENTS:  Pt very sleepy this am, sleeping through being washed up.  Asked if pain and where, he said "everywhere."  Wife at bedside, very supportive    MEDICATIONS  (STANDING):  atorvastatin 20 milliGRAM(s) Oral at bedtime  Biotene Dry Mouth Oral Rinse 5 milliLiter(s) Swish and Spit every 4 hours  buDESOnide  80 MICROgram(s)/formoterol 4.5 MICROgram(s) Inhaler 2 Puff(s) Inhalation two times a day  cholecalciferol 400 Unit(s) Oral daily  finasteride 5 milliGRAM(s) Oral daily  lactated ringers. 1000 milliLiter(s) (75 mL/Hr) IV Continuous <Continuous>  lactulose Syrup 10 Gram(s) Oral at bedtime  mirtazapine 7.5 milliGRAM(s) Oral at bedtime  modafinil 100 milliGRAM(s) Oral daily  polyethylene glycol 3350 17 Gram(s) Oral daily  risperiDONE   Tablet 0.25 milliGRAM(s) Oral at bedtime  tamsulosin 0.4 milliGRAM(s) Oral at bedtime  tiotropium 18 MICROgram(s) Capsule 1 Capsule(s) Inhalation daily    MEDICATIONS  (PRN):  acetaminophen   Tablet .. 650 milliGRAM(s) Oral every 8 hours PRN Mild Pain (1 - 3), Moderate Pain (4 - 6)  ALBUTerol    0.083% 2.5 milliGRAM(s) Nebulizer every 6 hours PRN Shortness of Breath and/or Wheezing  risperiDONE  Disintegrating Tablet 0.5 milliGRAM(s) Oral every 6 hours PRN agitation    I&O's Summary    12 Dec 2018 07:01  -  13 Dec 2018 07:00  --------------------------------------------------------  IN: 0 mL / OUT: 1150 mL / NET: -1150 mL    13 Dec 2018 07:01  -  13 Dec 2018 11:57  --------------------------------------------------------  IN: 0 mL / OUT: 225 mL / NET: -225 mL    Vital Signs Last 24 Hrs  T(C): 36.3 (13 Dec 2018 06:02), Max: 36.4 (12 Dec 2018 19:10)  T(F): 97.4 (13 Dec 2018 06:02), Max: 97.5 (12 Dec 2018 19:10)  HR: 83 (13 Dec 2018 11:11) (72 - 92)  BP: 133/89 (13 Dec 2018 06:02) (109/67 - 133/89)  RR: 16 (13 Dec 2018 06:02) (16 - 18)  SpO2: 96% (13 Dec 2018 11:11) (96% - 98%)    PHYSICAL EXAM:  GENERAL: elderly WM lying in bed, nasal bipap  HEAD:  Atraumatic, Normocephalic  EYES: EOMI, PERRLA, conjunctiva and sclera clear  NECK: Supple, No JVD  CHEST/LUNG: poor effort  HEART: Regular rate and rhythm; No murmurs, rubs, or gallops  ABDOMEN: Soft, Nontender, Nondistended; Bowel sounds present  EXTREMITIES:  2+ Peripheral Pulses, No clubbing, cyanosis, or edema  PSYCH: sleepy  NEUROLOGY: non-focal  SKIN: No rashes or lesions  +shah/B NT light brown urine    LABS:             12.9   13.58 )-----------( 179      ( 13 Dec 2018 06:02 )             41.5     138  |  102  |  12  ----------------------------<  74  3.6   |  21<L>  |  1.16    Ca    8.2<L>      12 Dec 2018 05:40  Mg     2.0     12-12    RADIOLOGY & ADDITIONAL TESTS:    Imaging Personally Reviewed:    Consultant(s) Notes Reviewed:      Care Discussed with Consultants/Other Providers: RN, SW, CM, ADS re overall care

## 2018-12-13 NOTE — DISCHARGE NOTE ADULT - PLAN OF CARE
improved You had bilateral nephrostomy tubes placed by interventional radiology. Please follow up with medical  provider at [facility] resolved. Electrolytes monitored closely during hospitalization. Continue medications as directed per psychiatry. Your anticoagulation was held due to bleeding. Please continue to use your inhalers as prescribed. Please also continue CPAP. Continue supportive care. prostate mass with extension into posterior bladder wall and possibly rectum with mod B hydroureteronephrosis, s/p B NT (shah removed).  PSA very elevated confirming prostate CA diagnosis.  Seen by Dr. Ghislaine De Luna pt very debilitated, not a candidate for any Rx such as surgery or chemo, approriate for Hospice, being transferred to [facility] for further care

## 2018-12-13 NOTE — PROGRESS NOTE ADULT - PROBLEM SELECTOR PLAN 3
PSA very elevated, confirms dx of prostate CA  liver bx 11/26 steatosis, steatohepatitis, no evidence malignancy  - appreciate Dr. Scanlon's input - very debilitated, not a candidate for any Rx such as surgery or chemo.  D/w wife and she is agreeable to Hospice referral (d/w Hospice RN will evaluate pt on Monday).  Wife will d/w her sons re ADs.  Wife also referred to Medicaid office to begin process of applying for Medicaid, she feels she cannot take pt home.  - wife d/w Hospice, in process of Medicaid application, open to [facility] evaluation  (hematuria improved off a/c) Na improved, but not taking any PO, needing to c/w IVFs for now

## 2018-12-13 NOTE — PROGRESS NOTE BEHAVIORAL HEALTH - RISK ASSESSMENT
Pt at imminent elevated risk at this time due to active confusion and medical illness. Protective factors include no suicide attempts, no violence history, no psychiatric history, medication compliance, no substance abuse, supportive family.    Patient with no hx of suicidal acts. no mention of wanting to harm self or others. Protective factors outweigh risk at this time. Patient will require services to ensure his safety on discharge.  Wife discussing with SW plan for care after DC.
Pt at imminent elevated risk at this time due to active confusion and medical illness. Protective factors include no suicide attempts, no violence history, no psychiatric history, medication compliance, no substance abuse, supportive family.    Patient with no hx of suicidal acts. no mention of wanting to harm self or others. Protective factors outweigh risk at this time. Patient will require services to ensure his safety on discharge.  Wife discussing with SW plan for care after DC.

## 2018-12-13 NOTE — PROGRESS NOTE ADULT - PROBLEM SELECTOR PLAN 4
dx 2y ago, wife says more confused in setting acute illness  avoid BZD/Ambien/anticholinergics, day/night cues, supportive care PSA very elevated, confirms dx of prostate CA  liver bx 11/26 steatosis, steatohepatitis, no evidence malignancy  - appreciate Dr. Scanlon's input - very debilitated, not a candidate for any Rx such as surgery or chemo.  D/w wife and she is agreeable to Hospice referral (d/w Hospice RN will evaluate pt on Monday).  Wife will d/w her sons re ADs.  Wife also referred to Medicaid office to begin process of applying for Medicaid, she feels she cannot take pt home.  - wife d/w Hospice, in process of Medicaid application, open to [facility] evaluation  (hematuria improved off a/c)

## 2018-12-13 NOTE — PROGRESS NOTE BEHAVIORAL HEALTH - NSBHCONSULTMEDS_PSY_A_CORE FT
remeron 7.5mg qhs  risperdal 0.25 mg m-tab qHS Consider stopping remeron qHS  consider starting abilify 1mg QD

## 2018-12-13 NOTE — PROGRESS NOTE ADULT - PROBLEM SELECTOR PLAN 5
appreciate S&S eval, asp prec, dysphagia 2/nec thick  mouth care, encourage PO dx 2y ago, wife says more confused in setting acute illness  avoid BZD/Ambien/anticholinergics, day/night cues, supportive care

## 2018-12-13 NOTE — DISCHARGE NOTE ADULT - MEDICATION SUMMARY - MEDICATIONS TO TAKE
I will START or STAY ON the medications listed below when I get home from the hospital:    CPAP  -- Patient's Own Machine  To Be Used qhs and PRN while sleeping  Settings: 7  -- Indication: For obstructive sleep apnea     finasteride 5 mg oral tablet  -- 1 tab(s) by mouth once a day  -- Indication: For Prostate mass    acetaminophen 325 mg oral tablet  -- 2 tab(s) by mouth every 8 hours, As needed, Mild Pain (1 - 3), Moderate Pain (4 - 6)  -- Indication: For Pain control     tamsulosin 0.4 mg oral capsule  -- 1 cap(s) by mouth once a day (at bedtime)  -- Indication: For Prostate mass     mirtazapine 7.5 mg oral tablet  -- 1 tab(s) by mouth once a day (at bedtime)  -- Indication: For Dementia with behavioral disturbance, unspecified dementia type    atorvastatin 20 mg oral tablet  -- 1 tab(s) by mouth once a day (at bedtime)  -- Indication: For elevated cholesterol     risperiDONE 0.5 mg oral tablet, disintegrating  -- 1 tab(s) by mouth every 6 hours, As needed, agitation  -- Indication: For Dementia with behavioral disturbance, unspecified dementia type    albuterol 2.5 mg/3 mL (0.083%) inhalation solution  -- 2.5 milligram(s) inhaled every 6 hours, As Needed for SOB/wheezing   -- Indication: For Chronic obstructive pulmonary disease, unspecified COPD type    budesonide-formoterol 80 mcg-4.5 mcg/inh inhalation aerosol  -- 2 puff(s) inhaled 2 times a day  -- Indication: For Chronic obstructive pulmonary disease, unspecified COPD type    tiotropium 18 mcg inhalation capsule  -- 1 cap(s) inhaled once a day  -- Indication: For Chronic obstructive pulmonary disease, unspecified COPD type    modafinil 100 mg oral tablet  -- 1 tab(s) by mouth once a day  -- Indication: For stimulant     lactulose 10 g/15 mL oral syrup  -- 15 milliliter(s) by mouth once a day (at bedtime)  -- Indication: For Constipation     polyethylene glycol 3350 oral powder for reconstitution  -- 17 gram(s) by mouth once a day  -- Indication: For Constipation    saliva substitutes oral solution  -- 5 milliliter(s) by mouth every 4 hours, swish and spit   -- Indication: For increase salivary production     cholecalciferol oral tablet  -- 400 unit(s) by mouth once a day  -- Indication: For supplement

## 2018-12-13 NOTE — DISCHARGE NOTE ADULT - HOSPITAL COURSE
78M 3rd hospitalization since Sept, COPD, BRIEN on CPAP, ILD/pulm fibrosis, GERD, Afib on eliquis, dementia (a/o x2, follows commands, mostly lucid) prostate mass and BL hydronephrosis (liver biopsy taken Nov 26th just steatosis), recurrent UTIs with indwelling shah presented with  COLUMBA, hypernatremia, and metabolic encephalopathy. CT A/P showed prostate mass w/ extension into the posterior bladder with moderate bilateral hydronephrosis. IR was consulted and pt recieved b/l nephrostomy tube placement on 12/5. Pt also with elevated PSA- confirming prostate cancer. Although COLUMBA improved, pt clinically is declining overall. Psych consulted for delirum/dementia, recs appreciated.  Pt was made DNR/DNI and accepted to [facility] for dispo. 78M 3rd hospitalization since Sept, COPD, BRIEN on CPAP, ILD/pulm fibrosis, GERD, Afib on eliquis, dementia (a/o x2, follows commands, mostly lucid) prostate mass and BL hydronephrosis (liver biopsy taken Nov 26th just steatosis), recurrent UTIs with indwelling shah presented with  COLUMBA, hypernatremia, and metabolic encephalopathy. CT A/P showed prostate mass w/ extension into the posterior bladder with moderate bilateral hydronephrosis. IR was consulted and pt recieved b/l nephrostomy tube placement on 12/5. Pt also with elevated PSA- confirming prostate cancer. Although COLUMBA improved, pt clinically is declining overall. Psych consulted for delirum/dementia. Pt was made DNR/DNI and accepted to [facility].    Metabolic encephalopathy.  more confused in setting acute illness, trying to treat underlying causes  avoid BZD/Ambien/anticholinergics, day/night cues, supportive care  - appreciate psych input Remeron 7.5 hs/risperdal .5 hs, Haldol PRN --> more sleepy today, will change risperdal to PRN.  COLUMBA (acute kidney injury). d/t obstruction from prostate mass, appreciate renal and uro f/u -->B NT placed 12/5--> stablilized.     Hypernatremia. Na improved, but not taking any PO, needing to c/w IVFs for now.  Prostate mass. Plan: PSA very elevated, confirms dx of prostate CA  liver bx 11/26 steatosis, steatohepatitis, no evidence malignancy  - appreciate Dr. Scanlon's input - very debilitated, not a candidate for any Rx such as surgery or chemo.  Pt clearly defers to his wife for complex medical decisions.  She d/w her sons, have decided DNR/I, comfort measures.  MOLST filled out.  Pt accepted to [facility].  (hematuria improved off a/c).    Dementia without behavioral disturbance, unspecified dementia type. dx 2y ago, wife says more confused in setting acute illness  avoid BZD/Ambien/anticholinergics, day/night cues, supportive care.  Dysphagia, unspecified type.Plan: appreciate S&S eval, asp prec, dysphagia 2/nec thick  mouth care, encourage PO.    Chronic obstructive pulmonary disease, unspecified COPD type.  Plan: c/w bipap anytime sleeping, nebs prn, ipratropium, laba/steriod.     Atrial fibrillation, unspecified type.  Plan: Eliquis and hep gtt stopped given bleeding from NT and overall poor prognosis, risks of a/c outweigh benefits.

## 2018-12-13 NOTE — PROGRESS NOTE ADULT - PROBLEM SELECTOR PROBLEM 8
Chronic obstructive pulmonary disease, unspecified COPD type
Atrial fibrillation, unspecified type
Chronic obstructive pulmonary disease, unspecified COPD type
Dementia without behavioral disturbance, unspecified dementia type

## 2018-12-13 NOTE — PROGRESS NOTE BEHAVIORAL HEALTH - NSBHFUPINTERVALHXFT_PSY_A_CORE
Mr. Quigley had no acute events overnight. No prn medications were administered. This morning, pt was sleeping with bipap in place. He arouses easily to verbal stimulus but is irritable, requesting that the examiner let him sleep. He is oriented to place, but disoriented to date and situation. He reports sleeping well at night but still feeling tired. He endorses a good appetite, but becomes increasingly irritable upon additional questioning. Extensive ROS unable to be obtained.

## 2018-12-13 NOTE — PROGRESS NOTE ADULT - PROBLEM SELECTOR PLAN 8
c/w bipap anytime sleeping, nebs prn, ipratropium, laba/steriod
Eliquis and hep gtt stopped given bleeding from NT and overall poor prognosis, risks of a/c outweigh benefits
c/w bipap anytime sleeping, nebs prn, ipratropium, laba/steriod
dx 2y ago, wife says more confused in setting acute illness  avoid BZD/Ambien/anticholinergics, day/night cues, supportive care

## 2018-12-13 NOTE — PROGRESS NOTE BEHAVIORAL HEALTH - AXIS III
COPD, BRIEN on Bipap, ILD/pulm fibrosis, GERD, Afib on eliquis, dementia, prostate mass and BL hydronephrosis with possible liver mets, recurrent UTIs, COLUMBA, hypernatremia, anorexia
COPD, BREIN on Bipap, ILD/pulm fibrosis, GERD, Afib on eliquis, dementia, prostate mass and BL hydronephrosis with possible liver mets, recurrent UTIs, COLUMBA, hypernatremia, anorexia

## 2018-12-13 NOTE — PROGRESS NOTE BEHAVIORAL HEALTH - NSBHCONSULTOBSREASON_PSY_A_CORE FT
at risk due to confusion, but pt on locked psychiatric unit
at risk due to confusion, but pt on locked psychiatric unit

## 2018-12-13 NOTE — PROGRESS NOTE ADULT - ASSESSMENT
78M 3rd hospitalization since Sept, COPD, BRIEN on Bipap, ILD/pulm fibrosis, GERD, Afib on eliquis, dementia (a/o x2, follows commands, mostly lucid) prostate mass and BL hydronephrosis with possible liver mets (liver biopsy taken Nov 26th just steatosis), recurrent UTIs with indwelling shah a/w COLUMBA, hypernatremia, anorexia, found to have prostate mass with extension into posterior bladder wall and possibly rectum with mod B hydroureteronephrosis, s/p B NT (shah removed).  PSA very elevated confirming prostate CA diagnosis.  COLUMBA improved but pt seems to be declining overall 78M 3rd hospitalization since Sept, COPD, BRIEN on Bipap, ILD/pulm fibrosis, GERD, Afib on eliquis, dementia (a/o x2, follows commands, mostly lucid) prostate mass and BL hydronephrosis (liver biopsy taken Nov 26th just steatosis), recurrent UTIs with indwelling shah a/w COLUMBA, hypernatremia, anorexia, found to have prostate mass with extension into posterior bladder wall and possibly rectum with mod B hydroureteronephrosis, s/p B NT (shah removed).  PSA very elevated confirming prostate CA diagnosis.  COLUMBA improved but pt seems to be declining overall

## 2018-12-13 NOTE — PROGRESS NOTE ADULT - PROBLEM SELECTOR PROBLEM 9
Atrial fibrillation, unspecified type
Dementia without behavioral disturbance, unspecified dementia type
Atrial fibrillation, unspecified type

## 2018-12-13 NOTE — PROGRESS NOTE BEHAVIORAL HEALTH - CASE SUMMARY
patient continues to be oversedated on current med, would change from risperdal to abilify as this has less QTc burden and is even less sedating. consider stopping mirtazapine for now to see if this aids in better wakefulness. c/w modafinil.

## 2018-12-13 NOTE — DISCHARGE NOTE ADULT - PATIENT PORTAL LINK FT
You can access the [facility], offered by [facility], by registering with the following website: [email or web address]

## 2018-12-13 NOTE — PROGRESS NOTE ADULT - PROBLEM SELECTOR PLAN 9
Eliquis and hep gtt stopped given bleeding from NT and overall poor prognosis, risks of a/c outweigh benefits
Eliquis and hep gtt stopped given bleeding from NT and overall poor prognosis, risks of a/c outweigh benefits
dx 2y ago, wife says more confused in setting acute illness  avoid BZD/Ambien/anticholinergics, day/night cues, supportive care
Eliquis and hep gtt stopped given bleeding from NT and overall poor prognosis, risks of a/c outweigh benefits

## 2018-12-13 NOTE — PROGRESS NOTE ADULT - PROBLEM SELECTOR PLAN 1
more confused in setting acute illness, treating underlying cause  avoid BZD/Ambien/anticholinergics, day/night cues, supportive care  - appreciate psych input Remeron 7.5 hs/risperdal .5 hs, Haldol PRN --> more sleepy today, will changed psych meds to PRN more confused in setting acute illness, trying to treat underlying causes  avoid BZD/Ambien/anticholinergics, day/night cues, supportive care  - appreciate psych input Remeron 7.5 hs/risperdal .5 hs, Haldol PRN --> more sleepy today, will changed psych meds to PRN

## 2018-12-13 NOTE — PROGRESS NOTE BEHAVIORAL HEALTH - NSBHCHARTREVIEWVS_PSY_A_CORE FT
Vital Signs Last 24 Hrs  T(C): 36.3 (13 Dec 2018 06:02), Max: 36.4 (12 Dec 2018 19:10)  T(F): 97.4 (13 Dec 2018 06:02), Max: 97.5 (12 Dec 2018 19:10)  HR: 80 (13 Dec 2018 06:49) (72 - 92)  BP: 133/89 (13 Dec 2018 06:02) (109/67 - 133/89)  BP(mean): --  RR: 16 (13 Dec 2018 06:02) (16 - 18)  SpO2: 96% (13 Dec 2018 06:49) (96% - 98%)

## 2018-12-13 NOTE — PROGRESS NOTE BEHAVIORAL HEALTH - SUMMARY
Patient is a 78 year old ,  male, currently living in rehab setting with multiple medical hospitalizations for the last 4 months with PMH of Afib COPD, BRIEN (Cpap at night), CHFpEF, GERD, HLD, Pulmonary fibrosis, RBBB, Chronic B/L LE lymphedema, dementia, recurrent UTIs and currently admitted with urinary obstruction and newly diagnosed prostate CA. Patient is confused, most of the history is by wife. Psychiatry consulted for agitation and dementia.    Pt with continued irritability and waxing/waning disorientation consistent with likely delirium superimposed upon his chronic cognitive issues. Pt with prolonged qtc via Bezet's formula, although less than 500 msecs with other corrections. Pt family amenable to standing antipsychotics, will continue at this time.

## 2018-12-13 NOTE — DISCHARGE NOTE ADULT - CARE PLAN
Principal Discharge DX:	COLUMBA (acute kidney injury)  Goal:	improved  Assessment and plan of treatment:	You had bilateral nephrostomy tubes placed by interventional radiology. Please follow up with medical  provider at [place]  Secondary Diagnosis:	Hypernatremia  Assessment and plan of treatment:	resolved. Electrolytes monitored closely during hospitalization.  Secondary Diagnosis:	Metabolic encephalopathy  Assessment and plan of treatment:	Continue medications as directed per psychiatry.  Secondary Diagnosis:	Atrial fibrillation, unspecified type  Assessment and plan of treatment:	Your anticoagulation was held due to bleeding.  Secondary Diagnosis:	COPD (chronic obstructive pulmonary disease)  Assessment and plan of treatment:	Please continue to use your inhalers as prescribed. Please also continue CPAP.  Secondary Diagnosis:	Dementia  Assessment and plan of treatment:	Continue supportive care. Principal Discharge DX:	COLUMBA (acute kidney injury)  Goal:	improved  Assessment and plan of treatment:	You had bilateral nephrostomy tubes placed by interventional radiology. Please follow up with medical  provider at [place]  Secondary Diagnosis:	Hypernatremia  Assessment and plan of treatment:	resolved. Electrolytes monitored closely during hospitalization.  Secondary Diagnosis:	Metabolic encephalopathy  Assessment and plan of treatment:	Continue medications as directed per psychiatry.  Secondary Diagnosis:	Atrial fibrillation, unspecified type  Assessment and plan of treatment:	Your anticoagulation was held due to bleeding.  Secondary Diagnosis:	COPD (chronic obstructive pulmonary disease)  Assessment and plan of treatment:	Please continue to use your inhalers as prescribed. Please also continue CPAP.  Secondary Diagnosis:	Dementia  Assessment and plan of treatment:	Continue supportive care.  Secondary Diagnosis:	Prostate cancer  Assessment and plan of treatment:	prostate mass with extension into posterior bladder wall and possibly rectum with mod B hydroureteronephrosis, s/p B NT (shah removed).  PSA very elevated confirming prostate CA diagnosis.  Seen by Dr. Ghislaine De Luna pt very debilitated, not a candidate for any Rx such as surgery or chemo, approriate for Hospice, being transferred to [place] for further care

## 2018-12-13 NOTE — DISCHARGE NOTE ADULT - MEDICATION SUMMARY - MEDICATIONS TO STOP TAKING
I will STOP taking the medications listed below when I get home from the hospital:    apixaban 5 mg oral tablet  -- 1 tab(s) by mouth every 12 hours    escitalopram 10 mg oral tablet  -- 1 tab(s) by mouth once a day    aspirin 81 mg oral delayed release tablet  -- 1 tab(s) by mouth once a day    levalbuterol 0.63 mg/3 mL inhalation solution  -- 3 milliliter(s) inhaled 3 times a day, As Needed

## 2018-12-13 NOTE — PROGRESS NOTE ADULT - REASON FOR ADMISSION
paige, hypernatremia

## 2018-12-13 NOTE — PROGRESS NOTE ADULT - PROVIDER SPECIALTY LIST ADULT
Heme/Onc
Hospitalist
Nephrology
Urology
Hospitalist
Nephrology
Hospitalist

## 2018-12-13 NOTE — DISCHARGE NOTE ADULT - SECONDARY DIAGNOSIS.
Hypernatremia Metabolic encephalopathy Atrial fibrillation, unspecified type COPD (chronic obstructive pulmonary disease) Dementia Prostate cancer

## 2018-12-13 NOTE — PROGRESS NOTE ADULT - PROBLEM SELECTOR PLAN 6
Na improved, but not taking any PO, c/w LR appreciate S&S eval, asp prec, dysphagia 2/nec thick  mouth care, encourage PO

## 2019-01-04 LAB
FUNGUS SPEC QL CULT: SIGNIFICANT CHANGE UP
FUNGUS SPEC QL CULT: SIGNIFICANT CHANGE UP

## 2019-04-06 NOTE — ED PROVIDER NOTE - CPE EDP [facility] NORM
CERTIFICATE OF RETURN TO GYM    April 6, 2019      Re:   Mj Carbajal  [address] [facility] [id number]                        This is to certify that Mj Carbajal has been under my care from 4/6/2019.  Please be aware he has a left knee injury and should avoid using it until evaluated by Orthopedics.         SIGNATURE:___________________________________________,   4/6/2019      Arelis Decker PA-C          Urgent Care Services  [facility]  [address] [place] [id number] [phone]       normal...

## 2019-06-06 NOTE — PROGRESS NOTE ADULT - PROBLEM SELECTOR PROBLEM 3
Addended by: KY SLAUGHTER on: 6/6/2019 04:45 PM     Modules accepted: Sharon     Persistent atrial fibrillation

## 2020-01-14 NOTE — PATIENT PROFILE ADULT. - FUNCTIONAL SCREEN CURRENT LEVEL: SWALLOWING (IF SCORE 2 OR MORE FOR ANY ITEM, CONSULT REHAB SERVICES), MLM)
(0) swallows foods/liquids without difficulty
History of tonsillectomy and adenoidectomy    Ovarian torsion

## 2020-02-19 NOTE — ED PROVIDER NOTE - PSYCHIATRIC NEGATIVE STATEMENT, MLM
Lyudmila Gastelum RN   no known mental health issues.

## 2020-09-08 NOTE — ED ADULT NURSE NOTE - GASTROINTESTINAL WDL
HEMATOLOGY ONCOLOGY FOLLOW UP        Patient name: Eleazar Jeong  : 1986  MRN: [id number]  Primary Care Physician: Tierra Payan PA  Referring Physician: Tierra Payan PA  Reason For Consult:     Chief Complaint   Patient presents with   • Follow-up     Monoclonal gammopathy   Monoclonal gammopathy  History of polycythemia    History of Present Illness:    1. IgG kappa monoclonal gammopathy:    2018 to 2019 patient has not followed up in our office.  Patient was referred back by MARTIN Lacey due to IgG kappa monoclonal gammopathy  · 2019: Serum protein electrophoresis shows M protein 1.8 g/dL  · 2019 IgA 116, IgG 2206 high, IgM 55, serum immunofixation shows IgG kappa monoclonal gammopathy.  · 2019 creatinine 0.84, BUN 12,  high, ALT 70, iron 130, ferritin 34.3, iron saturation 38%, TIBC 346  WBC 6.7, hemoglobin 14.9, platelets 184, MCV 93.7   · 2019: Skeletal survey: No evidence of multiple myeloma.  No lytic or blastic lesions.  · 2019  SPEP M protein 1.38 free kappa light chains 37.6 high, Urine protein electrophoresis M spike 111.7, M spike to 3.38.,  24-hour urine protein is 260, urine immunofixation shows free kappa paraprotein peak and also a small IgG kappa paraprotein peak.  · 2019 calcium 9.5, creatinine 0.8, ALT 65 high, AST 57 high, hepatitis panel negative,  · 2019 Skeletal survey: No evidence of bone lytic lesions.  · 2019:UPEP shows to M spike's 4.5% and 1.3%.  24-hour urine protein to 60 high,  · 2020: WBC 9.2, hemoglobin 17.5, MCV 96.1, platelets 201  · 2020 Bone marrow aspiration and biopsy: Atypical marrow plasmacytosis 5 to 10%, consistent with plasma cell neoplasm.  Normocellular to focally mildly hypocellular marrow for age, 40 to 50%, with trilineage hematopoiesis.  No increase in reticulin fibrosis.  Storage iron present.  Flow cytometry: Clonal plasma cell population detected  1% of analyzed cells, with aberrant expression of  and cytoplasmic kappa light chain restriction.,  Normal cytogenetics 46 XY  · 3/11/2020 IgG 2104, IgM 51, IgA 101, SPEP shows M protein 1.7  . Free kappa light chains 48.1, free lambda light chain 6.9, kappa lambda ratio 6.97 high, WBC 7.8, hemoglobin 14.9, platelets 206, MCV 93.6  · 3/11/2020:  high,  high, albumin 4.3, alk phos 56, bilirubin 0.6  · 3/18/2020 WBC 7.7, hemoglobin 16, MCV 94, platelets 202,  · 3/18/2020: IgG 2304 high, IgM 53, IgA 108, free kappa light chains 55.8 high, free lambda light chains 8.2, kappa lambda ratio 6.8 high  · 4/28/2020: Liver biopsy: Benign liver with no significant histopathology.  No evidence of malignancy.  Esophageal biopsy shows glandular mucosa with intestinal metaplasia consistent with Matson's esophagus.  Negative for dysplasia.   · 6/3/2020: CMP normal, WBC 6.9, hemoglobin 14.9, platelets 220, MCV 95.2  · 6/10/2020: WBC 8.39, hemoglobin 15.1, platelets 212, SPEP shows M protein 1.8 g/dL.,  Free kappa light chains 51.2 high, free lambda light chains 11.3, kappa lambda ratio 4.53 high, IgG 2242 high,  · 8/6/2020: WBC 8, hemoglobin 16.1, platelets 186, IgA 94, IgG 1940 high, IgM 54 low, free kappa light chains 3695 high, free lambda light chain 6.75, kappa lambda ratio 5.47 high,Calcium 9.3, AST ALT normal, albumin 4.2, TSH 0.6, total testosterone 482, SPEP shows M protein 1.55,  · 8/20/2020: CTs soft tissue neck with and without contrast: No acute process seen in the neck.  No abnormal soft tissue mass or fluid collection.  No cervical adenopathy.  · 8/21/2020: PET CT scan: No convincing hypermetabolic osseous malignancy.  Right prepatellar soft tissue thickening and fluid with diffuse mild metabolic activity suggestive of inflammatory process.    · 9/2/2020:SPEP shows M protein 1.8 g/dL, creatinine 0.94, LFTs normal, creatinine 0.94, LFTs normal, WBC 6.9, hemoglobin 16, platelets 202, MCV  92.1  ·         2.  History of polycythemia:  Patient presented to PCP Dr. Ann’s office on 4/11/16 that showed hemoglobin of 18.3.  MCV was  95.0.  He is referred to us for polycythemia.  The patient has a history of chronic anxiety and   depression.   · 4/11/16 - WBC 9.2, hemoglobin 18.3, MCV 95.0, platelet count 182,000.  Differential normal.    Vitamin B12 554.  · 5/10/2016, the patient presents for initial consultation for polycythemia.  He reports symptoms of occasional headaches.  He does have history of migraines.  He also reports some  nonspecific vision problems.  He also reports weight loss of around 30 pounds during the past six months.  He denies any fevers or any lymph node enlargement.  He also reports some night sweats.  He does feel tired all the time.  He denies having any snoring, but he lives alone.  He does report some symptoms of excessive daytime sleepiness.  He smokes about 3-4 cigars per day.  He  denies having a CBC in the past.    ·  5/10/16 - Creatinine 0.87.  LFTs normal.  WBC 7.3, hemoglobin 16.7, MCV 94.5, platelet count  189,000.  Erythropoietin level 10.6.  JAK2 V617 mutations negative.  Exon 12 and exon 13 mutation  negative.   mutation negative.    ·  6/8/16 - Bone marrow biopsy:  Hemodiluted hypocellular aspirate.  However, flow cytometry is  adequate and it does not reveal any abnormal immunophenotype.  Cytogenetics shows normal  karyotype.  The clot section showed a single bone marrow particle.  There was no evidence of  ringed sideroblasts.    · 6/30/16 - WBC 8.7, hemoglobin 17.6, MCV 94, platelet count 166,000.  · 9/1/16 - WBC 9.4, hemoglobin 16.8, MCV 94.9, platelet count 204,000.  Patient underwent   phlebotomy.  ·  1/5/17 - Hemoglobin 16.5.  Patient underwent phlebotomy.  ·  1/9/17 - WBC 9.8, hemoglobin 15.3, platelet count 195,000.    · 5/12/17 - WBC 6.9, hemoglobin 15.9, platelet count 185,000, MCV 91.3.    · 9/15/17 - WBC 9.4, hemoglobin 16.2, platelet count  201,000, MCV 91.1.   · 12/6/17 - EGD:  Probable Matson’s esophagus. Antral gastritis.  Glandular mucosa with focal intestinal metaplasia, consistent with Matson’s.    ·  3/21/18 - Brain MRI with and without contrast:  A 2.3 cm structure along the superior margin of the cerebellum at the midline, consistent arachnoid cyst.       Subjective  Patient  presents for a follow up of IgG kappa monoclonal gammopathy. He continues to smoke, and has not tried to quit. He reports fatigue.  In the interim he was seen at [facility] hematology oncology by Dr. Barajas.  He has had problems with the left ear.  He felt a lump in the throat and a CT scan neck was ordered and it came back negative.  He follows up with Dr. Dumas at [facility] in [place]. He underwent a liver biopsy performed on 04/28/2020.  Patient does not report any frequent infections.  No new complaints.    The following portions of the patient's history were reviewed and updated as appropriate: allergies, current medications, past family history, past medical history, past social history, past surgical history and problem list.       Past Medical History:   Diagnosis Date   • Anxiety    • Benign monoclonal gammopathy     dr. quiroz   • Colon polyp    • Depression    • Elevated liver enzymes 04/2020   • Erectile dysfunction    • GERD (gastroesophageal reflux disease)    • Headache    • Hypoglycemia    • Left sided abdominal pain    • Low back pain    • Migraine    • Nausea    • Pneumonia 03/2019   • Tremor    • Visual impairment     wears glasses       Past Surgical History:   Procedure Laterality Date   • COLONOSCOPY     • UPPER ENDOSCOPIC ULTRASOUND W/ FNA N/A 4/28/2020    Procedure: Esophagogastroduodenoscopy and endoscopic ultrasound with biopsy of liver, biopsy of esophagus;  Surgeon: Danilo Balderrama MD;  Location: [facility] ENDOSCOPY;  Service: Gastroenterology;  Laterality: N/A;  gastritis, hiatel hernia, barretts esophagus       Current Outpatient  Medications:   •  colestipol (COLESTID) 1 g tablet, Take 1 g by mouth Daily. Sometimes takes 2 per day as instructed, Disp: , Rfl:   •  lamoTRIgine (LaMICtal) 25 MG tablet, Take 25 mg by mouth Daily., Disp: , Rfl:   •  meclizine (ANTIVERT) 12.5 MG tablet, 1-2 tabs PO TID PRN dizziness, Disp: 60 tablet, Rfl: 1  •  omeprazole (priLOSEC) 40 MG capsule, Take 40 mg by mouth Daily., Disp: , Rfl:   •  sildenafil (REVATIO) 20 MG tablet, Take 20 mg by mouth Daily As Needed. Do not use 24 hours prior to procedure, Disp: , Rfl: 99  •  diphenhydrAMINE (BENADRYL ALLERGY) 25 MG tablet, Take 25 mg by mouth Every 6 (Six) Hours As Needed., Disp: , Rfl:   •  ibuprofen (ADVIL,MOTRIN) 800 MG tablet, Take 800 mg by mouth Every 8 (Eight) Hours. Stop now for procedure, Disp: , Rfl:   •  Loperamide HCl (IMODIUM PO), 1 dose Daily As Needed., Disp: , Rfl:     Allergies   Allergen Reactions   • Amoxicillin Unknown (See Comments)   • Erythromycin Hives   • Levofloxacin Hives   • Penicillin G Unknown (See Comments)   • Penicillins Other (See Comments)       Family History   Problem Relation Age of Onset   • Diabetes Mother    • Vision loss Mother    • COPD Paternal Aunt    • Vision loss Paternal Aunt    • Diabetes Maternal Grandmother    • Vision loss Maternal Grandmother    • Cancer Maternal Grandmother    • Heart disease Paternal Grandmother    • Stroke Paternal Grandmother    • Heart disease Paternal Grandfather    • Vision loss Paternal Grandfather    • Vision loss Father    • Vision loss Sister        Cancer-related family history includes Cancer in his maternal grandmother.    Social History     Tobacco Use   • Smoking status: Current Every Day Smoker     Packs/day: 1.00     Years: 17.00     Pack years: 17.00     Types: Cigarettes, Cigars, Electronic Cigarette     Start date: 8/2/2002   • Smokeless tobacco: Never Used   Substance Use Topics   • Alcohol use: Not Currently   • Drug use: Not Currently     I have reviewed the history of  "present illness, past medical history, family history, social history, lab results, all notes and other records since the patient was last seen on  11/27/2019.    ROS:   Review of Systems   Constitutional: Positive for fatigue. Negative for activity change, chills and fever.   HENT: Positive for ear pain. Negative for congestion, mouth sores, nosebleeds and sore throat.    Eyes: Negative for photophobia, redness and visual disturbance.   Respiratory: Negative for wheezing and stridor.    Cardiovascular: Negative for chest pain and palpitations.   Gastrointestinal: Positive for constipation (states it is chronic and he is, \"used to it\"). Negative for abdominal pain, diarrhea, nausea and vomiting.   Endocrine: Negative for cold intolerance and heat intolerance.   Genitourinary: Negative for dysuria and hematuria.   Musculoskeletal: Negative for joint swelling and neck stiffness.   Skin: Negative for color change and rash.   Neurological: Negative for dizziness (vertigo lately), seizures and syncope.   Hematological: Negative for adenopathy.        No obvious bleeding   Psychiatric/Behavioral: Negative for agitation, confusion and hallucinations.     Objective:  Vital signs:  Vitals:    09/09/20 0908   BP: 133/83   Pulse: 69   Resp: 16   Temp: 98 °F (36.7 °C)   Weight: 97.1 kg (214 lb)   Height: 188 cm (74\")     ECOG  (0) Fully active, able to carry on all predisease performance without restriction    Physical Exam:   Physical Exam   Constitutional: He is oriented to person, place, and time. He appears well-developed and well-nourished. No distress.   HENT:   Head: Normocephalic and atraumatic.   Poor dentition   Eyes: Conjunctivae and EOM are normal. Right eye exhibits no discharge. Left eye exhibits no discharge. No scleral icterus.   Wears glasses   Neck: Normal range of motion. Neck supple. No thyromegaly present.   Cardiovascular: Normal rate, regular rhythm and normal heart sounds. Exam reveals no gallop and no " friction rub.   Pulmonary/Chest: Effort normal. No stridor. No respiratory distress. He has no wheezes.   Slightly decreased air entry bilaterally   Abdominal: Soft. Bowel sounds are normal. He exhibits no mass. There is no tenderness. There is no rebound and no guarding.   Musculoskeletal: Normal range of motion. He exhibits no tenderness.   Lymphadenopathy:     He has no cervical adenopathy.   Neurological: He is alert and oriented to person, place, and time. He exhibits normal muscle tone.   Skin: Skin is warm. No rash noted. He is not diaphoretic. No erythema.   Multiple tattoos   Psychiatric: He has a normal mood and affect. His behavior is normal.   Nursing note and vitals reviewed.  I have reexamined the patient and the results are consistent with the previously documented exam. Shabbir Preciado MD       Lab Results - Last 18 Months   Lab Units 09/02/20  0806 06/10/20  0908 06/03/20  0913   WBC 10*3/mm3 6.98 8.39 6.96   HEMOGLOBIN g/dL 16.0 15.1 14.9   HEMATOCRIT % 44.4 42.1 41.8   PLATELETS 10*3/mm3 202 212 220   MCV fL 92.1 94.8 95.2     Lab Results - Last 18 Months   Lab Units 09/02/20  0806 06/03/20  0913 03/18/20  1039   SODIUM mmol/L 137 136 138   POTASSIUM mmol/L 3.8 3.6 4.4   CHLORIDE mmol/L 101 103 101   CO2 mmol/L 24.0 23.0 26.0   BUN  11 14 12   CREATININE mg/dL 0.94 1.03 0.97   CALCIUM mg/dL 9.0 9.2 9.5   BILIRUBIN mg/dL 0.5 0.6 0.6   ALK PHOS U/L 65 61 60   ALT (SGPT) U/L 25 33 96*   AST (SGOT) U/L 23 24 89*   GLUCOSE mg/dL 104* 119* 95       Lab Results   Component Value Date    GLUCOSE 104 (H) 09/02/2020    BUN  09/02/2020      Comment:      Testing performed by alternate method    BUN 11 09/02/2020    CREATININE 0.94 09/02/2020    EGFRIFNONA 91 09/02/2020    BCR  09/02/2020      Comment:      Testing not performed    K 3.8 09/02/2020    CO2 24.0 09/02/2020    CALCIUM 9.0 09/02/2020    PROTENTOTREF 8.0 09/02/2020    ALBUMIN 4.1 09/02/2020    ALBUMIN 4.60 09/02/2020    LABIL2 1.1 09/02/2020    AST  23 09/02/2020    ALT 25 09/02/2020       Lab Results - Last 18 Months   Lab Units 01/17/20  0808   INR  1.04   APTT seconds 24.9       Lab Results   Component Value Date    IRON 130 12/13/2019    TIBC 346 12/13/2019    FERRITIN 34.35 12/13/2019       No results found for: FOLATE    No results found for: OCCULTBLD    No results found for: RETICCTPCT    Lab Results   Component Value Date    ARMBGXLU45 319 06/13/2019     Lab Results   Component Value Date    SPEP Comment 09/02/2020     No results found for: LDH, URICACID  Lab Results   Component Value Date    SEDRATE 3 11/05/2019     No results found for: FIBRINOGEN, HAPTOGLOBIN  Lab Results   Component Value Date    PTT 24.9 01/17/2020    INR 1.04 01/17/2020     No results found for:   No results found for: CEA  No components found for: CA-19-9  No results found for: PSA      Assessment/Plan     Assessment:  1.  IgG kappa MGUS: Diagnosed November 2019.  High-intermediate risk.  PET CT scan on 8/21/2020 does not show any evidence of bone lesions..  No crab symptoms..  M protein was 1.8 initially, but more recently it was around 1.7.  Skeletal survey was negative.  He does not report any frequent infections..  Bone marrow biopsy shows about 5 to 10% plasma cells.  Cytogenetics showed normal karyotype..  Urine immunofixation is also positive.  M protein remains stable as of September 2020.  Unable to run myeloma FISH panel on the bone marrow.  2. History of elevated liver enzymes: ALT is up to 193 and AST up to 602 as of 3/11/2020.  Patient is following with gastroenterology.  .  Status post liver biopsy which did not reveal any pathology.  Repeat LFTs normal in September 2020.  3. History of secondary polycythemia: Seems resolved. Bone marrow biopsy was unremarkable.  JAK2 mutation and EPO  level do not suggest any evidence of polycythemia vera.  He has secondary polycythemia likely due  to tobacco use and from sleep apnea.  The patient continues to smoke.  Patient  did not want to  have a sleep study.  He has not required phlebotomy for the last 3 years His hemoglobin has stabilized.  Unfortunately he continues to smoke.   4. History of Matson’s esophagus.  He follows up with Dr. Hernandez.  Will need surveillance endoscopies.  5. History of tobacco use: Smoking cessation counseling was provided.  Patient not interested in quitting at this time.       PLAN:    1.  SPEP, free light chain assay, CBC CMP in 3 months.  Continue surveillance.  No indication for treatment.  2. Patient not interested in smoking cessation.  3. Continue follow-up with gastroenterology for increased liver enzymes and Matson's esophagus  4. Will need repeat endoscopies for his Matson's esophagus.  5. We will follow patient back in 3 months with repeat myeloma labs..      I counselled Eleazar of the risks of continuing to use tobacco and cessation.    During this visit, I spent 3-10 minutes counseling the patient regarding tobacco cessation.       Monoclonal gammopathy  - CBC & Differential  - Protein Elec + Interp, Serum  - Immunoglobulin Free LT Chains Blood  - Comprehensive Metabolic Panel    Polycythemia    Matson's esophagus without dysplasia    Orders Placed This Encounter   Procedures   • Protein Elec + Interp, Serum     Standing Status:   Future     Standing Expiration Date:   9/9/2021     Scheduling Instructions:      Please draw these labs prior to the next visit.   • Immunoglobulin Free LT Chains Blood     Standing Status:   Future     Standing Expiration Date:   9/9/2021   • Comprehensive Metabolic Panel     Standing Status:   Future     Standing Expiration Date:   9/9/2021   • CBC & Differential     Standing Status:   Future     Standing Expiration Date:   9/9/2021      Thank you very much for providing the opportunity to participate in this patient’s care. Please do not hesitate to call if there are any other questions.    I have reviewed all relevant labs results, outside reports, imaging,  notes, vital signs, medications, and plan with the patient today.    Portions of the note have been scribed by medical assistant/Nurse.  I have reviewed and made changes accordingly.    I have reviewed and confirmed the accuracy of the patient's history: Chief complaint, HPI, ROS and Subjective as entered by the MA/LPN/RN. Shabbir Preciado MD 09/09/20        Electronically signed by Shabbir Preciado MD, 09/09/20, 9:44 AM.     Abdomen soft, nontender, nondistended, bowel sounds present in all 4 quadrants.

## 2020-12-06 NOTE — PROGRESS NOTE ADULT - PROBLEM SELECTOR PLAN 3
ASSESSMENT AND PLAN:  This is a 81-year-old female, who developed pain, radiculopathy, spinal stenosis, spondylosis, here for refill of medications.  I will give her a refill prescription of Norco 10/325 , 1 tablet q.4 hours as needed for pain, a total of 180 pills, no refills . She will follow up in the pain management clinic in two months.   The patient is not able to make an appointment to see me in the clinic due to the coronavirus situation.  The patient was triaged via tele-communication method.  The patient reported no signs and symptoms of any respiratory depression or distress or fevers.  The patient is tolerating the opioid medication without adverse side effects of excessive drowsiness, constipation issues or mental status changes.  Next month the patient will make an appropriate arrangements for an office visit.    The conversation took  5  minutes.                      PQRS :  OA functioning assesed patient able to ambulate without assisting devices.  Pain assesed and documented in the chart, visual analog pain score is 5/10.  Current medications in then chart.  Radiology exposure is documented in operative reports  Quality of life with primary headache disorder , the patient does not have a primary headache disorder.  Falls plan of care METS >4. Sitting test performed.  Risk assesment for falls completed.  Osteoporosis treatment is being assesed and treated by primary doctor.  Tobacco prevention performed patient is a non smoker.  Screening blood pressure is normal  . The patient will follow with  primary doctor  BMI normal   , no follow up needed with primary doctor .   Advance care plan is son .      Thank you very much Dr. Rollins.          Electronically Signed On 06.01.2020 11:16  ___________________________________________________   Erick WETZEL, Geo Go   Stable. Continue Eliquis 5mg BID.

## 2021-02-17 NOTE — PROGRESS NOTE ADULT - SUBJECTIVE AND OBJECTIVE BOX
Labs are ok except  -average blood sugar still in prediabetes range  -elevated total cholesterol at 215 and triglycerides at 167. Watching fat and carbs in diet and increasing lean protein will help improve those numbers  Recheck cholesterol in 4 months.if not improved, we will consider medications  -very low vitamin d.please take supplements prescribed once a week.if not covered by insurance, then use otc vitamin d3 5000 IU daily for next  Months    Repeat labs placed for 4 moths [facility] Division of Kidney Diseases & Hypertension  FOLLOW UP NOTE  [phone]--------------------------------------------------------------------------------    78 year old male with h/o dementia, AMATT briggs was sent from [facility] to [facility] due to abnormal labs. Labs done in ER shows elevated creatinine and sodium and hence nephrology was consulted. On review of previous labs in [place], Scr. has been WNL with last Scr. prior to admission being 0.87 on 10/23/18. However labs done in ER showed elevated Scr. of 5 on 12/3/18 which worsened to 5.63 on 12/4/18. CT abdomen and pelvis showed bilateral hydronephrosis which has slightly increased compared to previous CT scan. Patient schedule for PCN tube placement today.    Patient seen and examined today at bedside. Patient is awake and appears comfortable.     PAST HISTORY  --------------------------------------------------------------------------------  No significant changes to PMH, PSH, FHx, SHx, unless otherwise noted    ALLERGIES & MEDICATIONS  --------------------------------------------------------------------------------  Allergies    No Known Drug Allergies  zosyn (Drowsiness)    Intolerances      Standing Inpatient Medications  atorvastatin 20 milliGRAM(s) Oral at bedtime  buDESOnide  80 MICROgram(s)/formoterol 4.5 MICROgram(s) Inhaler 2 Puff(s) Inhalation two times a day  cholecalciferol 400 Unit(s) Oral daily  dextrose 5%. 1000 milliLiter(s) IV Continuous <Continuous>  finasteride 5 milliGRAM(s) Oral daily  lactulose Syrup 10 Gram(s) Oral at bedtime  mirtazapine 7.5 milliGRAM(s) Oral at bedtime  modafinil 100 milliGRAM(s) Oral daily  polyethylene glycol 3350 17 Gram(s) Oral daily  tamsulosin 0.4 milliGRAM(s) Oral at bedtime  tiotropium 18 MICROgram(s) Capsule 1 Capsule(s) Inhalation daily    PRN Inpatient Medications  acetaminophen   Tablet .. 650 milliGRAM(s) Oral every 8 hours PRN  ALBUTerol    0.083% 2.5 milliGRAM(s) Nebulizer every 6 hours PRN      REVIEW OF SYSTEMS  --------------------------------------------------------------------------------    Unable to obtain.     VITALS/PHYSICAL EXAM  --------------------------------------------------------------------------------  T(C): 36.4 (12-05-18 @ 13:31), Max: 36.6 (12-05-18 @ 05:10)  HR: 106 (12-05-18 @ 13:31) (82 - 113)  BP: 117/78 (12-05-18 @ 13:31) (117/78 - 134/85)  RR: 17 (12-05-18 @ 13:31) (17 - 18)  SpO2: 99% (12-05-18 @ 13:31) (94% - 99%)  Wt(kg): --    Weight (kg): 79 (12-04-18 @ 00:20)      12-04-18 @ 07:01  -  12-05-18 @ 07:00  --------------------------------------------------------  IN: 1407.5 mL / OUT: 750 mL / NET: 657.5 mL    12-05-18 @ 07:01  -  12-05-18 @ 14:26  --------------------------------------------------------  IN: 243 mL / OUT: 0 mL / NET: 243 mL      Physical Exam:  	  Gen: Elderly male, NAD  	HEENT: no JVD  	Pulm: CTA B/L  	CV:  S1S2  	Abd: +BS, soft,               : shah catheter present; draining urine.   	Ext: No B/L Lower ext edema  	Neuro: No focal deficits  	Skin: Warm, without rashes    LABS/STUDIES  --------------------------------------------------------------------------------              12.2   11.64 >-----------<  126      [12-05-18 @ 08:35]              38.1     147  |  116  |  67  ----------------------------<  93      [12-05-18 @ 08:35]  4.1   |  14  |  6.23        Ca     8.1     [12-05-18 @ 08:35]      Mg     1.8     [12-05-18 @ 08:35]      Phos  4.0     [12-05-18 @ 08:35]    TPro  6.7  /  Alb  2.5  /  TBili  0.5  /  DBili  x   /  AST  16  /  ALT  11  /  AlkPhos  110  [12-04-18 @ 09:35]    PT/INR: PT 14.2 , INR 1.24       [12-05-18 @ 08:35]  PTT: 88.1       [12-05-18 @ 08:35]      Creatinine Trend:  SCr 6.23 [12-05 @ 08:35]  SCr 5.96 [12-04 @ 20:10]  SCr 5.42 [12-04 @ 09:35]  SCr 5.00 [12-03 @ 21:50]  SCr 5.03 [12-03 @ 14:10]    Urinalysis - [12-03-18 @ 15:30]      Color YELLOW / Appearance Lt TURBID / SG 1.015 / pH 5.5      Gluc NEGATIVE / Ketone NEGATIVE  / Bili NEGATIVE / Urobili NORMAL       Blood MODERATE / Protein 20 / Leuk Est MODERATE / Nitrite NEGATIVE      RBC 3-5 / WBC 6-10 / Hyaline 1+ / Gran  / Sq Epi FEW / Non Sq Epi  / Bacteria SMALL    Urine Creatinine 103.40      [12-04-18 @ 01:54]  Urine Sodium 50      [12-04-18 @ 01:54]  Urine Potassium 30.1      [12-04-18 @ 01:54]  Urine Chloride 44      [12-04-18 @ 01:54]  Urine Osmolality 330      [12-04-18 @ 01:54]

## 2021-02-24 NOTE — PROGRESS NOTE ADULT - PROBLEM SELECTOR PLAN 4
Baseline Cr 0.6  Currently elevated to 1.8  Likely pre-renal due to poor PO intake  Pt received 3 liters Bolus in ED  Holding further hydration due to LE edema   US renal B/l hydronephrosis   urology consulted   started on Flomax and Proscar   likely post obstructed 2/2 BPH   resolved   c/w shah and Proscar and flomax Protopic Pregnancy And Lactation Text: This medication is Pregnancy Category C. It is unknown if this medication is excreted in breast milk when applied topically.

## 2021-03-15 NOTE — PATIENT PROFILE ADULT. - FUNCTIONAL SCREEN CURRENT LEVEL: AMBULATION, MLM
Acute events [id number]       Nothing significant to report              Neuro: Aox1, disoriented to time, place, situation.  Pleasant and cooperative.  Intermittently restless, redirectable.  Bed alarm required, slightly impulsive.     Cardiac: SR.  Q4H SBP WNL. 1x pleural CT remaining.     Pulm: LS CTA but diminished.      Renal/: scant UOP, intermittent hemodialysis.   external urine collection bag        Endo/GI: Rectal tube in place for continued liquid stool.  SBFT (post-pyloric) for TF at goal.  Failed previous SLP evaluation, will need f/unit(s) evaluation with possible formal swallow study.   Feeding tube      Integu: Redness and some discomfort to L forearm, improved.  Excoriation and redness to buttocks, barrier cream liberally applied.                   Musc: TANIKA.  OOB to chair with lift, stood with therapies on 3/14.        Lines/Devices: Peripheral, PICC, Central line, Feeding tube and chest tube, rectal tube        Plan/Follow-up: Continue to monitor/POC.  Plan to transfer to floor when a bed becomes available.    Dee Tobias RN on 3/15/2021 at 5:47 AM     (2) assistive person (3) assistive equipment and person

## 2021-03-19 NOTE — PROGRESS NOTE ADULT - PROBLEM SELECTOR PLAN 5
Stable. Continue DuoNeb treatments PRN. Found to have prolonged QTc of 506 ms on today's ECG.   - Avoid QT-prolonging agents  - Repeat ECG, especially if considering antipsychotics when discharged independent

## 2021-04-13 NOTE — ED PROCEDURE NOTE - NS_EDPROVIDERDISPOUSERTYPE_ED_A_ED
Waitlist  Location: [facility]  Procedure: Colonoscopy  Date: 11/2/15  Provider: YUNIER WALSH MD  Sedation Used: IV Sedation  Findings: Normal colon mucosa up to proximal ascending colon  2. Poor preparation with solid and liquid stools  Repeat: 5 YEARS  Notes:       1st attempt: Patient did not want to schedule 5 year repeat colonoscopy.   2nd attempt: colonoscopy reminder letter sent.   
Attending Attestation (For Attendings USE Only)...
Attending Attestation (For Attendings USE Only)...

## 2021-05-11 NOTE — PROGRESS NOTE ADULT - PROBLEM SELECTOR PLAN 5
Medication(s) Requested:   lisdexamfetamine (Vyvanse) 40 MG capsule 30 capsule 0 4/16/2021     Sig: Do not start before April 16, 2021. TAKE 1 CAPSULE BY MOUTH ONCE A DAY. MUST LAST 30 DAYS.    Sent to pharmacy as: Lisdexamfetamine Dimesylate 40 MG Oral Capsule (Vyvanse)    Class: Eprescribe    Earliest Fill Date: 4/16/2021    E-Prescribing Status: Receipt confirmed by pharmacy (4/14/2021 12:41 PM CDT)        Last office visit: 2/17/2021  Advised follow up: 4 months  Appointment: 6/16/2021  Last refill: 4/16/2021 per PDMP  Is the patient due for refill of this medication(s): Yes 5/16/2021  PDMP review: Criteria met. Forwarded to Physician/KRISTYN for signature.   [facility]    Is it ok to refill Rx with fill date 5/16/2021?     Rx routed/e-scribed to patient's preferred pharmacy.   Bronchodilators   steroids  Pfts as OP. No

## 2021-08-26 NOTE — DISCHARGE NOTE ADULT - DISCHARGE WEIGHT
INFECTIOUS DISEASE PROGRESS NOTE     Patient seen during the COVID-19 pandemic crisis      SUBJECTIVE/OBJECTIVE   Feeling and looking better    PHYSICAL EXAM   Vitals: Minimum/Maximum (last 24 hours):  Temperature Blood Pressure Heart Rate Resp Rate SpO2   Temp  Av.5 °F (35.8 °C)  Min: 95.9 °F (35.5 °C)  Max: 97.5 °F (36.4 °C) BP  Min: 93/55  Max: 127/49 Pulse  Av.3  Min: 71  Max: 100 Resp  Avg: 15.9  Min: 11  Max: 23 SpO2  Av.4 %  Min: 96 %  Max: 100 %   Last Vitals:  Visit Vitals  /66 (BP Location: RUE - Right upper extremity, Patient Position: Supine)   Pulse 83   Temp 97.5 °F (36.4 °C) (Oral)   Resp 16   Ht 5' 6.14\" (1.68 m)   Wt 73.5 kg (162 lb 0.6 oz)   SpO2 96%   BMI 26.04 kg/m²       HEENT:  Normocephalic  Neck:  Supple  Lung:  Air entry is satisfactory and clear to auscultation bilaterally  Heart:  S1 and S2 heard  Abdomen:  Soft, non-distended. No obvious masses or organomegaly. No tenderness  Neurological: Awake, alert, and appropriate  Pulses:  Both dorsalis pedis are present  Extremities:  No clubbing or cyanosis  Skin: Superficial stage I right gluteal ulcer    Lab Results     Recent Labs     21  0435   WBC 12.8* 9.1 8.3   RBC 3.62* 3.12* 2.97*   HGB 11.5* 10.0* 9.4*   HCT 34.9* 30.2* 28.8*    178 160   MCV 96.4 96.8 97.0   MCH 31.8 32.1 31.6   MCHC 33.0 33.1 32.6   NRBCRE 0 0 0       Recent Labs     21  0436   SODIUM 135 140 142   POTASSIUM 4.2 4.1 3.8   CO2 29 26 27   ANIONGAP 11 14 13   GLUCOSE 131* 92 83   BUN 23* 24* 23*   CREATININE 1.55* 1.48* 1.52*   BCRAT 15 16 15   CALCIUM 9.1 8.5 8.3*   BILIRUBIN 1.3* 1.3* 1.4*   AST 18 18 15   GPT 12 12 11   ALKPT 141* 152* 135*   GLOB 4.3* 3.8 3.7   AGR 0.7* 0.6* 0.6*     Recent Labs     21  1039   [id number] Not Detected Not Detected     Anaerobe/Aerobe, Bacterial Culture With Gram Stain [[id number]] Collected: 21 1513   Order  Status: Completed Specimen: Pleural fluid from Pleural Cavity, Left Updated: 08/26/21 0638    CULTURE WITH GRAM STAIN, ANAEROBE/AEROBE No Growth 2 Days.    Gram Stain Present Polymorphonuclear cells.     No organisms seen.   Blood Culture [[id number]] Collected: 08/23/21 1845   Order Status: Completed Specimen: Blood Updated: 08/25/21 2203    Culture, Blood or Bone Marrow No Growth 2 Days.   Narrative:     Additional Comment from 2 different sites        Blood Culture [[id number]] Collected: 08/23/21 1830   Order Status: Completed Specimen: Blood Updated: 08/25/21 2203    Culture, Blood or Bone Marrow No Growth 2 Days.   Narrative:     Additional Comment from 2 different sites        Urine, Bacterial Culture [[id number]] Collected: 08/24/21 1150   Order Status: Completed Specimen: Urine clean catch Updated: 08/25/21 1514    Urine, Bacterial Culture No Growth.     Procalcitonin [[id number]] (Abnormal) Collected: 08/24/21 1928   Specimen: Blood, Venous Updated: 08/24/21 2040    Procalcitonin 0.34 High  ng/mL      pH, Fluid [[id number]] Collected: 08/24/21 1513   Specimen: Pleural fluid from Pleural Cavity, Left Updated: 08/24/21 1956    pH, Fluid 8.20 Units    Fluid Cell Count and Differential (Non Synovial/Non CSF) [[id number]] Collected: 08/24/21 1513   Specimen: Pleural fluid from Pleural Cavity, Left Updated: 08/24/21 1823   Narrative:     The following orders were created for panel order Fluid Cell Count and Differential (Non Synovial/Non CSF).   Procedure                               Abnormality         Status                     ---------                               -----------         ------                     Fluid Cell Count and D...[[id number]]                      Final result               Differential, Fluid[[id number]]                            Final result                 Please view results for these tests on the individual orders.   Fluid Cell Count and Differential (NON SYNOVIAL/NON  CSF) (performable only) [[id number]] Collected: 08/24/21 1513   Specimen: Pleural fluid Updated: 08/24/21 1823    Fluid Color Orange    Fluid Clarity Cloudy    Fluid Volume 71 mL     Total Nucleated Cells 222 /mcL    Differential, Fluid [[id number]] Collected: 08/24/21 1513   Specimen: Pleural fluid Updated: 08/24/21 1823    Neutrophils %, Fluid 25 %     Lymphocytes %, Fluid 11 %     Mono/Macrophages %, Fluid 61 %     Mesothelial Cells %, Fluid 3 %     Total Cells Counted, Fluid 100     Glucose, Fluid [[id number]] (Normal) Collected: 08/24/21 1513   Specimen: Pleural fluid from Pleural Cavity, Left Updated: 08/24/21 1618    Glucose, Fluid 108 mg/dL    LDH, Fluid [[id number]] (Normal) Collected: 08/24/21 1513   Specimen: Pleural fluid from Pleural Cavity, Left Updated: 08/24/21 1618    LDH, Fluid 80 Units/L    Total Protein, Fluid [[id number]] (Abnormal) Collected: 08/24/21 1513   Specimen: Pleural fluid from Pleural Cavity, Left Updated: 08/24/21 1618    Total Protein, Fluid 4.4 High  g/dL    Albumin, Fluid [[id number]] (Abnormal) Collected: 08/24/21 1513   Specimen: Pleural fluid from Pleural Cavity, Left Updated: 08/24/21 1618    Albumin, Fluid 2.3 High  g/dL          XR CHEST PA OR AP 1 VIEW  No significant change from previous exam allowing for differences in  technique and positioning.  Moderate to large left pleural effusion with  stable left pneumothorax.  Signed on: 8/23/2021 8:18 PM      XR CHEST AP OR PA 1 VIEW   Impression: FINDINGS/IMPRESSION: Accounting for technical differences, cardiopulmonary  findings including opacification of lower two thirds of left hemithorax and  possible small left pneumothorax appears relatively stable.  Recommend  clinical correlation and follow-up as clinically warranted.  Report is provided at time of study.  Signed on: 8/25/2021 11:42 AM     CT ABDOMEN PELVIS W CONTRAST  1.   Moderate and enlarging pericardial effusion with pericardial  thickening/enhancement  concerning for pericarditis.  Previously seen  pericardial drain has been removed.  2.   Small bilateral pleural effusions.  3.   Cirrhotic morphology of the liver with splenomegaly.  4.    Dilated small bowel with relative change in caliber at the distal  ileal anastomosis although the anastomosis is widely patent without signs  of anatomic obstruction.  Findings are suggestive of postsurgical atony.    5.   On the current examination the majority of the jejunum is located in  the right abdomen, this may represent sequela of recent postsurgical  changes however clinical correlation is advised.  6.   Moderate to marked uncomplicated colonic diverticulosis.  7.   Left renal staghorn calculi.  Nonspecific bilateral perinephric fat  stranding may be due to senescence but inflammation/infection is not excluded.  8.   Other stable findings as above.  Signed on: 8/23/2021 10:23 PM     CT CHEST WO CONTRAST  Impression: 1.    Small to moderate left pleural effusion is noted predominantly at the  left lung base.  Trace increase in pleural fluid at the deep right lung base.  2.   Postop changes noted left lung.  No consolidation or mass seen in the lungs.  3.   Small increase in pericardial fluid is new from previous.  Question  mild pericardial thickening on this unenhanced exam, follow-up as necessary.  Signed on: 8/26/2021 3:15 PM     XR CHEST AP OR PA 1 VIEW   Impression: FINDINGS/IMPRESSION: Accounting for technical differences, cardiopulmonary  findings including opacification of lower two thirds of left hemithorax and  small left pneumothorax appear relatively stable.  Calcifications in region  of left kidney are noted.  Recommend clinical correlation and follow-up as clinically warranted.  Signed on: 8/26/2021 7:02 AM     ASSESSMENT   Resolving abdominal pain/diarrhea and with radiologic findings suggestive of postsurgical atony  Exudative pleural effusion S/p right thoracentesis  Recurrent pericardial effusions S/p  recent pericardiocentesis  Left stagorn calculus  CKD  Cirrhosis of the liver  Diverticulosis  CHF  Lung carcinoma  Atrial fibrillation  CAD  HTN    PLAN   Reviewed  Follow cultures  Stool studies if diarrhea recurs   IV fluids  Discontinue IV Zosyn   Discussed with patient and wife    Thank you for requesting my participation in the care of this patient    Nicho Michel MD  8/26/2021   6:34 PM   bed

## 2021-11-27 NOTE — BEHAVIORAL HEALTH ASSESSMENT NOTE - THOUGHT CONTENT
Patient calls to office - requests call - was prescribed clonazepam - the 0.5mg tablets are too hard for her to cut in half to take 0.25mg twice a day - asking if we can send prescription for 0.25 so she doesn't have to cut them    Unremarkable

## 2022-03-18 NOTE — DISCHARGE NOTE ADULT - FUNCTIONAL SCREEN CURRENT LEVEL: BATHING, MLM
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 For the last few days she has increased fatigue and fevers and was tachycardic in 140s today at ID clinic. Dr Estephanie Vo referred to to the ED for admission. Also request repeat LP if possible. And Neurology to see. She states that her head pressure has remained unchanged since she was discharged but the fevers again are new. Past Medical History:   Diagnosis Date    History of vascular access device 03/08/2022    [facility] VAT: 4 FR Single lumen PICC Right brachial for LT ABX therapy; Length 35 CM Arm circ 31 CM. Verified with Max P @ 35 CM    Raynaud's disease     Rheumatoid arthritis ([facility] [address]        Past Surgical History:   Procedure Laterality Date    HX GYN      hystorectomy         No family history on file.     Social History     Socioeconomic History    Marital status:      Spouse name: Not on file    Number of children: Not on file    Years of education: Not on file    Highest education level: Not on file   Occupational History    Not on file   Tobacco Use    Smoking status: Never Smoker    Smokeless tobacco: Not on file   Substance and Sexual Activity    Alcohol use: Not Currently    Drug use: Not Currently    Sexual activity: Not on file   Other Topics Concern     Service Not Asked    Blood Transfusions Not Asked    Caffeine Concern Not Asked    Occupational Exposure Not Asked    Hobby Hazards Not Asked    Sleep Concern Not Asked    Stress Concern Not Asked    Weight Concern Not Asked    Special Diet Not Asked    Back Care Not Asked    Exercise Not Asked    Bike Helmet Not Asked   [address] Not Asked    Self-Exams Not Asked   Social History Narrative    Not on file     Social Determinants of Health     Financial Resource Strain:     Difficulty of Paying Living Expenses: Not on file   Food Insecurity:  Worried About Running Out of Food in the Last Year: Not on file    Vick of Food in the Last Year: Not on file   Transportation Needs:     Lack of Transportation (Medical): Not on file    Lack of Transportation (Non-Medical): Not on file   Physical Activity:     Days of Exercise per Week: Not on file    Minutes of Exercise per Session: Not on file   Stress:     Feeling of Stress : Not on file   Social Connections:     Frequency of Communication with Friends and Family: Not on file    Frequency of Social Gatherings with Friends and Family: Not on file    Attends Taoist Services: Not on file    Active Member of [address] [place] [address] Infinancials or Organizations: Not on file    Attends Club or Organization Meetings: Not on file    Marital Status: Not on file   Intimate Partner Violence:     Fear of Current or Ex-Partner: Not on file    Emotionally Abused: Not on file    Physically Abused: Not on file    Sexually Abused: Not on file   Housing Stability:     Unable to Pay for Housing in the Last Year: Not on file    Number of [place] in the Last Year: Not on file    Unstable Housing in the Last Year: Not on file         ALLERGIES: Patient has no known allergies. Review of Systems   Constitutional: Positive for fatigue and fever. HENT: Negative for sore throat. Eyes: Negative for visual disturbance. Respiratory: Negative for cough. Cardiovascular: Negative for chest pain. Gastrointestinal: Negative for abdominal pain. Genitourinary: Negative for dysuria. Musculoskeletal: Negative for back pain. Skin: Negative for rash. Neurological: Negative for headaches. Vitals:    03/18/22 1341 03/18/22 1634   BP: 115/69 111/71   Pulse: 89    Resp: 16    Temp: 99.4 °F (37.4 °C)    SpO2: 100%    Weight: 63.5 kg (140 lb)             Physical Exam  Vitals and nursing note reviewed. Constitutional:       General: She is not in acute distress. Appearance: She is well-developed.    HENT:      Head: Normocephalic and atraumatic. Eyes:      Conjunctiva/sclera: Conjunctivae normal.   Neck:      Trachea: Phonation normal.   Cardiovascular:      Rate and Rhythm: Normal rate and regular rhythm. Heart sounds: Normal heart sounds. Pulmonary:      Effort: Pulmonary effort is normal. No respiratory distress. Abdominal:      General: There is no distension. Tenderness: There is no abdominal tenderness. Musculoskeletal:         General: No tenderness. Normal range of motion. Cervical back: Normal range of motion. No tenderness. Skin:     General: Skin is warm and dry. Neurological:      Mental Status: She is alert. She is not disoriented. Motor: No abnormal muscle tone. [facility]       Perfect Serve Consult for Admission  5:05 PM    ED Room Number: ER20/20  Patient Name and age:  Roldan Amin 39 y.o.  female  Working Diagnosis:   1. Sepsis, due to unspecified organism, unspecified whether acute organ dysfunction present ([facility] [address]    2. Varicella encephalitis        COVID-19 Suspicion:  no  Sepsis present:  yes  Reassessment needed: yes  Code Status:  Full Code  Readmission: yes  Isolation Requirements:  yes  Recommended Level of Care:  telemetry  Department:[facility] ED - [phone]  Other:  Patient sent from Dr. Alia Ferrara office. HAs been having fevers and had HR 140s in office. Is still continuing her IV acyclovir q8H at home. Elza Ross requested a repeat LP if possible and admission with neurology consult. I tried the LP and was unsuccessful and have asked radiology to try under flouro. CXR showed patchy opacities in both lung bases. I just ordered rocephin for her ans she received her 4pm acyclovir dose in the ED as well.       Procedures 2 = assistive person 3 = assistive equipment and person

## 2022-03-28 NOTE — DISCHARGE NOTE ADULT - CONDITION (STATED IN TERMS THAT PERMIT A SPECIFIC MEASURABLE COMPARISON WITH CONDITION ON ADMISSION):
Called pt about the message below and he stated that he is feeling better today. Patient stated that he tested Positive for Covid on Monday. Patient stated that he was just watching a zoom call at the time of the pain. Patient stated that he don't have any symptoms now. Patient stated that he has a loop recorder and would like to see if someone could pull the report from it. stable

## 2022-03-28 NOTE — PROGRESS NOTE ADULT - PROBLEM SELECTOR PLAN 3
-concern for metastatic Prostate CA vs BPH given CT findings  -anticipate D/C with Davidson in place with  outpatient f/u  -appreciate  recs, outpatient workup no

## 2022-03-29 NOTE — H&P ADULT - PROBLEM/PLAN-8
Addended by: PRACHI BURGESS on: 3/29/2022 03:25 PM     Modules accepted: Orders     DISPLAY PLAN FREE TEXT

## 2022-04-03 NOTE — PROVIDER CONTACT NOTE (CRITICAL VALUE NOTIFICATION) - ASSESSMENT
A&Ox2. confused. Denies SOB/Chest pain. Denies pain/discomfort. Davidson catheter intact, draining yellow urine. No sxs of bleeding noted. .

## 2022-10-03 NOTE — ED PROVIDER NOTE - HISTORY ATTESTATION, MLM
I have reviewed and confirmed nurses' notes... Area M Indication Text: Tumors in this location are included in Area M (cheek, forehead, scalp, neck, jawline and pretibial skin).  Mohs surgery is indicated for tumors in these anatomic locations.

## 2022-12-10 NOTE — PROGRESS NOTE ADULT - PROBLEM SELECTOR PLAN 4
Renal follow up  Urology follow up  check PSA  Flomax 0.4 mgs po daily  monitor BMP  Avoid nephrotoxic drugs. Renal follow up  Urology follow up  check PSA  Flomax 0.4 mgs po daily  monitor BMP  Avoid nephrotoxic drugs.  Cont shah Lower abd pain and urinary symptoms, already completed bactrim with minimal relief, UA without significant LE or pyuria, signed out to oncoming provider pending pelvic and renal US.

## 2022-12-12 NOTE — GOALS OF CARE CONVERSATION - PERSONAL ADVANCE DIRECTIVE - DOES PATIENT HAVE ADVANCE DIRECTIVE
No Transition of Care Plan: IPR-referral sent to [facility]    RUR: 10% low    PCP F/U: Adriana Edgar NP    Disposition: IPR-referral sent to [facility]    Transportation: BLS    Main Contact: Sister: [place]: [phone]: Met with patient at the bedside. Introduced self and role of CM. Patient A&Ox4. Confirmed demographics. Discussed IPR recommendation. Would like referral sent to [facility]. Referral sent. Will continue to follow    Peter Stiles RN, CRM    Residency:  one story home  Lives With: brother   Prior functioning:  independent  Prior DME used: previously used a cane, no longer owns any DME  Rehab history: states he has has rehab before. Cannot remember where  [facility]    Reason for Admission:  CVA                   RUR Score: 10% low                    Plan for utilizing home health: recommending IPR         PCP: First and Last name:  Janice Lundberg NP     Name of Practice:    Are you a current patient: Yes/No: No   Approximate date of last visit: greater than a year   Can you participate in a virtual visit with your PCP:                     Current Advanced Directive/Advance Care Plan: Full Code      Healthcare Decision Maker:   Click here to complete [address] including selection of the Healthcare Decision Maker Relationship (ie \"Primary\")             Primary Decision Maker: Devi Powell - [phone]                  Transition of Care Plan: IPR                   Care Management Interventions  PCP Verified by CM:  Yes  Mode of Transport at Discharge: BLS  Transition of Care Consult (CM Consult): Acute Rehab  Physical Therapy Consult: Yes  Occupational Therapy Consult: Yes  Speech Therapy Consult: Yes  Support Systems: Other Family Member(s)  Confirm Follow Up Transport: Other (see comment) (S)  Discharge Location  Patient Expects to be Discharged to[de-identified] Rehab hospital/unit acute    Transition of Care Plan:     The Plan for Transition of Care is related to the following treatment goals: [facility]    The Patient and/or patient representative was provided with a choice of provider and agrees  with the discharge plan. Yes [x] No []    A Freedom of choice list was provided with basic dialogue that supports the patient's individualized plan of care/goals and shares the quality data associated with the providers.        Yes [x] No []

## 2023-02-20 NOTE — DISCHARGE NOTE ADULT - CARE PROVIDERS DIRECT ADDRESSES
,[email or web address] Protopic Counseling: Patient may experience a mild burning sensation during topical application. Protopic is not approved in children less than 2 years of age. There have been case reports of hematologic and skin malignancies in patients using topical calcineurin inhibitors although causality is questionable.

## 2023-11-07 NOTE — BEHAVIORAL HEALTH ASSESSMENT NOTE - INSIGHT (REGARDING PSYCHIATRIC ILLNESS)
Patient Identifiers  Name: CHANTE EGAN  : [birth year]  Age: 8y3m Male    Start Time: 23 03:53  End Time: 23 08:00    History:      ESES study patient    Medications:   dexmethylphenidate XR Oral Tab/Cap - Peds 5 milliGRAM(s) Oral daily  diazepam Rectal Gel - Peds 10 milliGRAM(s) Rectal once PRN  doxepin Oral Liquid - Peds 10 milliGRAM(s) Oral at bedtime  melatonin Oral Tab/Cap - Peds 3 milliGRAM(s) Oral at bedtime PRN  QUEtiapine Oral Liquid - Peds 25 milliGRAM(s) Oral once PRN  ___________________________________________________________________________  Recording Technique:     The patient underwent continuous Video/EEG monitoring using a cable telemetry system [employer].  The EEG was recorded from 21 electrodes using the standard 10/20 placement, with EKG.  Time synchronized digital video recording was done simultaneously with EEG recording.  The EEG was continuously sampled on disk, and spike detection and seizure detection algorithms marked portions of the EEG for further analysis offline.  Video data was stored on disk for important clinical events (indicated by manual pushbutton) and for periods identified by the seizure detection algorithm, and analyzed offline.    Video and EEG data were reviewed by the electroencephalographer on a daily basis, and selected segments were archived on compact disc.    The patient was attended by an EEG technician for eight to ten hours per day.  Patients were observed by the epilepsy nursing staff 24 hours per day.  The epilepsy center neurologist was available in person or on call 24 hours per day during the period of monitoring.    ___________________________________________________________________________    Background in drowsiness/sleep:  As the patient became drowsy, there was an attenuation of the background and the appearance of widespread, irregular slower frequency activity.  Stage II sleep was marked by synchronous age appropriate spindles. Normal slow wave sleep was achieved.     Slowing:  No focal slowing was present. No generalized slowing was present.     Attenuation and Asymmetry: None.    Interictal Activity:  Occasional P4/T6 spikes during sleep.      Patient Events/ Ictal Activity: No push button events or seizures were recorded during the monitoring period.      Activation Procedures:  None.    EKG:  No clear abnormalities were noted.     Impression:  This is an abnormal video EEG study due to the following finding:    - Occasional P4/T6 spikes during sleep.     Clinical Correlation:   The findings of this EEG recording indicated a focal epileptic diathesis over the right temporoparietal region consistent with the interictal manifestation of a focal epilepsy in the appropriate clinical setting. No seizures were recorded during the monitoring period.      Cesar Delong MD  PGY-6, Pediatric Epilepsy Fellow    ***THIS IS A PRELIMINARY FELLOW REPORT PENDING REVIEW WITH ATTENDING EPILEPTOLOGIST***   Patient Identifiers  Name: CHANTE EGAN  : [birth year]  Age: 8y3m Male    Start Time: 23 03:53  End Time: 23 08:00    History:      ESES study patient    Medications:   dexmethylphenidate XR Oral Tab/Cap - Peds 5 milliGRAM(s) Oral daily  diazepam Rectal Gel - Peds 10 milliGRAM(s) Rectal once PRN  doxepin Oral Liquid - Peds 10 milliGRAM(s) Oral at bedtime  melatonin Oral Tab/Cap - Peds 3 milliGRAM(s) Oral at bedtime PRN  QUEtiapine Oral Liquid - Peds 25 milliGRAM(s) Oral once PRN  ___________________________________________________________________________  Recording Technique:     The patient underwent continuous Video/EEG monitoring using a cable telemetry system [employer].  The EEG was recorded from 21 electrodes using the standard 10/20 placement, with EKG.  Time synchronized digital video recording was done simultaneously with EEG recording.  The EEG was continuously sampled on disk, and spike detection and seizure detection algorithms marked portions of the EEG for further analysis offline.  Video data was stored on disk for important clinical events (indicated by manual pushbutton) and for periods identified by the seizure detection algorithm, and analyzed offline.    Video and EEG data were reviewed by the electroencephalographer on a daily basis, and selected segments were archived on compact disc.    The patient was attended by an EEG technician for eight to ten hours per day.  Patients were observed by the epilepsy nursing staff 24 hours per day.  The epilepsy center neurologist was available in person or on call 24 hours per day during the period of monitoring.    ___________________________________________________________________________    Background in drowsiness/sleep:  As the patient became drowsy, there was an attenuation of the background and the appearance of widespread, irregular slower frequency activity.  Stage II sleep was marked by synchronous age appropriate spindles. Normal slow wave sleep was achieved.     Slowing:  No focal slowing was present. No generalized slowing was present.     Attenuation and Asymmetry: None.    Interictal Activity:  Occasional P4/T6 spikes during sleep.      Patient Events/ Ictal Activity: No push button events or seizures were recorded during the monitoring period.      Activation Procedures:  None.    EKG:  No clear abnormalities were noted.     Impression:  This is an abnormal video EEG study due to the following finding:    - Occasional P4/T6 spikes during sleep.     Clinical Correlation:   The findings of this EEG recording indicated a focal epileptic diathesis over the right temporoparietal region consistent with the interictal manifestation of a focal epilepsy in the appropriate clinical setting. No seizures were recorded during the monitoring period.      Cesar Delong MD  PGY-6, Pediatric Epilepsy Fellow    ***THIS IS A PRELIMINARY FELLOW REPORT PENDING REVIEW WITH ATTENDING EPILEPTOLOGIST***   Patient Identifiers  Name: CHANTE EGAN  : [birth year]  Age: 8y3m Male    Start Time: 23 03:53  End Time: 23 08:00    History:      ESES study patient    Medications:   dexmethylphenidate XR Oral Tab/Cap - Peds 5 milliGRAM(s) Oral daily  diazepam Rectal Gel - Peds 10 milliGRAM(s) Rectal once PRN  doxepin Oral Liquid - Peds 10 milliGRAM(s) Oral at bedtime  melatonin Oral Tab/Cap - Peds 3 milliGRAM(s) Oral at bedtime PRN  QUEtiapine Oral Liquid - Peds 25 milliGRAM(s) Oral once PRN  ___________________________________________________________________________  Recording Technique:     The patient underwent continuous Video/EEG monitoring using a cable telemetry system [employer].  The EEG was recorded from 21 electrodes using the standard 10/20 placement, with EKG.  Time synchronized digital video recording was done simultaneously with EEG recording.  The EEG was continuously sampled on disk, and spike detection and seizure detection algorithms marked portions of the EEG for further analysis offline.  Video data was stored on disk for important clinical events (indicated by manual pushbutton) and for periods identified by the seizure detection algorithm, and analyzed offline.    Video and EEG data were reviewed by the electroencephalographer on a daily basis, and selected segments were archived on compact disc.    The patient was attended by an EEG technician for eight to ten hours per day.  Patients were observed by the epilepsy nursing staff 24 hours per day.  The epilepsy center neurologist was available in person or on call 24 hours per day during the period of monitoring.    ___________________________________________________________________________    Background in drowsiness/sleep:  As the patient became drowsy, there was an attenuation of the background and the appearance of widespread, irregular slower frequency activity.  Stage II sleep was marked by synchronous age appropriate spindles. Normal slow wave sleep was achieved.     Slowing:  No focal slowing was present. No generalized slowing was present.     Attenuation and Asymmetry: None.    Interictal Activity:  Occasional P4/T6 spikes during sleep.      Patient Events/ Ictal Activity: No push button events or seizures were recorded during the monitoring period.      Activation Procedures:  None.    EKG:  No clear abnormalities were noted.     Impression:  This is an abnormal video EEG study due to the following finding:    - Occasional P4/T6 spikes during sleep.     Clinical Correlation:   The findings of this EEG recording indicated a focal epileptic diathesis over the right temporoparietal region consistent with the interictal manifestation of a focal epilepsy in the appropriate clinical setting. No seizures were recorded during the monitoring period.      Cesar Delong MD  PGY-6, Pediatric Epilepsy Fellow    ***THIS IS A PRELIMINARY FELLOW REPORT PENDING REVIEW WITH ATTENDING EPILEPTOLOGIST***   Patient Identifiers  Name: CHANTE EGAN  : [birth year]  Age: 8y3m Male    Start Time: 23 00:01  End Time: 23 08:00    History:      ESES study patient    Medications:   dexmethylphenidate XR Oral Tab/Cap - Peds 5 milliGRAM(s) Oral daily  diazepam Rectal Gel - Peds 10 milliGRAM(s) Rectal once PRN  doxepin Oral Liquid - Peds 10 milliGRAM(s) Oral at bedtime  melatonin Oral Tab/Cap - Peds 3 milliGRAM(s) Oral at bedtime PRN  QUEtiapine Oral Liquid - Peds 25 milliGRAM(s) Oral once PRN  ___________________________________________________________________________  Recording Technique:     The patient underwent continuous Video/EEG monitoring using a cable telemetry system [employer].  The EEG was recorded from 21 electrodes using the standard 10/20 placement, with EKG.  Time synchronized digital video recording was done simultaneously with EEG recording.  The EEG was continuously sampled on disk, and spike detection and seizure detection algorithms marked portions of the EEG for further analysis offline.  Video data was stored on disk for important clinical events (indicated by manual pushbutton) and for periods identified by the seizure detection algorithm, and analyzed offline.    Video and EEG data were reviewed by the electroencephalographer on a daily basis, and selected segments were archived on compact disc.    The patient was attended by an EEG technician for eight to ten hours per day.  Patients were observed by the epilepsy nursing staff 24 hours per day.  The epilepsy center neurologist was available in person or on call 24 hours per day during the period of monitoring.    ___________________________________________________________________________    Background in drowsiness/sleep:  As the patient became drowsy, there was an attenuation of the background and the appearance of widespread, irregular slower frequency activity.  Stage II sleep was marked by synchronous age appropriate spindles. Normal slow wave sleep was achieved.     Slowing:  No focal slowing was present. No generalized slowing was present.     Attenuation and Asymmetry: None.    Interictal Activity:  Occasional P4/T6 spikes during sleep.      Patient Events/ Ictal Activity: No push button events or seizures were recorded during the monitoring period.      Activation Procedures:  None.    EKG:  No clear abnormalities were noted.     Impression:  This is an abnormal video EEG study due to the following finding:    - Occasional P4/T6 spikes during sleep.     Clinical Correlation:   The findings of this EEG recording indicated a focal epileptic diathesis over the right temporoparietal region consistent with the interictal manifestation of a focal epilepsy in the appropriate clinical setting. No seizures were recorded during the monitoring period.      Cesar Delong MD  PGY-6, Pediatric Epilepsy Fellow    ***THIS IS A PRELIMINARY FELLOW REPORT PENDING REVIEW WITH ATTENDING EPILEPTOLOGIST***   Patient Identifiers  Name: CHANTE EGAN  : [birth year]  Age: 8y3m Male    Start Time: 23 00:01  End Time: 23 08:00    History:      ESES study patient    Medications:   dexmethylphenidate XR Oral Tab/Cap - Peds 5 milliGRAM(s) Oral daily  diazepam Rectal Gel - Peds 10 milliGRAM(s) Rectal once PRN  doxepin Oral Liquid - Peds 10 milliGRAM(s) Oral at bedtime  melatonin Oral Tab/Cap - Peds 3 milliGRAM(s) Oral at bedtime PRN  QUEtiapine Oral Liquid - Peds 25 milliGRAM(s) Oral once PRN  ___________________________________________________________________________  Recording Technique:     The patient underwent continuous Video/EEG monitoring using a cable telemetry system [employer].  The EEG was recorded from 21 electrodes using the standard 10/20 placement, with EKG.  Time synchronized digital video recording was done simultaneously with EEG recording.  The EEG was continuously sampled on disk, and spike detection and seizure detection algorithms marked portions of the EEG for further analysis offline.  Video data was stored on disk for important clinical events (indicated by manual pushbutton) and for periods identified by the seizure detection algorithm, and analyzed offline.    Video and EEG data were reviewed by the electroencephalographer on a daily basis, and selected segments were archived on compact disc.    The patient was attended by an EEG technician for eight to ten hours per day.  Patients were observed by the epilepsy nursing staff 24 hours per day.  The epilepsy center neurologist was available in person or on call 24 hours per day during the period of monitoring.    ___________________________________________________________________________    Background in drowsiness/sleep:  As the patient became drowsy, there was an attenuation of the background and the appearance of widespread, irregular slower frequency activity.  Stage II sleep was marked by synchronous age appropriate spindles. Normal slow wave sleep was achieved.     Slowing:  No focal slowing was present. No generalized slowing was present.     Attenuation and Asymmetry: None.    Interictal Activity:  Occasional P4/T6 spikes during sleep.      Patient Events/ Ictal Activity: No push button events or seizures were recorded during the monitoring period.      Activation Procedures:  None.    EKG:  No clear abnormalities were noted.     Impression:  This is an abnormal video EEG study due to the following finding:    - Occasional P4/T6 spikes during sleep.     Clinical Correlation:   The findings of this EEG recording indicated a focal epileptic diathesis over the right temporoparietal region consistent with the interictal manifestation of a focal epilepsy in the appropriate clinical setting. No seizures were recorded during the monitoring period.      Cesar Delong MD  PGY-6, Pediatric Epilepsy Fellow    ***THIS IS A PRELIMINARY FELLOW REPORT PENDING REVIEW WITH ATTENDING EPILEPTOLOGIST***   Patient Identifiers  Name: CHANTE EGAN  : [birth year]  Age: 8y3m Male    Start Time: 23 00:01  End Time: 23 08:00    History:      ESES study patient    Medications:   dexmethylphenidate XR Oral Tab/Cap - Peds 5 milliGRAM(s) Oral daily  diazepam Rectal Gel - Peds 10 milliGRAM(s) Rectal once PRN  doxepin Oral Liquid - Peds 10 milliGRAM(s) Oral at bedtime  melatonin Oral Tab/Cap - Peds 3 milliGRAM(s) Oral at bedtime PRN  QUEtiapine Oral Liquid - Peds 25 milliGRAM(s) Oral once PRN  ___________________________________________________________________________  Recording Technique:     The patient underwent continuous Video/EEG monitoring using a cable telemetry system [employer].  The EEG was recorded from 21 electrodes using the standard 10/20 placement, with EKG.  Time synchronized digital video recording was done simultaneously with EEG recording.  The EEG was continuously sampled on disk, and spike detection and seizure detection algorithms marked portions of the EEG for further analysis offline.  Video data was stored on disk for important clinical events (indicated by manual pushbutton) and for periods identified by the seizure detection algorithm, and analyzed offline.    Video and EEG data were reviewed by the electroencephalographer on a daily basis, and selected segments were archived on compact disc.    The patient was attended by an EEG technician for eight to ten hours per day.  Patients were observed by the epilepsy nursing staff 24 hours per day.  The epilepsy center neurologist was available in person or on call 24 hours per day during the period of monitoring.    ___________________________________________________________________________    Background in drowsiness/sleep:  As the patient became drowsy, there was an attenuation of the background and the appearance of widespread, irregular slower frequency activity.  Stage II sleep was marked by synchronous age appropriate spindles. Normal slow wave sleep was achieved.     Slowing:  No focal slowing was present. No generalized slowing was present.     Attenuation and Asymmetry: None.    Interictal Activity:  Occasional P4/T6 spikes during sleep.      Patient Events/ Ictal Activity: No push button events or seizures were recorded during the monitoring period.      Activation Procedures:  None.    EKG:  No clear abnormalities were noted.     Impression:  This is an abnormal video EEG study due to the following finding:    - Occasional P4/T6 spikes during sleep.     Clinical Correlation:   The findings of this EEG recording indicated a focal epileptic diathesis over the right temporoparietal region consistent with the interictal manifestation of a focal epilepsy in the appropriate clinical setting. No seizures were recorded during the monitoring period.      Cesar Delong MD  PGY-6, Pediatric Epilepsy Fellow    ***THIS IS A PRELIMINARY FELLOW REPORT PENDING REVIEW WITH ATTENDING EPILEPTOLOGIST***   Fair

## 2023-11-16 NOTE — H&P ADULT - NSHPOUTPATIENTPROVIDERS_GEN_ALL_CORE
Advised rest and increase water intake until hearing from you. Has a HA and gets a little lightheaded. Denies other sx. Drinks about 1 Louie cup size container of water a day.    Dr Baldwin

## 2023-11-21 NOTE — PATIENT PROFILE ADULT. - URINARY CATHETER
"Subjective   Chief complaint: Norma Valdovinos is a 31 y.o. female who presents for Follow-up (PATIENT HERE FOR 3 MONTH FOLLOW-UP.).    HPI:  Here for a 3 month follow up.  Will need medication refills.  No concerns with her doses; working well for her.  Limited labs done in March 2023.      [place]AR[place] report reviewed.  Controlled Substance Contract signed earlier this year.  Patient is in agreement with the contract.      Objective   /80 (BP Location: Left arm, Patient Position: Sitting, BP Cuff Size: Adult)   Pulse (!) 120   Temp 36.7 °C (98 °F) (Temporal)   Ht 1.6 m (5' 3\")   Wt 50.5 kg (111 lb 6.4 oz)   BMI 19.73 kg/m²     Physical Exam  General:  Alert, oriented, no acute distress  Eyes:  Sclerae white, PER, conjunctivae clear  ENT:  No nasal congestion.    Neck: Supple  Respiratory:  Normal breath sounds.    Vascular:  No edema.  CNS:  No gross neurological deficits.  Gait within normal limits.    Psychiatric:  Affect is positive and appropriate.  No depression.  No anxiety.    Review of Systems   I have reviewed and reconciled the medication list with the patient today.   Current Outpatient Medications:     b complex vitamins capsule, Take 1 capsule by mouth once daily., Disp: , Rfl:     cetirizine (ZyrTEC) 10 mg tablet, Take 1 tablet (10 mg) by mouth once daily., Disp: , Rfl:     cholecalciferol (Vitamin D-3) 25 MCG (1000 UT) tablet, Take 1 tablet (25 mcg) by mouth once daily., Disp: , Rfl:     EPINEPHrine 0.3 mg/0.3 mL injection syringe, Inject 0.3 mL (0.3 mg) into the muscle 1 time if needed for anaphylaxis. Inject into upper leg. Call 911 after use., Disp: , Rfl:     hydrOXYzine HCL (Atarax) 25 mg tablet, Take 1 tablet (25 mg) by mouth 4 times a day., Disp: , Rfl:     mometasone (Elocon) 0.1 % cream, Apply 1 Application topically twice a day., Disp: , Rfl:     multivitamin (Daily Multi-Vitamin) tablet, Take 1 tablet by mouth once daily., Disp: , Rfl:     amphetamine-dextroamphetamine (Adderall) 10 " mg tablet, Take 1 tablet (10 mg) by mouth once daily., Disp: 30 tablet, Rfl: 0    [START ON 1/19/2024] amphetamine-dextroamphetamine XR (Adderall XR) 20 mg 24 hr capsule, Take 1 capsule (20 mg) by mouth once daily. Do not start before January 19, 2024., Disp: 30 capsule, Rfl: 0     Imaging:  No results found.     Labs reviewed:    Lab Results   Component Value Date    WBC 7.1 02/11/2022    HGB 14.5 02/11/2022    HCT 44.0 02/11/2022     02/11/2022    CHOL 168 02/20/2023    TRIG 93 02/20/2023    HDL 76.2 02/20/2023    ALT 18 02/20/2023    AST 19 02/20/2023     (L) 02/20/2023    K 3.5 02/20/2023     02/20/2023    CREATININE 1.02 02/20/2023    BUN 15 02/20/2023    CO2 26 02/20/2023    TSH 2.34 02/11/2022       Assessment/Plan   Problem List Items Addressed This Visit       ADHD - Primary     Dose is working well.  Needs refill.          Relevant Medications    amphetamine-dextroamphetamine XR (Adderall XR) 20 mg 24 hr capsule (Start on 1/19/2024)    amphetamine-dextroamphetamine (Adderall) 10 mg tablet    Other Relevant Orders    CBC and Auto Differential    Comprehensive Metabolic Panel    TSH with reflex to Free T4 if abnormal    Encounter for medication refill     Medicartion reviewed.  Refills given.          Other Visit Diagnoses       Healthcare maintenance        Relevant Orders    Vitamin D 25-Hydroxy,Total (for eval of Vitamin D levels)    Hepatitis C Antibody    HIV 1/2 Antigen/Antibody Screen with Reflex to Confirmation    Lipid Panel            Continue current medications as listed  Follow up in 3 months.      no

## 2023-12-14 NOTE — PROGRESS NOTE BEHAVIORAL HEALTH - NS ED B[facility] MED ROS CONSTITUTIONAL SYMPTOMS
Chief Complaint   Patient presents with    Seizures Recurrent     Pt presents to ED with complaints of recurrent seizures at started at 1800 (1 minute) and 2012 (3 minutes). Patient's mother POA states that usually when patient states that when patient begins to have recurrent seizures, it is an underlying condition. Patient's mother states that patient recently had kidney stones removed. Patient takes perampanel, midazolam, diazepam.    Visit Vitals  BP (!) 89/51 (BP Location: LUE - Left upper extremity, Patient Position: Sitting/High-Wall's)   Pulse (!) 52   Temp 97.2 °F (36.2 °C) (Temporal)   Resp 16   SpO2 95%     No past medical history on file.        
Unable to assess
Unable to assess

## 2024-03-06 NOTE — PHYSICAL THERAPY INITIAL EVALUATION ADULT - PERTINENT HX OF CURRENT PROBLEM, REHAB EVAL
generalized weakness and fever, H/O multiple UTI's
History/Exam/Medical decision making
Medical decision making

## 2024-03-14 NOTE — PROGRESS NOTE ADULT - PROBLEM/PLAN-7
Yes
DISPLAY PLAN FREE TEXT

## 2024-06-11 NOTE — ED ADULT NURSE NOTE - RESPIRATORY WDL
Detail Level: Zone
Breathing spontaneous and unlabored. Breath sounds clear and equal bilaterally with regular rhythm.

## 2024-06-18 NOTE — PATIENT PROFILE ADULT. - FUNCTIONAL SCREEN CURRENT LEVEL: COMMUNICATION, MLM
Called and spoke to patient regarding questions on restarting lisinopril.  Patient evaluated by Dr. Gilman in office yesterday, BP 120s/70s  Advised to hold off on restarting lisinopril.  Patient will monitor blood pressure and if consistently greater than 130/80 will call office and can restart medical therapy at that time if needed.  He verbally understood and had no further questions.  
(0) understands/communicates without difficulty

## 2024-06-23 NOTE — H&P ADULT - PROBLEM/PLAN-4
[facility]  ED Nurse Handoff Report    ED Chief complaint: Fall      ED Diagnosis:   Final diagnoses:   None       Code Status: not addressed     Allergies: Not on File    Patient Story: fellmaking bed, lives independently, left hip pain shortened and rotated, B 198 for ems, fell onto carpet, hit left side first and then head, no loc no lacerations on eliquis, last ate yesterday, has not taken todays meds, did have some  water this morning, ems gave fent 50 litzy   Focused Assessment:  left hip pain     Treatments and/or interventions provided: morhine 2 mg   Patient's response to treatments and/or interventions: sats boarderline at 90 on room air, placed on O2 2 liters NC and is 96%     To be done/followed up on inpatient unit:        Does this patient have any cognitive concerns?:  none     Activity level - Baseline/Home:  Independent  Activity Level - Current:   Total Care    Patient's Preferred language: English   Needed?: No    Isolation: None  Infection: Not Applicable  Patient tested for COVID 19 prior to admission: NO  Bariatric?: No    Vital Signs:   Vitals:    06/23/24 1039 06/23/24 1045   BP: (!) 148/77    Pulse: 90    Resp:  16   Temp:  97.8  F (36.6  C)   SpO2: 90%    Weight:  49.9 kg (110 lb)       Cardiac Rhythm:     Was the PSS-3 completed:   Yes  What interventions are required if any?               Family Comments: son here   OBS brochure/video discussed/provided to patient/family: N/A              Name of person given brochure if not patient:               Relationship to patient:     For the majority of the shift this patient's behavior was .   Behavioral interventions performed were .    ED NURSE PHONE NUMBER: [phone]         DISPLAY PLAN FREE TEXT

## 2024-06-23 NOTE — PHYSICAL THERAPY INITIAL EVALUATION ADULT - SITTING BALANCE: DYNAMIC
Arrived via EMS, in restraints, KWASI hold, EMS reports pt called police around 27 times, experiencing meth induced psychosis, took 9 police officers to restrain pt, helmet was placed by officer due to patient hitting his head on the ground.   Given 5 mg versed IM and 5 mg Haldol IM  and placed on NS by EMS      
Bed: ED04  Expected date:   Expected time:   Means of arrival:   Comments:  596-Meth-on hold  
Pt ambulated to bathroom. Water and standard tray given. Ready for DEC.  
Pt restraints discontinued at 0336, debriefing with pt occurred, fluids given, declined elimination. Currently calm, pleasant and cooperative.   
RN ED Mental Health Handoff Note    KWASI    Does patient require 1:1? No    Hold and rights been given and documented for patient: Yes    Is the patient in BH scrubs? No     Has the patient been searched? Yes    Is the 15 minute observation tool up to date? Yes    Was patient issued a welcome folder? No     Room check completed this shift: Yes    PSS3 and [place] Assessment/Reassessment this shift:        Behavioral status of patient: Yellow    Code 21 called this shift? No    Use of restraints/seclusion this shift? Yes. Details: Patient arrived in restrains    Most recent vital signs:  Temp: 96.8  F (36  C) Temp src: Temporal BP: (!) 134/114 Pulse: 97   Resp: 18 SpO2: 100 % O2 Device: Nasal cannula Oxygen Delivery: 2 LPM    Medications:  Scheduled medication compliance? Yes    PRN Meds administered this shift? No    Medications   sodium chloride 0.9% BOLUS 1,000 mL (0 mLs Intravenous Stopped 6/23/24 0238)         ADLs    Meal Provided this shift? No    Hygiene items provided? Yes    ADLs completed? Yes    Date of last shower: PTA    Any significant events this shift? No    Any information that would be helpful in caring for this patient?    Pt arrived in restraints, restraints removed 0336, pt expressed sadness of situation, concerned for family safety. Cooperative after restraints removed.      Family present/updated? No    Location of patient's belongings:     Critical Care Minutes:  Does the patient need critical care minutes documented? No                   
[facility]

## 2024-11-12 NOTE — PROGRESS NOTE ADULT - PROBLEM/PLAN-9
Return to work added to chart and printed for pickup at the     Yuliya Ramsay RN  [facility]  General Surgery  [address] [place] [address]  [email or web address]  Office:[phone]  Employed by [employer]      
DISPLAY PLAN FREE TEXT

## 2025-04-07 NOTE — PROGRESS NOTE ADULT - PROBLEM SELECTOR PLAN 1
leukocytosis improving on ceftriaxone  Ucx with Ecoli, sensitivities pending  BCx: NGTD
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
Patient complaining of back pain and bilateral knee pain after falling 5 steps off ladder- -AC use